# Patient Record
Sex: FEMALE | Race: WHITE | NOT HISPANIC OR LATINO | URBAN - METROPOLITAN AREA
[De-identification: names, ages, dates, MRNs, and addresses within clinical notes are randomized per-mention and may not be internally consistent; named-entity substitution may affect disease eponyms.]

---

## 2018-06-30 ENCOUNTER — OUTPATIENT (OUTPATIENT)
Dept: OUTPATIENT SERVICES | Facility: HOSPITAL | Age: 83
LOS: 1 days | End: 2018-06-30
Payer: COMMERCIAL

## 2018-06-30 ENCOUNTER — INPATIENT (INPATIENT)
Facility: HOSPITAL | Age: 83
LOS: 15 days | Discharge: OTHER INSTITUTION | DRG: 871 | End: 2018-07-16
Attending: HOSPITALIST | Admitting: HOSPITALIST
Payer: MEDICARE

## 2018-06-30 VITALS
TEMPERATURE: 97 F | RESPIRATION RATE: 17 BRPM | HEART RATE: 86 BPM | OXYGEN SATURATION: 95 % | WEIGHT: 113.1 LBS | DIASTOLIC BLOOD PRESSURE: 65 MMHG | SYSTOLIC BLOOD PRESSURE: 120 MMHG

## 2018-06-30 DIAGNOSIS — Z00.00 ENCOUNTER FOR GENERAL ADULT MEDICAL EXAMINATION WITHOUT ABNORMAL FINDINGS: ICD-10-CM

## 2018-06-30 DIAGNOSIS — E03.9 HYPOTHYROIDISM, UNSPECIFIED: ICD-10-CM

## 2018-06-30 DIAGNOSIS — N17.9 ACUTE KIDNEY FAILURE, UNSPECIFIED: ICD-10-CM

## 2018-06-30 DIAGNOSIS — E86.0 DEHYDRATION: ICD-10-CM

## 2018-06-30 DIAGNOSIS — I10 ESSENTIAL (PRIMARY) HYPERTENSION: ICD-10-CM

## 2018-06-30 DIAGNOSIS — M19.90 UNSPECIFIED OSTEOARTHRITIS, UNSPECIFIED SITE: ICD-10-CM

## 2018-06-30 DIAGNOSIS — Z29.9 ENCOUNTER FOR PROPHYLACTIC MEASURES, UNSPECIFIED: ICD-10-CM

## 2018-06-30 DIAGNOSIS — N39.0 URINARY TRACT INFECTION, SITE NOT SPECIFIED: ICD-10-CM

## 2018-06-30 DIAGNOSIS — E78.5 HYPERLIPIDEMIA, UNSPECIFIED: ICD-10-CM

## 2018-06-30 DIAGNOSIS — R53.1 WEAKNESS: ICD-10-CM

## 2018-06-30 LAB
ALBUMIN SERPL ELPH-MCNC: 2.2 G/DL — LOW (ref 3.3–5)
ALP SERPL-CCNC: 151 U/L — HIGH (ref 40–120)
ALT FLD-CCNC: 61 U/L — SIGNIFICANT CHANGE UP (ref 12–78)
ANION GAP SERPL CALC-SCNC: 12 MMOL/L — SIGNIFICANT CHANGE UP (ref 5–17)
APPEARANCE UR: CLEAR — SIGNIFICANT CHANGE UP
APTT BLD: 29.9 SEC — SIGNIFICANT CHANGE UP (ref 27.5–37.4)
AST SERPL-CCNC: 66 U/L — HIGH (ref 15–37)
BASOPHILS # BLD AUTO: 0 K/UL — SIGNIFICANT CHANGE UP (ref 0–0.2)
BASOPHILS NFR BLD AUTO: 0 % — SIGNIFICANT CHANGE UP (ref 0–2)
BILIRUB SERPL-MCNC: 0.3 MG/DL — SIGNIFICANT CHANGE UP (ref 0.2–1.2)
BILIRUB UR-MCNC: NEGATIVE — SIGNIFICANT CHANGE UP
BUN SERPL-MCNC: 45 MG/DL — HIGH (ref 7–23)
CALCIUM SERPL-MCNC: 9.2 MG/DL — SIGNIFICANT CHANGE UP (ref 8.5–10.1)
CHLORIDE SERPL-SCNC: 110 MMOL/L — HIGH (ref 96–108)
CO2 SERPL-SCNC: 18 MMOL/L — LOW (ref 22–31)
COLOR SPEC: YELLOW — SIGNIFICANT CHANGE UP
CREAT SERPL-MCNC: 1.6 MG/DL — HIGH (ref 0.5–1.3)
DIFF PNL FLD: ABNORMAL
EOSINOPHIL # BLD AUTO: 0.82 K/UL — HIGH (ref 0–0.5)
EOSINOPHIL NFR BLD AUTO: 7 % — HIGH (ref 0–6)
GLUCOSE SERPL-MCNC: 119 MG/DL — HIGH (ref 70–99)
GLUCOSE UR QL: NEGATIVE — SIGNIFICANT CHANGE UP
HCT VFR BLD CALC: 33.1 % — LOW (ref 34.5–45)
HGB BLD-MCNC: 11.4 G/DL — LOW (ref 11.5–15.5)
INR BLD: 1.04 RATIO — SIGNIFICANT CHANGE UP (ref 0.88–1.16)
KETONES UR-MCNC: NEGATIVE — SIGNIFICANT CHANGE UP
LACTATE SERPL-SCNC: 0.7 MMOL/L — SIGNIFICANT CHANGE UP (ref 0.7–2)
LEUKOCYTE ESTERASE UR-ACNC: ABNORMAL
LYMPHOCYTES # BLD AUTO: 1.53 K/UL — SIGNIFICANT CHANGE UP (ref 1–3.3)
LYMPHOCYTES # BLD AUTO: 13 % — SIGNIFICANT CHANGE UP (ref 13–44)
MCHC RBC-ENTMCNC: 31.1 PG — SIGNIFICANT CHANGE UP (ref 27–34)
MCHC RBC-ENTMCNC: 34.4 GM/DL — SIGNIFICANT CHANGE UP (ref 32–36)
MCV RBC AUTO: 90.4 FL — SIGNIFICANT CHANGE UP (ref 80–100)
MONOCYTES # BLD AUTO: 0.82 K/UL — SIGNIFICANT CHANGE UP (ref 0–0.9)
MONOCYTES NFR BLD AUTO: 7 % — SIGNIFICANT CHANGE UP (ref 2–14)
NEUTROPHILS # BLD AUTO: 8.24 K/UL — HIGH (ref 1.8–7.4)
NEUTROPHILS NFR BLD AUTO: 48 % — SIGNIFICANT CHANGE UP (ref 43–77)
NITRITE UR-MCNC: POSITIVE
PH UR: 5 — SIGNIFICANT CHANGE UP (ref 5–8)
PLATELET # BLD AUTO: 159 K/UL — SIGNIFICANT CHANGE UP (ref 150–400)
POTASSIUM SERPL-MCNC: 3.5 MMOL/L — SIGNIFICANT CHANGE UP (ref 3.5–5.3)
POTASSIUM SERPL-SCNC: 3.5 MMOL/L — SIGNIFICANT CHANGE UP (ref 3.5–5.3)
PROT SERPL-MCNC: 6.3 G/DL — SIGNIFICANT CHANGE UP (ref 6–8.3)
PROT UR-MCNC: 150 MG/DL
PROTHROM AB SERPL-ACNC: 11.4 SEC — SIGNIFICANT CHANGE UP (ref 9.8–12.7)
RBC # BLD: 3.66 M/UL — LOW (ref 3.8–5.2)
RBC # FLD: 13.9 % — SIGNIFICANT CHANGE UP (ref 10.3–14.5)
SODIUM SERPL-SCNC: 140 MMOL/L — SIGNIFICANT CHANGE UP (ref 135–145)
SP GR SPEC: 1.01 — SIGNIFICANT CHANGE UP (ref 1.01–1.02)
UROBILINOGEN FLD QL: NEGATIVE — SIGNIFICANT CHANGE UP
WBC # BLD: 11.77 K/UL — HIGH (ref 3.8–10.5)
WBC # FLD AUTO: 11.77 K/UL — HIGH (ref 3.8–10.5)

## 2018-06-30 PROCEDURE — 70450 CT HEAD/BRAIN W/O DYE: CPT

## 2018-06-30 PROCEDURE — 99285 EMERGENCY DEPT VISIT HI MDM: CPT

## 2018-06-30 PROCEDURE — 71045 X-RAY EXAM CHEST 1 VIEW: CPT | Mod: 26

## 2018-06-30 PROCEDURE — 99223 1ST HOSP IP/OBS HIGH 75: CPT | Mod: AI,GC

## 2018-06-30 PROCEDURE — 70450 CT HEAD/BRAIN W/O DYE: CPT | Mod: 26

## 2018-06-30 PROCEDURE — 93010 ELECTROCARDIOGRAM REPORT: CPT

## 2018-06-30 RX ORDER — LACTOBACILLUS ACIDOPHILUS 100MM CELL
1 CAPSULE ORAL DAILY
Qty: 0 | Refills: 0 | Status: DISCONTINUED | OUTPATIENT
Start: 2018-06-30 | End: 2018-07-16

## 2018-06-30 RX ORDER — FUROSEMIDE 40 MG
10 TABLET ORAL ONCE
Qty: 0 | Refills: 0 | Status: COMPLETED | OUTPATIENT
Start: 2018-06-30 | End: 2018-06-30

## 2018-06-30 RX ORDER — ASPIRIN/CALCIUM CARB/MAGNESIUM 324 MG
0 TABLET ORAL
Qty: 0 | Refills: 0 | COMMUNITY

## 2018-06-30 RX ORDER — METOPROLOL TARTRATE 50 MG
12.5 TABLET ORAL DAILY
Qty: 0 | Refills: 0 | Status: DISCONTINUED | OUTPATIENT
Start: 2018-06-30 | End: 2018-07-01

## 2018-06-30 RX ORDER — CHOLECALCIFEROL (VITAMIN D3) 125 MCG
1000 CAPSULE ORAL DAILY
Qty: 0 | Refills: 0 | Status: DISCONTINUED | OUTPATIENT
Start: 2018-06-30 | End: 2018-07-16

## 2018-06-30 RX ORDER — METOPROLOL TARTRATE 50 MG
0 TABLET ORAL
Qty: 0 | Refills: 0 | COMMUNITY

## 2018-06-30 RX ORDER — CEFTRIAXONE 500 MG/1
1 INJECTION, POWDER, FOR SOLUTION INTRAMUSCULAR; INTRAVENOUS EVERY 24 HOURS
Qty: 0 | Refills: 0 | Status: DISCONTINUED | OUTPATIENT
Start: 2018-07-01 | End: 2018-07-01

## 2018-06-30 RX ORDER — CEFTRIAXONE 500 MG/1
1 INJECTION, POWDER, FOR SOLUTION INTRAMUSCULAR; INTRAVENOUS ONCE
Qty: 0 | Refills: 0 | Status: COMPLETED | OUTPATIENT
Start: 2018-06-30 | End: 2018-06-30

## 2018-06-30 RX ORDER — ASPIRIN/CALCIUM CARB/MAGNESIUM 324 MG
81 TABLET ORAL DAILY
Qty: 0 | Refills: 0 | Status: DISCONTINUED | OUTPATIENT
Start: 2018-06-30 | End: 2018-07-16

## 2018-06-30 RX ORDER — LORATADINE 10 MG/1
10 TABLET ORAL DAILY
Qty: 0 | Refills: 0 | Status: DISCONTINUED | OUTPATIENT
Start: 2018-06-30 | End: 2018-07-16

## 2018-06-30 RX ORDER — LANOLIN ALCOHOL/MO/W.PET/CERES
0 CREAM (GRAM) TOPICAL
Qty: 0 | Refills: 0 | COMMUNITY

## 2018-06-30 RX ORDER — LEVOTHYROXINE SODIUM 125 MCG
0 TABLET ORAL
Qty: 0 | Refills: 0 | COMMUNITY

## 2018-06-30 RX ORDER — IBUPROFEN 200 MG
800 TABLET ORAL DAILY
Qty: 0 | Refills: 0 | Status: DISCONTINUED | OUTPATIENT
Start: 2018-06-30 | End: 2018-07-16

## 2018-06-30 RX ORDER — SODIUM CHLORIDE 9 MG/ML
1000 INJECTION INTRAMUSCULAR; INTRAVENOUS; SUBCUTANEOUS
Qty: 0 | Refills: 0 | Status: COMPLETED | OUTPATIENT
Start: 2018-06-30 | End: 2018-06-30

## 2018-06-30 RX ORDER — IPRATROPIUM/ALBUTEROL SULFATE 18-103MCG
3 AEROSOL WITH ADAPTER (GRAM) INHALATION ONCE
Qty: 0 | Refills: 0 | Status: COMPLETED | OUTPATIENT
Start: 2018-06-30 | End: 2018-06-30

## 2018-06-30 RX ORDER — SODIUM CHLORIDE 9 MG/ML
1000 INJECTION INTRAMUSCULAR; INTRAVENOUS; SUBCUTANEOUS ONCE
Qty: 0 | Refills: 0 | Status: COMPLETED | OUTPATIENT
Start: 2018-06-30 | End: 2018-06-30

## 2018-06-30 RX ORDER — LEVOTHYROXINE SODIUM 125 MCG
75 TABLET ORAL DAILY
Qty: 0 | Refills: 0 | Status: DISCONTINUED | OUTPATIENT
Start: 2018-06-30 | End: 2018-07-16

## 2018-06-30 RX ORDER — ENOXAPARIN SODIUM 100 MG/ML
30 INJECTION SUBCUTANEOUS EVERY 24 HOURS
Qty: 0 | Refills: 0 | Status: DISCONTINUED | OUTPATIENT
Start: 2018-06-30 | End: 2018-07-16

## 2018-06-30 RX ORDER — SIMVASTATIN 20 MG/1
10 TABLET, FILM COATED ORAL AT BEDTIME
Qty: 0 | Refills: 0 | Status: DISCONTINUED | OUTPATIENT
Start: 2018-06-30 | End: 2018-07-16

## 2018-06-30 RX ADMIN — Medication 3 MILLILITER(S): at 21:40

## 2018-06-30 RX ADMIN — Medication 10 MILLIGRAM(S): at 23:10

## 2018-06-30 RX ADMIN — CEFTRIAXONE 1 GRAM(S): 500 INJECTION, POWDER, FOR SOLUTION INTRAMUSCULAR; INTRAVENOUS at 20:46

## 2018-06-30 RX ADMIN — SODIUM CHLORIDE 1000 MILLILITER(S): 9 INJECTION INTRAMUSCULAR; INTRAVENOUS; SUBCUTANEOUS at 20:45

## 2018-06-30 RX ADMIN — CEFTRIAXONE 100 GRAM(S): 500 INJECTION, POWDER, FOR SOLUTION INTRAMUSCULAR; INTRAVENOUS at 20:06

## 2018-06-30 RX ADMIN — SODIUM CHLORIDE 1000 MILLILITER(S): 9 INJECTION INTRAMUSCULAR; INTRAVENOUS; SUBCUTANEOUS at 20:43

## 2018-06-30 RX ADMIN — SODIUM CHLORIDE 1000 MILLILITER(S): 9 INJECTION INTRAMUSCULAR; INTRAVENOUS; SUBCUTANEOUS at 19:18

## 2018-06-30 NOTE — ED PROVIDER NOTE - CARE PLAN
Principal Discharge DX:	Dehydration  Secondary Diagnosis:	Generalized weakness Principal Discharge DX:	Dehydration  Secondary Diagnosis:	Generalized weakness  Secondary Diagnosis:	UTI (urinary tract infection)

## 2018-06-30 NOTE — H&P ADULT - PROBLEM SELECTOR PLAN 9
IMPROVE VTE Individual Risk Assessment          RISK                                                          Points  [  ] Previous VTE                                                3  [  ] Thrombophilia                                            2  [  ] Lower limb paralysis                                  2        (unable to hold up >15 seconds)    [  ] Current Cancer                                            2         (within 6 months)  [  ] Immobilization > 24 hrs                             1  [  ] ICU/CCU stay > 24 hours                           1  [ X ] Age > 60                                                1    IMPROVE VTE Score:  1    DVT ppx: heparin

## 2018-06-30 NOTE — H&P ADULT - NSHPSOCIALHISTORY_GEN_ALL_CORE
Marital Status:  Lives with:   Occupation:     Tobacco Use:   Alcohol Use:  Substance Use:    Immunization Hx  [  ] Flu shot                          Date:   [  ] Pneumonia shot           Date:  [  ] Tetanus                          Date:    [  ] Advanced Directives: [   ] declined   [  ] health care proxy: Marital Status:   Lives with:   Occupation: Retired    Tobacco Use: Former smoker, quit>50yrs ago   Alcohol Use: Wine on occasion  Substance Use: Denies    [  ] Advanced Directives: [   ] declined   [  ] health care proxy:

## 2018-06-30 NOTE — H&P ADULT - PSH
Hip Replacement  left hip  Knee Replacement  b/l  S/P cataract surgery  bialteral  S/P hip replacement    S/P knee replacement, bilateral  Partial R) hip  S/P ovarian cystectomy Knee Replacement  b/l  S/P cataract surgery  bialteral  S/P hip replacement    S/P knee replacement, bilateral  Partial R) hip  S/P ovarian cystectomy

## 2018-06-30 NOTE — ED ADULT NURSE REASSESSMENT NOTE - NS ED NURSE REASSESS COMMENT FT1
2106 Pt. in no distress, interacting well with nurses and family. IV abx completed as ordered, no adverse rxn. Family at bedside. IVF as ordered. Pending admission, will monitor - olivia rn

## 2018-06-30 NOTE — ED ADULT NURSE NOTE - PSH
Hip Replacement  left hip  Knee Replacement  b/l  S/P cataract surgery  bialteral  S/P hip replacement    S/P knee replacement, bilateral  Partial R) hip  S/P ovarian cystectomy

## 2018-06-30 NOTE — H&P ADULT - PROBLEM SELECTOR PLAN 3
Likely due to UTI and dehydration  - PT consult Likely due to UTI and dehydration  - Baseline walks with cane and walker   - PT consult

## 2018-06-30 NOTE — H&P ADULT - PROBLEM SELECTOR PLAN 7
IMPROVE VTE Individual Risk Assessment          RISK                                                          Points  [  ] Previous VTE                                                3  [  ] Thrombophilia                                            2  [  ] Lower limb paralysis                                  2        (unable to hold up >15 seconds)    [  ] Current Cancer                                            2         (within 6 months)  [  ] Immobilization > 24 hrs                             1  [  ] ICU/CCU stay > 24 hours                           1  [ X ] Age > 60                                                1    IMPROVE VTE Score:  1    DVT ppx: heparin No hx of hypertension per patient but patient on metoprolol. Denies cardiac hx.   - Continue metoprolol for now

## 2018-06-30 NOTE — H&P ADULT - NSHPREVIEWOFSYSTEMS_GEN_ALL_CORE
CONSTITUTIONAL:  No fever, chills,  weight loss, weakness or fatigue.  HEENT:  Eyes:  No visual loss, blurred vision, diplopia or yellow sclerae.   Ears, Nose, Throat:  No hearing loss, congestion, runny nose or dysphagia.  CARDIOVASCULAR:  No chest pain, chest pressure or chest discomfort. No palpitations or edema.  RESPIRATORY:  No dyspnea, cough, congestion, wheeze.   GASTROINTESTINAL:  No abdominal pain, nausea, vomiting or diarrhea.   GENITOURINARY:  No dysuria, urinary frequency or hematuria.   NEUROLOGICAL:  No headache, dizziness, syncope, paralysis, ataxia, numbness or tingling in the extremities. No change in bowel or bladder control.  MUSCULOSKELETAL:  No muscle, back pain, joint pain or stiffness.  SKIN: No itch, rash, lesions.   HEMATOLOGIC:  No anemia, bleeding or bruising.  PSYCHIATRIC:  No history of depression or anxiety. CONSTITUTIONAL:  No fever, chills,  weight loss. Weakness and lightheadedness.  HEENT:  Eyes:  No visual loss, blurred vision, diplopia or yellow sclerae.   Ears, Nose, Throat:  No hearing loss, congestion, runny nose or dysphagia.  CARDIOVASCULAR:  No chest pain, chest pressure or chest discomfort. No palpitations or edema.  RESPIRATORY:  No dyspnea, cough, congestion, wheeze.   GASTROINTESTINAL:  No abdominal pain, nausea, vomiting or diarrhea.   GENITOURINARY:  No dysuria, urinary frequency or hematuria.   NEUROLOGICAL:  No headache, dizziness, syncope, paralysis, ataxia, numbness or tingling in the extremities.  MUSCULOSKELETAL:  No muscle, back pain, joint pain or stiffness.  SKIN: No itch, rash, lesions.   HEMATOLOGIC:  No anemia, bleeding or bruising.  PSYCHIATRIC:  No history of depression or anxiety.

## 2018-06-30 NOTE — ED ADULT NURSE REASSESSMENT NOTE - NS ED NURSE REASSESS COMMENT FT1
1900 Received report from outgoing RN, who states pt. at [facility] for head CT since our CT machine is down. Will assess upon arrival. As per previous RN, pt. alert, oriented, with some periods of confusion. Pending OLIVER - olivia rn

## 2018-06-30 NOTE — H&P ADULT - HISTORY OF PRESENT ILLNESS
87F with PMH of HLD and hypothyroidism presents with weakness and decreased PO intake x1 week. Brought in from home by daughter. Patient had a fever (101F) 5 days ago. Patient was lying in bed naked last night and was walking around not knowing what she was doing.    In ED, vitals stable. Labs significant for WBC 11.77, H/H 11.4/33.1, BUN/Cr 1.6/119, AST 66, alk phos 151. Troponin negative. CPK 3.9. UA positive. CXR pending. CT head negative. Given 2L NS bolus. Received dose of rocephin. 87F with PMH of hypothyroidism, HLD, and arthritis presents to ED with weakness for the past 5 days. This afternoon she felt more dizzy and weak. Family is at bedside and states that patient has also not been eating well this past week after receiving some bad news about her . Of note patient was lying in bed naked last night and was walking around not knowing what she was doing according to her daughter. Denies lightheadedness, CP, SOB, abd pain, dysuria.     In ED, vitals stable. Labs significant for WBC 11.77, H/H 11.4/33.1, BUN/Cr 1.6/119, AST 66, alk phos 151. Troponin negative. CPK 3.9. UA positive. CXR pending. CT head negative. Given 2L NS bolus. Received dose of rocephin.

## 2018-06-30 NOTE — ED PROVIDER NOTE - CONSTITUTIONAL, MLM
normal... thin, frail appearing, awake, alert, oriented to person, place, time/situation and in no apparent distress.

## 2018-06-30 NOTE — H&P ADULT - PROBLEM SELECTOR PLAN 4
- Continue levothyroxine Likely due to dehydration.   - Monitor Cr levels   - Monitor electrolytes and replete as needed

## 2018-06-30 NOTE — H&P ADULT - PROBLEM SELECTOR PLAN 2
- continue with IVF for 8 hrs Given 2L NS bolus. Hold off on IVF as pt seems to be fluid overloaded on exam. Given 2L NS bolus. Hold off on IVF as pt seems to be fluid overloaded on exam.  - Give small dose of lasix

## 2018-06-30 NOTE — H&P ADULT - PROBLEM SELECTOR PLAN 6
IMPROVE VTE Individual Risk Assessment          RISK                                                          Points  [  ] Previous VTE                                                3  [  ] Thrombophilia                                            2  [  ] Lower limb paralysis                                  2        (unable to hold up >15 seconds)    [  ] Current Cancer                                            2         (within 6 months)  [  ] Immobilization > 24 hrs                             1  [  ] ICU/CCU stay > 24 hours                           1  [ X ] Age > 60                                                1    IMPROVE VTE Score:  1    DVT ppx: heparin No hx of hypertension per patient but patient on metoprolol. Denies cardiac hx.   - Continue metoprolol for now - Continue statin

## 2018-06-30 NOTE — ED ADULT NURSE NOTE - OBJECTIVE STATEMENT
Pt to ED c/o lethargy and decrease in PO intake. Family reports pt being primary caregiver to  at home that is becoming increasingly ill. Family reports fever earlier in the week and observed as altered, pt is afebrile on arrival, pt is AxOx4, will continue to monitor

## 2018-06-30 NOTE — ED ADULT NURSE NOTE - PMH
Anxiety    Cataract  left eye  Clostridium Difficile Infection    Edema of Leg  b/l LE  Hypercholesteremia    Hyperlipidemia    Hypothyroid    Hypothyroidism    Migraine    Osteoporosis    Seasonal Allergies

## 2018-06-30 NOTE — H&P ADULT - PMH
Anxiety    Cataract  left eye  Edema of Leg  b/l LE  Hyperlipidemia    Hypothyroidism    Migraine    Osteoporosis Arthritis    Cataract  s/p cataract surgery  Edema of Leg  b/l LE  Hyperlipidemia    Hypothyroidism    Migraine    Osteoporosis

## 2018-06-30 NOTE — ED PROVIDER NOTE - OBJECTIVE STATEMENT
88 yo female hx of HLD, hypothyroid BIB daughter c/o generalized weakness decreased PO intake x 1 week.  +fever tmax 101 5 days ago with cough today.  No nausea/vomiting/diarrhea.  No abdominal pain.  PMD Dr. Eduardo Martin.  Also as per daughter, patient was lying in bed naked last night and was walking around not knowing what she was doing.

## 2018-06-30 NOTE — H&P ADULT - NSHPPHYSICALEXAM_GEN_ALL_CORE
Vital Signs Last 24 Hrs  T(C): 36.7 (30 Jun 2018 20:37), Max: 37.1 (30 Jun 2018 19:16)  T(F): 98.1 (30 Jun 2018 20:37), Max: 98.8 (30 Jun 2018 19:16)  HR: 75 (30 Jun 2018 20:37) (75 - 86)  BP: 152/83 (30 Jun 2018 20:37) (95/78 - 152/83)  RR: 16 (30 Jun 2018 20:37) (16 - 18)  SpO2: 98% (30 Jun 2018 20:37) (95% - 98%)

## 2018-06-30 NOTE — ED ADULT NURSE REASSESSMENT NOTE - NS ED NURSE REASSESS COMMENT FT1
2200 Pt. with improvement in slight wheezing after duoneb. Family and Resident doctors at bedside. Pending complete admission orders and bed placement - olivia page

## 2018-06-30 NOTE — H&P ADULT - PROBLEM SELECTOR PLAN 1
Admit to medicine.   - Continue with ceftriaxone  - f/u urine cx Admit to medicine.   - Continue with ceftriaxone  - f/u blood and urine culture

## 2018-07-01 DIAGNOSIS — R78.81 BACTEREMIA: ICD-10-CM

## 2018-07-01 DIAGNOSIS — N10 ACUTE PYELONEPHRITIS: ICD-10-CM

## 2018-07-01 DIAGNOSIS — R41.82 ALTERED MENTAL STATUS, UNSPECIFIED: ICD-10-CM

## 2018-07-01 LAB
ALBUMIN SERPL ELPH-MCNC: 2.1 G/DL — LOW (ref 3.3–5)
ALP SERPL-CCNC: 154 U/L — HIGH (ref 40–120)
ALT FLD-CCNC: 57 U/L — SIGNIFICANT CHANGE UP (ref 12–78)
ANION GAP SERPL CALC-SCNC: 11 MMOL/L — SIGNIFICANT CHANGE UP (ref 5–17)
AST SERPL-CCNC: 60 U/L — HIGH (ref 15–37)
BILIRUB SERPL-MCNC: 0.4 MG/DL — SIGNIFICANT CHANGE UP (ref 0.2–1.2)
BUN SERPL-MCNC: 32 MG/DL — HIGH (ref 7–23)
CALCIUM SERPL-MCNC: 8.4 MG/DL — LOW (ref 8.5–10.1)
CHLORIDE SERPL-SCNC: 110 MMOL/L — HIGH (ref 96–108)
CO2 SERPL-SCNC: 20 MMOL/L — LOW (ref 22–31)
CREAT SERPL-MCNC: 1.2 MG/DL — SIGNIFICANT CHANGE UP (ref 0.5–1.3)
E COLI DNA BLD POS QL NAA+NON-PROBE: SIGNIFICANT CHANGE UP
GLUCOSE SERPL-MCNC: 92 MG/DL — SIGNIFICANT CHANGE UP (ref 70–99)
GRAM STN FLD: SIGNIFICANT CHANGE UP
HCT VFR BLD CALC: 31.4 % — LOW (ref 34.5–45)
HGB BLD-MCNC: 10.5 G/DL — LOW (ref 11.5–15.5)
MCHC RBC-ENTMCNC: 30.5 PG — SIGNIFICANT CHANGE UP (ref 27–34)
MCHC RBC-ENTMCNC: 33.4 GM/DL — SIGNIFICANT CHANGE UP (ref 32–36)
MCV RBC AUTO: 91.3 FL — SIGNIFICANT CHANGE UP (ref 80–100)
METHOD TYPE: SIGNIFICANT CHANGE UP
NRBC # BLD: 0 /100 WBCS — SIGNIFICANT CHANGE UP (ref 0–0)
PLATELET # BLD AUTO: 154 K/UL — SIGNIFICANT CHANGE UP (ref 150–400)
POTASSIUM SERPL-MCNC: 3.1 MMOL/L — LOW (ref 3.5–5.3)
POTASSIUM SERPL-SCNC: 3.1 MMOL/L — LOW (ref 3.5–5.3)
PROT SERPL-MCNC: 5.9 G/DL — LOW (ref 6–8.3)
RBC # BLD: 3.44 M/UL — LOW (ref 3.8–5.2)
RBC # FLD: 14.2 % — SIGNIFICANT CHANGE UP (ref 10.3–14.5)
SODIUM SERPL-SCNC: 141 MMOL/L — SIGNIFICANT CHANGE UP (ref 135–145)
SPECIMEN SOURCE: SIGNIFICANT CHANGE UP
WBC # BLD: 15.01 K/UL — HIGH (ref 3.8–10.5)
WBC # FLD AUTO: 15.01 K/UL — HIGH (ref 3.8–10.5)

## 2018-07-01 PROCEDURE — 99233 SBSQ HOSP IP/OBS HIGH 50: CPT

## 2018-07-01 RX ORDER — PIPERACILLIN AND TAZOBACTAM 4; .5 G/20ML; G/20ML
3.38 INJECTION, POWDER, LYOPHILIZED, FOR SOLUTION INTRAVENOUS EVERY 8 HOURS
Qty: 0 | Refills: 0 | Status: DISCONTINUED | OUTPATIENT
Start: 2018-07-01 | End: 2018-07-05

## 2018-07-01 RX ORDER — VANCOMYCIN HCL 1 G
1000 VIAL (EA) INTRAVENOUS ONCE
Qty: 0 | Refills: 0 | Status: COMPLETED | OUTPATIENT
Start: 2018-07-01 | End: 2018-07-01

## 2018-07-01 RX ORDER — METOPROLOL TARTRATE 50 MG
12.5 TABLET ORAL DAILY
Qty: 0 | Refills: 0 | Status: DISCONTINUED | OUTPATIENT
Start: 2018-07-01 | End: 2018-07-16

## 2018-07-01 RX ORDER — PIPERACILLIN AND TAZOBACTAM 4; .5 G/20ML; G/20ML
3.38 INJECTION, POWDER, LYOPHILIZED, FOR SOLUTION INTRAVENOUS ONCE
Qty: 0 | Refills: 0 | Status: COMPLETED | OUTPATIENT
Start: 2018-07-01 | End: 2018-07-01

## 2018-07-01 RX ORDER — POTASSIUM CHLORIDE 20 MEQ
40 PACKET (EA) ORAL EVERY 4 HOURS
Qty: 0 | Refills: 0 | Status: COMPLETED | OUTPATIENT
Start: 2018-07-01 | End: 2018-07-01

## 2018-07-01 RX ADMIN — Medication 81 MILLIGRAM(S): at 12:07

## 2018-07-01 RX ADMIN — Medication 800 MILLIGRAM(S): at 12:07

## 2018-07-01 RX ADMIN — Medication 75 MICROGRAM(S): at 05:58

## 2018-07-01 RX ADMIN — Medication 40 MILLIEQUIVALENT(S): at 09:59

## 2018-07-01 RX ADMIN — ENOXAPARIN SODIUM 30 MILLIGRAM(S): 100 INJECTION SUBCUTANEOUS at 05:58

## 2018-07-01 RX ADMIN — Medication 1000 UNIT(S): at 12:07

## 2018-07-01 RX ADMIN — LORATADINE 10 MILLIGRAM(S): 10 TABLET ORAL at 12:07

## 2018-07-01 RX ADMIN — PIPERACILLIN AND TAZOBACTAM 25 GRAM(S): 4; .5 INJECTION, POWDER, LYOPHILIZED, FOR SOLUTION INTRAVENOUS at 21:44

## 2018-07-01 RX ADMIN — PIPERACILLIN AND TAZOBACTAM 200 GRAM(S): 4; .5 INJECTION, POWDER, LYOPHILIZED, FOR SOLUTION INTRAVENOUS at 13:35

## 2018-07-01 RX ADMIN — Medication 200 MILLIGRAM(S): at 00:08

## 2018-07-01 RX ADMIN — Medication 1 TABLET(S): at 12:07

## 2018-07-01 RX ADMIN — Medication 40 MILLIEQUIVALENT(S): at 13:36

## 2018-07-01 RX ADMIN — SIMVASTATIN 10 MILLIGRAM(S): 20 TABLET, FILM COATED ORAL at 21:44

## 2018-07-01 RX ADMIN — Medication 12.5 MILLIGRAM(S): at 05:57

## 2018-07-01 NOTE — PROGRESS NOTE ADULT - SUBJECTIVE AND OBJECTIVE BOX
INTERVAL HPI/OVERNIGHT EVENTS: Patient seen and examined at bedside. No acute events overnight. Complains of weakness this morning. Denies any other symptoms.    MEDICATIONS  (STANDING):  aspirin  chewable 81 milliGRAM(s) Oral daily  cholecalciferol 1000 Unit(s) Oral daily  enoxaparin Injectable 30 milliGRAM(s) SubCutaneous every 24 hours  ibuprofen  Tablet 800 milliGRAM(s) Oral daily  lactobacillus acidophilus 1 Tablet(s) Oral daily  levothyroxine 75 MICROGram(s) Oral daily  loratadine 10 milliGRAM(s) Oral daily  metoprolol succinate ER 12.5 milliGRAM(s) Oral daily  piperacillin/tazobactam IVPB. 3.375 Gram(s) IV Intermittent every 8 hours  simvastatin 10 milliGRAM(s) Oral at bedtime    MEDICATIONS  (PRN):  guaiFENesin    Syrup 200 milliGRAM(s) Oral every 6 hours PRN Cough      Allergies    erythromycin (Hives)    Intolerances        CONSTITUTIONAL: + weakness, no fevers or chills  RESPIRATORY: No cough, wheezing, hemoptysis; No shortness of breath  Cvs: No chest pain or palpitations  Gi: No abdominal pain. No nausea, vomiting, diarrhea or constipation. No melena or hematochezia.  Neuro: + weakness non focal    All other review of systems is negative unless indicated above.    Vital Signs Last 24 Hrs  T(C): 37.1 (2018 05:35), Max: 37.1 (2018 19:16)  T(F): 98.7 (2018 05:35), Max: 98.8 (2018 19:16)  HR: 90 (2018 05:35) (75 - 95)  BP: 138/82 (2018 05:35) (95/78 - 152/83)  BP(mean): --  RR: 16 (2018 05:35) (16 - 22)  SpO2: 97% (2018 05:35) (94% - 98%)    General: NAD  Neurology: alert and awake, nonfocal, motor strength 4/5 bl, unable to assess gait  Respiratory: CTA B/L  CV: RRR, S1S2  Abdominal: Soft, NT, ND +BS  Extremities: No edema, + peripheral pulses      LABS:                        10.5   15.01 )-----------( 154      ( 2018 05:56 )             31.4     07    141  |  110<H>  |  32<H>  ----------------------------<  92  3.1<L>   |  20<L>  |  1.20    Ca    8.4<L>      2018 05:56    TPro  5.9<L>  /  Alb  2.1<L>  /  TBili  0.4  /  DBili  x   /  AST  60<H>  /  ALT  57  /  AlkPhos  154<H>      PT/INR - ( 2018 16:54 )   PT: 11.4 sec;   INR: 1.04 ratio         PTT - ( 2018 16:54 )  PTT:29.9 sec  Urinalysis Basic - ( 2018 19:53 )    Color: Yellow / Appearance: Clear / S.010 / pH: x  Gluc: x / Ketone: Negative  / Bili: Negative / Urobili: Negative   Blood: x / Protein: 150 mg/dL / Nitrite: Positive   Leuk Esterase: Moderate / RBC: 6-10 /HPF / WBC 26-50   Sq Epi: x / Non Sq Epi: Moderate / Bacteria: Many        RADIOLOGY & ADDITIONAL TESTS:

## 2018-07-01 NOTE — PATIENT PROFILE ADULT. - FUNCTIONAL SCREEN CURRENT LEVEL: COMMUNICATION, MLM
Progress Notes by Gianni Fajardo, [facility] at 06/28/18 11:49 AM     Author:  Gianni Fajardo CMA Service:  (none) Author Type:  Certified Medical Assistant     Filed:  06/28/18 11:49 AM Encounter Date:  2018 Status:  Signed     :  Gianni Fajardo, [facility] (Certified Medical Assistant)              We have recommended to patient/parent/guardian that they should wait 15 minutes after receiving an injection. [JM1.1T] [address] [facility] [address], [address] [facility], [address][JM1.1M]    Electronically Signed by:    Gianni Fajardo CMA , 2018[JM1.1T]      Revision History        User Key Date/Time User Provider Type Action    > JM1.1 06/28/18 11:49 AM Cristina Bermudez, [facility] Certified Medical Assistant Sign    M - Manual, T - Template
(0) understands/communicates without difficulty

## 2018-07-01 NOTE — PROVIDER CONTACT NOTE (CRITICAL VALUE NOTIFICATION) - TEST AND RESULT REPORTED:
blood culture from 6/30/18 gram negative rods in aerobic bottle and gram positive cocci in clusters in the anaerobic bottle

## 2018-07-01 NOTE — PROGRESS NOTE ADULT - PROBLEM SELECTOR PLAN 1
- Blood Cx +  GNR   - ID Dr. Connell consulted  - Switched to zosyn for broader coverage given rising WBC count as well  - f/u repeat blood cultures and urine cx

## 2018-07-01 NOTE — CONSULT NOTE ADULT - SUBJECTIVE AND OBJECTIVE BOX
HPI:  87F with PMH of hypothyroidism, HLD, and arthritis presents to ED with weakness for the past 5 days PTA. She felt more dizzy and weak. Family was at bedside and states that patient has also not been eating well this past week after receiving some bad news about her . Of note patient was lying in bed naked last night and was walking around not knowing what she was doing according to her daughter. Denies lightheadedness, CP, SOB, abd pain, dysuria.     In ED, vitals stable. Labs significant for WBC 11.77, H/H 11.4/33.1, BUN/Cr 1.6/119, AST 66, alk phos 151. Troponin negative. CPK 3.9. UA positive. CXR pending. CT head negative. Given 2L NS bolus. Received dose of rocephin. (2018 20:55)    When I see the patient she is upset reporting that her  is here somewhere and no one will acknowledge this and she feel certain something suspicious is going on here.      PAST MEDICAL & SURGICAL HISTORY:  Arthritis  Hyperlipidemia  Osteoporosis  Migraine  Cataract: s/p cataract surgery  Edema of Leg: b/l LE  Hypothyroidism  S/P cataract surgery: bialteral  S/P ovarian cystectomy  S/P hip replacement  S/P knee replacement, bilateral: Partial R) hip  Knee Replacement: b/l      Antimicrobials  cefTRIAXone   IVPB 1 Gram(s) IV Intermittent every 24 hours      Immunological      Other  aspirin  chewable 81 milliGRAM(s) Oral daily  cholecalciferol 1000 Unit(s) Oral daily  enoxaparin Injectable 30 milliGRAM(s) SubCutaneous every 24 hours  guaiFENesin    Syrup 200 milliGRAM(s) Oral every 6 hours PRN  ibuprofen  Tablet 800 milliGRAM(s) Oral daily  lactobacillus acidophilus 1 Tablet(s) Oral daily  levothyroxine 75 MICROGram(s) Oral daily  loratadine 10 milliGRAM(s) Oral daily  metoprolol succinate ER 12.5 milliGRAM(s) Oral daily  potassium chloride    Tablet ER 40 milliEquivalent(s) Oral every 4 hours  simvastatin 10 milliGRAM(s) Oral at bedtime      Allergies    erythromycin (Hives)    Intolerances      SOCIAL HISTORY: no toxic habits reported    FAMILY HISTORY:  No pertinent family history in first degree relatives      ROS:    EYES:  Negative  blurry vision or double vision  GASTROINTESTINAL:  Negative for nausea, vomiting, diarrhea  -otherwise negative except for subjective    Vital Signs Last 24 Hrs  T(C): 37.1 (2018 05:35), Max: 37.1 (2018 19:16)  T(F): 98.7 (2018 05:35), Max: 98.8 (2018 19:16)  HR: 90 (2018 05:35) (75 - 95)  BP: 138/82 (2018 05:35) (95/78 - 152/83)  BP(mean): --  RR: 16 (2018 05:35) (16 - 22)  SpO2: 97% (2018 05:35) (94% - 98%)    PE:  WDWN in no distress  HEENT:  NC, PERRL, sclerae anicteric, conjunctivae clear, EOMI.  Sinuses nontender, no nasal exudate.  No buccal or pharyngeal lesions, erythema or exudate  Neck:  Supple, no adenopathy  Lungs:  No accessory muscle use, bilaterally clear to auscultation  Cor:  RRR, S1, S2, no murmur appreciated  Abd:  Symmetric, normoactive BS.  Soft, nontender, no masses, guarding or rebound.  Liver and spleen not enlarged  Extrem:  No cyanosis or edema  Skin:  No rashes.  Neuro: grossly intact, but not clearly well oriented  Musc: moving all limbs freely, no focal deficits    LABS:                        10.5   15.01 )-----------( 154      ( 2018 05:56 )             31.4       WBC Count: 15.01 K/uL (18 @ 05:56)  WBC Count: 11.77 K/uL (18 @ 16:59)          141  |  110<H>  |  32<H>  ----------------------------<  92  3.1<L>   |  20<L>  |  1.20    Ca    8.4<L>      2018 05:56    TPro  5.9<L>  /  Alb  2.1<L>  /  TBili  0.4  /  DBili  x   /  AST  60<H>  /  ALT  57  /  AlkPhos  154<H>        Creatinine, Serum: 1.20 mg/dL (18 @ 05:56)  Creatinine, Serum: 1.60 mg/dL (18 @ 16:36)      Urinalysis Basic - ( 2018 19:53 )    Color: Yellow / Appearance: Clear / S.010 / pH: x  Gluc: x / Ketone: Negative  / Bili: Negative / Urobili: Negative   Blood: x / Protein: 150 mg/dL / Nitrite: Positive   Leuk Esterase: Moderate / RBC: 6-10 /HPF / WBC 26-50   Sq Epi: x / Non Sq Epi: Moderate / Bacteria: Many        MICROBIOLOGY:  Culture Results:   Growth in aerobic bottle: Gram Negative Rods ( @ 18:56)  Culture Results:   Growth in aerobic bottle: Gram Negative Rods    Culture - Blood (18 @ 18:56)    -  Escherichia coli: Detec    Gram Stain:   Growth in aerobic bottle: Gram Negative Rods    Specimen Source: .Blood Blood    Organism: Blood Culture PCR    Culture Results:   Growth in aerobic bottle: Gram Negative Rods    RADIOLOGY & ADDITIONAL STUDIES:    --< from: Xray Chest 1 View AP/PA (18 @ 19:46) >    EXAM:  XR CHEST AP OR PA 1V                            PROCEDURE DATE:  2018          INTERPRETATION:  Dehydration    AP chest. Prior dated 2015.    Low lung volumes. No change heart mediastinum. Bilateral chronic   accentuated bronchovascular markings. No gross acute infiltrate or   pleural collection.    Impression: No gross acute infiltrate.

## 2018-07-01 NOTE — CONSULT NOTE ADULT - PROBLEM SELECTOR RECOMMENDATION 4
will re-evaluate clinical history as cognition improves.    Thank you for consulting us and involving us in the management of this most interesting and challenging case.     We will follow along in the care of this patient.

## 2018-07-01 NOTE — PROGRESS NOTE ADULT - ASSESSMENT
87F with PMH of hypothyroidism, HLD, and arthritis presents with weakness, admitted with UTI, GNR bacteremia.

## 2018-07-01 NOTE — PROGRESS NOTE ADULT - PROBLEM SELECTOR PLAN 7
No hx of hypertension per patient but patient on metoprolol. Denies cardiac hx.   - Continue metoprolol for now

## 2018-07-01 NOTE — CHART NOTE - NSCHARTNOTEFT_GEN_A_CORE
Notified by nurse of Growth in anaerobic bottle: Gram Positive Cocci in Clusters.  Will give one dose of Vancomycin.  Will defer to day team if they want to continue it.  Vital Signs Last 24 Hrs  T(C): 36.6 (01 Jul 2018 20:25), Max: 37.1 (01 Jul 2018 05:35)  T(F): 97.8 (01 Jul 2018 20:25), Max: 98.7 (01 Jul 2018 05:35)  HR: 89 (01 Jul 2018 20:25) (89 - 90)  BP: 131/79 (01 Jul 2018 20:25) (131/79 - 148/84)  BP(mean): --  RR: 17 (01 Jul 2018 20:25) (16 - 17)  SpO2: 95% (01 Jul 2018 20:25) (91% - 97%)

## 2018-07-01 NOTE — PATIENT PROFILE ADULT. - REASON FOR ADMISSION
They sent me to here because I have a UTI "They sent me to here because I have a UTI"  Brought in by EMS with AMS

## 2018-07-02 LAB
-  AMIKACIN: SIGNIFICANT CHANGE UP
-  AMOXICILLIN/CLAVULANIC ACID: SIGNIFICANT CHANGE UP
-  AMPICILLIN/SULBACTAM: SIGNIFICANT CHANGE UP
-  AMPICILLIN: SIGNIFICANT CHANGE UP
-  AZTREONAM: SIGNIFICANT CHANGE UP
-  CEFAZOLIN: SIGNIFICANT CHANGE UP
-  CEFEPIME: SIGNIFICANT CHANGE UP
-  CEFOXITIN: SIGNIFICANT CHANGE UP
-  CEFTRIAXONE: SIGNIFICANT CHANGE UP
-  CIPROFLOXACIN: SIGNIFICANT CHANGE UP
-  COAGULASE NEGATIVE STAPHYLOCOCCUS: SIGNIFICANT CHANGE UP
-  ERTAPENEM: SIGNIFICANT CHANGE UP
-  GENTAMICIN: SIGNIFICANT CHANGE UP
-  IMIPENEM: SIGNIFICANT CHANGE UP
-  LEVOFLOXACIN: SIGNIFICANT CHANGE UP
-  MEROPENEM: SIGNIFICANT CHANGE UP
-  NITROFURANTOIN: SIGNIFICANT CHANGE UP
-  PIPERACILLIN/TAZOBACTAM: SIGNIFICANT CHANGE UP
-  TIGECYCLINE: SIGNIFICANT CHANGE UP
-  TOBRAMYCIN: SIGNIFICANT CHANGE UP
-  TRIMETHOPRIM/SULFAMETHOXAZOLE: SIGNIFICANT CHANGE UP
ANION GAP SERPL CALC-SCNC: 11 MMOL/L — SIGNIFICANT CHANGE UP (ref 5–17)
BUN SERPL-MCNC: 29 MG/DL — HIGH (ref 7–23)
CALCIUM SERPL-MCNC: 8.8 MG/DL — SIGNIFICANT CHANGE UP (ref 8.5–10.1)
CHLORIDE SERPL-SCNC: 115 MMOL/L — HIGH (ref 96–108)
CO2 SERPL-SCNC: 19 MMOL/L — LOW (ref 22–31)
CREAT SERPL-MCNC: 1.2 MG/DL — SIGNIFICANT CHANGE UP (ref 0.5–1.3)
CULTURE RESULTS: SIGNIFICANT CHANGE UP
GLUCOSE SERPL-MCNC: 131 MG/DL — HIGH (ref 70–99)
HCT VFR BLD CALC: 33.3 % — LOW (ref 34.5–45)
HGB BLD-MCNC: 11.3 G/DL — LOW (ref 11.5–15.5)
MCHC RBC-ENTMCNC: 30.1 PG — SIGNIFICANT CHANGE UP (ref 27–34)
MCHC RBC-ENTMCNC: 33.9 GM/DL — SIGNIFICANT CHANGE UP (ref 32–36)
MCV RBC AUTO: 88.6 FL — SIGNIFICANT CHANGE UP (ref 80–100)
METHOD TYPE: SIGNIFICANT CHANGE UP
METHOD TYPE: SIGNIFICANT CHANGE UP
NRBC # BLD: 0 /100 WBCS — SIGNIFICANT CHANGE UP (ref 0–0)
ORGANISM # SPEC MICROSCOPIC CNT: SIGNIFICANT CHANGE UP
ORGANISM # SPEC MICROSCOPIC CNT: SIGNIFICANT CHANGE UP
PLATELET # BLD AUTO: 177 K/UL — SIGNIFICANT CHANGE UP (ref 150–400)
POTASSIUM SERPL-MCNC: 3.8 MMOL/L — SIGNIFICANT CHANGE UP (ref 3.5–5.3)
POTASSIUM SERPL-SCNC: 3.8 MMOL/L — SIGNIFICANT CHANGE UP (ref 3.5–5.3)
RBC # BLD: 3.76 M/UL — LOW (ref 3.8–5.2)
RBC # FLD: 14.6 % — HIGH (ref 10.3–14.5)
SODIUM SERPL-SCNC: 145 MMOL/L — SIGNIFICANT CHANGE UP (ref 135–145)
SPECIMEN SOURCE: SIGNIFICANT CHANGE UP
WBC # BLD: 20.58 K/UL — HIGH (ref 3.8–10.5)
WBC # FLD AUTO: 20.58 K/UL — HIGH (ref 3.8–10.5)

## 2018-07-02 PROCEDURE — 71045 X-RAY EXAM CHEST 1 VIEW: CPT | Mod: 26

## 2018-07-02 PROCEDURE — 99233 SBSQ HOSP IP/OBS HIGH 50: CPT

## 2018-07-02 RX ORDER — IPRATROPIUM/ALBUTEROL SULFATE 18-103MCG
3 AEROSOL WITH ADAPTER (GRAM) INHALATION ONCE
Qty: 0 | Refills: 0 | Status: COMPLETED | OUTPATIENT
Start: 2018-07-02 | End: 2018-07-02

## 2018-07-02 RX ADMIN — Medication 1 TABLET(S): at 11:24

## 2018-07-02 RX ADMIN — SIMVASTATIN 10 MILLIGRAM(S): 20 TABLET, FILM COATED ORAL at 23:12

## 2018-07-02 RX ADMIN — Medication 75 MICROGRAM(S): at 05:28

## 2018-07-02 RX ADMIN — Medication 200 MILLIGRAM(S): at 23:10

## 2018-07-02 RX ADMIN — Medication 200 MILLIGRAM(S): at 01:27

## 2018-07-02 RX ADMIN — PIPERACILLIN AND TAZOBACTAM 25 GRAM(S): 4; .5 INJECTION, POWDER, LYOPHILIZED, FOR SOLUTION INTRAVENOUS at 13:27

## 2018-07-02 RX ADMIN — Medication 12.5 MILLIGRAM(S): at 05:28

## 2018-07-02 RX ADMIN — Medication 800 MILLIGRAM(S): at 11:24

## 2018-07-02 RX ADMIN — Medication 81 MILLIGRAM(S): at 11:24

## 2018-07-02 RX ADMIN — PIPERACILLIN AND TAZOBACTAM 25 GRAM(S): 4; .5 INJECTION, POWDER, LYOPHILIZED, FOR SOLUTION INTRAVENOUS at 05:28

## 2018-07-02 RX ADMIN — Medication 3 MILLILITER(S): at 05:52

## 2018-07-02 RX ADMIN — Medication 800 MILLIGRAM(S): at 12:25

## 2018-07-02 RX ADMIN — Medication 250 MILLIGRAM(S): at 01:19

## 2018-07-02 RX ADMIN — Medication 1000 UNIT(S): at 11:25

## 2018-07-02 RX ADMIN — LORATADINE 10 MILLIGRAM(S): 10 TABLET ORAL at 11:24

## 2018-07-02 RX ADMIN — ENOXAPARIN SODIUM 30 MILLIGRAM(S): 100 INJECTION SUBCUTANEOUS at 05:28

## 2018-07-02 NOTE — PROGRESS NOTE ADULT - SUBJECTIVE AND OBJECTIVE BOX
Patient is a 87y old  Female who presents with a chief complaint of "They sent me to here because I have a UTI"  Brought in by EMS with AMS (2018 00:09)      INTERVAL HPI/OVERNIGHT EVENTS: Complains of cough, though otherwise feeling well. Denies CP, abdominal pain, fevers, nausea, vomiting.     MEDICATIONS  (STANDING):  aspirin  chewable 81 milliGRAM(s) Oral daily  cholecalciferol 1000 Unit(s) Oral daily  enoxaparin Injectable 30 milliGRAM(s) SubCutaneous every 24 hours  ibuprofen  Tablet 800 milliGRAM(s) Oral daily  lactobacillus acidophilus 1 Tablet(s) Oral daily  levothyroxine 75 MICROGram(s) Oral daily  loratadine 10 milliGRAM(s) Oral daily  metoprolol succinate ER 12.5 milliGRAM(s) Oral daily  piperacillin/tazobactam IVPB. 3.375 Gram(s) IV Intermittent every 8 hours  simvastatin 10 milliGRAM(s) Oral at bedtime    MEDICATIONS  (PRN):  guaiFENesin    Syrup 200 milliGRAM(s) Oral every 6 hours PRN Cough      Allergies    erythromycin (Hives)    Intolerances        REVIEW OF SYSTEMS:  CONSTITUTIONAL: No fever or chills  HEENT:  No headache, no sore throat  RESPIRATORY: No cough, wheezing, or shortness of breath  CARDIOVASCULAR: No chest pain, palpitations, or leg swelling  GASTROINTESTINAL: No abd pain, nausea, vomiting, or diarrhea  GENITOURINARY: No dysuria, frequency, or hematuria  NEUROLOGICAL: no focal weakness or dizziness  MUSCULOSKELETAL: no myalgias     Vital Signs Last 24 Hrs  T(C): 37.1 (2018 13:39), Max: 37.1 (2018 13:39)  T(F): 98.7 (2018 13:39), Max: 98.7 (2018 13:39)  HR: 85 (2018 13:39) (85 - 91)  BP: 121/71 (2018 13:39) (121/71 - 156/86)  BP(mean): --  RR: 17 (2018 13:39) (17 - 17)  SpO2: 95% (2018 13:39) (92% - 95%)    PHYSICAL EXAM:  GENERAL: NAD, frail, resting comfortably sitting up in a chair.   HEENT:  EOMI, moist mucous membranes  CHEST/LUNG:  Right middle and lower lobe crackles, no wheezes, no rhonchi  HEART:  RRR, S1, S2  ABDOMEN:  BS+, soft, nontender, nondistended  EXTREMITIES: no edema, cyanosis, or calf tenderness  NERVOUS SYSTEM: AA&Ox3    LABS:                        11.3   20.58 )-----------( 177      ( 2018 06:57 )             33.3     CBC Full  -  ( 2018 06:57 )  WBC Count : 20.58 K/uL  Hemoglobin : 11.3 g/dL  Hematocrit : 33.3 %  Platelet Count - Automated : 177 K/uL  Mean Cell Volume : 88.6 fl  Mean Cell Hemoglobin : 30.1 pg  Mean Cell Hemoglobin Concentration : 33.9 gm/dL  Auto Neutrophil # : x  Auto Lymphocyte # : x  Auto Monocyte # : x  Auto Eosinophil # : x  Auto Basophil # : x  Auto Neutrophil % : x  Auto Lymphocyte % : x  Auto Monocyte % : x  Auto Eosinophil % : x  Auto Basophil % : x    2018 06:57    145    |  115    |  29     ----------------------------<  131    3.8     |  19     |  1.20     Ca    8.8        2018 06:57      PT/INR - ( 2018 16:54 )   PT: 11.4 sec;   INR: 1.04 ratio         PTT - ( 2018 16:54 )  PTT:29.9 sec  Urinalysis Basic - ( 2018 19:53 )    Color: Yellow / Appearance: Clear / S.010 / pH: x  Gluc: x / Ketone: Negative  / Bili: Negative / Urobili: Negative   Blood: x / Protein: 150 mg/dL / Nitrite: Positive   Leuk Esterase: Moderate / RBC: 6-10 /HPF / WBC 26-50   Sq Epi: x / Non Sq Epi: Moderate / Bacteria: Many      CAPILLARY BLOOD GLUCOSE            Culture - Urine (collected 18 @ 22:50)  Source: .Urine Clean Catch (Midstream)  Preliminary Report (18 @ 17:40):    >100,000 CFU/ml Escherichia coli    Culture - Blood (collected 18 @ 18:56)  Source: .Blood Blood  Gram Stain (18 @ 13:31):    Growth in aerobic bottle: Gram Negative Rods    Growth in anaerobic bottle: Gram Negative Rods  Preliminary Report (18 @ 06:58):    Growth in aerobic and anaerobic bottles: Escherichia coli    "Due to technical problems, Proteus sp. will Not be reported as part of    the BCID panel until further notice"    ***Blood Panel PCR results on this specimen are available    approximately 3 hours after the Gram stain result.***    Gram stain, PCR, and/or culture results may not always    correspond due to difference in methodologies.    ************************************************************    This PCR assay was performed using [employer].    The following targets are tested for: Enterococcus,    vancomycin resistant enterococci, Listeria monocytogenes,    coagulase negative staphylococci, S. aureus,    methicillin resistant S. aureus, Streptococcus agalactiae    (Group B), S. pneumoniae, S.pyogenes (Group A),    Acinetobacter baumannii, Enterobacter cloacae, E. coli,    Klebsiella oxytoca, K. pneumoniae, Proteus sp.,    Serratia marcescens, Haemophilus influenzae,    Neisseria meningitidis, Pseudomonas aeruginosa, Candida    albicans, C. glabrata, C krusei, C parapsilosis,    C. tropicalis and the KPC resistance gene.  Organism: Blood Culture PCR (18 @ 09:56)  Organism: Blood Culture PCR (18 @ 09:56)      -  Escherichia coli: Detec      Method Type: PCR    Culture - Blood (collected 18 @ 18:56)  Source: .Blood Blood  Gram Stain (18 @ 22:49):    Growth in aerobic bottle: Gram Negative Rods    Growth in anaerobic bottle: Gram Positive Cocci in Clusters  Preliminary Report (18 @ 13:08):    Growth in aerobic bottle: Escherichia coli    SEE SPECIMEN# [id number]    Growth in anaerobic bottle: Coag Negative Staphylococcus    Single set isolate, possible contaminant. Contact    Microbiology if susceptibility testing clinically    indicated.    "Due to technical problems, Proteus sp. will Not be reported as part of    the BCID panel until further notice"    ***Blood Panel PCR results on this specimen are available    approximately 3 hours after the Gram stain result.***    Gram stain, PCR, and/or culture results may not always    correspond due to difference in methodologies.    ************************************************************    This PCR assay was performed using [employer].    The following targets are tested for: Enterococcus,    vancomycin resistant enterococci, Listeria monocytogenes,    coagulase negative staphylococci, S. aureus,    methicillin resistant S. aureus, Streptococcus agalactiae    (Group B), S. pneumoniae, S. pyogenes (Group A),    Acinetobacter baumannii, Enterobacter cloacae, E. coli,    Klebsiella oxytoca, K. pneumoniae, Proteus sp.,    Serratia marcescens, Haemophilus influenzae,    Neisseria meningitidis, Pseudomonas aeruginosa, Candida    albicans, C. glabrata, C krusei, C parapsilosis,    C. tropicalis and the KPC resistance gene.  Organism: Blood Culture PCR (18 @ 01:03)  Organism: Blood Culture PCR (18 @ 01:03)      -  Coagulase negative Staphylococcus: Detec      Method Type: PCR        RADIOLOGY & ADDITIONAL TESTS:    Personally reviewed.     Consultant(s) Notes Reviewed:  [x] YES  [ ] NO

## 2018-07-02 NOTE — PROGRESS NOTE ADULT - ATTENDING COMMENTS
Agree with exam and plan as above with following: Pt with EColi bacteremia suspected sepsis. Continue Zosyn for now. CXR concerning for possible infiltrate. Will consider  short interval CT scan. Rest as above.

## 2018-07-02 NOTE — PHYSICAL THERAPY INITIAL EVALUATION ADULT - ADDITIONAL COMMENTS
24 hour home care aide, 5 steps to enter with one handrail. Stair glide to 2nd floor. Pt states she owns a walker and a cane. ,

## 2018-07-02 NOTE — PROGRESS NOTE ADULT - SUBJECTIVE AND OBJECTIVE BOX
[facility], Division of Infectious Diseases  MARCIN Muniz A. Lee  [phone]    Name: ROSENDO ROSARIO  Age: 87y  Gender: Female  MRN: [id number]    Interval History--  Notes reviewed  pt feels better today. still coughing, no abd pain, no diarrhea, no dysuria    Past Medical History--  Arthritis  Hypothyroid  Hyperlipidemia  Seasonal Allergies  Osteoporosis  Migraine  Cataract  Edema of Leg  Chronic Edema  Seasonal Rhinitis  Hypercholesteremia  Hypothyroidism  Anxiety  Clostridium Difficile Infection  S/P cataract surgery  S/P ovarian cystectomy  S/P hip replacement  S/P knee replacement, bilateral  Hip Replacement  Knee Replacement      For details regarding the patient's social history, family history, and other miscellaneous elements, please refer the initial infectious diseases consultation and/or the admitting history and physical examination for this admission.    Allergies    erythromycin (Hives)    Intolerances        Medications--  Antibiotics:  piperacillin/tazobactam IVPB. 3.375 Gram(s) IV Intermittent every 8 hours    Immunologic:    Other:  aspirin  chewable  cholecalciferol  enoxaparin Injectable  guaiFENesin    Syrup PRN  ibuprofen  Tablet  lactobacillus acidophilus  levothyroxine  loratadine  metoprolol succinate ER  simvastatin      Review of Systems--  A 10-point review of systems was obtained.     Pertinent positives and negatives--  Constitutional: No fevers. No Chills. No Rigors.   Cardiovascular: No chest pain. No palpitations.  Respiratory: No shortness of breath. No cough.  Gastrointestinal: No nausea or vomiting. No diarrhea or constipation.   Psychiatric: no anxiety    Review of systems otherwise negative except as previously noted.    Physical Examination--  Vital Signs: T(F): 98.1 (07-02-18 @ 05:09), Max: 98.3 (07-01-18 @ 14:19)  HR: 91 (07-02-18 @ 05:09)  BP: 156/86 (07-02-18 @ 05:09)  RR: 17 (07-02-18 @ 05:09)  SpO2: 92% (07-02-18 @ 05:09)  Wt(kg): --  General: Nontoxic-appearing Female in no acute distress.  HEENT: AT/NC. Anicteric. Conjunctiva pink and moist. Oropharynx clear. Dentition fair.  Neck: Not rigid. No sense of mass.  Nodes: None palpable.  Lungs: Clear bilaterally without rales, wheezing or rhonchi  Heart: Regular rate and rhythm. + systolic murmur  Abdomen: Bowel sounds present and normoactive. Soft. Nondistended. Nontender.  Back: No spinal tenderness. No costovertebral angle tenderness.   Extremities: No cyanosis or clubbing. No edema.   Skin: Warm. Dry. Good turgor. No rash. No vasculitic stigmata.          Laboratory Studies--  CBC                        11.3   20.58 )-----------( 177      ( 02 Jul 2018 06:57 )             33.3       Chemistries  07-02    145  |  115<H>  |  29<H>  ----------------------------<  131<H>  3.8   |  19<L>  |  1.20    Ca    8.8      02 Jul 2018 06:57    TPro  5.9<L>  /  Alb  2.1<L>  /  TBili  0.4  /  DBili  x   /  AST  60<H>  /  ALT  57  /  AlkPhos  154<H>  07-01      Culture Data    Culture - Urine (collected 30 Jun 2018 22:50)  Source: .Urine Clean Catch (Midstream)  Preliminary Report (01 Jul 2018 17:40):    >100,000 CFU/ml Escherichia coli    Culture - Blood (collected 30 Jun 2018 18:56)  Source: .Blood Blood  Gram Stain (01 Jul 2018 13:31):    Growth in aerobic bottle: Gram Negative Rods    Growth in anaerobic bottle: Gram Negative Rods  Preliminary Report (02 Jul 2018 06:58):    Growth in aerobic and anaerobic bottles: Escherichia coli    "Due to technical problems, Proteus sp. will Not be reported as part of    the BCID panel until further notice"    ***Blood Panel PCR results on this specimen are available    approximately 3 hours after the Gram stain result.***    Gram stain, PCR, and/or culture results may not always    correspond due to difference in methodologies.    ************************************************************    This PCR assay was performed using [employer].    The following targets are tested for: Enterococcus,    vancomycin resistant enterococci, Listeria monocytogenes,    coagulase negative staphylococci, S. aureus,    methicillin resistant S. aureus, Streptococcus agalactiae    (Group B), S. pneumoniae, S.pyogenes (Group A),    Acinetobacter baumannii, Enterobacter cloacae, E. coli,    Klebsiella oxytoca, K. pneumoniae, Proteus sp.,    Serratia marcescens, Haemophilus influenzae,    Neisseria meningitidis, Pseudomonas aeruginosa, Candida    albicans, C. glabrata, C krusei, C parapsilosis,    C. tropicalis and the KPC resistance gene.  Organism: Blood Culture PCR (01 Jul 2018 09:56)  Organism: Blood Culture PCR (01 Jul 2018 09:56)    Culture - Blood (collected 30 Jun 2018 18:56)  Source: .Blood Blood  Gram Stain (01 Jul 2018 22:49):    Growth in aerobic bottle: Gram Negative Rods    Growth in anaerobic bottle: Gram Positive Cocci in Clusters  Preliminary Report (02 Jul 2018 13:08):    Growth in aerobic bottle: Escherichia coli    SEE SPECIMEN# [id number]    Growth in anaerobic bottle: Coag Negative Staphylococcus    Single set isolate, possible contaminant. Contact    Microbiology if susceptibility testing clinically    indicated.    "Due to technical problems, Proteus sp. will Not be reported as part of    the BCID panel until further notice"    ***Blood Panel PCR results on this specimen are available    approximately 3 hours after the Gram stain result.***    Gram stain, PCR, and/or culture results may not always    correspond due to difference in methodologies.    ************************************************************    This PCR assay was performed using [employer].    The following targets are tested for: Enterococcus,    vancomycin resistant enterococci, Listeria monocytogenes,    coagulase negative staphylococci, S. aureus,    methicillin resistant S. aureus, Streptococcus agalactiae    (Group B), S. pneumoniae, S. pyogenes (Group A),    Acinetobacter baumannii, Enterobacter cloacae, E. coli,    Klebsiella oxytoca, K. pneumoniae, Proteus sp.,    Serratia marcescens, Haemophilus influenzae,    Neisseria meningitidis, Pseudomonas aeruginosa, Candida    albicans, C. glabrata, C krusei, C parapsilosis,    C. tropicalis and the KPC resistance gene.  Organism: Blood Culture PCR (02 Jul 2018 01:03)  Organism: Blood Culture PCR (02 Jul 2018 01:03)      < from: Xray Chest 1 View AP/PA (06.30.18 @ 19:46) >    EXAM:  XR CHEST AP OR PA 1V                            PROCEDURE DATE:  06/30/2018          INTERPRETATION:  Dehydration    AP chest. Prior dated 2/4/2015.    Low lung volumes. No change heart mediastinum. Bilateral chronic   accentuated bronchovascular markings. No gross acute infiltrate or   pleural collection.    Impression: No gross acute infiltrate.    < end of copied text >

## 2018-07-02 NOTE — PROGRESS NOTE ADULT - PROBLEM SELECTOR PLAN 1
pending sensitivities  gu source?  although no symptoms urine cx with ecoli as well  follow sensitivities of both to see if concordant.  cont zosyn for now.  follow wbc and temp  repeat blood cx

## 2018-07-02 NOTE — PROGRESS NOTE ADULT - PROBLEM SELECTOR PLAN 1
- Bacteriemia:  Blood Cx +  for GNR  E.Coli  - Switched to Zosyn for broader coverage given rising WBC count  - CXR for cough , prelim read as RUL infiltrate , awaiting final read , likely pneumonia  - following recs from ID : Dr. Isabela Whiting   - f/u repeat blood cultures and urine cx

## 2018-07-02 NOTE — CHART NOTE - NSCHARTNOTEFT_GEN_A_CORE
called by RN, patient found to have increased coughing and expiratory wheezes per nurse. Patient seen and examined at bedside. Denies any SOB or chest pain, reports she feels well.     Physical Exam:  General: Well developed, well nourished, NAD  HEENT: NCAT, PERRLA, EOMI bl, moist mucous membranes   Neck: Supple, nontender, no mass  Neurology: A&Ox3, nonfocal, CN II-XII grossly intact, sensation intact  Respiratory: diffuse expiratory wheezing in all lung fields.  CV: RRR, +S1/S2, no murmurs, rubs or gallops  Abdominal: Soft, NT, ND +BSx4  Extremities: No C/C/E, + peripheral pulses  MSK: Normal ROM, no joint erythema or warmth, no joint swelling   Skin: warm, dry, normal color, no rash or abnormal lesions    Assessment and plan  87F with PMH of hypothyroidism, HLD, and arthritis presents with weakness, admitted with UTI, GNR bacteremia. Patient found to have expiratory wheezing and increased cough r/o pneumonia vs fluid overload.   -repeat CXR  -one time duoneb treatment  -will continue to follow

## 2018-07-02 NOTE — PROGRESS NOTE ADULT - ASSESSMENT
87F with PMH of hypothyroidism, HLD, and arthritis presents with weakness, admitted with UTI, GNR bacteremia. 87F with PMH of hypothyroidism, HLD, and arthritis presents with weakness, admitted with UTI, GNR bacteremia. Sepsis suspected due to UTI.

## 2018-07-03 DIAGNOSIS — R93.8 ABNORMAL FINDINGS ON DIAGNOSTIC IMAGING OF OTHER SPECIFIED BODY STRUCTURES: ICD-10-CM

## 2018-07-03 DIAGNOSIS — J18.9 PNEUMONIA, UNSPECIFIED ORGANISM: ICD-10-CM

## 2018-07-03 LAB
-  AMIKACIN: SIGNIFICANT CHANGE UP
-  AMPICILLIN/SULBACTAM: SIGNIFICANT CHANGE UP
-  AMPICILLIN: SIGNIFICANT CHANGE UP
-  AZTREONAM: SIGNIFICANT CHANGE UP
-  CEFAZOLIN: SIGNIFICANT CHANGE UP
-  CEFEPIME: SIGNIFICANT CHANGE UP
-  CEFOXITIN: SIGNIFICANT CHANGE UP
-  CEFTRIAXONE: SIGNIFICANT CHANGE UP
-  CIPROFLOXACIN: SIGNIFICANT CHANGE UP
-  ERTAPENEM: SIGNIFICANT CHANGE UP
-  GENTAMICIN: SIGNIFICANT CHANGE UP
-  IMIPENEM: SIGNIFICANT CHANGE UP
-  LEVOFLOXACIN: SIGNIFICANT CHANGE UP
-  MEROPENEM: SIGNIFICANT CHANGE UP
-  PIPERACILLIN/TAZOBACTAM: SIGNIFICANT CHANGE UP
-  TOBRAMYCIN: SIGNIFICANT CHANGE UP
-  TRIMETHOPRIM/SULFAMETHOXAZOLE: SIGNIFICANT CHANGE UP
ANION GAP SERPL CALC-SCNC: 11 MMOL/L — SIGNIFICANT CHANGE UP (ref 5–17)
BASOPHILS # BLD AUTO: 0 K/UL — SIGNIFICANT CHANGE UP (ref 0–0.2)
BASOPHILS NFR BLD AUTO: 0 % — SIGNIFICANT CHANGE UP (ref 0–2)
BUN SERPL-MCNC: 26 MG/DL — HIGH (ref 7–23)
CALCIUM SERPL-MCNC: 8.8 MG/DL — SIGNIFICANT CHANGE UP (ref 8.5–10.1)
CHLORIDE SERPL-SCNC: 111 MMOL/L — HIGH (ref 96–108)
CO2 SERPL-SCNC: 21 MMOL/L — LOW (ref 22–31)
CREAT SERPL-MCNC: 1 MG/DL — SIGNIFICANT CHANGE UP (ref 0.5–1.3)
CULTURE RESULTS: SIGNIFICANT CHANGE UP
CULTURE RESULTS: SIGNIFICANT CHANGE UP
EOSINOPHIL # BLD AUTO: 0.85 K/UL — HIGH (ref 0–0.5)
EOSINOPHIL NFR BLD AUTO: 4 % — SIGNIFICANT CHANGE UP (ref 0–6)
GLUCOSE SERPL-MCNC: 119 MG/DL — HIGH (ref 70–99)
HCT VFR BLD CALC: 31.6 % — LOW (ref 34.5–45)
HGB BLD-MCNC: 10.7 G/DL — LOW (ref 11.5–15.5)
LYMPHOCYTES # BLD AUTO: 1.7 K/UL — SIGNIFICANT CHANGE UP (ref 1–3.3)
LYMPHOCYTES # BLD AUTO: 8 % — LOW (ref 13–44)
MCHC RBC-ENTMCNC: 30.3 PG — SIGNIFICANT CHANGE UP (ref 27–34)
MCHC RBC-ENTMCNC: 33.9 GM/DL — SIGNIFICANT CHANGE UP (ref 32–36)
MCV RBC AUTO: 89.5 FL — SIGNIFICANT CHANGE UP (ref 80–100)
METHOD TYPE: SIGNIFICANT CHANGE UP
MONOCYTES # BLD AUTO: 2.77 K/UL — HIGH (ref 0–0.9)
MONOCYTES NFR BLD AUTO: 13 % — SIGNIFICANT CHANGE UP (ref 2–14)
NEUTROPHILS # BLD AUTO: 15.53 K/UL — HIGH (ref 1.8–7.4)
NEUTROPHILS NFR BLD AUTO: 70 % — SIGNIFICANT CHANGE UP (ref 43–77)
NT-PROBNP SERPL-SCNC: 8001 PG/ML — HIGH (ref 0–450)
ORGANISM # SPEC MICROSCOPIC CNT: SIGNIFICANT CHANGE UP
PLATELET # BLD AUTO: 174 K/UL — SIGNIFICANT CHANGE UP (ref 150–400)
POTASSIUM SERPL-MCNC: 3.3 MMOL/L — LOW (ref 3.5–5.3)
POTASSIUM SERPL-SCNC: 3.3 MMOL/L — LOW (ref 3.5–5.3)
RBC # BLD: 3.53 M/UL — LOW (ref 3.8–5.2)
RBC # FLD: 15.2 % — HIGH (ref 10.3–14.5)
SODIUM SERPL-SCNC: 143 MMOL/L — SIGNIFICANT CHANGE UP (ref 135–145)
SPECIMEN SOURCE: SIGNIFICANT CHANGE UP
SPECIMEN SOURCE: SIGNIFICANT CHANGE UP
TSH SERPL-MCNC: 2.43 UIU/ML — SIGNIFICANT CHANGE UP (ref 0.36–3.74)
WBC # BLD: 21.28 K/UL — HIGH (ref 3.8–10.5)
WBC # FLD AUTO: 21.28 K/UL — HIGH (ref 3.8–10.5)

## 2018-07-03 PROCEDURE — 76775 US EXAM ABDO BACK WALL LIM: CPT | Mod: 26

## 2018-07-03 PROCEDURE — 71250 CT THORAX DX C-: CPT | Mod: 26

## 2018-07-03 PROCEDURE — 99233 SBSQ HOSP IP/OBS HIGH 50: CPT

## 2018-07-03 RX ORDER — ALBUTEROL 90 UG/1
2.5 AEROSOL, METERED ORAL EVERY 8 HOURS
Qty: 0 | Refills: 0 | Status: DISCONTINUED | OUTPATIENT
Start: 2018-07-03 | End: 2018-07-16

## 2018-07-03 RX ORDER — POTASSIUM CHLORIDE 20 MEQ
20 PACKET (EA) ORAL
Qty: 0 | Refills: 0 | Status: COMPLETED | OUTPATIENT
Start: 2018-07-03 | End: 2018-07-03

## 2018-07-03 RX ADMIN — Medication 20 MILLIEQUIVALENT(S): at 12:09

## 2018-07-03 RX ADMIN — Medication 800 MILLIGRAM(S): at 13:11

## 2018-07-03 RX ADMIN — PIPERACILLIN AND TAZOBACTAM 25 GRAM(S): 4; .5 INJECTION, POWDER, LYOPHILIZED, FOR SOLUTION INTRAVENOUS at 17:21

## 2018-07-03 RX ADMIN — PIPERACILLIN AND TAZOBACTAM 25 GRAM(S): 4; .5 INJECTION, POWDER, LYOPHILIZED, FOR SOLUTION INTRAVENOUS at 08:47

## 2018-07-03 RX ADMIN — Medication 200 MILLIGRAM(S): at 07:01

## 2018-07-03 RX ADMIN — Medication 12.5 MILLIGRAM(S): at 07:01

## 2018-07-03 RX ADMIN — Medication 1 TABLET(S): at 12:12

## 2018-07-03 RX ADMIN — Medication 1000 UNIT(S): at 12:11

## 2018-07-03 RX ADMIN — Medication 20 MILLIEQUIVALENT(S): at 10:43

## 2018-07-03 RX ADMIN — Medication 800 MILLIGRAM(S): at 12:11

## 2018-07-03 RX ADMIN — PIPERACILLIN AND TAZOBACTAM 25 GRAM(S): 4; .5 INJECTION, POWDER, LYOPHILIZED, FOR SOLUTION INTRAVENOUS at 01:01

## 2018-07-03 RX ADMIN — LORATADINE 10 MILLIGRAM(S): 10 TABLET ORAL at 12:12

## 2018-07-03 RX ADMIN — Medication 81 MILLIGRAM(S): at 12:11

## 2018-07-03 RX ADMIN — SIMVASTATIN 10 MILLIGRAM(S): 20 TABLET, FILM COATED ORAL at 22:33

## 2018-07-03 RX ADMIN — PIPERACILLIN AND TAZOBACTAM 25 GRAM(S): 4; .5 INJECTION, POWDER, LYOPHILIZED, FOR SOLUTION INTRAVENOUS at 22:33

## 2018-07-03 RX ADMIN — Medication 75 MICROGRAM(S): at 07:01

## 2018-07-03 RX ADMIN — ENOXAPARIN SODIUM 30 MILLIGRAM(S): 100 INJECTION SUBCUTANEOUS at 06:56

## 2018-07-03 RX ADMIN — Medication 20 MILLIEQUIVALENT(S): at 13:38

## 2018-07-03 NOTE — PROGRESS NOTE ADULT - PROBLEM SELECTOR PLAN 2
Cough x 2 days  CXR   Non contrast CT Chest showed multifocal ground glass pneumonia, with RUL consolidation  Continue on Zosyn  - follow ID recs  - Consulted Pulmonary Dr. Mack Caldera  - Will contact primary care doctor :  Eduardo Martin Cough x 2 days  CXR showed RUL infiltrate  Non contrast CT Chest showed multifocal ground glass pneumonia, with RUL consolidation  Continue on Zosyn  - follow ID recs  - Consulted Pulmonary Dr. Mack Caldera  - Will contact primary care doctor :  Eduardo Martin Cough x 2 days  CXR showed RUL infiltrate  Non contrast CT Chest showed multifocal ground glass pneumonia, with RUL consolidation  Continue on Zosyn  - follow ID recs,   - Consulted Pulmonary Dr. Mack Caldera   - Will contact primary care doctor :  Eduardo Martin, [phone].  Attempted to reach today for past records of abnormal chest CT. Office closed.

## 2018-07-03 NOTE — PROGRESS NOTE ADULT - SUBJECTIVE AND OBJECTIVE BOX
[facility], Division of Infectious Diseases  MARCIN Muniz A. Lee  [phone]    Name: ROSENDO ROSARIO  Age: 87y  Gender: Female  MRN: [id number]    Interval History--  Notes reviewed. Feeling ok. No CP. No SOB. No cough. No N/V. Denies any new/worsening back pain. No fevers, chills, or rigors. Denies urinary symptoms presently    Past Medical History--  Arthritis  Hypothyroid  Hyperlipidemia  Seasonal Allergies  Osteoporosis  Migraine  Cataract  Edema of Leg  Chronic Edema  Seasonal Rhinitis  Hypercholesteremia  Hypothyroidism  Anxiety  Clostridium Difficile Infection  S/P cataract surgery  S/P ovarian cystectomy  S/P hip replacement  S/P knee replacement, bilateral  Hip Replacement  Knee Replacement      For details regarding the patient's social history, family history, and other miscellaneous elements, please refer the initial infectious diseases consultation and/or the admitting history and physical examination for this admission.    Allergies    erythromycin (Hives)    Intolerances        Medications--  Antibiotics:  piperacillin/tazobactam IVPB. 3.375 Gram(s) IV Intermittent every 8 hours    Immunologic:    Other:  aspirin  chewable  cholecalciferol  enoxaparin Injectable  guaiFENesin    Syrup PRN  ibuprofen  Tablet  lactobacillus acidophilus  levothyroxine  loratadine  metoprolol succinate ER  potassium chloride    Tablet ER  simvastatin      Review of Systems--  A 10-point review of systems was obtained.     Pertinent positives and negatives--  Constitutional: No fevers. No Chills. No Rigors.   Cardiovascular: No chest pain. No palpitations.  Respiratory: No shortness of breath. No cough.  Gastrointestinal: No nausea or vomiting. No diarrhea or constipation.   Psychiatric: Pleasant. Appropriate affect.    Review of systems otherwise negative except as previously noted.    Physical Examination--  Vital Signs: T(F): 98.1 (07-03-18 @ 06:10), Max: 99.1 (07-02-18 @ 20:24)  HR: 86 (07-03-18 @ 06:10)  BP: 158/82 (07-03-18 @ 06:10)  RR: 18 (07-03-18 @ 06:10)  SpO2: 93% (07-03-18 @ 06:10)  Wt(kg): --  General: Nontoxic-appearing Female in no acute distress.  HEENT: Mild bitemporal wasting. Anicteric. Conjunctiva pink and moist. Oropharynx clear.  Neck: Not rigid. No sense of mass.  Nodes: None palpable.  Lungs: Clear bilaterally without rales, wheezing or rhonchi  Heart: Regular rate and rhythm. No Murmur. No rub. No gallop. No palpable thrill.  Abdomen: Bowel sounds present and normoactive. Soft. Nondistended. Nontender. No sense of mass. No organomegaly.  Back: No spinal tenderness. No costovertebral angle tenderness.   Extremities: No cyanosis or clubbing. No edema.   Skin: Warm. Dry. Good turgor. No rash. No vasculitic stigmata.  Psychiatric: Appropriate affect and mood for situation.         Laboratory Studies--  CBC                        10.7   21.28 )-----------( 174      ( 03 Jul 2018 07:48 )             31.6       Chemistries  07-03    143  |  111<H>  |  26<H>  ----------------------------<  119<H>  3.3<L>   |  21<L>  |  1.00    Ca    8.8      03 Jul 2018 07:48    < from: CT Chest No Cont (07.03.18 @ 11:30) >  EXAM:  CT CHEST                        PROCEDURE DATE:  07/03/2018    INTERPRETATION:  .  CLINICAL INFORMATION: Normal chest xray evaluate for endobronchial   lesion. UTI sepsis.    TECHNIQUE: Helical axial images of the chest were obtained from the   thoracic inlet to the adrenal glands without the administration of   intravenous contrast. Sagittal and coronal reformations were obtained   from the source data.     COMPARISON: Prior chest x-ray examination from 7/2/2018. No prior chest   CT examinations available for comparison.    FINDINGS: There is no axillary adenopathy. Subcentimeter sized lymph   nodes are notable within the mediastinum.    The heart size is at the upper limits of normal. The pericardium appears   unremarkable. There are atherosclerotic calcifications of the imaged   coronary arteries, aorta, and branch vessels. The imaged portions of the   aorta are normal in caliber.    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 Layering secretions are notable within the   right side of the trachea. There is patchy consolidation within the right   upper lobe. A few patchy groundglass opacities are also notable within   the left upper lobe and lingula. A few patchy groundglass opacities are   notable within the right middle lobe. Scattered areas of linear   atelectasis are notable throughout both lungs. No pleural effusions are   noted.    There is an exophytic left-sided renal cyst. There is nonspecific   left-sided perinephric stranding. The other visualized upper abdominal   organs appear unremarkable.    There is generalized osteopenia. Multilevel degenerative changes are   noted within the imaged potions of the spine. There are age indeterminate   moderate compression deformities of the T8, T9, and T11 vertebral bodies   without retropulsion. There is a severe L1 compression deformity which is   age indeterminate. There is mild retropulsion of the posterosuperior   aspect into the vertebral canal.  There is a partially imaged superior   endplate deformity of L3.     IMPRESSION: Findings compatible with multifocal pneumonia, most severe   within the right upper lobe.    Multiple age indeterminate compression fractures, as discussed. Mild   retropulsion at the L1 level.    IRMA HAYWOOD M.D., ATTENDING RADIOLOGIST  This document has been electronically signed. Jul  3 2018 11:13AM  < end of copied text >      Culture Data    Culture - Blood (collected 02 Jul 2018 08:25)  Source: .Blood Blood-Peripheral  Preliminary Report (03 Jul 2018 09:01):    No growth to date.    Culture - Blood (collected 01 Jul 2018 16:50)  Source: .Blood Blood-Venous  Preliminary Report (02 Jul 2018 17:01):    No growth to date.    Culture - Blood (collected 01 Jul 2018 16:50)  Source: .Blood Blood-Peripheral  Preliminary Report (02 Jul 2018 17:01):    No growth to date.    Culture - Urine (collected 30 Jun 2018 22:50)  Source: .Urine Clean Catch (Midstream)  Final Report (02 Jul 2018 18:49):    >100,000 CFU/ml Escherichia coli  Organism: Escherichia coli (02 Jul 2018 18:49)  Organism: Escherichia coli (02 Jul 2018 18:49)    Culture - Blood (collected 30 Jun 2018 18:56)  Source: .Blood Blood  Gram Stain (01 Jul 2018 13:31):    Growth in aerobic bottle: Gram Negative Rods    Growth in anaerobic bottle: Gram Negative Rods  Final Report (03 Jul 2018 10:47):    Growth in aerobic and anaerobic bottles: Escherichia coli    "Due to technical problems, Proteus sp. will Not be reported as part of    the BCID panel until further notice"    ***Blood Panel PCR results on this specimen are available    approximately 3 hours after the Gram stain result.***    Gram stain, PCR, and/or culture results may not always    correspond due to difference in methodologies.    ************************************************************    This PCR assay was performed using [employer].    The following targets are tested for: Enterococcus,    vancomycin resistant enterococci, Listeria monocytogenes,    coagulase negative staphylococci, S. aureus,    methicillin resistant S. aureus, Streptococcus agalactiae    (Group B), S. pneumoniae, S.pyogenes (Group A),    Acinetobacter baumannii, Enterobacter cloacae, E. coli,    Klebsiella oxytoca, K. pneumoniae, Proteus sp.,    Serratia marcescens, Haemophilus influenzae,    Neisseria meningitidis, Pseudomonas aeruginosa, Candida    albicans, C. glabrata, C krusei, C parapsilosis,    C. tropicalis and the KPC resistance gene.  Organism: Blood Culture PCR  Escherichia coli (03 Jul 2018 10:47)  Organism: Escherichia coli (03 Jul 2018 10:47)  Organism: Blood Culture PCR (03 Jul 2018 10:47)    Culture - Blood (collected 30 Jun 2018 18:56)  Source: .Blood Blood  Gram Stain (01 Jul 2018 22:49):    Growth in aerobic bottle: Gram Negative Rods    Growth in anaerobic bottle: Gram Positive Cocci in Clusters  Final Report (03 Jul 2018 10:46):    Growth in aerobic bottle: Escherichia coli    See previous culture [id number]    Growth in anaerobic bottle: Coag Negative Staphylococcus    Single set isolate, possible contaminant. Contact    Microbiology if susceptibility testing clinically    indicated.    "Due to technical problems, Proteus sp. will Not be reported as part of    the BCID panel until further notice"    ***Blood Panel PCR results on this specimen are available    approximately 3 hours after the Gram stain result.***    Gram stain, PCR, and/or culture results may not always    correspond due to difference in methodologies.    ************************************************************    This PCR assay was performed using [employer].    The following targets are tested for: Enterococcus,    vancomycin resistant enterococci, Listeria monocytogenes,    coagulase negative staphylococci, S. aureus,    methicillin resistant S. aureus, Streptococcus agalactiae    (Group B), S. pneumoniae, S. pyogenes (Group A),    Acinetobacter baumannii, Enterobacter cloacae, E. coli,    Klebsiella oxytoca, K. pneumoniae, Proteus sp.,    Serratia marcescens, Haemophilus influenzae,    Neisseria meningitidis, Pseudomonas aeruginosa, Candida    albicans, C. glabrata, C krusei, C parapsilosis,    C. tropicalis and the KPC resistance gene.  Organism: Blood Culture PCR (03 Jul 2018 10:46)  Organism: Blood Culture PCR (03 Jul 2018 10:46)

## 2018-07-03 NOTE — PROGRESS NOTE ADULT - ASSESSMENT
86 yo female admitted with confusion and E coli bacteremia likely from a urinary source  L perinephric stranding on chest CT  WBC going up despite what in vitro should be adequate antibiotics  Patient with changes on CT, but unimpressive exam and paucity of respiratory symptoms to suggest pneumonia  Patient states with abnormal CT in past going back 4-5 years, but not clear on what the abnormalities actually were/are.  If this woman had bacteremic pneumonia with E. coli, I would exepect that she would be quite ill.

## 2018-07-03 NOTE — PROGRESS NOTE ADULT - SUBJECTIVE AND OBJECTIVE BOX
Patient is a 87y old  Female who presents with a chief complaint of "They sent me to here because I have a UTI"  Brought in by EMS with AMS (01 Jul 2018 00:09)      INTERVAL HPI/OVERNIGHT EVENTS: None. Denies all complaints      MEDICATIONS  (STANDING):  aspirin  chewable 81 milliGRAM(s) Oral daily  cholecalciferol 1000 Unit(s) Oral daily  enoxaparin Injectable 30 milliGRAM(s) SubCutaneous every 24 hours  ibuprofen  Tablet 800 milliGRAM(s) Oral daily  lactobacillus acidophilus 1 Tablet(s) Oral daily  levothyroxine 75 MICROGram(s) Oral daily  loratadine 10 milliGRAM(s) Oral daily  metoprolol succinate ER 12.5 milliGRAM(s) Oral daily  piperacillin/tazobactam IVPB. 3.375 Gram(s) IV Intermittent every 8 hours  potassium chloride    Tablet ER 20 milliEquivalent(s) Oral every 2 hours  simvastatin 10 milliGRAM(s) Oral at bedtime    MEDICATIONS  (PRN):  guaiFENesin    Syrup 200 milliGRAM(s) Oral every 6 hours PRN Cough      Allergies    erythromycin (Hives)    Intolerances        REVIEW OF SYSTEMS:  CONSTITUTIONAL: No fever or chills  HEENT:  No headache, no sore throat  RESPIRATORY: No cough, wheezing, or shortness of breath  CARDIOVASCULAR: + , palpitations, no leg swelling  GASTROINTESTINAL: No abd pain, nausea, vomiting, or diarrhea  GENITOURINARY: No dysuria, frequency, or hematuria  NEUROLOGICAL: no focal weakness or dizziness  MUSCULOSKELETAL: no myalgias     Vital Signs Last 24 Hrs  T(C): 36.7 (03 Jul 2018 06:10), Max: 37.3 (02 Jul 2018 20:24)  T(F): 98.1 (03 Jul 2018 06:10), Max: 99.1 (02 Jul 2018 20:24)  HR: 86 (03 Jul 2018 06:10) (85 - 86)  BP: 158/82 (03 Jul 2018 06:10) (121/71 - 158/82)  BP(mean): --  RR: 18 (03 Jul 2018 06:10) (17 - 18)  SpO2: 93% (03 Jul 2018 06:10) (92% - 95%)    PHYSICAL EXAM:  GENERAL: anxious, frail woman  HEENT:  EOMI, moist mucous membranes  CHEST/LUNG:  CTA b/l, no rales, wheezes, or rhonchi  HEART:  RRR, S1, S2  ABDOMEN:  BS+, soft, nontender, nondistended  EXTREMITIES: no edema, cyanosis, or calf tenderness  NERVOUS SYSTEM: AA&Ox3    LABS:                        10.7   21.28 )-----------( 174      ( 03 Jul 2018 07:48 )             31.6     CBC Full  -  ( 03 Jul 2018 07:48 )  WBC Count : 21.28 K/uL  Hemoglobin : 10.7 g/dL  Hematocrit : 31.6 %  Platelet Count - Automated : 174 K/uL  Mean Cell Volume : 89.5 fl  Mean Cell Hemoglobin : 30.3 pg  Mean Cell Hemoglobin Concentration : 33.9 gm/dL  Auto Neutrophil # : x  Auto Lymphocyte # : x  Auto Monocyte # : x  Auto Eosinophil # : x  Auto Basophil # : x  Auto Neutrophil % : x  Auto Lymphocyte % : x  Auto Monocyte % : x  Auto Eosinophil % : x  Auto Basophil % : x    03 Jul 2018 07:48    143    |  111    |  26     ----------------------------<  119    3.3     |  21     |  1.00     Ca    8.8        03 Jul 2018 07:48          CAPILLARY BLOOD GLUCOSE            Culture - Blood (collected 07-02-18 @ 08:25)  Source: .Blood Blood-Peripheral  Preliminary Report (07-03-18 @ 09:01):    No growth to date.    Culture - Blood (collected 07-01-18 @ 16:50)  Source: .Blood Blood-Venous  Preliminary Report (07-02-18 @ 17:01):    No growth to date.    Culture - Blood (collected 07-01-18 @ 16:50)  Source: .Blood Blood-Peripheral  Preliminary Report (07-02-18 @ 17:01):    No growth to date.    Culture - Urine (collected 06-30-18 @ 22:50)  Source: .Urine Clean Catch (Midstream)  Final Report (07-02-18 @ 18:49):    >100,000 CFU/ml Escherichia coli  Organism: Escherichia coli (07-02-18 @ 18:49)  Organism: Escherichia coli (07-02-18 @ 18:49)      -  Amikacin: S <=8      -  Amoxicillin/Clavulanic Acid: S <=8/4      -  Ampicillin: R >16 These ampicillin results predict results for amoxicillin      -  Ampicillin/Sulbactam: I 16/8      -  Aztreonam: S <=4      -  Cefazolin: S <=2 This predicts results for oral agents cefaclor, cefdinir, cefpodoxime, cefprozil, cefuroxime axetil, cephalexin and locarbef for uncomplicated UTI. Note that some isolates may be susceptible to these agents while testing resistant to cefazolin.      -  Cefepime: S <=2      -  Cefoxitin: S <=4      -  Ceftriaxone: S <=1 Enterobacter, Citrobacter, and Serratia may develop resistance during prolonged therapy      -  Ciprofloxacin: S <=0.5      -  Ertapenem: S <=0.5      -  Gentamicin: S <=1      -  Imipenem: S <=1      -  Levofloxacin: S 2      -  Meropenem: S <=1      -  Nitrofurantoin: S <=32 Should not be used to treat pyelonephritis      -  Piperacillin/Tazobactam: S <=8      -  Tigecycline: S <=1      -  Tobramycin: S <=2      -  Trimethoprim/Sulfamethoxazole: R >2/38      Method Type: CECY    Culture - Blood (collected 06-30-18 @ 18:56)  Source: .Blood Blood  Gram Stain (07-01-18 @ 13:31):    Growth in aerobic bottle: Gram Negative Rods    Growth in anaerobic bottle: Gram Negative Rods  Preliminary Report (07-02-18 @ 06:58):    Growth in aerobic and anaerobic bottles: Escherichia coli    "Due to technical problems, Proteus sp. will Not be reported as part of    the BCID panel until further notice"    ***Blood Panel PCR results on this specimen are available    approximately 3 hours after the Gram stain result.***    Gram stain, PCR, and/or culture results may not always    correspond due to difference in methodologies.    ************************************************************    This PCR assay was performed using [email or web address].    The following targets are tested for: Enterococcus,    vancomycin resistant enterococci, Listeria monocytogenes,    coagulase negative staphylococci, S. aureus,    methicillin resistant S. aureus, Streptococcus agalactiae    (Group B), S. pneumoniae, S.pyogenes (Group A),    Acinetobacter baumannii, Enterobacter cloacae, E. coli,    Klebsiella oxytoca, K. pneumoniae, Proteus sp.,    Serratia marcescens, Haemophilus influenzae,    Neisseria meningitidis, Pseudomonas aeruginosa, Candida    albicans, C. glabrata, C krusei, C parapsilosis,    C. tropicalis and the KPC resistance gene.  Organism: Blood Culture PCR (07-01-18 @ 09:56)  Organism: Blood Culture PCR (07-01-18 @ 09:56)      -  Escherichia coli: Detec      Method Type: PCR    Culture - Blood (collected 06-30-18 @ 18:56)  Source: .Blood Blood  Gram Stain (07-01-18 @ 22:49):    Growth in aerobic bottle: Gram Negative Rods    Growth in anaerobic bottle: Gram Positive Cocci in Clusters  Preliminary Report (07-02-18 @ 13:08):    Growth in aerobic bottle: Escherichia coli    SEE SPECIMEN# [id number]    Growth in anaerobic bottle: Coag Negative Staphylococcus    Single set isolate, possible contaminant. Contact    Microbiology if susceptibility testing clinically    indicated.    "Due to technical problems, Proteus sp. will Not be reported as part of    the BCID panel until further notice"    ***Blood Panel PCR results on this specimen are available    approximately 3 hours after the Gram stain result.***    Gram stain, PCR, and/or culture results may not always    correspond due to difference in methodologies.    ************************************************************    This PCR assay was performed using [email or web address].    The following targets are tested for: Enterococcus,    vancomycin resistant enterococci, Listeria monocytogenes,    coagulase negative staphylococci, S. aureus,    methicillin resistant S. aureus, Streptococcus agalactiae    (Group B), S. pneumoniae, S. pyogenes (Group A),    Acinetobacter baumannii, Enterobacter cloacae, E. coli,    Klebsiella oxytoca, K. pneumoniae, Proteus sp.,    Serratia marcescens, Haemophilus influenzae,    Neisseria meningitidis, Pseudomonas aeruginosa, Candida    albicans, C. glabrata, C krusei, C parapsilosis,    C. tropicalis and the KPC resistance gene.  Organism: Blood Culture PCR (07-02-18 @ 01:03)  Organism: Blood Culture PCR (07-02-18 @ 01:03)      -  Coagulase negative Staphylococcus: Detec      Method Type: PCR        RADIOLOGY & ADDITIONAL TESTS:    Personally reviewed.     Consultant(s) Notes Reviewed:  [x] YES  [ ] NO Patient is a 87y old  Female who presents with a chief complaint of "They sent me to here because I have a UTI"  Brought in by EMS with AMS (01 Jul 2018 00:09)      INTERVAL HPI/OVERNIGHT EVENTS: No overnight events.  Denies all complaints. Still has cough this morning.   Pt required reorienting as she was concerned that "people asking her  for things, money".       MEDICATIONS  (STANDING):  aspirin  chewable 81 milliGRAM(s) Oral daily  cholecalciferol 1000 Unit(s) Oral daily  enoxaparin Injectable 30 milliGRAM(s) SubCutaneous every 24 hours  ibuprofen  Tablet 800 milliGRAM(s) Oral daily  lactobacillus acidophilus 1 Tablet(s) Oral daily  levothyroxine 75 MICROGram(s) Oral daily  loratadine 10 milliGRAM(s) Oral daily  metoprolol succinate ER 12.5 milliGRAM(s) Oral daily  piperacillin/tazobactam IVPB. 3.375 Gram(s) IV Intermittent every 8 hours  potassium chloride    Tablet ER 20 milliEquivalent(s) Oral every 2 hours  simvastatin 10 milliGRAM(s) Oral at bedtime    MEDICATIONS  (PRN):  guaiFENesin    Syrup 200 milliGRAM(s) Oral every 6 hours PRN Cough      Allergies    erythromycin (Hives)    Intolerances        REVIEW OF SYSTEMS:  CONSTITUTIONAL: No fever or chills  HEENT:  No headache, no sore throat  RESPIRATORY: No cough, wheezing, or shortness of breath  CARDIOVASCULAR: + , palpitations, no leg swelling  GASTROINTESTINAL: No abd pain, nausea, vomiting, or diarrhea  GENITOURINARY: No dysuria, frequency, or hematuria  NEUROLOGICAL: no focal weakness or dizziness  MUSCULOSKELETAL: no myalgias     Vital Signs Last 24 Hrs  T(C): 36.7 (03 Jul 2018 06:10), Max: 37.3 (02 Jul 2018 20:24)  T(F): 98.1 (03 Jul 2018 06:10), Max: 99.1 (02 Jul 2018 20:24)  HR: 86 (03 Jul 2018 06:10) (85 - 86)  BP: 158/82 (03 Jul 2018 06:10) (121/71 - 158/82)  BP(mean): --  RR: 18 (03 Jul 2018 06:10) (17 - 18)  SpO2: 93% (03 Jul 2018 06:10) (92% - 95%)    PHYSICAL EXAM:  GENERAL: NAD, frail woman  HEENT:  EOMI, moist mucous membranes  CHEST/LUNG:  crackles throughout all lung fields bilaterally ,no wheezes, or rhonchi  HEART:  RRR, S1, S2  ABDOMEN:  BS+, soft, nontender, nondistended  EXTREMITIES: no edema, cyanosis, no calf tenderness  NERVOUS SYSTEM: AAxO 3/4, not oriented to situation     LABS:                        10.7   21.28 )-----------( 174      ( 03 Jul 2018 07:48 )             31.6     CBC Full  -  ( 03 Jul 2018 07:48 )  WBC Count : 21.28 K/uL  Hemoglobin : 10.7 g/dL  Hematocrit : 31.6 %  Platelet Count - Automated : 174 K/uL  Mean Cell Volume : 89.5 fl  Mean Cell Hemoglobin : 30.3 pg  Mean Cell Hemoglobin Concentration : 33.9 gm/dL  Auto Neutrophil # : x  Auto Lymphocyte # : x  Auto Monocyte # : x  Auto Eosinophil # : x  Auto Basophil # : x  Auto Neutrophil % : x  Auto Lymphocyte % : x  Auto Monocyte % : x  Auto Eosinophil % : x  Auto Basophil % : x    03 Jul 2018 07:48    143    |  111    |  26     ----------------------------<  119    3.3     |  21     |  1.00     Ca    8.8        03 Jul 2018 07:48          CAPILLARY BLOOD GLUCOSE            Culture - Blood (collected 07-02-18 @ 08:25)  Source: .Blood Blood-Peripheral  Preliminary Report (07-03-18 @ 09:01):    No growth to date.    Culture - Blood (collected 07-01-18 @ 16:50)  Source: .Blood Blood-Venous  Preliminary Report (07-02-18 @ 17:01):    No growth to date.    Culture - Blood (collected 07-01-18 @ 16:50)  Source: .Blood Blood-Peripheral  Preliminary Report (07-02-18 @ 17:01):    No growth to date.    Culture - Urine (collected 06-30-18 @ 22:50)  Source: .Urine Clean Catch (Midstream)  Final Report (07-02-18 @ 18:49):    >100,000 CFU/ml Escherichia coli  Organism: Escherichia coli (07-02-18 @ 18:49)  Organism: Escherichia coli (07-02-18 @ 18:49)      -  Amikacin: S <=8      -  Amoxicillin/Clavulanic Acid: S <=8/4      -  Ampicillin: R >16 These ampicillin results predict results for amoxicillin      -  Ampicillin/Sulbactam: I 16/8      -  Aztreonam: S <=4      -  Cefazolin: S <=2 This predicts results for oral agents cefaclor, cefdinir, cefpodoxime, cefprozil, cefuroxime axetil, cephalexin and locarbef for uncomplicated UTI. Note that some isolates may be susceptible to these agents while testing resistant to cefazolin.      -  Cefepime: S <=2      -  Cefoxitin: S <=4      -  Ceftriaxone: S <=1 Enterobacter, Citrobacter, and Serratia may develop resistance during prolonged therapy      -  Ciprofloxacin: S <=0.5      -  Ertapenem: S <=0.5      -  Gentamicin: S <=1      -  Imipenem: S <=1      -  Levofloxacin: S 2      -  Meropenem: S <=1      -  Nitrofurantoin: S <=32 Should not be used to treat pyelonephritis      -  Piperacillin/Tazobactam: S <=8      -  Tigecycline: S <=1      -  Tobramycin: S <=2      -  Trimethoprim/Sulfamethoxazole: R >2/38      Method Type: CECY    Culture - Blood (collected 06-30-18 @ 18:56)  Source: .Blood Blood  Gram Stain (07-01-18 @ 13:31):    Growth in aerobic bottle: Gram Negative Rods    Growth in anaerobic bottle: Gram Negative Rods  Preliminary Report (07-02-18 @ 06:58):    Growth in aerobic and anaerobic bottles: Escherichia coli    "Due to technical problems, Proteus sp. will Not be reported as part of    the BCID panel until further notice"    ***Blood Panel PCR results on this specimen are available    approximately 3 hours after the Gram stain result.***    Gram stain, PCR, and/or culture results may not always    correspond due to difference in methodologies.    ************************************************************    This PCR assay was performed using [employer].    The following targets are tested for: Enterococcus,    vancomycin resistant enterococci, Listeria monocytogenes,    coagulase negative staphylococci, S. aureus,    methicillin resistant S. aureus, Streptococcus agalactiae    (Group B), S. pneumoniae, S.pyogenes (Group A),    Acinetobacter baumannii, Enterobacter cloacae, E. coli,    Klebsiella oxytoca, K. pneumoniae, Proteus sp.,    Serratia marcescens, Haemophilus influenzae,    Neisseria meningitidis, Pseudomonas aeruginosa, Candida    albicans, C. glabrata, C krusei, C parapsilosis,    C. tropicalis and the KPC resistance gene.  Organism: Blood Culture PCR (07-01-18 @ 09:56)  Organism: Blood Culture PCR (07-01-18 @ 09:56)      -  Escherichia coli: Detec      Method Type: PCR    Culture - Blood (collected 06-30-18 @ 18:56)  Source: .Blood Blood  Gram Stain (07-01-18 @ 22:49):    Growth in aerobic bottle: Gram Negative Rods    Growth in anaerobic bottle: Gram Positive Cocci in Clusters  Preliminary Report (07-02-18 @ 13:08):    Growth in aerobic bottle: Escherichia coli    SEE SPECIMEN# [id number]    Growth in anaerobic bottle: Coag Negative Staphylococcus    Single set isolate, possible contaminant. Contact    Microbiology if susceptibility testing clinically    indicated.    "Due to technical problems, Proteus sp. will Not be reported as part of    the BCID panel until further notice"    ***Blood Panel PCR results on this specimen are available    approximately 3 hours after the Gram stain result.***    Gram stain, PCR, and/or culture results may not always    correspond due to difference in methodologies.    ************************************************************    This PCR assay was performed using [employer].    The following targets are tested for: Enterococcus,    vancomycin resistant enterococci, Listeria monocytogenes,    coagulase negative staphylococci, S. aureus,    methicillin resistant S. aureus, Streptococcus agalactiae    (Group B), S. pneumoniae, S. pyogenes (Group A),    Acinetobacter baumannii, Enterobacter cloacae, E. coli,    Klebsiella oxytoca, K. pneumoniae, Proteus sp.,    Serratia marcescens, Haemophilus influenzae,    Neisseria meningitidis, Pseudomonas aeruginosa, Candida    albicans, C. glabrata, C krusei, C parapsilosis,    C. tropicalis and the KPC resistance gene.  Organism: Blood Culture PCR (07-02-18 @ 01:03)  Organism: Blood Culture PCR (07-02-18 @ 01:03)      -  Coagulase negative Staphylococcus: Detec      Method Type: PCR        RADIOLOGY  < from: CT Chest No Cont (07.03.18 @ 11:30) >  EXAM:  CT CHEST                        PROCEDURE DATE:  07/03/2018    INTERPRETATION:  .    CLINICAL INFORMATION: Normal chest xray evaluate for endobronchial   lesion. UTI sepsis.    TECHNIQUE: Helical axial images of the chest were obtained from the   thoracic inlet to the adrenal glands without the administration of   intravenous contrast. Sagittal and coronal reformations were obtained   from the source data.     COMPARISON: Prior chest x-ray examination from 7/2/2018. No prior chest   CT examinations available for comparison.    FINDINGS: There is no axillary adenopathy. Subcentimeter sized lymph   nodes are notable within the mediastinum.    The heart size is at the upper limits of normal. The pericardium appears   unremarkable. There are atherosclerotic calcifications of the imaged   coronary arteries, aorta, and branch vessels. The imaged portions of the   aorta are normal in caliber.    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 Layering secretions are notable within the   right side of the trachea. There is patchy consolidation within the right   upper lobe. A few patchy groundglass opacities are also notable within   the left upper lobe and lingula. A few patchy groundglass opacities are   notable within the right middle lobe. Scattered areas of linear   atelectasis are notable throughout both lungs. No pleural effusions are   noted.    There is an exophytic left-sided renal cyst. There is nonspecific   left-sided perinephric stranding. The other visualized upper abdominal   organs appear unremarkable.    There is generalized osteopenia. Multilevel degenerative changes are   noted within the imaged potions of the spine. There are age indeterminate   moderate compression deformities of the T8, T9, and T11 vertebral bodies   without retropulsion. There is a severe L1 compression deformity which is   age indeterminate. There is mild retropulsion of the posterosuperior   aspect into the vertebral canal.  There is a partially imaged superior   endplate deformity of L3.     IMPRESSION: Findings compatible with multifocal pneumonia, most severe   within the right upper lobe.    Multiple age indeterminate compression fractures, as discussed. Mild   retropulsion at the L1 level.                IRMA HAYWOOD M.D., ATTENDING RADIOLOGIST  This document has been electronically signed. Jul  3 2018 11:13AM    < end of copied text >    Personally reviewed.     Consultant(s) Notes Reviewed:  [x] YES  [ ] NO Patient is a 87y old  Female who presents with a chief complaint of "They sent me to here because I have a UTI"  Brought in by EMS with AMS (01 Jul 2018 00:09)      INTERVAL HPI/OVERNIGHT EVENTS: No overnight events.  Denies all complaints. Still has cough this morning.   Pt required reorienting as she was concerned that "people asking her  for things, money".       MEDICATIONS  (STANDING):  aspirin  chewable 81 milliGRAM(s) Oral daily  cholecalciferol 1000 Unit(s) Oral daily  enoxaparin Injectable 30 milliGRAM(s) SubCutaneous every 24 hours  ibuprofen  Tablet 800 milliGRAM(s) Oral daily  lactobacillus acidophilus 1 Tablet(s) Oral daily  levothyroxine 75 MICROGram(s) Oral daily  loratadine 10 milliGRAM(s) Oral daily  metoprolol succinate ER 12.5 milliGRAM(s) Oral daily  piperacillin/tazobactam IVPB. 3.375 Gram(s) IV Intermittent every 8 hours  potassium chloride    Tablet ER 20 milliEquivalent(s) Oral every 2 hours  simvastatin 10 milliGRAM(s) Oral at bedtime    MEDICATIONS  (PRN):  guaiFENesin    Syrup 200 milliGRAM(s) Oral every 6 hours PRN Cough      Allergies    erythromycin (Hives)    Intolerances        REVIEW OF SYSTEMS:  CONSTITUTIONAL: No fever or chills  HEENT:  No headache, no sore throat  RESPIRATORY: No cough, wheezing, or shortness of breath  CARDIOVASCULAR: + , palpitations, no leg swelling  GASTROINTESTINAL: No abd pain, nausea, vomiting, or diarrhea  GENITOURINARY: No dysuria, frequency, or hematuria  NEUROLOGICAL: no focal weakness or dizziness  MUSCULOSKELETAL: no myalgias     Vital Signs Last 24 Hrs  T(C): 36.7 (03 Jul 2018 06:10), Max: 37.3 (02 Jul 2018 20:24)  T(F): 98.1 (03 Jul 2018 06:10), Max: 99.1 (02 Jul 2018 20:24)  HR: 86 (03 Jul 2018 06:10) (85 - 86)  BP: 158/82 (03 Jul 2018 06:10) (121/71 - 158/82)  BP(mean): --  RR: 18 (03 Jul 2018 06:10) (17 - 18)  SpO2: 93% (03 Jul 2018 06:10) (92% - 95%)    PHYSICAL EXAM:  GENERAL: NAD, frail woman  HEENT:  EOMI, moist mucous membranes  CHEST/LUNG:  crackles throughout all lung fields bilaterally ,no wheezes, or rhonchi  HEART:  RRR, S1, S2  ABDOMEN:  BS+, soft, nontender, nondistended  EXTREMITIES: no edema, cyanosis, no calf tenderness  NERVOUS SYSTEM: AAxO 3/4, not oriented to situation     LABS:                        10.7   21.28 )-----------( 174      ( 03 Jul 2018 07:48 )             31.6     CBC Full  -  ( 03 Jul 2018 07:48 )  WBC Count : 21.28 K/uL  Hemoglobin : 10.7 g/dL  Hematocrit : 31.6 %  Platelet Count - Automated : 174 K/uL  Mean Cell Volume : 89.5 fl  Mean Cell Hemoglobin : 30.3 pg  Mean Cell Hemoglobin Concentration : 33.9 gm/dL  Auto Neutrophil # : x  Auto Lymphocyte # : x  Auto Monocyte # : x  Auto Eosinophil # : x  Auto Basophil # : x  Auto Neutrophil % : x  Auto Lymphocyte % : x  Auto Monocyte % : x  Auto Eosinophil % : x  Auto Basophil % : x    03 Jul 2018 07:48    143    |  111    |  26     ----------------------------<  119    3.3     |  21     |  1.00     Ca    8.8        03 Jul 2018 07:48          CAPILLARY BLOOD GLUCOSE            Culture - Blood (collected 07-02-18 @ 08:25)  Source: .Blood Blood-Peripheral  Preliminary Report (07-03-18 @ 09:01):    No growth to date.    Culture - Blood (collected 07-01-18 @ 16:50)  Source: .Blood Blood-Venous  Preliminary Report (07-02-18 @ 17:01):    No growth to date.    Culture - Blood (collected 07-01-18 @ 16:50)  Source: .Blood Blood-Peripheral  Preliminary Report (07-02-18 @ 17:01):    No growth to date.    Culture - Urine (collected 06-30-18 @ 22:50)  Source: .Urine Clean Catch (Midstream)  Final Report (07-02-18 @ 18:49):    >100,000 CFU/ml Escherichia coli  Organism: Escherichia coli (07-02-18 @ 18:49)  Organism: Escherichia coli (07-02-18 @ 18:49)      -  Amikacin: S <=8      -  Amoxicillin/Clavulanic Acid: S <=8/4      -  Ampicillin: R >16 These ampicillin results predict results for amoxicillin      -  Ampicillin/Sulbactam: I 16/8      -  Aztreonam: S <=4      -  Cefazolin: S <=2 This predicts results for oral agents cefaclor, cefdinir, cefpodoxime, cefprozil, cefuroxime axetil, cephalexin and locarbef for uncomplicated UTI. Note that some isolates may be susceptible to these agents while testing resistant to cefazolin.      -  Cefepime: S <=2      -  Cefoxitin: S <=4      -  Ceftriaxone: S <=1 Enterobacter, Citrobacter, and Serratia may develop resistance during prolonged therapy      -  Ciprofloxacin: S <=0.5      -  Ertapenem: S <=0.5      -  Gentamicin: S <=1      -  Imipenem: S <=1      -  Levofloxacin: S 2      -  Meropenem: S <=1      -  Nitrofurantoin: S <=32 Should not be used to treat pyelonephritis      -  Piperacillin/Tazobactam: S <=8      -  Tigecycline: S <=1      -  Tobramycin: S <=2      -  Trimethoprim/Sulfamethoxazole: R >2/38      Method Type: CECY    Culture - Blood (collected 06-30-18 @ 18:56)  Source: .Blood Blood  Gram Stain (07-01-18 @ 13:31):    Growth in aerobic bottle: Gram Negative Rods    Growth in anaerobic bottle: Gram Negative Rods  Preliminary Report (07-02-18 @ 06:58):    Growth in aerobic and anaerobic bottles: Escherichia coli    "Due to technical problems, Proteus sp. will Not be reported as part of    the BCID panel until further notice"    ***Blood Panel PCR results on this specimen are available    approximately 3 hours after the Gram stain result.***    Gram stain, PCR, and/or culture results may not always    correspond due to difference in methodologies.    ************************************************************    This PCR assay was performed using [employer].    The following targets are tested for: Enterococcus,    vancomycin resistant enterococci, Listeria monocytogenes,    coagulase negative staphylococci, S. aureus,    methicillin resistant S. aureus, Streptococcus agalactiae    (Group B), S. pneumoniae, S.pyogenes (Group A),    Acinetobacter baumannii, Enterobacter cloacae, E. coli,    Klebsiella oxytoca, K. pneumoniae, Proteus sp.,    Serratia marcescens, Haemophilus influenzae,    Neisseria meningitidis, Pseudomonas aeruginosa, Candida    albicans, C. glabrata, C krusei, C parapsilosis,    C. tropicalis and the KPC resistance gene.  Organism: Blood Culture PCR (07-01-18 @ 09:56)  Organism: Blood Culture PCR (07-01-18 @ 09:56)      -  Escherichia coli: Detec      Method Type: PCR    Culture - Blood (collected 06-30-18 @ 18:56)  Source: .Blood Blood  Gram Stain (07-01-18 @ 22:49):    Growth in aerobic bottle: Gram Negative Rods    Growth in anaerobic bottle: Gram Positive Cocci in Clusters  Preliminary Report (07-02-18 @ 13:08):    Growth in aerobic bottle: Escherichia coli    SEE SPECIMEN# [id number]    Growth in anaerobic bottle: Coag Negative Staphylococcus    Single set isolate, possible contaminant. Contact    Microbiology if susceptibility testing clinically    indicated.    "Due to technical problems, Proteus sp. will Not be reported as part of    the BCID panel until further notice"    ***Blood Panel PCR results on this specimen are available    approximately 3 hours after the Gram stain result.***    Gram stain, PCR, and/or culture results may not always    correspond due to difference in methodologies.    ************************************************************    This PCR assay was performed using [employer].    The following targets are tested for: Enterococcus,    vancomycin resistant enterococci, Listeria monocytogenes,    coagulase negative staphylococci, S. aureus,    methicillin resistant S. aureus, Streptococcus agalactiae    (Group B), S. pneumoniae, S. pyogenes (Group A),    Acinetobacter baumannii, Enterobacter cloacae, E. coli,    Klebsiella oxytoca, K. pneumoniae, Proteus sp.,    Serratia marcescens, Haemophilus influenzae,    Neisseria meningitidis, Pseudomonas aeruginosa, Candida    albicans, C. glabrata, C krusei, C parapsilosis,    C. tropicalis and the KPC resistance gene.  Organism: Blood Culture PCR (07-02-18 @ 01:03)  Organism: Blood Culture PCR (07-02-18 @ 01:03)      -  Coagulase negative Staphylococcus: Detec      Method Type: PCR        RADIOLOGY  < from: Xray Chest 1 View- PORTABLE-Urgent (07.02.18 @ 06:12) >  EXAM:  XR CHEST PORTABLE URGENT 1V                        PROCEDURE DATE:  07/02/2018    INTERPRETATION:  cough    A frontal chest film demonstrates opacification of the right upper lung   field suggesting a right upper lobe infiltrate. There is suggestion of a   right lung volume loss as well in the upper lobe region.    Generalized emphysematous and fibrotic changes are noted in both lungs      There is worsening as compared to 6/30/2018 .     The osseous structures appear intact intact.     IMPRESSION:  Right upper lobe infiltrate with volume loss. Interval reassessment and   follow-up recommended. CT imaging of the chest may be considered to rule   out the possibility of an endobronchial obstructive lesion or mass.    STELLA BEAVERS M.D., ATTENDING RADIOLOGIST  This document has been electronically signed. Jul 2 2018 11:08AM      < from: CT Chest No Cont (07.03.18 @ 11:30) >  EXAM:  CT CHEST                        PROCEDURE DATE:  07/03/2018    INTERPRETATION:  .    CLINICAL INFORMATION: Normal chest xray evaluate for endobronchial   lesion. UTI sepsis.    TECHNIQUE: Helical axial images of the chest were obtained from the   thoracic inlet to the adrenal glands without the administration of   intravenous contrast. Sagittal and coronal reformations were obtained   from the source data.     COMPARISON: Prior chest x-ray examination from 7/2/2018. No prior chest   CT examinations available for comparison.    FINDINGS: There is no axillary adenopathy. Subcentimeter sized lymph   nodes are notable within the mediastinum.    The heart size is at the upper limits of normal. The pericardium appears   unremarkable. There are atherosclerotic calcifications of the imaged   coronary arteries, aorta, and branch vessels. The imaged portions of the   aorta are normal in caliber.    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 Layering secretions are notable within the   right side of the trachea. There is patchy consolidation within the right   upper lobe. A few patchy groundglass opacities are also notable within   the left upper lobe and lingula. A few patchy groundglass opacities are   notable within the right middle lobe. Scattered areas of linear   atelectasis are notable throughout both lungs. No pleural effusions are   noted.    There is an exophytic left-sided renal cyst. There is nonspecific   left-sided perinephric stranding. The other visualized upper abdominal   organs appear unremarkable.    There is generalized osteopenia. Multilevel degenerative changes are   noted within the imaged potions of the spine. There are age indeterminate   moderate compression deformities of the T8, T9, and T11 vertebral bodies   without retropulsion. There is a severe L1 compression deformity which is   age indeterminate. There is mild retropulsion of the posterosuperior   aspect into the vertebral canal.  There is a partially imaged superior   endplate deformity of L3.     IMPRESSION: Findings compatible with multifocal pneumonia, most severe   within the right upper lobe.    Multiple age indeterminate compression fractures, as discussed. Mild   retropulsion at the L1 level.    IRMA HAYWOOD M.D., ATTENDING RADIOLOGIST  This document has been electronically signed. Jul  3 2018 11:13AM      < from: US Renal (07.03.18 @ 14:43) >    EXAM:  US KIDNEY(S)                        PROCEDURE DATE:  07/03/2018    INTERPRETATION:  History: Escherichia coli septicemia. r/o pyelonephrosis  Bilateral renal ultrasound.    Right kidney 9.5 the left 12.4 cm long dimension. No hydronephrosis   shadowing calculus or solid renal mass bilaterally. There is a 1.7 x 1.3   cm cortical cyst in the right kidney, 3.2 x 2.9 cm cortical cyst   interpolar left kidney with internal septa, and  3.4 x 2.3 cm simple   cortical cyst left lower pole.    Impression: No hydronephrosis. Bilateral renal cortical cysts    KETURAH CAM M.D., ATTENDING RADIOLOGIST  This document has been electronically signed. Jul  3 2018  3:12PM    Personally reviewed.     Consultant(s) Notes Reviewed:  [x] YES  [ ] NO

## 2018-07-03 NOTE — PROGRESS NOTE ADULT - ASSESSMENT
87F with PMH of hypothyroidism, HLD, and arthritis presents with weakness, admitted with UTI, GNR bacteremia. Sepsis suspected due to UTI.

## 2018-07-03 NOTE — PROGRESS NOTE ADULT - PROBLEM SELECTOR PLAN 1
- Bacteriemia:  Blood Cx +  for GNR  E.Coli  - Switched to Zosyn for broader coverage given rising WBC count  - pneumonia  - following recs from ID : Dr. Isabela Whiting   - f/u repeat blood cultures and urine cx - Bacteriemia:  Blood Cx +  for GNR  E.Coli  b/l Renal US  neg for hydronephrosis, does not reveal reason for increasing WBC   - Continue on Zosyn for broad coverage   - following recs from ID : Dr. Jones  - f/u repeat blood cultures and urine cx - Bacteriemia:  Blood Cx +  for GNR  E.Coli  b/l Renal US  neg for hydronephrosis, does not reveal reason for increasing WBC   - Continue on Zosyn for broad coverage   - following recs from ID : Dr. Macario  - f/u repeat blood cultures and urine cx

## 2018-07-03 NOTE — CONSULT NOTE ADULT - SUBJECTIVE AND OBJECTIVE BOX
Date/Time Patient Seen:  		  Referring MD:   Data Reviewed	       Patient is a 87y old  Female who presents with a chief complaint of "They sent me to here because I have a UTI"  Brought in by EMS with AMS (01 Jul 2018 00:09)      Subjective/HPI    in bed  seen and examined  vs and meds reviewed    H and P reviewed  pt is a poor historian    ER provider note reviewed    LABS and Imaging reviewed    History and Physical:   Source of Information	Patient  Outpatient Providers	PCP: Dr. Eduardo Martin     Language:  · Patient/Family of Limited English Proficiency	No       History of Present Illness:  Reason for Admission: Weakness  History of Present Illness:   87F with PMH of hypothyroidism, HLD, and arthritis presents to ED with weakness for the past 5 days. This afternoon she felt more dizzy and weak. Family is at bedside and states that patient has also not been eating well this past week after receiving some bad news about her . Of note patient was lying in bed naked last night and was walking around not knowing what she was doing according to her daughter. Denies lightheadedness, CP, SOB, abd pain, dysuria.     In ED, vitals stable. Labs significant for WBC 11.77, H/H 11.4/33.1, BUN/Cr 1.6/119, AST 66, alk phos 151. Troponin negative. CPK 3.9. UA positive. CXR pending. CT head negative. Given 2L NS bolus. Received dose of rocephin.        PAST MEDICAL & SURGICAL HISTORY:  Arthritis  Hypothyroid  Hyperlipidemia  Seasonal Allergies  Osteoporosis  Migraine  Cataract: s/p cataract surgery  Edema of Leg: b/l LE  Chronic Edema  Seasonal Rhinitis  Hypercholesteremia  Hypothyroidism  Anxiety  Clostridium Difficile Infection  S/P cataract surgery: bialteral  S/P ovarian cystectomy  S/P hip replacement  S/P knee replacement, bilateral: Partial R) hip  Hip Replacement: left hip  Knee Replacement: b/l        Medication list         MEDICATIONS  (STANDING):  aspirin  chewable 81 milliGRAM(s) Oral daily  cholecalciferol 1000 Unit(s) Oral daily  enoxaparin Injectable 30 milliGRAM(s) SubCutaneous every 24 hours  ibuprofen  Tablet 800 milliGRAM(s) Oral daily  lactobacillus acidophilus 1 Tablet(s) Oral daily  levothyroxine 75 MICROGram(s) Oral daily  loratadine 10 milliGRAM(s) Oral daily  metoprolol succinate ER 12.5 milliGRAM(s) Oral daily  piperacillin/tazobactam IVPB. 3.375 Gram(s) IV Intermittent every 8 hours  simvastatin 10 milliGRAM(s) Oral at bedtime    MEDICATIONS  (PRN):  guaiFENesin    Syrup 200 milliGRAM(s) Oral every 6 hours PRN Cough         Vitals log        ICU Vital Signs Last 24 Hrs  T(C): 36.7 (03 Jul 2018 06:10), Max: 37.3 (02 Jul 2018 20:24)  T(F): 98.1 (03 Jul 2018 06:10), Max: 99.1 (02 Jul 2018 20:24)  HR: 86 (03 Jul 2018 06:10) (86 - 86)  BP: 158/82 (03 Jul 2018 06:10) (147/77 - 158/82)  BP(mean): --  ABP: --  ABP(mean): --  RR: 18 (03 Jul 2018 06:10) (18 - 18)  SpO2: 93% (03 Jul 2018 06:10) (92% - 93%)        Past Surgical History:  Knee Replacement  b/l  S/P cataract surgery  bialteral  S/P hip replacement    S/P knee replacement, bilateral  Partial R) hip  S/P ovarian cystectomy.     Family History:  No pertinent family history in first degree relatives.     Social History:  Social History (marital status, living situation, occupation, tobacco use, alcohol and drug use, and sexual history): Marital Status:   	Lives with:   	Occupation: Retired    	Tobacco Use: Former smoker, quit>50yrs ago   	Alcohol Use: Wine on occasion  	Substance Use: Denies    	[  ] Advanced Directives: [   ] declined   [  ] health care proxy:      Input and Output:  I&O's Detail      Lab Data                        10.7   21.28 )-----------( 174      ( 03 Jul 2018 07:48 )             31.6     07-03    143  |  111<H>  |  26<H>  ----------------------------<  119<H>  3.3<L>   |  21<L>  |  1.00    Ca    8.8      03 Jul 2018 07:48              Review of Systems	      Objective     Physical Examination    frail  weak  lung dec BS  abd soft      Pertinent Lab findings & Imaging      Diaz:  NO   Adequate UO     I&O's Detail           Discussed with:     Cultures:	        Radiology

## 2018-07-03 NOTE — CONSULT NOTE ADULT - PROBLEM SELECTOR RECOMMENDATION 9
frail and weak elderly  ct chest reviewed  on ABX for poss LUT infection, covers poss PNA  HOB elev  suspect poss aspiration on chronic basis  will order NEBS to help mobilize secretions  chest pt as needed  keep sat > 88 pct  oral hygiene  speech and swallow eval  pall care eval  check ProBNP eval for HF  check Thyroid panel  ID follow up and monitoring  GGO on CT chest noted and appreciated - differential - infectious, malignant and volume overloaded,   will follow  work up under way  prognosis guarded  advanced age, frail and weak, pall care eval for MOLST discussion
Suspicion for this being from a urinary source with this clinical story and the grossly abn UA but will need to follow as patient's cognitive status improves for any other potential localization such as abdomen.  With rising wbc and sensitivities still pending on E coli will escalate to Zosyn with further recs to follow based ons sensitivities and clinical course

## 2018-07-03 NOTE — PROGRESS NOTE ADULT - ATTENDING COMMENTS
Agree with exam and plan as above with following: Pt with EColi bacteremia suspected sepsis. WBC count not improved ordered renal us no sign of hydronephrosis, collection/abcess. Pt has abnormal CXR which she claims is chronic for many years, CT non contrast with GGO concerning or multifocal PNA. Dr Caldera consulted, requested PMD to fax over old CT scan findings. Spoke to daughter over the phone aware of current hospital course. Rest agree as above. Agree with exam and plan as above with following: Pt with EColi bacteremia suspected sepsis. WBC count not improved ordered renal us no sign of hydronephrosis, evidence of obstruction, collection/abcess. Pt has abnormal CXR which she claims is chronic for many years, CT non contrast with GGO concerning or multifocal PNA. Dr Caldera consulted, requested PMD to fax over old CT scan findings. Spoke to daughter over the phone aware of current hospital course. Rest agree as above.

## 2018-07-04 DIAGNOSIS — D72.829 ELEVATED WHITE BLOOD CELL COUNT, UNSPECIFIED: ICD-10-CM

## 2018-07-04 LAB
ANION GAP SERPL CALC-SCNC: 9 MMOL/L — SIGNIFICANT CHANGE UP (ref 5–17)
BUN SERPL-MCNC: 20 MG/DL — SIGNIFICANT CHANGE UP (ref 7–23)
CALCIUM SERPL-MCNC: 8.5 MG/DL — SIGNIFICANT CHANGE UP (ref 8.5–10.1)
CHLORIDE SERPL-SCNC: 110 MMOL/L — HIGH (ref 96–108)
CO2 SERPL-SCNC: 23 MMOL/L — SIGNIFICANT CHANGE UP (ref 22–31)
CREAT SERPL-MCNC: 0.84 MG/DL — SIGNIFICANT CHANGE UP (ref 0.5–1.3)
GLUCOSE SERPL-MCNC: 120 MG/DL — HIGH (ref 70–99)
HCT VFR BLD CALC: 31.8 % — LOW (ref 34.5–45)
HGB BLD-MCNC: 11 G/DL — LOW (ref 11.5–15.5)
MCHC RBC-ENTMCNC: 29.9 PG — SIGNIFICANT CHANGE UP (ref 27–34)
MCHC RBC-ENTMCNC: 34.6 GM/DL — SIGNIFICANT CHANGE UP (ref 32–36)
MCV RBC AUTO: 86.4 FL — SIGNIFICANT CHANGE UP (ref 80–100)
NRBC # BLD: 0 /100 WBCS — SIGNIFICANT CHANGE UP (ref 0–0)
PLATELET # BLD AUTO: 159 K/UL — SIGNIFICANT CHANGE UP (ref 150–400)
POTASSIUM SERPL-MCNC: 3.4 MMOL/L — LOW (ref 3.5–5.3)
POTASSIUM SERPL-SCNC: 3.4 MMOL/L — LOW (ref 3.5–5.3)
RBC # BLD: 3.68 M/UL — LOW (ref 3.8–5.2)
RBC # FLD: 14.8 % — HIGH (ref 10.3–14.5)
SODIUM SERPL-SCNC: 142 MMOL/L — SIGNIFICANT CHANGE UP (ref 135–145)
T3 SERPL-MCNC: 50 NG/DL — LOW (ref 80–200)
T4 AB SER-ACNC: 8.9 UG/DL — SIGNIFICANT CHANGE UP (ref 4.6–12)
WBC # BLD: 22.61 K/UL — HIGH (ref 3.8–10.5)
WBC # FLD AUTO: 22.61 K/UL — HIGH (ref 3.8–10.5)

## 2018-07-04 PROCEDURE — 99233 SBSQ HOSP IP/OBS HIGH 50: CPT

## 2018-07-04 RX ORDER — POTASSIUM CHLORIDE 20 MEQ
40 PACKET (EA) ORAL ONCE
Qty: 0 | Refills: 0 | Status: COMPLETED | OUTPATIENT
Start: 2018-07-04 | End: 2018-07-04

## 2018-07-04 RX ORDER — FUROSEMIDE 40 MG
40 TABLET ORAL ONCE
Qty: 0 | Refills: 0 | Status: COMPLETED | OUTPATIENT
Start: 2018-07-04 | End: 2018-07-04

## 2018-07-04 RX ADMIN — Medication 200 MILLIGRAM(S): at 21:44

## 2018-07-04 RX ADMIN — ALBUTEROL 2.5 MILLIGRAM(S): 90 AEROSOL, METERED ORAL at 19:15

## 2018-07-04 RX ADMIN — ALBUTEROL 2.5 MILLIGRAM(S): 90 AEROSOL, METERED ORAL at 13:36

## 2018-07-04 RX ADMIN — Medication 75 MICROGRAM(S): at 05:43

## 2018-07-04 RX ADMIN — Medication 800 MILLIGRAM(S): at 12:14

## 2018-07-04 RX ADMIN — Medication 1 TABLET(S): at 12:14

## 2018-07-04 RX ADMIN — Medication 40 MILLIEQUIVALENT(S): at 12:14

## 2018-07-04 RX ADMIN — Medication 40 MILLIGRAM(S): at 06:18

## 2018-07-04 RX ADMIN — Medication 1000 UNIT(S): at 12:15

## 2018-07-04 RX ADMIN — Medication 81 MILLIGRAM(S): at 12:15

## 2018-07-04 RX ADMIN — Medication 12.5 MILLIGRAM(S): at 05:44

## 2018-07-04 RX ADMIN — PIPERACILLIN AND TAZOBACTAM 25 GRAM(S): 4; .5 INJECTION, POWDER, LYOPHILIZED, FOR SOLUTION INTRAVENOUS at 17:01

## 2018-07-04 RX ADMIN — Medication 800 MILLIGRAM(S): at 13:14

## 2018-07-04 RX ADMIN — ALBUTEROL 2.5 MILLIGRAM(S): 90 AEROSOL, METERED ORAL at 07:21

## 2018-07-04 RX ADMIN — LORATADINE 10 MILLIGRAM(S): 10 TABLET ORAL at 12:14

## 2018-07-04 RX ADMIN — PIPERACILLIN AND TAZOBACTAM 25 GRAM(S): 4; .5 INJECTION, POWDER, LYOPHILIZED, FOR SOLUTION INTRAVENOUS at 09:30

## 2018-07-04 RX ADMIN — ENOXAPARIN SODIUM 30 MILLIGRAM(S): 100 INJECTION SUBCUTANEOUS at 05:43

## 2018-07-04 RX ADMIN — SIMVASTATIN 10 MILLIGRAM(S): 20 TABLET, FILM COATED ORAL at 21:44

## 2018-07-04 NOTE — SWALLOW BEDSIDE ASSESSMENT ADULT - ASR SWALLOW ASPIRATION MONITOR
change of breathing pattern/cough/fever/throat clearing/upper respiratory infection/gurgly voice/pneumonia

## 2018-07-04 NOTE — PROGRESS NOTE ADULT - PROBLEM SELECTOR PLAN 1
frail and weak  CT chest noted, GGO noted, differential - infection, malignancy and or aspiration and or pulm edema  proBNP elevated, will give LASIX x 1 dose now  cont NEBS TID  speech and swallow eval  HOB elev  oral hygiene  on emp ABX, ID following  supportive measures  pall care eval for MOLST discussion  will follow

## 2018-07-04 NOTE — PROGRESS NOTE ADULT - SUBJECTIVE AND OBJECTIVE BOX
Patient is a 87y old  Female who presents with a chief complaint of "They sent me to here because I have a UTI"  Brought in by EMS with AMS (01 Jul 2018 00:09)      INTERVAL HPI/OVERNIGHT EVENTS: Patient seen and examined at bedside. No acute events overnight. Seen by Dr. Caldera Pulmonary. Coughing improved. Abnormal CT scan called PMD Dr Martin awaiting report from prior CT scans. BNP elevated, getting ECHO.      MEDICATIONS  (STANDING):  ALBUTerol    0.083% 2.5 milliGRAM(s) Nebulizer every 8 hours  aspirin  chewable 81 milliGRAM(s) Oral daily  cholecalciferol 1000 Unit(s) Oral daily  enoxaparin Injectable 30 milliGRAM(s) SubCutaneous every 24 hours  ibuprofen  Tablet 800 milliGRAM(s) Oral daily  lactobacillus acidophilus 1 Tablet(s) Oral daily  levothyroxine 75 MICROGram(s) Oral daily  loratadine 10 milliGRAM(s) Oral daily  metoprolol succinate ER 12.5 milliGRAM(s) Oral daily  piperacillin/tazobactam IVPB. 3.375 Gram(s) IV Intermittent every 8 hours  simvastatin 10 milliGRAM(s) Oral at bedtime    MEDICATIONS  (PRN):  guaiFENesin    Syrup 200 milliGRAM(s) Oral every 6 hours PRN Cough        Allergies    erythromycin (Hives)    Intolerances        REVIEW OF SYSTEMS:  CONSTITUTIONAL: No fever or chills  HEENT:  No headache, no sore throat  RESPIRATORY: No cough, wheezing, or shortness of breath  CARDIOVASCULAR: No palpitations, no leg swelling  GASTROINTESTINAL: No abd pain, nausea, vomiting, or diarrhea  GENITOURINARY: No dysuria, frequency, or hematuria  NEUROLOGICAL: no focal weakness or dizziness  MUSCULOSKELETAL: no myalgias     Vital Signs Last 24 Hrs  T(C): 36.7 (04 Jul 2018 13:51), Max: 36.7 (03 Jul 2018 20:32)  T(F): 98 (04 Jul 2018 13:51), Max: 98.1 (04 Jul 2018 04:47)  HR: 91 (04 Jul 2018 13:51) (70 - 91)  BP: 92/61 (04 Jul 2018 13:51) (92/61 - 170/84)  BP(mean): --  RR: 17 (04 Jul 2018 13:51) (17 - 18)  SpO2: 98% (04 Jul 2018 13:51) (94% - 98%)    PHYSICAL EXAM:  GENERAL: NAD, frail woman  HEENT:  EOMI, moist mucous membranes  CHEST/LUNG:  expiratory wheezes bases  HEART:  RRR, S1, S2  ABDOMEN:  BS+, soft, nontender, nondistended  EXTREMITIES: no edema, cyanosis, no calf tenderness  NERVOUS SYSTEM: AAxO 3/4, not oriented to situation     LABS:                        11.0   22.61 )-----------( 159      ( 04 Jul 2018 06:32 )             31.8     07-04    142  |  110<H>  |  20  ----------------------------<  120<H>  3.4<L>   |  23  |  0.84    Ca    8.5      04 Jul 2018 06:32            Culture - Blood (collected 07-02-18 @ 08:25)  Source: .Blood Blood-Peripheral  Preliminary Report (07-03-18 @ 09:01):    No growth to date.    Culture - Blood (collected 07-01-18 @ 16:50)  Source: .Blood Blood-Venous  Preliminary Report (07-02-18 @ 17:01):    No growth to date.    Culture - Blood (collected 07-01-18 @ 16:50)  Source: .Blood Blood-Peripheral  Preliminary Report (07-02-18 @ 17:01):    No growth to date.    Culture - Urine (collected 06-30-18 @ 22:50)  Source: .Urine Clean Catch (Midstream)  Final Report (07-02-18 @ 18:49):    >100,000 CFU/ml Escherichia coli  Organism: Escherichia coli (07-02-18 @ 18:49)  Organism: Escherichia coli (07-02-18 @ 18:49)      -  Amikacin: S <=8      -  Amoxicillin/Clavulanic Acid: S <=8/4      -  Ampicillin: R >16 These ampicillin results predict results for amoxicillin      -  Ampicillin/Sulbactam: I 16/8      -  Aztreonam: S <=4      -  Cefazolin: S <=2 This predicts results for oral agents cefaclor, cefdinir, cefpodoxime, cefprozil, cefuroxime axetil, cephalexin and locarbef for uncomplicated UTI. Note that some isolates may be susceptible to these agents while testing resistant to cefazolin.      -  Cefepime: S <=2      -  Cefoxitin: S <=4      -  Ceftriaxone: S <=1 Enterobacter, Citrobacter, and Serratia may develop resistance during prolonged therapy      -  Ciprofloxacin: S <=0.5      -  Ertapenem: S <=0.5      -  Gentamicin: S <=1      -  Imipenem: S <=1      -  Levofloxacin: S 2      -  Meropenem: S <=1      -  Nitrofurantoin: S <=32 Should not be used to treat pyelonephritis      -  Piperacillin/Tazobactam: S <=8      -  Tigecycline: S <=1      -  Tobramycin: S <=2      -  Trimethoprim/Sulfamethoxazole: R >2/38      Method Type: CECY    Culture - Blood (collected 06-30-18 @ 18:56)  Source: .Blood Blood  Gram Stain (07-01-18 @ 13:31):    Growth in aerobic bottle: Gram Negative Rods    Growth in anaerobic bottle: Gram Negative Rods  Preliminary Report (07-02-18 @ 06:58):    Growth in aerobic and anaerobic bottles: Escherichia coli    "Due to technical problems, Proteus sp. will Not be reported as part of    the BCID panel until further notice"    ***Blood Panel PCR results on this specimen are available    approximately 3 hours after the Gram stain result.***    Gram stain, PCR, and/or culture results may not always    correspond due to difference in methodologies.    ************************************************************    This PCR assay was performed using [employer].    The following targets are tested for: Enterococcus,    vancomycin resistant enterococci, Listeria monocytogenes,    coagulase negative staphylococci, S. aureus,    methicillin resistant S. aureus, Streptococcus agalactiae    (Group B), S. pneumoniae, S.pyogenes (Group A),    Acinetobacter baumannii, Enterobacter cloacae, E. coli,    Klebsiella oxytoca, K. pneumoniae, Proteus sp.,    Serratia marcescens, Haemophilus influenzae,    Neisseria meningitidis, Pseudomonas aeruginosa, Candida    albicans, C. glabrata, C krusei, C parapsilosis,    C. tropicalis and the KPC resistance gene.  Organism: Blood Culture PCR (07-01-18 @ 09:56)  Organism: Blood Culture PCR (07-01-18 @ 09:56)      -  Escherichia coli: Detec      Method Type: PCR    Culture - Blood (collected 06-30-18 @ 18:56)  Source: .Blood Blood  Gram Stain (07-01-18 @ 22:49):    Growth in aerobic bottle: Gram Negative Rods    Growth in anaerobic bottle: Gram Positive Cocci in Clusters  Preliminary Report (07-02-18 @ 13:08):    Growth in aerobic bottle: Escherichia coli    SEE SPECIMEN# [id number]    Growth in anaerobic bottle: Coag Negative Staphylococcus    Single set isolate, possible contaminant. Contact    Microbiology if susceptibility testing clinically    indicated.    "Due to technical problems, Proteus sp. will Not be reported as part of    the BCID panel until further notice"    ***Blood Panel PCR results on this specimen are available    approximately 3 hours after the Gram stain result.***    Gram stain, PCR, and/or culture results may not always    correspond due to difference in methodologies.    ************************************************************    This PCR assay was performed using [employer].    The following targets are tested for: Enterococcus,    vancomycin resistant enterococci, Listeria monocytogenes,    coagulase negative staphylococci, S. aureus,    methicillin resistant S. aureus, Streptococcus agalactiae    (Group B), S. pneumoniae, S. pyogenes (Group A),    Acinetobacter baumannii, Enterobacter cloacae, E. coli,    Klebsiella oxytoca, K. pneumoniae, Proteus sp.,    Serratia marcescens, Haemophilus influenzae,    Neisseria meningitidis, Pseudomonas aeruginosa, Candida    albicans, C. glabrata, C krusei, C parapsilosis,    C. tropicalis and the KPC resistance gene.  Organism: Blood Culture PCR (07-02-18 @ 01:03)  Organism: Blood Culture PCR (07-02-18 @ 01:03)      -  Coagulase negative Staphylococcus: Detec      Method Type: PCR        RADIOLOGY  < from: Xray Chest 1 View- PORTABLE-Urgent (07.02.18 @ 06:12) >  EXAM:  XR CHEST PORTABLE URGENT 1V                        PROCEDURE DATE:  07/02/2018    INTERPRETATION:  cough    A frontal chest film demonstrates opacification of the right upper lung   field suggesting a right upper lobe infiltrate. There is suggestion of a   right lung volume loss as well in the upper lobe region.    Generalized emphysematous and fibrotic changes are noted in both lungs      There is worsening as compared to 6/30/2018 .     The osseous structures appear intact intact.     IMPRESSION:  Right upper lobe infiltrate with volume loss. Interval reassessment and   follow-up recommended. CT imaging of the chest may be considered to rule   out the possibility of an endobronchial obstructive lesion or mass.    STELLA BEAVERS M.D., ATTENDING RADIOLOGIST  This document has been electronically signed. Jul 2 2018 11:08AM      < from: CT Chest No Cont (07.03.18 @ 11:30) >  EXAM:  CT CHEST                        PROCEDURE DATE:  07/03/2018    INTERPRETATION:  .    CLINICAL INFORMATION: Normal chest xray evaluate for endobronchial   lesion. UTI sepsis.    TECHNIQUE: Helical axial images of the chest were obtained from the   thoracic inlet to the adrenal glands without the administration of   intravenous contrast. Sagittal and coronal reformations were obtained   from the source data.     COMPARISON: Prior chest x-ray examination from 7/2/2018. No prior chest   CT examinations available for comparison.    FINDINGS: There is no axillary adenopathy. Subcentimeter sized lymph   nodes are notable within the mediastinum.    The heart size is at the upper limits of normal. The pericardium appears   unremarkable. There are atherosclerotic calcifications of the imaged   coronary arteries, aorta, and branch vessels. The imaged portions of the   aorta are normal in caliber.    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 Layering secretions are notable within the   right side of the trachea. There is patchy consolidation within the right   upper lobe. A few patchy groundglass opacities are also notable within   the left upper lobe and lingula. A few patchy groundglass opacities are   notable within the right middle lobe. Scattered areas of linear   atelectasis are notable throughout both lungs. No pleural effusions are   noted.    There is an exophytic left-sided renal cyst. There is nonspecific   left-sided perinephric stranding. The other visualized upper abdominal   organs appear unremarkable.    There is generalized osteopenia. Multilevel degenerative changes are   noted within the imaged potions of the spine. There are age indeterminate   moderate compression deformities of the T8, T9, and T11 vertebral bodies   without retropulsion. There is a severe L1 compression deformity which is   age indeterminate. There is mild retropulsion of the posterosuperior   aspect into the vertebral canal.  There is a partially imaged superior   endplate deformity of L3.     IMPRESSION: Findings compatible with multifocal pneumonia, most severe   within the right upper lobe.    Multiple age indeterminate compression fractures, as discussed. Mild   retropulsion at the L1 level.    IRMA HAYWOOD M.D., ATTENDING RADIOLOGIST  This document has been electronically signed. Jul  3 2018 11:13AM      < from: US Renal (07.03.18 @ 14:43) >    EXAM:  US KIDNEY(S)                        PROCEDURE DATE:  07/03/2018    INTERPRETATION:  History: Escherichia coli septicemia. r/o pyelonephrosis  Bilateral renal ultrasound.    Right kidney 9.5 the left 12.4 cm long dimension. No hydronephrosis   shadowing calculus or solid renal mass bilaterally. There is a 1.7 x 1.3   cm cortical cyst in the right kidney, 3.2 x 2.9 cm cortical cyst   interpolar left kidney with internal septa, and  3.4 x 2.3 cm simple   cortical cyst left lower pole.    Impression: No hydronephrosis. Bilateral renal cortical cysts    KETURAH CAM M.D., ATTENDING RADIOLOGIST  This document has been electronically signed. Jul  3 2018  3:12PM    Personally reviewed.     Consultant(s) Notes Reviewed:  [x] YES  [ ] NO

## 2018-07-04 NOTE — PROGRESS NOTE ADULT - PROBLEM SELECTOR PLAN 1
- Bacteriemia:  Blood Cx +  for GNR  E.Coli  - Renal US  neg for hydronephrosis/abc ess collection/ obstruction   - Continue on Zosyn for broad coverage   - following recs from ID : Dr. Macario  - f/u repeat blood cultures and urine cx

## 2018-07-04 NOTE — SWALLOW BEDSIDE ASSESSMENT ADULT - COMMENTS
Pt A+Ox4 cooperative. Pt admitted with UTI, AMS, multi-focal pna. Pt presents with oral dysphagia characterized by adequate oral prep, prolonged but complete mastication, latent AP transport, timely swallow. No overt s/s of aspiration noted h/e pt at increased risk of aspiration with hard solids secondary to oral phase difficulties. Recommend dysphagia 3 soft solids with thin liquids. Discussed with RN.

## 2018-07-04 NOTE — PROVIDER CONTACT NOTE (OTHER) - ACTION/TREATMENT ORDERED:
Lasix given as ordered; doctor to order cough medicine; receiving med-surg ANAID shook
maintain monitor and recheck 1 hr

## 2018-07-04 NOTE — PROGRESS NOTE ADULT - ASSESSMENT
88 yo female admitted with confusion and E coli bacteremia likely from a urinary source  L perinephric stranding on chest CT  WBC going up despite what in vitro should be adequate antibiotics-- and clinically looks well.  renal ultrasound without obstruction or abscess

## 2018-07-04 NOTE — PROGRESS NOTE ADULT - SUBJECTIVE AND OBJECTIVE BOX
[facility], Division of Infectious Diseases  MARCIN Muniz A. Lee  [phone]  Name: ROSENDO ROSARIO  Age: 87y  Gender: Female  MRN: [id number]    Interval History--  Notes reviewed  would like to go home.  has been sitting in chair all morning    Past Medical History--  Arthritis  Hypothyroid  Hyperlipidemia  Seasonal Allergies  Osteoporosis  Migraine  Cataract  Edema of Leg  Chronic Edema  Seasonal Rhinitis  Hypercholesteremia  Hypothyroidism  Anxiety  Clostridium Difficile Infection  S/P cataract surgery  S/P ovarian cystectomy  S/P hip replacement  S/P knee replacement, bilateral  Hip Replacement  Knee Replacement      For details regarding the patient's social history, family history, and other miscellaneous elements, please refer the initial infectious diseases consultation and/or the admitting history and physical examination for this admission.    Allergies    erythromycin (Hives)    Intolerances        Medications--  Antibiotics:  piperacillin/tazobactam IVPB. 3.375 Gram(s) IV Intermittent every 8 hours    Immunologic:    Other:  ALBUTerol    0.083%  aspirin  chewable  cholecalciferol  enoxaparin Injectable  guaiFENesin    Syrup PRN  ibuprofen  Tablet  lactobacillus acidophilus  levothyroxine  loratadine  metoprolol succinate ER  potassium chloride    Tablet ER  simvastatin      Review of Systems--  A 10-point review of systems was obtained.     Pertinent positives and negatives--  Constitutional: No fevers. No Chills. No Rigors.   Cardiovascular: No chest pain. No palpitations.  Respiratory: No shortness of breath. No cough.  Gastrointestinal: No nausea or vomiting. No diarrhea or constipation.   Psychiatric: +anxiety    Review of systems otherwise negative except as previously noted.    Physical Examination--  Vital Signs: T(F): 98.1 (07-04-18 @ 04:47), Max: 98.1 (07-04-18 @ 04:47)  HR: 72 (07-04-18 @ 07:21)  BP: 170/84 (07-04-18 @ 04:47)  RR: 18 (07-04-18 @ 04:47)  SpO2: 97% (07-04-18 @ 07:21)  Wt(kg): --  General: Nontoxic-appearing Female in no acute distress.  HEENT: AT/NC.  Neck: Not rigid. No sense of mass.  Nodes: None palpable.  Lungs: Clear bilaterally without rales, wheezing or rhonchi  Heart: Regular rate and rhythm. + murmur  Abdomen: Bowel sounds present and normoactive. Soft. Nondistended. N  Back: No spinal tenderness. No costovertebral angle tenderness.   Extremities: No cyanosis or clubbing. No edema.   Skin: Warm. Dry. Good turgor. No rash. No vasculitic stigmata.  Psychiatric: Appropriate affect and mood for situation.         Laboratory Studies--  CBC                        11.0   22.61 )-----------( 159      ( 04 Jul 2018 06:32 )             31.8       Chemistries  07-04    142  |  110<H>  |  20  ----------------------------<  120<H>  3.4<L>   |  23  |  0.84    Ca    8.5      04 Jul 2018 06:32        Culture Data    Culture - Blood (collected 02 Jul 2018 08:25)  Source: .Blood Blood-Peripheral  Preliminary Report (03 Jul 2018 09:01):    No growth to date.    Culture - Blood (collected 01 Jul 2018 16:50)  Source: .Blood Blood-Venous  Preliminary Report (02 Jul 2018 17:01):    No growth to date.    Culture - Blood (collected 01 Jul 2018 16:50)  Source: .Blood Blood-Peripheral  Preliminary Report (02 Jul 2018 17:01):    No growth to date.    Culture - Urine (collected 30 Jun 2018 22:50)  Source: .Urine Clean Catch (Midstream)  Final Report (02 Jul 2018 18:49):    >100,000 CFU/ml Escherichia coli  Organism: Escherichia coli (02 Jul 2018 18:49)  Organism: Escherichia coli (02 Jul 2018 18:49)    Culture - Blood (collected 30 Jun 2018 18:56)  Source: .Blood Blood  Gram Stain (01 Jul 2018 13:31):    Growth in aerobic bottle: Gram Negative Rods    Growth in anaerobic bottle: Gram Negative Rods  Final Report (03 Jul 2018 10:47):    Growth in aerobic and anaerobic bottles: Escherichia coli    "Due to technical problems, Proteus sp. will Not be reported as part of    the BCID panel until further notice"    ***Blood Panel PCR results on this specimen are available    approximately 3 hours after the Gram stain result.***    Gram stain, PCR, and/or culture results may not always    correspond due to difference in methodologies.    ************************************************************    This PCR assay was performed using [employer].    The following targets are tested for: Enterococcus,    vancomycin resistant enterococci, Listeria monocytogenes,    coagulase negative staphylococci, S. aureus,    methicillin resistant S. aureus, Streptococcus agalactiae    (Group B), S. pneumoniae, S.pyogenes (Group A),    Acinetobacter baumannii, Enterobacter cloacae, E. coli,    Klebsiella oxytoca, K. pneumoniae, Proteus sp.,    Serratia marcescens, Haemophilus influenzae,    Neisseria meningitidis, Pseudomonas aeruginosa, Candida    albicans, C. glabrata, C krusei, C parapsilosis,    C. tropicalis and the KPC resistance gene.  Organism: Blood Culture PCR  Escherichia coli (03 Jul 2018 10:47)  Organism: Escherichia coli (03 Jul 2018 10:47)  Organism: Blood Culture PCR (03 Jul 2018 10:47)    Culture - Blood (collected 30 Jun 2018 18:56)  Source: .Blood Blood  Gram Stain (01 Jul 2018 22:49):    Growth in aerobic bottle: Gram Negative Rods    Growth in anaerobic bottle: Gram Positive Cocci in Clusters  Final Report (03 Jul 2018 10:46):    Growth in aerobic bottle: Escherichia coli    See previous culture [id number]    Growth in anaerobic bottle: Coag Negative Staphylococcus    Single set isolate, possible contaminant. Contact    Microbiology if susceptibility testing clinically    indicated.    "Due to technical problems, Proteus sp. will Not be reported as part of    the BCID panel until further notice"    ***Blood Panel PCR results on this specimen are available    approximately 3 hours after the Gram stain result.***    Gram stain, PCR, and/or culture results may not always    correspond due to difference in methodologies.    ************************************************************    This PCR assay was performed using [employer].    The following targets are tested for: Enterococcus,    vancomycin resistant enterococci, Listeria monocytogenes,    coagulase negative staphylococci, S. aureus,    methicillin resistant S. aureus, Streptococcus agalactiae    (Group B), S. pneumoniae, S. pyogenes (Group A),    Acinetobacter baumannii, Enterobacter cloacae, E. coli,    Klebsiella oxytoca, K. pneumoniae, Proteus sp.,    Serratia marcescens, Haemophilus influenzae,    Neisseria meningitidis, Pseudomonas aeruginosa, Candida    albicans, C. glabrata, C krusei, C parapsilosis,    C. tropicalis and the KPC resistance gene.  Organism: Blood Culture PCR (03 Jul 2018 10:46)  Organism: Blood Culture PCR (03 Jul 2018 10:46)          < from: US Renal (07.03.18 @ 14:43) >    EXAM:  US KIDNEY(S)                            PROCEDURE DATE:  07/03/2018          INTERPRETATION:  History: Escherichia coli septicemia.    Bilateral renal ultrasound.    Right kidney 9.5 the left 12.4 cm long dimension. No hydronephrosis   shadowing calculus or solid renal mass bilaterally. There is a 1.7 x 1.3   cm cortical cyst in the right kidney, 3.2 x 2.9 cm cortical cyst   interpolar left kidney with internal septa, and  3.4 x 2.3 cm simple   cortical cyst left lower pole.    Impression: No hydronephrosis. Bilateral renal cortical cysts    < end of copied text >

## 2018-07-04 NOTE — PROGRESS NOTE ADULT - SUBJECTIVE AND OBJECTIVE BOX
Date/Time Patient Seen:  		  Referring MD:   Data Reviewed	       Patient is a 87y old  Female who presents with a chief complaint of "They sent me to here because I have a UTI"  Brought in by EMS with AMS (01 Jul 2018 00:09)  in bed  seen and examined  vs and meds reviewed        Subjective/HPI     PAST MEDICAL & SURGICAL HISTORY:  Arthritis  Hypothyroid  Hyperlipidemia  Seasonal Allergies  Osteoporosis  Migraine  Cataract: s/p cataract surgery  Edema of Leg: b/l LE  Chronic Edema  Seasonal Rhinitis  Hypercholesteremia  Hypothyroidism  Anxiety  Clostridium Difficile Infection  S/P cataract surgery: bialteral  S/P ovarian cystectomy  S/P hip replacement  S/P knee replacement, bilateral: Partial R) hip  Hip Replacement: left hip  Knee Replacement: b/l        Medication list         MEDICATIONS  (STANDING):  ALBUTerol    0.083% 2.5 milliGRAM(s) Nebulizer every 8 hours  aspirin  chewable 81 milliGRAM(s) Oral daily  cholecalciferol 1000 Unit(s) Oral daily  enoxaparin Injectable 30 milliGRAM(s) SubCutaneous every 24 hours  furosemide   Injectable 40 milliGRAM(s) IV Push once  ibuprofen  Tablet 800 milliGRAM(s) Oral daily  lactobacillus acidophilus 1 Tablet(s) Oral daily  levothyroxine 75 MICROGram(s) Oral daily  loratadine 10 milliGRAM(s) Oral daily  metoprolol succinate ER 12.5 milliGRAM(s) Oral daily  piperacillin/tazobactam IVPB. 3.375 Gram(s) IV Intermittent every 8 hours  simvastatin 10 milliGRAM(s) Oral at bedtime    MEDICATIONS  (PRN):  guaiFENesin    Syrup 200 milliGRAM(s) Oral every 6 hours PRN Cough         Vitals log        ICU Vital Signs Last 24 Hrs  T(C): 36.7 (04 Jul 2018 04:47), Max: 36.7 (03 Jul 2018 06:10)  T(F): 98.1 (04 Jul 2018 04:47), Max: 98.1 (03 Jul 2018 06:10)  HR: 81 (04 Jul 2018 04:47) (81 - 86)  BP: 170/84 (04 Jul 2018 04:47) (158/82 - 170/84)  BP(mean): --  ABP: --  ABP(mean): --  RR: 18 (04 Jul 2018 04:47) (18 - 18)  SpO2: 96% (04 Jul 2018 04:47) (93% - 96%)           Input and Output:  I&O's Detail      Lab Data                        10.7   21.28 )-----------( 174      ( 03 Jul 2018 07:48 )             31.6     07-03    143  |  111<H>  |  26<H>  ----------------------------<  119<H>  3.3<L>   |  21<L>  |  1.00    Ca    8.8      03 Jul 2018 07:48              Review of Systems	      Objective     Physical Examination    head at  heart s1s2  lung dec BS  abd soft      Pertinent Lab findings & Imaging      Emily:  NO   Adequate UO     I&O's Detail           Discussed with:     Cultures:	        Radiology

## 2018-07-04 NOTE — PROGRESS NOTE ADULT - PROBLEM SELECTOR PLAN 2
Cough x 2 days  CXR showed RUL infiltrate  Non contrast CT Chest showed multifocal ground glass pneumonia, with RUL consolidation-awaiting records to compare if this is chronic   Continue on Zosyn  - follow ID recs,   - Consulted Pulmonary Dr. Mack Caldera   - Will contact primary care doctor :  Eduardo Martin, [phone].  Attempted to reach today for past records of abnormal chest CT. Spoke to daughter who states shes had abnormal Lung CT scans in the past.

## 2018-07-04 NOTE — PROGRESS NOTE ADULT - ASSESSMENT
87F with PMH of hypothyroidism, HLD, and arthritis presents with weakness, admitted with UTI, GNR bacteremia. Sepsis suspected due to UTI. Abnormal CT scan of lung.

## 2018-07-05 ENCOUNTER — TRANSCRIPTION ENCOUNTER (OUTPATIENT)
Age: 83
End: 2018-07-05

## 2018-07-05 LAB
ANION GAP SERPL CALC-SCNC: 9 MMOL/L — SIGNIFICANT CHANGE UP (ref 5–17)
BASOPHILS # BLD AUTO: 0.23 K/UL — HIGH (ref 0–0.2)
BASOPHILS NFR BLD AUTO: 1 % — SIGNIFICANT CHANGE UP (ref 0–2)
BUN SERPL-MCNC: 25 MG/DL — HIGH (ref 7–23)
CALCIUM SERPL-MCNC: 8.2 MG/DL — LOW (ref 8.5–10.1)
CHLORIDE SERPL-SCNC: 111 MMOL/L — HIGH (ref 96–108)
CO2 SERPL-SCNC: 22 MMOL/L — SIGNIFICANT CHANGE UP (ref 22–31)
CREAT SERPL-MCNC: 1.1 MG/DL — SIGNIFICANT CHANGE UP (ref 0.5–1.3)
EOSINOPHIL # BLD AUTO: 0.23 K/UL — SIGNIFICANT CHANGE UP (ref 0–0.5)
EOSINOPHIL NFR BLD AUTO: 1 % — SIGNIFICANT CHANGE UP (ref 0–6)
GLUCOSE SERPL-MCNC: 123 MG/DL — HIGH (ref 70–99)
HCT VFR BLD CALC: 28.2 % — LOW (ref 34.5–45)
HGB BLD-MCNC: 9.8 G/DL — LOW (ref 11.5–15.5)
LYMPHOCYTES # BLD AUTO: 11 % — LOW (ref 13–44)
LYMPHOCYTES # BLD AUTO: 2.57 K/UL — SIGNIFICANT CHANGE UP (ref 1–3.3)
MCHC RBC-ENTMCNC: 30.2 PG — SIGNIFICANT CHANGE UP (ref 27–34)
MCHC RBC-ENTMCNC: 34.8 GM/DL — SIGNIFICANT CHANGE UP (ref 32–36)
MCV RBC AUTO: 87 FL — SIGNIFICANT CHANGE UP (ref 80–100)
MONOCYTES # BLD AUTO: 0.47 K/UL — SIGNIFICANT CHANGE UP (ref 0–0.9)
MONOCYTES NFR BLD AUTO: 2 % — SIGNIFICANT CHANGE UP (ref 2–14)
NEUTROPHILS # BLD AUTO: 19.4 K/UL — HIGH (ref 1.8–7.4)
NEUTROPHILS NFR BLD AUTO: 75 % — SIGNIFICANT CHANGE UP (ref 43–77)
PLATELET # BLD AUTO: 176 K/UL — SIGNIFICANT CHANGE UP (ref 150–400)
POTASSIUM SERPL-MCNC: 3.3 MMOL/L — LOW (ref 3.5–5.3)
POTASSIUM SERPL-SCNC: 3.3 MMOL/L — LOW (ref 3.5–5.3)
RBC # BLD: 3.24 M/UL — LOW (ref 3.8–5.2)
RBC # FLD: 14.7 % — HIGH (ref 10.3–14.5)
SODIUM SERPL-SCNC: 142 MMOL/L — SIGNIFICANT CHANGE UP (ref 135–145)
WBC # BLD: 23.37 K/UL — HIGH (ref 3.8–10.5)
WBC # FLD AUTO: 23.37 K/UL — HIGH (ref 3.8–10.5)

## 2018-07-05 PROCEDURE — 99233 SBSQ HOSP IP/OBS HIGH 50: CPT

## 2018-07-05 PROCEDURE — 93306 TTE W/DOPPLER COMPLETE: CPT | Mod: 26

## 2018-07-05 RX ORDER — POTASSIUM CHLORIDE 20 MEQ
20 PACKET (EA) ORAL
Qty: 0 | Refills: 0 | Status: COMPLETED | OUTPATIENT
Start: 2018-07-05 | End: 2018-07-05

## 2018-07-05 RX ORDER — ACETAMINOPHEN 500 MG
650 TABLET ORAL ONCE
Qty: 0 | Refills: 0 | Status: COMPLETED | OUTPATIENT
Start: 2018-07-05 | End: 2018-07-05

## 2018-07-05 RX ORDER — ALBUTEROL 90 UG/1
2.5 AEROSOL, METERED ORAL ONCE
Qty: 0 | Refills: 0 | Status: COMPLETED | OUTPATIENT
Start: 2018-07-05 | End: 2018-07-05

## 2018-07-05 RX ORDER — ERTAPENEM SODIUM 1 G/1
1000 INJECTION, POWDER, LYOPHILIZED, FOR SOLUTION INTRAMUSCULAR; INTRAVENOUS EVERY 24 HOURS
Qty: 0 | Refills: 0 | Status: COMPLETED | OUTPATIENT
Start: 2018-07-05 | End: 2018-07-14

## 2018-07-05 RX ADMIN — Medication 1000 UNIT(S): at 13:06

## 2018-07-05 RX ADMIN — SIMVASTATIN 10 MILLIGRAM(S): 20 TABLET, FILM COATED ORAL at 22:37

## 2018-07-05 RX ADMIN — Medication 650 MILLIGRAM(S): at 04:06

## 2018-07-05 RX ADMIN — Medication 20 MILLIEQUIVALENT(S): at 13:06

## 2018-07-05 RX ADMIN — LORATADINE 10 MILLIGRAM(S): 10 TABLET ORAL at 13:04

## 2018-07-05 RX ADMIN — Medication 12.5 MILLIGRAM(S): at 05:17

## 2018-07-05 RX ADMIN — Medication 81 MILLIGRAM(S): at 13:05

## 2018-07-05 RX ADMIN — Medication 1 TABLET(S): at 13:04

## 2018-07-05 RX ADMIN — ALBUTEROL 2.5 MILLIGRAM(S): 90 AEROSOL, METERED ORAL at 07:39

## 2018-07-05 RX ADMIN — ERTAPENEM SODIUM 120 MILLIGRAM(S): 1 INJECTION, POWDER, LYOPHILIZED, FOR SOLUTION INTRAMUSCULAR; INTRAVENOUS at 19:31

## 2018-07-05 RX ADMIN — ALBUTEROL 2.5 MILLIGRAM(S): 90 AEROSOL, METERED ORAL at 15:33

## 2018-07-05 RX ADMIN — PIPERACILLIN AND TAZOBACTAM 25 GRAM(S): 4; .5 INJECTION, POWDER, LYOPHILIZED, FOR SOLUTION INTRAVENOUS at 01:07

## 2018-07-05 RX ADMIN — Medication 800 MILLIGRAM(S): at 13:04

## 2018-07-05 RX ADMIN — ALBUTEROL 2.5 MILLIGRAM(S): 90 AEROSOL, METERED ORAL at 23:16

## 2018-07-05 RX ADMIN — ENOXAPARIN SODIUM 30 MILLIGRAM(S): 100 INJECTION SUBCUTANEOUS at 05:16

## 2018-07-05 RX ADMIN — Medication 200 MILLIGRAM(S): at 03:47

## 2018-07-05 RX ADMIN — PIPERACILLIN AND TAZOBACTAM 25 GRAM(S): 4; .5 INJECTION, POWDER, LYOPHILIZED, FOR SOLUTION INTRAVENOUS at 09:30

## 2018-07-05 RX ADMIN — ALBUTEROL 2.5 MILLIGRAM(S): 90 AEROSOL, METERED ORAL at 03:47

## 2018-07-05 RX ADMIN — Medication 20 MILLIEQUIVALENT(S): at 18:22

## 2018-07-05 RX ADMIN — Medication 20 MILLIEQUIVALENT(S): at 10:36

## 2018-07-05 RX ADMIN — Medication 75 MICROGRAM(S): at 05:16

## 2018-07-05 RX ADMIN — Medication 200 MILLIGRAM(S): at 22:37

## 2018-07-05 RX ADMIN — Medication 650 MILLIGRAM(S): at 03:36

## 2018-07-05 RX ADMIN — Medication 800 MILLIGRAM(S): at 14:00

## 2018-07-05 NOTE — DISCHARGE NOTE ADULT - MEDICATION SUMMARY - MEDICATIONS TO STOP TAKING
I will STOP taking the medications listed below when I get home from the hospital:    metoprolol tartrate 25 mg oral tablet  -- 0.5 tab(s) by mouth once a day    lysine 500 mg oral tablet  -- 1 tab(s) by mouth once a day    ibuprofen 800 mg oral tablet  -- 1 tab(s) by mouth once a day

## 2018-07-05 NOTE — PROGRESS NOTE ADULT - ASSESSMENT
88 yo female admitted with confusion and E coli bacteremia likely from a urinary source  L perinephric stranding on chest CT  WBC going up despite what in vitro should be adequate antibiotics  Patient with changes on CT, but unimpressive exam and paucity of respiratory symptoms to suggest pneumonia

## 2018-07-05 NOTE — DISCHARGE NOTE ADULT - CARE PLAN
Principal Discharge DX:	Bacteremia due to Escherichia coli  Secondary Diagnosis:	Anemia  Secondary Diagnosis:	Arthritis  Secondary Diagnosis:	Hyperlipidemia  Secondary Diagnosis:	Hypothyroidism  Secondary Diagnosis:	Hypertension  Secondary Diagnosis:	Vertigo Principal Discharge DX:	Bacteremia due to Escherichia coli  Goal:	Resolution  Assessment and plan of treatment:	Completed course of antibiotics  F/U with PCP  Secondary Diagnosis:	Anemia  Assessment and plan of treatment:	F/U with PCP  Secondary Diagnosis:	Arthritis  Assessment and plan of treatment:	Stable  F/u with PCP  Secondary Diagnosis:	Hyperlipidemia  Assessment and plan of treatment:	Continue Meds  F/u with PCP  Secondary Diagnosis:	Hypothyroidism  Assessment and plan of treatment:	Continue Synthroid  F/u with PCP  Secondary Diagnosis:	Hypertension  Assessment and plan of treatment:	Continue meds  F/u with PCP  Secondary Diagnosis:	Vertigo  Assessment and plan of treatment:	Asymptomatic  F/u with PCP.

## 2018-07-05 NOTE — CHART NOTE - NSCHARTNOTEFT_GEN_A_CORE
called by RN, patient requesting tylenol for leg pain and nurse requesting albuterol treatment as patient is coughing. Patient seen and examined at bedside. Patient states that the leg pain is sciatic and she has had it for years. Denies chest pain or SOB. Legs are warm to touch, no swelling noted.     Vital Signs Last 24 Hrs  T(C): 36.8 (05 Jul 2018 03:21), Max: 37.7 (04 Jul 2018 20:28)  T(F): 98.2 (05 Jul 2018 03:21), Max: 99.9 (04 Jul 2018 20:28)  HR: 89 (05 Jul 2018 03:21) (70 - 97)  BP: 155/80 (05 Jul 2018 03:21) (90/60 - 170/84)  BP(mean): --  RR: 17 (05 Jul 2018 03:21) (17 - 18)  SpO2: 92% (05 Jul 2018 03:21) (92% - 98%)    Physical Exam:  General: frail, NAD  HEENT: NCAT, PERRLA, EOMI bl, moist mucous membranes   Neck: Supple, nontender, no mass  Neurology: A&Ox3, nonfocal, CN II-XII grossly intact, sensation intact, no gait abnormalities   Respiratory: CTA B/L, No W/R/R  CV: RRR, +S1/S2, no murmurs, rubs or gallops  Abdominal: Soft, NT, ND +BSx4  Extremities: No C/C/E, + peripheral pulses  MSK: Normal ROM, no joint erythema or warmth, no joint swelling   Skin: warm, dry, normal color, no rash or abnormal lesions    Assessment and Plan    87F with PMH of hypothyroidism, HLD, and arthritis presents with weakness, admitted with UTI, GNR bacteremia. Sepsis suspected due to UTI. Abnormal CT scan of lung. Complaining of sciatic leg pain and cough.     - tylenol 650mg PO x1 given      - albuterol treatment to be administered.

## 2018-07-05 NOTE — DISCHARGE NOTE ADULT - PLAN OF CARE
Resolution Completed course of antibiotics  F/U with PCP F/U with PCP Stable  F/u with PCP Continue Meds  F/u with PCP Continue Synthroid  F/u with PCP Continue meds  F/u with PCP Asymptomatic  F/u with PCP.

## 2018-07-05 NOTE — PROGRESS NOTE ADULT - SUBJECTIVE AND OBJECTIVE BOX
Date/Time Patient Seen:  		  Referring MD:   Data Reviewed	       Patient is a 87y old  Female who presents with a chief complaint of "They sent me to here because I have a UTI"  Brought in by EMS with AMS (01 Jul 2018 00:09)    in bed  seen and examined  vs and meds reviewed    speech and swallow eval noted      Subjective/HPI     PAST MEDICAL & SURGICAL HISTORY:  Arthritis  Hypothyroid  Hyperlipidemia  Seasonal Allergies  Osteoporosis  Migraine  Cataract: s/p cataract surgery  Edema of Leg: b/l LE  Chronic Edema  Seasonal Rhinitis  Hypercholesteremia  Hypothyroidism  Anxiety  Clostridium Difficile Infection  S/P cataract surgery: bialteral  S/P ovarian cystectomy  S/P hip replacement  S/P knee replacement, bilateral: Partial R) hip  Hip Replacement: left hip  Knee Replacement: b/l        Medication list         MEDICATIONS  (STANDING):  ALBUTerol    0.083% 2.5 milliGRAM(s) Nebulizer every 8 hours  aspirin  chewable 81 milliGRAM(s) Oral daily  cholecalciferol 1000 Unit(s) Oral daily  enoxaparin Injectable 30 milliGRAM(s) SubCutaneous every 24 hours  ibuprofen  Tablet 800 milliGRAM(s) Oral daily  lactobacillus acidophilus 1 Tablet(s) Oral daily  levothyroxine 75 MICROGram(s) Oral daily  loratadine 10 milliGRAM(s) Oral daily  metoprolol succinate ER 12.5 milliGRAM(s) Oral daily  piperacillin/tazobactam IVPB. 3.375 Gram(s) IV Intermittent every 8 hours  simvastatin 10 milliGRAM(s) Oral at bedtime    MEDICATIONS  (PRN):  guaiFENesin    Syrup 200 milliGRAM(s) Oral every 6 hours PRN Cough         Vitals log        ICU Vital Signs Last 24 Hrs  T(C): 36.7 (05 Jul 2018 04:54), Max: 37.7 (04 Jul 2018 20:28)  T(F): 98 (05 Jul 2018 04:54), Max: 99.9 (04 Jul 2018 20:28)  HR: 87 (05 Jul 2018 04:54) (70 - 97)  BP: 125/71 (05 Jul 2018 04:54) (90/60 - 155/80)  BP(mean): --  ABP: --  ABP(mean): --  RR: 17 (05 Jul 2018 04:54) (17 - 18)  SpO2: 95% (05 Jul 2018 04:54) (92% - 98%)           Input and Output:  I&O's Detail    04 Jul 2018 07:01  -  05 Jul 2018 06:09  --------------------------------------------------------  IN:    Solution: 100 mL  Total IN: 100 mL    OUT:  Total OUT: 0 mL    Total NET: 100 mL          Lab Data                        11.0   22.61 )-----------( 159      ( 04 Jul 2018 06:32 )             31.8     07-04    142  |  110<H>  |  20  ----------------------------<  120<H>  3.4<L>   |  23  |  0.84    Ca    8.5      04 Jul 2018 06:32              Review of Systems	      Objective     Physical Examination  head at  heart s1s2  lung dec BS  abd soft  cn grossly int        Pertinent Lab findings & Imaging      Emily:  NO   Adequate UO     I&O's Detail    04 Jul 2018 07:01  -  05 Jul 2018 06:09  --------------------------------------------------------  IN:    Solution: 100 mL  Total IN: 100 mL    OUT:  Total OUT: 0 mL    Total NET: 100 mL               Discussed with:     Cultures:	        Radiology

## 2018-07-05 NOTE — DISCHARGE NOTE ADULT - MEDICATION SUMMARY - MEDICATIONS TO TAKE
I will START or STAY ON the medications listed below when I get home from the hospital:    aspirin 81 mg oral tablet  -- 1 tab(s) by mouth once a day  -- Indication: For Home Med    enoxaparin  -- 30 milligram(s) subcutaneous once a day  -- Indication: For DVT PPX    Xyzal 5 mg oral tablet  -- 1 tab(s) by mouth once a day (in the evening)  -- Indication: For Home Med    pravastatin 20 mg oral tablet  -- 1 tab(s) by mouth once a day  -- Indication: For HLD    metoprolol  -- 12.5 milligram(s) by mouth once a day  -- Indication: For HTN    petrolatum topical ointment  -- 1 application on skin 3 times a day  -- Indication: For Skin care    guaiFENesin 100 mg/5 mL oral liquid  -- 10 milliliter(s) by mouth every 6 hours, As needed, Cough  -- Indication: For As needed for cough    Acidophilus oral tablet  -- 1 tab(s) by mouth 2 times a day  -- Indication: For GI PPX    levothyroxine 75 mcg (0.075 mg) oral tablet  -- 1 tab(s) by mouth once a day  -- Indication: For Hypothyroidism    Vitamin D3 1000 intl units oral tablet  -- 1 tab(s) by mouth once a day  -- Indication: For Supplements

## 2018-07-05 NOTE — PROGRESS NOTE ADULT - PROBLEM SELECTOR PLAN 1
with persistent leukocytosis  change to ertapenem  repeat blood cx today  abd exam benign   follow cbc

## 2018-07-05 NOTE — GOALS OF CARE CONVERSATION - PERSONAL ADVANCE DIRECTIVE - CONVERSATION DETAILS
Pt clear in her understanding confused with some things .Spoke with dtr Corry GONZALES to confirm her wishes.

## 2018-07-05 NOTE — PROGRESS NOTE ADULT - PROBLEM SELECTOR PLAN 2
Cough x 2 days  CXR showed RUL infiltrate  Non contrast CT Chest showed multifocal ground glass pneumonia, with RUL consolidation-awaiting records to compare if this is chronic   - Continue invanz  - ID Dr. Whiting   - Pulmonary Dr. Mack Caldera

## 2018-07-05 NOTE — PROGRESS NOTE ADULT - PROBLEM SELECTOR PLAN 1
- Bacteriemia:  Blood Cx +  for GNR  E.Coli  - Renal US  neg for hydronephrosis/abc ess collection/ obstruction   - WBC worsening. Zosyn changed to invanz as per ID, Dr. Whiting  - f/u repeat blood cultures and urine cx

## 2018-07-05 NOTE — PROGRESS NOTE ADULT - SUBJECTIVE AND OBJECTIVE BOX
Patient is a 87y old  Female who presents with a chief complaint of "They sent me to here because I have a UTI"  Brought in by EMS with AMS (05 Jul 2018 13:57)      INTERVAL HPI/OVERNIGHT EVENTS: No events overnight. patient seen and examined at bedside this am. Patient states she is "feeling fine." Denies any chest pain, SOB, or palpitations. Tolerating PO diet. Denies any abdominal pain, nausea, vomiting, or diarrhea. No other complaints at this time.     MEDICATIONS  (STANDING):  ALBUTerol    0.083% 2.5 milliGRAM(s) Nebulizer every 8 hours  aspirin  chewable 81 milliGRAM(s) Oral daily  cholecalciferol 1000 Unit(s) Oral daily  enoxaparin Injectable 30 milliGRAM(s) SubCutaneous every 24 hours  ertapenem  IVPB 1000 milliGRAM(s) IV Intermittent every 24 hours  ibuprofen  Tablet 800 milliGRAM(s) Oral daily  lactobacillus acidophilus 1 Tablet(s) Oral daily  levothyroxine 75 MICROGram(s) Oral daily  loratadine 10 milliGRAM(s) Oral daily  metoprolol succinate ER 12.5 milliGRAM(s) Oral daily  potassium chloride    Tablet ER 20 milliEquivalent(s) Oral every 2 hours  simvastatin 10 milliGRAM(s) Oral at bedtime    MEDICATIONS  (PRN):  guaiFENesin    Syrup 200 milliGRAM(s) Oral every 6 hours PRN Cough      Allergies    erythromycin (Hives)    Intolerances  REVIEW OF SYSTEMS:  CONSTITUTIONAL: No fever or chills  HEENT:  No headache, no sore throat  RESPIRATORY: No cough, wheezing, or shortness of breath  CARDIOVASCULAR: No chest pain, palpitations, or leg swelling  GASTROINTESTINAL: No abd pain, nausea, vomiting, or diarrhea  GENITOURINARY: No dysuria, frequency, or hematuria  NEUROLOGICAL: no focal weakness or dizziness  MUSCULOSKELETAL: no myalgias     Vital Signs Last 24 Hrs  T(C): 36.7 (05 Jul 2018 14:41), Max: 37.7 (04 Jul 2018 20:28)  T(F): 98.1 (05 Jul 2018 14:41), Max: 99.9 (04 Jul 2018 20:28)  HR: 81 (05 Jul 2018 15:34) (81 - 97)  BP: 144/75 (05 Jul 2018 14:41) (118/66 - 155/80)  BP(mean): --  RR: 17 (05 Jul 2018 14:41) (17 - 18)  SpO2: 96% (05 Jul 2018 15:34) (92% - 97%)    PHYSICAL EXAM:  GENERAL: NAD, frail woman  HEENT:  EOMI, moist mucous membranes  CHEST/LUNG:  crackles heard throughout  HEART:  RRR, S1, S2  ABDOMEN:  BS+, soft, nontender, nondistended  EXTREMITIES: no edema, cyanosis, no calf tenderness  NERVOUS SYSTEM: AAO x 3    LABS:                        9.8    23.37 )-----------( 176      ( 05 Jul 2018 07:11 )             28.2     CBC Full  -  ( 05 Jul 2018 07:11 )  WBC Count : 23.37 K/uL  Hemoglobin : 9.8 g/dL  Hematocrit : 28.2 %  Platelet Count - Automated : 176 K/uL  Mean Cell Volume : 87.0 fl  Mean Cell Hemoglobin : 30.2 pg  Mean Cell Hemoglobin Concentration : 34.8 gm/dL  Auto Neutrophil # : 19.40 K/uL  Auto Lymphocyte # : 2.57 K/uL  Auto Monocyte # : 0.47 K/uL  Auto Eosinophil # : 0.23 K/uL  Auto Basophil # : 0.23 K/uL  Auto Neutrophil % : 75.0 %  Auto Lymphocyte % : 11.0 %  Auto Monocyte % : 2.0 %  Auto Eosinophil % : 1.0 %  Auto Basophil % : 1.0 %    05 Jul 2018 07:11    142    |  111    |  25     ----------------------------<  123    3.3     |  22     |  1.10     Ca    8.2        05 Jul 2018 07:11          CAPILLARY BLOOD GLUCOSE            Culture - Blood (collected 07-02-18 @ 08:25)  Source: .Blood Blood-Peripheral  Preliminary Report (07-03-18 @ 09:01):    No growth to date.    Culture - Blood (collected 07-01-18 @ 16:50)  Source: .Blood Blood-Venous  Preliminary Report (07-02-18 @ 17:01):    No growth to date.    Culture - Blood (collected 07-01-18 @ 16:50)  Source: .Blood Blood-Peripheral  Preliminary Report (07-02-18 @ 17:01):    No growth to date.    Culture - Urine (collected 06-30-18 @ 22:50)  Source: .Urine Clean Catch (Midstream)  Final Report (07-02-18 @ 18:49):    >100,000 CFU/ml Escherichia coli  Organism: Escherichia coli (07-02-18 @ 18:49)  Organism: Escherichia coli (07-02-18 @ 18:49)      -  Amikacin: S <=8      -  Amoxicillin/Clavulanic Acid: S <=8/4      -  Ampicillin: R >16 These ampicillin results predict results for amoxicillin      -  Ampicillin/Sulbactam: I 16/8      -  Aztreonam: S <=4      -  Cefazolin: S <=2 This predicts results for oral agents cefaclor, cefdinir, cefpodoxime, cefprozil, cefuroxime axetil, cephalexin and locarbef for uncomplicated UTI. Note that some isolates may be susceptible to these agents while testing resistant to cefazolin.      -  Cefepime: S <=2      -  Cefoxitin: S <=4      -  Ceftriaxone: S <=1 Enterobacter, Citrobacter, and Serratia may develop resistance during prolonged therapy      -  Ciprofloxacin: S <=0.5      -  Ertapenem: S <=0.5      -  Gentamicin: S <=1      -  Imipenem: S <=1      -  Levofloxacin: S 2      -  Meropenem: S <=1      -  Nitrofurantoin: S <=32 Should not be used to treat pyelonephritis      -  Piperacillin/Tazobactam: S <=8      -  Tigecycline: S <=1      -  Tobramycin: S <=2      -  Trimethoprim/Sulfamethoxazole: R >2/38      Method Type: CECY    Culture - Blood (collected 06-30-18 @ 18:56)  Source: .Blood Blood  Gram Stain (07-01-18 @ 13:31):    Growth in aerobic bottle: Gram Negative Rods    Growth in anaerobic bottle: Gram Negative Rods  Final Report (07-03-18 @ 10:47):    Growth in aerobic and anaerobic bottles: Escherichia coli    "Due to technical problems, Proteus sp. will Not be reported as part of    the BCID panel until further notice"    ***Blood Panel PCR results on this specimen are available    approximately 3 hours after the Gram stain result.***    Gram stain, PCR, and/or culture results may not always    correspond due to difference in methodologies.    ************************************************************    This PCR assay was performed using [employer].    The following targets are tested for: Enterococcus,    vancomycin resistant enterococci, Listeria monocytogenes,    coagulase negative staphylococci, S. aureus,    methicillin resistant S. aureus, Streptococcus agalactiae    (Group B), S. pneumoniae, S.pyogenes (Group A),    Acinetobacter baumannii, Enterobacter cloacae, E. coli,    Klebsiella oxytoca, K. pneumoniae, Proteus sp.,    Serratia marcescens, Haemophilus influenzae,    Neisseria meningitidis, Pseudomonas aeruginosa, Candida    albicans, C. glabrata, C krusei, C parapsilosis,    C. tropicalis and the KPC resistance gene.  Organism: Blood Culture PCR  Escherichia coli (07-03-18 @ 10:47)  Organism: Escherichia coli (07-03-18 @ 10:47)      -  Amikacin: S <=8      -  Ampicillin: R >16 These ampicillin results predict results for amoxicillin      -  Ampicillin/Sulbactam: I 16/8      -  Aztreonam: S <=4      -  Cefazolin: S <=2      -  Cefepime: S <=2      -  Cefoxitin: S <=4      -  Ceftriaxone: S <=1 Enterobacter, Citrobacter, and Serratia may develop resistance during prolonged therapy      -  Ciprofloxacin: S <=0.5      -  Ertapenem: S <=0.5      -  Gentamicin: S <=1      -  Imipenem: S <=1      -  Levofloxacin: S <=1      -  Meropenem: S <=1      -  Piperacillin/Tazobactam: S <=8      -  Tobramycin: S <=2      -  Trimethoprim/Sulfamethoxazole: R >2/38      Method Type: CECY  Organism: Blood Culture PCR (07-03-18 @ 10:47)      -  Escherichia coli: Detec      Method Type: PCR    Culture - Blood (collected 06-30-18 @ 18:56)  Source: .Blood Blood  Gram Stain (07-01-18 @ 22:49):    Growth in aerobic bottle: Gram Negative Rods    Growth in anaerobic bottle: Gram Positive Cocci in Clusters  Final Report (07-03-18 @ 10:46):    Growth in aerobic bottle: Escherichia coli    See previous culture [id number]    Growth in anaerobic bottle: Coag Negative Staphylococcus    Single set isolate, possible contaminant. Contact    Microbiology if susceptibility testing clinically    indicated.    "Due to technical problems, Proteus sp. will Not be reported as part of    the BCID panel until further notice"    ***Blood Panel PCR results on this specimen are available    approximately 3 hours after the Gram stain result.***    Gram stain, PCR, and/or culture results may not always    correspond due to difference in methodologies.    ************************************************************    This PCR assay was performed using [employer].    The following targets are tested for: Enterococcus,    vancomycin resistant enterococci, Listeria monocytogenes,    coagulase negative staphylococci, S. aureus,    methicillin resistant S. aureus, Streptococcus agalactiae    (Group B), S. pneumoniae, S. pyogenes (Group A),    Acinetobacter baumannii, Enterobacter cloacae, E. coli,    Klebsiella oxytoca, K. pneumoniae, Proteus sp.,    Serratia marcescens, Haemophilus influenzae,    Neisseria meningitidis, Pseudomonas aeruginosa, Candida    albicans, C. glabrata, C krusei, C parapsilosis,    C. tropicalis and the KPC resistance gene.  Organism: Blood Culture PCR (07-03-18 @ 10:46)  Organism: Blood Culture PCR (07-03-18 @ 10:46)      -  Coagulase negative Staphylococcus: Detec      Method Type: PCR        RADIOLOGY & ADDITIONAL TESTS:    Personally reviewed.     Consultant(s) Notes Reviewed:  [x] YES  [ ] NO

## 2018-07-05 NOTE — PROGRESS NOTE ADULT - SUBJECTIVE AND OBJECTIVE BOX
[facility], Division of Infectious Diseases  MARCIN Muniz A. Lee  [phone]  Name: ROSENDO ROSARIO  Age: 87y  Gender: Female  MRN: [id number]    Interval History--  Notes reviewed  pt without any events     Past Medical History--  Arthritis  Hypothyroid  Hyperlipidemia  Seasonal Allergies  Osteoporosis  Migraine  Cataract  Edema of Leg  Chronic Edema  Seasonal Rhinitis  Hypercholesteremia  Hypothyroidism  Anxiety  Clostridium Difficile Infection  S/P cataract surgery  S/P ovarian cystectomy  S/P hip replacement  S/P knee replacement, bilateral  Hip Replacement  Knee Replacement      For details regarding the patient's social history, family history, and other miscellaneous elements, please refer the initial infectious diseases consultation and/or the admitting history and physical examination for this admission.    Allergies    erythromycin (Hives)    Intolerances        Medications--  Antibiotics:  ertapenem  IVPB 1000 milliGRAM(s) IV Intermittent every 24 hours    Immunologic:    Other:  ALBUTerol    0.083%  aspirin  chewable  cholecalciferol  enoxaparin Injectable  guaiFENesin    Syrup PRN  ibuprofen  Tablet  lactobacillus acidophilus  levothyroxine  loratadine  metoprolol succinate ER  potassium chloride    Tablet ER  simvastatin      Review of Systems--  A 10-point review of systems was obtained.     Pertinent positives and negatives--  Constitutional: No fevers. No Chills. No Rigors.   Cardiovascular: No chest pain. No palpitations.  Respiratory: No shortness of breath. No cough.  Gastrointestinal: No nausea or vomiting. No diarrhea or constipation.   Psychiatric: + anxiety    Review of systems otherwise negative except as previously noted.    Physical Examination--  Vital Signs: T(F): 98.1 (07-05-18 @ 14:41), Max: 99.9 (07-04-18 @ 20:28)  HR: 81 (07-05-18 @ 15:34)  BP: 144/75 (07-05-18 @ 14:41)  RR: 17 (07-05-18 @ 14:41)  SpO2: 96% (07-05-18 @ 15:34)  Wt(kg): --  General: Nontoxic-appearing Female in no acute distress.  HEENT: AT/NC.  Anicteric. Conjunctiva pink and moist. Oropharynx clear. Dentition fair.  Neck: Not rigid. No sense of mass.  Nodes: None palpable.  Lungs: Clear bilaterally without rales, wheezing or rhonchi  Heart: Regular rate and rhythm. No Murmur. No rub. No gallop. No palpable thrill.  Abdomen: Bowel sounds present and normoactive. Soft. Nondistended.  Extremities: No cyanosis or clubbing. No edema.   Skin: Warm. Dry. Good turgor. No rash. No vasculitic stigmata.  Psychiatric: Appropriate affect and mood for situation.         Laboratory Studies--  CBC                        9.8    23.37 )-----------( 176      ( 05 Jul 2018 07:11 )             28.2       Chemistries  07-05    142  |  111<H>  |  25<H>  ----------------------------<  123<H>  3.3<L>   |  22  |  1.10    Ca    8.2<L>      05 Jul 2018 07:11        Culture Data    Culture - Blood (collected 02 Jul 2018 08:25)  Source: .Blood Blood-Peripheral  Preliminary Report (03 Jul 2018 09:01):    No growth to date.    Culture - Blood (collected 01 Jul 2018 16:50)  Source: .Blood Blood-Venous  Preliminary Report (02 Jul 2018 17:01):    No growth to date.    Culture - Blood (collected 01 Jul 2018 16:50)  Source: .Blood Blood-Peripheral  Preliminary Report (02 Jul 2018 17:01):    No growth to date.    Culture - Urine (collected 30 Jun 2018 22:50)  Source: .Urine Clean Catch (Midstream)  Final Report (02 Jul 2018 18:49):    >100,000 CFU/ml Escherichia coli  Organism: Escherichia coli (02 Jul 2018 18:49)  Organism: Escherichia coli (02 Jul 2018 18:49)    Culture - Blood (collected 30 Jun 2018 18:56)  Source: .Blood Blood  Gram Stain (01 Jul 2018 13:31):    Growth in aerobic bottle: Gram Negative Rods    Growth in anaerobic bottle: Gram Negative Rods  Final Report (03 Jul 2018 10:47):    Growth in aerobic and anaerobic bottles: Escherichia coli    "Due to technical problems, Proteus sp. will Not be reported as part of    the BCID panel until further notice"    ***Blood Panel PCR results on this specimen are available    approximately 3 hours after the Gram stain result.***    Gram stain, PCR, and/or culture results may not always    correspond due to difference in methodologies.    ************************************************************    This PCR assay was performed using [employer].    The following targets are tested for: Enterococcus,    vancomycin resistant enterococci, Listeria monocytogenes,    coagulase negative staphylococci, S. aureus,    methicillin resistant S. aureus, Streptococcus agalactiae    (Group B), S. pneumoniae, S.pyogenes (Group A),    Acinetobacter baumannii, Enterobacter cloacae, E. coli,    Klebsiella oxytoca, K. pneumoniae, Proteus sp.,    Serratia marcescens, Haemophilus influenzae,    Neisseria meningitidis, Pseudomonas aeruginosa, Candida    albicans, C. glabrata, C krusei, C parapsilosis,    C. tropicalis and the KPC resistance gene.  Organism: Blood Culture PCR  Escherichia coli (03 Jul 2018 10:47)  Organism: Escherichia coli (03 Jul 2018 10:47)  Organism: Blood Culture PCR (03 Jul 2018 10:47)    Culture - Blood (collected 30 Jun 2018 18:56)  Source: .Blood Blood  Gram Stain (01 Jul 2018 22:49):    Growth in aerobic bottle: Gram Negative Rods    Growth in anaerobic bottle: Gram Positive Cocci in Clusters  Final Report (03 Jul 2018 10:46):    Growth in aerobic bottle: Escherichia coli    See previous culture [id number]    Growth in anaerobic bottle: Coag Negative Staphylococcus    Single set isolate, possible contaminant. Contact    Microbiology if susceptibility testing clinically    indicated.    "Due to technical problems, Proteus sp. will Not be reported as part of    the BCID panel until further notice"    ***Blood Panel PCR results on this specimen are available    approximately 3 hours after the Gram stain result.***    Gram stain, PCR, and/or culture results may not always    correspond due to difference in methodologies.    ************************************************************    This PCR assay was performed using [employer].    The following targets are tested for: Enterococcus,    vancomycin resistant enterococci, Listeria monocytogenes,    coagulase negative staphylococci, S. aureus,    methicillin resistant S. aureus, Streptococcus agalactiae    (Group B), S. pneumoniae, S. pyogenes (Group A),    Acinetobacter baumannii, Enterobacter cloacae, E. coli,    Klebsiella oxytoca, K. pneumoniae, Proteus sp.,    Serratia marcescens, Haemophilus influenzae,    Neisseria meningitidis, Pseudomonas aeruginosa, Candida    albicans, C. glabrata, C krusei, C parapsilosis,    C. tropicalis and the KPC resistance gene.  Organism: Blood Culture PCR (03 Jul 2018 10:46)  Organism: Blood Culture PCR (03 Jul 2018 10:46)            < from: US Renal (07.03.18 @ 14:43) >    EXAM:  US KIDNEY(S)                            PROCEDURE DATE:  07/03/2018          INTERPRETATION:  History: Escherichia coli septicemia.    Bilateral renal ultrasound.    Right kidney 9.5 the left 12.4 cm long dimension. No hydronephrosis   shadowing calculus or solid renal mass bilaterally. There is a 1.7 x 1.3   cm cortical cyst in the right kidney, 3.2 x 2.9 cm cortical cyst   interpolar left kidney with internal septa, and  3.4 x 2.3 cm simple   cortical cyst left lower pole.    Impression: No hydronephrosis. Bilateral renal cortical cysts    < end of copied text >

## 2018-07-05 NOTE — PROGRESS NOTE ADULT - PROBLEM SELECTOR PLAN 1
aspiration in the differential  pulm edema in the differential  Speech and swallow eval noted - recs - Dysphagia diet 3  HOB elev  oral hygiene  NEBS bid  s/p empiric dose of LASIX  on emp ABX, ID following  Mucinex PRN  supportive measures and assist with ADL  keep sat > 88 pct  overall prognosis guarded, advanced age and multiple medical issues, GOC discussion under way

## 2018-07-05 NOTE — PROGRESS NOTE ADULT - ATTENDING COMMENTS
Agree with exam and plan as above with following: Pt with EColi bacteremia suspected sepsis. WBC count not improved ordered renal us no sign of hydronephrosis, evidence of obstruction, collection/abcess. ID changed antibiotics to Ivanz and awaiting repeat cultures.  Pt has abnormal CXR which she claims is chronic for many years, CT non contrast with GGO concerning or multifocal PNA. Dr Caldera consulted, requested PMD to fax over old CT scan findings. Spoke to daughter over the phone aware of current hospital course. Patient has been told she has abnormal CT findings in the past. Will attempt to get copy of CT chest and discuss with PMD Dr. Martin. Rest agree as above.

## 2018-07-05 NOTE — DISCHARGE NOTE ADULT - ADDITIONAL INSTRUCTIONS
Follow-up with your PCP upon discharge. Your CT scan was abnormal, the pulmonologist recommends a repeat CT scan of the chest in 12 weeks to monitor.

## 2018-07-05 NOTE — DISCHARGE NOTE ADULT - NSTOBACCOHOTLINE_GEN_A_CS
[facility] Smokers Quitline ([phone]) [facility] Smokers Quitline ([phone])

## 2018-07-05 NOTE — DISCHARGE NOTE ADULT - HOSPITAL COURSE
87F with PMH of hypothyroidism, HLD, and arthritis presents to ED with weakness for the past 5 days. This afternoon she felt more dizzy and weak. Family is at bedside and states that patient has also not been eating well this past week after receiving some bad news about her . Of note patient was lying in bed naked last night and was walking around not knowing what she was doing according to her daughter. Denies lightheadedness, CP, SOB, abd pain, dysuria.     In ED, vitals stable. Labs significant for WBC 11.77, H/H 11.4/33.1, BUN/Cr 1.6/119, AST 66, alk phos 151. Troponin negative. CPK 3.9. UA positive. CXR pending. CT head negative. Given 2L NS bolus. Received dose of rocephin. 87F with PMH of hypothyroidism, HLD, and arthritis presents to ED with weakness for the past 5 days. This afternoon she felt more dizzy and weak. Family is at bedside and states that patient has also not been eating well this past week after receiving some bad news about her . Of note patient was lying in bed naked last night and was walking around not knowing what she was doing according to her daughter. Denies lightheadedness, CP, SOB, abd pain, dysuria.     In ED, vitals stable. Labs significant for WBC 11.77, H/H 11.4/33.1, BUN/Cr 1.6/119, AST 66, alk phos 151. Troponin negative. CPK 3.9. UA positive. CXR pending. CT head negative. Given 2L NS bolus. Received dose of rocephin. Blood cultures came back positive for E coli, vivianaley secondary to UTI. Completed course of antibiotics. Repeat blood cultures are negative. Patient medically stable for discharge to [facility]. 87F with PMHx hypothyroidism, HLD, and arthritis presented to ED with weakness, delerium, admitted for UTI with poor PO intake.  Noted to have PNA, Blood Cx grew E coli, completed course of IV antibiotics. Physical therapy evaluated patient, recommend [facility].     7/11: bloodwork to be performed tomorrow. patient in agreement. petroleum jelly for dry skin. meclizine 12.5 mg TID prn for dizziness 87F with PMHx hypothyroidism, HLD, and arthritis presented to ED with weakness, delerium, admitted for UTI, pyelonephritis with poor PO intake.  Noted to have PNA, Blood Cx grew E coli, ID consulted (Dr. Isabela Whiting) completed course of IV antibiotics. Physical therapy evaluated patient, recommend [facility].   CT Chest significant for patchy consolidation in RUL, few patchy groundglass opatcities within THOMAS and lingula and RML, subcentimeter lymph nodes within mediastinum, and multiple age indeterminate compression fractures.  Pulmonology (Dr. Caldera) recommended follow-up CT in 12 weeks. 87F with PMHx hypothyroidism, HLD, and arthritis presented to ED with weakness, delerium, admitted for UTI, pyelonephritis with poor PO intake.  Noted to have PNA, Blood Cx grew E coli, ID consulted (Dr. Isabela Whiitng) completed course of IV antibiotics. Physical therapy evaluated patient, recommend [facility].   CT Chest significant for patchy consolidation in RUL, few patchy groundglass opatcities within THOMAS and lingula and RML, subcentimeter lymph nodes within mediastinum, and multiple age indeterminate compression fractures.  Pulmonology (Dr. Caldera) recommended follow-up CT in 12 weeks.   Patient symptomatically improved and stable for discharge to [facility].     Exam on day of discharge:    Vital Signs Last 24 Hrs  T(F): 98.1 (16 Jul 2018 04:57), Max: 98.4 (15 Jul 2018 20:44)  HR: 79 (16 Jul 2018 08:05) (75 - 87)  BP: 112/65 (16 Jul 2018 04:57) (110/67 - 114/67)  RR: 17 (16 Jul 2018 04:57) (17 - 17)  SpO2: 97% (16 Jul 2018 08:05) (97% - 98%)    Physical Exam:  GENERAL: NAD, resting comfortably in bed   HEENT:  EOMI, moist mucous membranes  CHEST/LUNG:  CTA b/l, no rales, wheezes, or rhonchi  HEART:  RRR, S1, S2, no murmurs   ABDOMEN:  BS+, soft, nontender, nondistended  EXTREMITIES: no edema, cyanosis, or calf tenderness  NERVOUS SYSTEM: AA&Ox3  SKIN: no rashes or open wounds. diffuse ecchymosis on b/l UE forearms from previous blood draws, improving

## 2018-07-05 NOTE — DISCHARGE NOTE ADULT - PATIENT PORTAL LINK FT
You can access the [facility] Patient Portal, offered by [facility], by registering with the following website: [email or web address]

## 2018-07-06 DIAGNOSIS — R05 COUGH: ICD-10-CM

## 2018-07-06 LAB
ANION GAP SERPL CALC-SCNC: 10 MMOL/L — SIGNIFICANT CHANGE UP (ref 5–17)
BASOPHILS # BLD AUTO: 0 K/UL — SIGNIFICANT CHANGE UP (ref 0–0.2)
BASOPHILS NFR BLD AUTO: 0 % — SIGNIFICANT CHANGE UP (ref 0–2)
BUN SERPL-MCNC: 18 MG/DL — SIGNIFICANT CHANGE UP (ref 7–23)
CALCIUM SERPL-MCNC: 8.2 MG/DL — LOW (ref 8.5–10.1)
CHLORIDE SERPL-SCNC: 109 MMOL/L — HIGH (ref 96–108)
CK MB BLD-MCNC: 5.2 % — HIGH (ref 0–3.5)
CK MB CFR SERPL CALC: 1.5 NG/ML — SIGNIFICANT CHANGE UP (ref 0–3.6)
CK SERPL-CCNC: 29 U/L — SIGNIFICANT CHANGE UP (ref 26–192)
CO2 SERPL-SCNC: 23 MMOL/L — SIGNIFICANT CHANGE UP (ref 22–31)
CREAT SERPL-MCNC: 0.85 MG/DL — SIGNIFICANT CHANGE UP (ref 0.5–1.3)
CULTURE RESULTS: SIGNIFICANT CHANGE UP
CULTURE RESULTS: SIGNIFICANT CHANGE UP
EOSINOPHIL # BLD AUTO: 0.49 K/UL — SIGNIFICANT CHANGE UP (ref 0–0.5)
EOSINOPHIL NFR BLD AUTO: 2 % — SIGNIFICANT CHANGE UP (ref 0–6)
GLUCOSE SERPL-MCNC: 94 MG/DL — SIGNIFICANT CHANGE UP (ref 70–99)
HCT VFR BLD CALC: 29.7 % — LOW (ref 34.5–45)
HGB BLD-MCNC: 10.4 G/DL — LOW (ref 11.5–15.5)
LYMPHOCYTES # BLD AUTO: 15 % — SIGNIFICANT CHANGE UP (ref 13–44)
LYMPHOCYTES # BLD AUTO: 3.71 K/UL — HIGH (ref 1–3.3)
MAGNESIUM SERPL-MCNC: 1.9 MG/DL — SIGNIFICANT CHANGE UP (ref 1.6–2.6)
MCHC RBC-ENTMCNC: 31 PG — SIGNIFICANT CHANGE UP (ref 27–34)
MCHC RBC-ENTMCNC: 35 GM/DL — SIGNIFICANT CHANGE UP (ref 32–36)
MCV RBC AUTO: 88.7 FL — SIGNIFICANT CHANGE UP (ref 80–100)
MONOCYTES # BLD AUTO: 2.72 K/UL — HIGH (ref 0–0.9)
MONOCYTES NFR BLD AUTO: 11 % — SIGNIFICANT CHANGE UP (ref 2–14)
NEUTROPHILS # BLD AUTO: 17.55 K/UL — HIGH (ref 1.8–7.4)
NEUTROPHILS NFR BLD AUTO: 67 % — SIGNIFICANT CHANGE UP (ref 43–77)
PHOSPHATE SERPL-MCNC: 2.5 MG/DL — SIGNIFICANT CHANGE UP (ref 2.5–4.5)
PLATELET # BLD AUTO: 177 K/UL — SIGNIFICANT CHANGE UP (ref 150–400)
POTASSIUM SERPL-MCNC: 4 MMOL/L — SIGNIFICANT CHANGE UP (ref 3.5–5.3)
POTASSIUM SERPL-SCNC: 4 MMOL/L — SIGNIFICANT CHANGE UP (ref 3.5–5.3)
RBC # BLD: 3.35 M/UL — LOW (ref 3.8–5.2)
RBC # FLD: 15 % — HIGH (ref 10.3–14.5)
SODIUM SERPL-SCNC: 142 MMOL/L — SIGNIFICANT CHANGE UP (ref 135–145)
SPECIMEN SOURCE: SIGNIFICANT CHANGE UP
SPECIMEN SOURCE: SIGNIFICANT CHANGE UP
TROPONIN I SERPL-MCNC: <.015 NG/ML — SIGNIFICANT CHANGE UP (ref 0.01–0.04)
WBC # BLD: 24.72 K/UL — HIGH (ref 3.8–10.5)
WBC # FLD AUTO: 24.72 K/UL — HIGH (ref 3.8–10.5)

## 2018-07-06 PROCEDURE — 71045 X-RAY EXAM CHEST 1 VIEW: CPT | Mod: 26

## 2018-07-06 PROCEDURE — 99233 SBSQ HOSP IP/OBS HIGH 50: CPT

## 2018-07-06 PROCEDURE — 93010 ELECTROCARDIOGRAM REPORT: CPT | Mod: 76

## 2018-07-06 RX ORDER — ACETAMINOPHEN 500 MG
650 TABLET ORAL ONCE
Qty: 0 | Refills: 0 | Status: COMPLETED | OUTPATIENT
Start: 2018-07-06 | End: 2018-07-06

## 2018-07-06 RX ADMIN — Medication 1 TABLET(S): at 11:57

## 2018-07-06 RX ADMIN — Medication 12.5 MILLIGRAM(S): at 06:25

## 2018-07-06 RX ADMIN — ENOXAPARIN SODIUM 30 MILLIGRAM(S): 100 INJECTION SUBCUTANEOUS at 06:25

## 2018-07-06 RX ADMIN — Medication 1000 UNIT(S): at 11:57

## 2018-07-06 RX ADMIN — LORATADINE 10 MILLIGRAM(S): 10 TABLET ORAL at 11:57

## 2018-07-06 RX ADMIN — SIMVASTATIN 10 MILLIGRAM(S): 20 TABLET, FILM COATED ORAL at 22:02

## 2018-07-06 RX ADMIN — Medication 75 MICROGRAM(S): at 06:25

## 2018-07-06 RX ADMIN — Medication 650 MILLIGRAM(S): at 03:25

## 2018-07-06 RX ADMIN — Medication 100 MILLIGRAM(S): at 02:55

## 2018-07-06 RX ADMIN — Medication 800 MILLIGRAM(S): at 11:57

## 2018-07-06 RX ADMIN — Medication 650 MILLIGRAM(S): at 02:55

## 2018-07-06 RX ADMIN — Medication 800 MILLIGRAM(S): at 13:10

## 2018-07-06 RX ADMIN — ALBUTEROL 2.5 MILLIGRAM(S): 90 AEROSOL, METERED ORAL at 07:54

## 2018-07-06 RX ADMIN — Medication 81 MILLIGRAM(S): at 11:57

## 2018-07-06 RX ADMIN — Medication 100 MILLIGRAM(S): at 22:02

## 2018-07-06 RX ADMIN — ERTAPENEM SODIUM 120 MILLIGRAM(S): 1 INJECTION, POWDER, LYOPHILIZED, FOR SOLUTION INTRAMUSCULAR; INTRAVENOUS at 17:31

## 2018-07-06 RX ADMIN — Medication 100 MILLIGRAM(S): at 13:11

## 2018-07-06 RX ADMIN — ALBUTEROL 2.5 MILLIGRAM(S): 90 AEROSOL, METERED ORAL at 15:44

## 2018-07-06 NOTE — CHART NOTE - NSCHARTNOTEFT_GEN_A_CORE
called by RN, patient complaining of chest pressure and heaviness. Seen and examined at bedside. Patient admits to mid chest pressure and shortness of breath. Pain reproducible and patient has been coughing for the past few days.     Vital Signs Last 24 Hrs  T(C): 37.1 (06 Jul 2018 02:24), Max: 37.1 (05 Jul 2018 20:40)  T(F): 98.7 (06 Jul 2018 02:24), Max: 98.8 (05 Jul 2018 20:40)  HR: 95 (06 Jul 2018 02:24) (81 - 95)  BP: 147/70 (06 Jul 2018 02:24) (125/71 - 155/80)  BP(mean): --  RR: 18 (06 Jul 2018 02:24) (17 - 18)  SpO2: 94% (06 Jul 2018 02:24) (92% - 96%)    Physical Exam:  General: cachetic, anxious  HEENT: NCAT, PERRLA, EOMI bl, moist mucous membranes   Neck: Supple, nontender, no mass  Neurology: baseline dementia, nonfocal, CN II-XII grossly intact, sensation intact, no gait abnormalities   Respiratory: coarse breath sounds throughout   CV: RRR, +S1/S2, no murmurs, rubs or gallops  Abdominal: Soft, NT, ND +BSx4  Extremities: No C/C/E, + peripheral pulses  MSK: Normal ROM, no joint erythema or warmth, no joint swelling   Skin: warm, dry, normal color, no rash or abnormal lesions    Assessment and plan     87F with PMH of hypothyroidism, HLD, and arthritis presents with weakness, admitted with UTI, GNR bacteremia. Sepsis suspected due to UTI. Abnormal CT scan of lung. Patient complaining of chest pressure and heaviness as well as cough. Chest pain reproducible on exam.       -ekg ordered- similar to prior, no ST changes      - Cardiac enzymes ordered      -CXR ordered    - tylenol 650 POx1 for pain called by RN, patient complaining of chest pressure and heaviness. Seen and examined at bedside. Patient admits to mid chest pressure and shortness of breath. Pain reproducible and patient has been coughing for the past few days.     Vital Signs Last 24 Hrs  T(C): 37.1 (06 Jul 2018 02:24), Max: 37.1 (05 Jul 2018 20:40)  T(F): 98.7 (06 Jul 2018 02:24), Max: 98.8 (05 Jul 2018 20:40)  HR: 95 (06 Jul 2018 02:24) (81 - 95)  BP: 147/70 (06 Jul 2018 02:24) (125/71 - 155/80)  BP(mean): --  RR: 18 (06 Jul 2018 02:24) (17 - 18)  SpO2: 94% (06 Jul 2018 02:24) (92% - 96%)    Physical Exam:  General: cachetic, anxious  HEENT: NCAT, PERRLA, EOMI bl, moist mucous membranes   Neck: Supple, nontender, no mass  Neurology: baseline dementia, nonfocal, CN II-XII grossly intact, sensation intact, no gait abnormalities   Respiratory: coarse breath sounds throughout   CV: RRR, +S1/S2, no murmurs, rubs or gallops  Abdominal: Soft, NT, ND +BSx4  Extremities: No C/C/E, + peripheral pulses  MSK: Normal ROM, no joint erythema or warmth, no joint swelling   Skin: warm, dry, normal color, no rash or abnormal lesions    Assessment and plan     87F with PMH of hypothyroidism, HLD, and arthritis presents with weakness, admitted with UTI, GNR bacteremia. Sepsis suspected due to UTI. Abnormal CT scan of lung. Patient complaining of chest pressure and heaviness as well as cough. Chest pain reproducible on exam. Likely 2/2 cough.       -ekg ordered- similar to prior, no ST changes      - Cardiac enzymes ordered      -CXR ordered      - tessalon perle for cough     - tylenol 650 POx1 for pain      - will continue to monitor, RN to call for any changes.

## 2018-07-06 NOTE — PROGRESS NOTE ADULT - ATTENDING COMMENTS
I personally conducted a physical examination of the patient. I personally gathered the patient's history. I edited the above listed findings which were prepared by the listed resident physician. I personally discussed the plan of care with the patient. The questions and concerns were addressed to the best of my ability. The patient is in agreement with the listed treatment plan.     A/P: multifocal complex pna. continue invanz for now. monitor symptoms, doing well.

## 2018-07-06 NOTE — PROGRESS NOTE ADULT - SUBJECTIVE AND OBJECTIVE BOX
Patient is a 87y old  Female who presents with a chief complaint of "They sent me to here because I have a UTI"  Brought in by EMS with [facility] (05 Jul 2018 13:57)      INTERVAL HPI/OVERNIGHT EVENTS:  Patient seen at bedside, overnight patient reported having midsternal chest pressure. Patient stated she felt like she was "dying." Chest pressure was reproducible on physical exam. EKG no acute changes, Troponin negative x1, Chest x-ray negative.  Patient was given teslon pearls for her cough. Now feeling well, other than fatigue. Denies chest pain, SOB, palpitations, abdominal pain, dysuria, constipation, diarrhea, headache, weakness.     MEDICATIONS  (STANDING):  ALBUTerol    0.083% 2.5 milliGRAM(s) Nebulizer every 8 hours  aspirin  chewable 81 milliGRAM(s) Oral daily  benzonatate 100 milliGRAM(s) Oral three times a day  cholecalciferol 1000 Unit(s) Oral daily  enoxaparin Injectable 30 milliGRAM(s) SubCutaneous every 24 hours  ertapenem  IVPB 1000 milliGRAM(s) IV Intermittent every 24 hours  ibuprofen  Tablet 800 milliGRAM(s) Oral daily  lactobacillus acidophilus 1 Tablet(s) Oral daily  levothyroxine 75 MICROGram(s) Oral daily  loratadine 10 milliGRAM(s) Oral daily  metoprolol succinate ER 12.5 milliGRAM(s) Oral daily  simvastatin 10 milliGRAM(s) Oral at bedtime    MEDICATIONS  (PRN):  guaiFENesin    Syrup 200 milliGRAM(s) Oral every 6 hours PRN Cough      Allergies    erythromycin (Hives)        REVIEW OF SYSTEMS:  CONSTITUTIONAL: +Fatigue, No fever or chills, No dizziness, No tinnitus, confusion   HEENT:  No headache, no sore throat  RESPIRATORY: No cough, wheezing, or shortness of breath  CARDIOVASCULAR: No chest pain, palpitations, or leg swelling  GASTROINTESTINAL: No abd pain, nausea, vomiting, or diarrhea  GENITOURINARY: No dysuria, frequency, or hematuria  NEUROLOGICAL: no focal weakness or dizziness  MUSCULOSKELETAL: +generalized arthralgia, no myalgias     Vital Signs Last 24 Hrs  T(C): 36.5 (06 Jul 2018 14:23), Max: 37.1 (05 Jul 2018 20:40)  T(F): 97.7 (06 Jul 2018 14:23), Max: 98.8 (05 Jul 2018 20:40)  HR: 84 (06 Jul 2018 14:23) (80 - 95)  BP: 128/76 (06 Jul 2018 14:23) (121/69 - 147/70)  BP(mean): --  RR: 17 (06 Jul 2018 14:23) (17 - 18)  SpO2: 96% (06 Jul 2018 14:23) (92% - 97%)    PHYSICAL EXAM:  GENERAL: NAD, frail elderly woman  HEENT:  EOMI, moist mucous membranes  CHEST/LUNG:  Decreased breath sounds on Right lung field,  no rales, wheezes, or rhonchi  HEART:  RRR, S1, S2 no murmurs, rubs, gallops, or clicks  ABDOMEN:  BS+, soft, nontender, nondistended  EXTREMITIES: no edema, cyanosis, or calf tenderness  NERVOUS SYSTEM: AA&Ox3  SKIN: Skin survey negative for lesions or wounds    LABS:                        10.4   24.72 )-----------( 177      ( 06 Jul 2018 06:50 )             29.7     CBC Full  -  ( 06 Jul 2018 06:50 )  WBC Count : 24.72 K/uL  Hemoglobin : 10.4 g/dL  Hematocrit : 29.7 %  Platelet Count - Automated : 177 K/uL  Mean Cell Volume : 88.7 fl  Mean Cell Hemoglobin : 31.0 pg  Mean Cell Hemoglobin Concentration : 35.0 gm/dL  Auto Neutrophil # : 17.55 K/uL  Auto Lymphocyte # : 3.71 K/uL  Auto Monocyte # : 2.72 K/uL  Auto Eosinophil # : 0.49 K/uL  Auto Basophil # : 0.00 K/uL  Auto Neutrophil % : 67.0 %  Auto Lymphocyte % : 15.0 %  Auto Monocyte % : 11.0 %  Auto Eosinophil % : 2.0 %  Auto Basophil % : 0.0 %    06 Jul 2018 06:50    142    |  109    |  18     ----------------------------<  94     4.0     |  23     |  0.85     Ca    8.2        06 Jul 2018 06:50  Phos  2.5       06 Jul 2018 06:50  Mg     1.9       06 Jul 2018 06:50          CAPILLARY BLOOD GLUCOSE            Culture - Blood (collected 07-02-18 @ 08:25)  Source: .Blood Blood-Peripheral  Preliminary Report (07-03-18 @ 09:01):    No growth to date.    Culture - Blood (collected 07-01-18 @ 16:50)  Source: .Blood Blood-Venous  Preliminary Report (07-02-18 @ 17:01):    No growth to date.    Culture - Blood (collected 07-01-18 @ 16:50)  Source: .Blood Blood-Peripheral  Preliminary Report (07-02-18 @ 17:01):    No growth to date.    Culture - Urine (collected 06-30-18 @ 22:50)  Source: .Urine Clean Catch (Midstream)  Final Report (07-02-18 @ 18:49):    >100,000 CFU/ml Escherichia coli  Organism: Escherichia coli (07-02-18 @ 18:49)  Organism: Escherichia coli (07-02-18 @ 18:49)      -  Amikacin: S <=8      -  Amoxicillin/Clavulanic Acid: S <=8/4      -  Ampicillin: R >16 These ampicillin results predict results for amoxicillin      -  Ampicillin/Sulbactam: I 16/8      -  Aztreonam: S <=4      -  Cefazolin: S <=2 This predicts results for oral agents cefaclor, cefdinir, cefpodoxime, cefprozil, cefuroxime axetil, cephalexin and locarbef for uncomplicated UTI. Note that some isolates may be susceptible to these agents while testing resistant to cefazolin.      -  Cefepime: S <=2      -  Cefoxitin: S <=4      -  Ceftriaxone: S <=1 Enterobacter, Citrobacter, and Serratia may develop resistance during prolonged therapy      -  Ciprofloxacin: S <=0.5      -  Ertapenem: S <=0.5      -  Gentamicin: S <=1      -  Imipenem: S <=1      -  Levofloxacin: S 2      -  Meropenem: S <=1      -  Nitrofurantoin: S <=32 Should not be used to treat pyelonephritis      -  Piperacillin/Tazobactam: S <=8      -  Tigecycline: S <=1      -  Tobramycin: S <=2      -  Trimethoprim/Sulfamethoxazole: R >2/38      Method Type: CECY    Culture - Blood (collected 06-30-18 @ 18:56)  Source: .Blood Blood  Gram Stain (07-01-18 @ 13:31):    Growth in aerobic bottle: Gram Negative Rods    Growth in anaerobic bottle: Gram Negative Rods  Final Report (07-03-18 @ 10:47):    Growth in aerobic and anaerobic bottles: Escherichia coli    "Due to technical problems, Proteus sp. will Not be reported as part of    the BCID panel until further notice"    ***Blood Panel PCR results on this specimen are available    approximately 3 hours after the Gram stain result.***    Gram stain, PCR, and/or culture results may not always    correspond due to difference in methodologies.    ************************************************************    This PCR assay was performed using [employer].    The following targets are tested for: Enterococcus,    vancomycin resistant enterococci, Listeria monocytogenes,    coagulase negative staphylococci, S. aureus,    methicillin resistant S. aureus, Streptococcus agalactiae    (Group B), S. pneumoniae, S.pyogenes (Group A),    Acinetobacter baumannii, Enterobacter cloacae, E. coli,    Klebsiella oxytoca, K. pneumoniae, Proteus sp.,    Serratia marcescens, Haemophilus influenzae,    Neisseria meningitidis, Pseudomonas aeruginosa, Candida    albicans, C. glabrata, C krusei, C parapsilosis,    C. tropicalis and the KPC resistance gene.  Organism: Blood Culture PCR  Escherichia coli (07-03-18 @ 10:47)  Organism: Escherichia coli (07-03-18 @ 10:47)      -  Amikacin: S <=8      -  Ampicillin: R >16 These ampicillin results predict results for amoxicillin      -  Ampicillin/Sulbactam: I 16/8      -  Aztreonam: S <=4      -  Cefazolin: S <=2      -  Cefepime: S <=2      -  Cefoxitin: S <=4      -  Ceftriaxone: S <=1 Enterobacter, Citrobacter, and Serratia may develop resistance during prolonged therapy      -  Ciprofloxacin: S <=0.5      -  Ertapenem: S <=0.5      -  Gentamicin: S <=1      -  Imipenem: S <=1      -  Levofloxacin: S <=1      -  Meropenem: S <=1      -  Piperacillin/Tazobactam: S <=8      -  Tobramycin: S <=2      -  Trimethoprim/Sulfamethoxazole: R >2/38      Method Type: CECY  Organism: Blood Culture PCR (07-03-18 @ 10:47)      -  Escherichia coli: Detec      Method Type: PCR    Culture - Blood (collected 06-30-18 @ 18:56)  Source: .Blood Blood  Gram Stain (07-01-18 @ 22:49):    Growth in aerobic bottle: Gram Negative Rods    Growth in anaerobic bottle: Gram Positive Cocci in Clusters  Final Report (07-03-18 @ 10:46):    Growth in aerobic bottle: Escherichia coli    See previous culture [id number]    Growth in anaerobic bottle: Coag Negative Staphylococcus    Single set isolate, possible contaminant. Contact    Microbiology if susceptibility testing clinically    indicated.    "Due to technical problems, Proteus sp. will Not be reported as part of    the BCID panel until further notice"    ***Blood Panel PCR results on this specimen are available    approximately 3 hours after the Gram stain result.***    Gram stain, PCR, and/or culture results may not always    correspond due to difference in methodologies.    ************************************************************    This PCR assay was performed using [employer].    The following targets are tested for: Enterococcus,    vancomycin resistant enterococci, Listeria monocytogenes,    coagulase negative staphylococci, S. aureus,    methicillin resistant S. aureus, Streptococcus agalactiae    (Group B), S. pneumoniae, S. pyogenes (Group A),    Acinetobacter baumannii, Enterobacter cloacae, E. coli,    Klebsiella oxytoca, K. pneumoniae, Proteus sp.,    Serratia marcescens, Haemophilus influenzae,    Neisseria meningitidis, Pseudomonas aeruginosa, Candida    albicans, C. glabrata, C krusei, C parapsilosis,    C. tropicalis and the KPC resistance gene.  Organism: Blood Culture PCR (07-03-18 @ 10:46)  Organism: Blood Culture PCR (07-03-18 @ 10:46)      -  Coagulase negative Staphylococcus: Detec      Method Type: PCR        RADIOLOGY & ADDITIONAL TESTS:  < from: Xray Chest 1 View AP/PA (07.06.18 @ 03:01) >  FINDINGS:  Mild interval decrease right upper lobe airspace consolidation   superimposed on chronic interstitial disease. The left lung remains   grossly clear. No pleural effusion or pneumothorax.  The cardiac silhouette is magnified on this projection.   The osseous structures are unremarkable.    IMPRESSION:  Mild improvement right upper lobe infiltrate superimposed on chronic   interstitial changes.    < end of copied text >    < from: TTE Echo Doppler w/o contrast (07.06.18 @ 12:36) >  TTE:   CONCLUSIONS:  1. Technically difficult and limited study.  2. Endocardium is not well-visualized. Grossly normal left ventricular   size and systolic function.  3. Thickened and calcified mitral valve leaflet tips with normal   diastolic opening. Mild mitral regurgitation  4. Grossly normal right ventricular size and systolic function. Mild   tricuspid regurgitation.  5. Grade 1 diastolic dysfunction  6. No pericardial effusion    No prior exam for comparison  Personally reviewed.     < from: CT Chest No Cont (07.03.18 @ 11:30) >  INTERPRETATION:  .    CLINICAL INFORMATION: Normal chest xray evaluate for endobronchial   lesion. UTI sepsis.    TECHNIQUE: Helical axial images of the chest were obtained from the   thoracic inlet to the adrenal glands without the administration of   intravenous contrast. Sagittal and coronal reformations were obtained   from the source data.     COMPARISON: Prior chest x-ray examination from 7/2/2018. No prior chest   CT examinations available for comparison.    FINDINGS: There is no axillary adenopathy. Subcentimeter sized lymph   nodes are notable within the mediastinum.    The heart size is at the upper limits of normal. The pericardium appears   unremarkable. There are atherosclerotic calcifications of the imaged   coronary arteries, aorta, and branch vessels. The imaged portions of the   aorta are normal in caliber.    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 Layering secretions are notable within the   right side of the trachea. There is patchy consolidation within the right   upper lobe. A few patchy groundglass opacities are also notable within   the left upper lobe and lingula. A few patchy groundglass opacities are   notable within the right middle lobe. Scattered areas of linear   atelectasis are notable throughout both lungs. No pleural effusions are   noted.    There is an exophytic left-sided renal cyst. There is nonspecific   left-sided perinephric stranding. The other visualized upper abdominal   organs appear unremarkable.    There is generalized osteopenia. Multilevel degenerative changes are   noted within the imaged potions of the spine. There are age indeterminate   moderate compression deformities of the T8, T9, and T11 vertebral bodies   without retropulsion. There is a severe L1 compression deformity which is   age indeterminate. There is mild retropulsion of the posterosuperior   aspect into the vertebral canal.  There is a partially imaged superior   endplate deformity of L3.     IMPRESSION: Findings compatible with multifocal pneumonia, most severe   within the right upper lobe.    Multiple age indeterminate compression fractures, as discussed. Mild   retropulsion at the L1 level.    < end of copied text >    Consultant(s) Notes Reviewed:  [x] YES  [ ] NO Patient is a 87y old  Female who presents with a chief complaint of "They sent me to here because I have a UTI"  Brought in by EMS with [facility] (05 Jul 2018 13:57)      INTERVAL HPI/OVERNIGHT EVENTS:  Patient seen at bedside, overnight patient reported having midsternal chest pressure. Patient stated she felt like she was "dying." Chest pressure was reproducible on physical exam. EKG no acute changes, Troponin negative x1, Chest x-ray negative.  Patient was given teslon pearls for her cough. Now feeling well, other than fatigue. Denies chest pain, SOB, palpitations, abdominal pain, dysuria, constipation, diarrhea, headache, weakness.     MEDICATIONS  (STANDING):  ALBUTerol    0.083% 2.5 milliGRAM(s) Nebulizer every 8 hours  aspirin  chewable 81 milliGRAM(s) Oral daily  benzonatate 100 milliGRAM(s) Oral three times a day  cholecalciferol 1000 Unit(s) Oral daily  enoxaparin Injectable 30 milliGRAM(s) SubCutaneous every 24 hours  ertapenem  IVPB 1000 milliGRAM(s) IV Intermittent every 24 hours  ibuprofen  Tablet 800 milliGRAM(s) Oral daily  lactobacillus acidophilus 1 Tablet(s) Oral daily  levothyroxine 75 MICROGram(s) Oral daily  loratadine 10 milliGRAM(s) Oral daily  metoprolol succinate ER 12.5 milliGRAM(s) Oral daily  simvastatin 10 milliGRAM(s) Oral at bedtime    MEDICATIONS  (PRN):  guaiFENesin    Syrup 200 milliGRAM(s) Oral every 6 hours PRN Cough      Allergies    erythromycin (Hives)        REVIEW OF SYSTEMS:  CONSTITUTIONAL: +Fatigue, No fever or chills, No dizziness, No tinnitus, confusion   HEENT:  No headache, no sore throat  RESPIRATORY: No cough, wheezing, or shortness of breath  CARDIOVASCULAR: No chest pain, palpitations, or leg swelling  GASTROINTESTINAL: No abd pain, nausea, vomiting, or diarrhea  GENITOURINARY: No dysuria, frequency, or hematuria  NEUROLOGICAL: no focal weakness or dizziness  MUSCULOSKELETAL: +generalized arthralgia, no myalgias   10 systesms reviewed and negative unless otherwise noted    Vital Signs Last 24 Hrs  T(C): 36.5 (06 Jul 2018 14:23), Max: 37.1 (05 Jul 2018 20:40)  T(F): 97.7 (06 Jul 2018 14:23), Max: 98.8 (05 Jul 2018 20:40)  HR: 84 (06 Jul 2018 14:23) (80 - 95)  BP: 128/76 (06 Jul 2018 14:23) (121/69 - 147/70)  BP(mean): --  RR: 17 (06 Jul 2018 14:23) (17 - 18)  SpO2: 96% (06 Jul 2018 14:23) (92% - 97%)    PHYSICAL EXAM:  GENERAL: NAD, frail elderly woman  HEENT:  EOMI, moist mucous membranes  CHEST/LUNG:  Decreased breath sounds on Right lung field,  no rales, wheezes, or rhonchi  HEART:  RRR, S1, S2 no murmurs, rubs, gallops, or clicks  ABDOMEN:  BS+, soft, nontender, nondistended  EXTREMITIES: no edema, cyanosis, or calf tenderness  NERVOUS SYSTEM: AA&Ox3  SKIN: Skin survey negative for lesions or wounds    LABS:                        10.4   24.72 )-----------( 177      ( 06 Jul 2018 06:50 )             29.7     CBC Full  -  ( 06 Jul 2018 06:50 )  WBC Count : 24.72 K/uL  Hemoglobin : 10.4 g/dL  Hematocrit : 29.7 %  Platelet Count - Automated : 177 K/uL  Mean Cell Volume : 88.7 fl  Mean Cell Hemoglobin : 31.0 pg  Mean Cell Hemoglobin Concentration : 35.0 gm/dL  Auto Neutrophil # : 17.55 K/uL  Auto Lymphocyte # : 3.71 K/uL  Auto Monocyte # : 2.72 K/uL  Auto Eosinophil # : 0.49 K/uL  Auto Basophil # : 0.00 K/uL  Auto Neutrophil % : 67.0 %  Auto Lymphocyte % : 15.0 %  Auto Monocyte % : 11.0 %  Auto Eosinophil % : 2.0 %  Auto Basophil % : 0.0 %    06 Jul 2018 06:50    142    |  109    |  18     ----------------------------<  94     4.0     |  23     |  0.85     Ca    8.2        06 Jul 2018 06:50  Phos  2.5       06 Jul 2018 06:50  Mg     1.9       06 Jul 2018 06:50          CAPILLARY BLOOD GLUCOSE            Culture - Blood (collected 07-02-18 @ 08:25)  Source: .Blood Blood-Peripheral  Preliminary Report (07-03-18 @ 09:01):    No growth to date.    Culture - Blood (collected 07-01-18 @ 16:50)  Source: .Blood Blood-Venous  Preliminary Report (07-02-18 @ 17:01):    No growth to date.    Culture - Blood (collected 07-01-18 @ 16:50)  Source: .Blood Blood-Peripheral  Preliminary Report (07-02-18 @ 17:01):    No growth to date.    Culture - Urine (collected 06-30-18 @ 22:50)  Source: .Urine Clean Catch (Midstream)  Final Report (07-02-18 @ 18:49):    >100,000 CFU/ml Escherichia coli  Organism: Escherichia coli (07-02-18 @ 18:49)  Organism: Escherichia coli (07-02-18 @ 18:49)      -  Amikacin: S <=8      -  Amoxicillin/Clavulanic Acid: S <=8/4      -  Ampicillin: R >16 These ampicillin results predict results for amoxicillin      -  Ampicillin/Sulbactam: I 16/8      -  Aztreonam: S <=4      -  Cefazolin: S <=2 This predicts results for oral agents cefaclor, cefdinir, cefpodoxime, cefprozil, cefuroxime axetil, cephalexin and locarbef for uncomplicated UTI. Note that some isolates may be susceptible to these agents while testing resistant to cefazolin.      -  Cefepime: S <=2      -  Cefoxitin: S <=4      -  Ceftriaxone: S <=1 Enterobacter, Citrobacter, and Serratia may develop resistance during prolonged therapy      -  Ciprofloxacin: S <=0.5      -  Ertapenem: S <=0.5      -  Gentamicin: S <=1      -  Imipenem: S <=1      -  Levofloxacin: S 2      -  Meropenem: S <=1      -  Nitrofurantoin: S <=32 Should not be used to treat pyelonephritis      -  Piperacillin/Tazobactam: S <=8      -  Tigecycline: S <=1      -  Tobramycin: S <=2      -  Trimethoprim/Sulfamethoxazole: R >2/38      Method Type: CECY    Culture - Blood (collected 06-30-18 @ 18:56)  Source: .Blood Blood  Gram Stain (07-01-18 @ 13:31):    Growth in aerobic bottle: Gram Negative Rods    Growth in anaerobic bottle: Gram Negative Rods  Final Report (07-03-18 @ 10:47):    Growth in aerobic and anaerobic bottles: Escherichia coli    "Due to technical problems, Proteus sp. will Not be reported as part of    the BCID panel until further notice"    ***Blood Panel PCR results on this specimen are available    approximately 3 hours after the Gram stain result.***    Gram stain, PCR, and/or culture results may not always    correspond due to difference in methodologies.    ************************************************************    This PCR assay was performed using [employer].    The following targets are tested for: Enterococcus,    vancomycin resistant enterococci, Listeria monocytogenes,    coagulase negative staphylococci, S. aureus,    methicillin resistant S. aureus, Streptococcus agalactiae    (Group B), S. pneumoniae, S.pyogenes (Group A),    Acinetobacter baumannii, Enterobacter cloacae, E. coli,    Klebsiella oxytoca, K. pneumoniae, Proteus sp.,    Serratia marcescens, Haemophilus influenzae,    Neisseria meningitidis, Pseudomonas aeruginosa, Candida    albicans, C. glabrata, C krusei, C parapsilosis,    C. tropicalis and the KPC resistance gene.  Organism: Blood Culture PCR  Escherichia coli (07-03-18 @ 10:47)  Organism: Escherichia coli (07-03-18 @ 10:47)      -  Amikacin: S <=8      -  Ampicillin: R >16 These ampicillin results predict results for amoxicillin      -  Ampicillin/Sulbactam: I 16/8      -  Aztreonam: S <=4      -  Cefazolin: S <=2      -  Cefepime: S <=2      -  Cefoxitin: S <=4      -  Ceftriaxone: S <=1 Enterobacter, Citrobacter, and Serratia may develop resistance during prolonged therapy      -  Ciprofloxacin: S <=0.5      -  Ertapenem: S <=0.5      -  Gentamicin: S <=1      -  Imipenem: S <=1      -  Levofloxacin: S <=1      -  Meropenem: S <=1      -  Piperacillin/Tazobactam: S <=8      -  Tobramycin: S <=2      -  Trimethoprim/Sulfamethoxazole: R >2/38      Method Type: CECY  Organism: Blood Culture PCR (07-03-18 @ 10:47)      -  Escherichia coli: Detec      Method Type: PCR    Culture - Blood (collected 06-30-18 @ 18:56)  Source: .Blood Blood  Gram Stain (07-01-18 @ 22:49):    Growth in aerobic bottle: Gram Negative Rods    Growth in anaerobic bottle: Gram Positive Cocci in Clusters  Final Report (07-03-18 @ 10:46):    Growth in aerobic bottle: Escherichia coli    See previous culture [id number]    Growth in anaerobic bottle: Coag Negative Staphylococcus    Single set isolate, possible contaminant. Contact    Microbiology if susceptibility testing clinically    indicated.    "Due to technical problems, Proteus sp. will Not be reported as part of    the BCID panel until further notice"    ***Blood Panel PCR results on this specimen are available    approximately 3 hours after the Gram stain result.***    Gram stain, PCR, and/or culture results may not always    correspond due to difference in methodologies.    ************************************************************    This PCR assay was performed using [employer].    The following targets are tested for: Enterococcus,    vancomycin resistant enterococci, Listeria monocytogenes,    coagulase negative staphylococci, S. aureus,    methicillin resistant S. aureus, Streptococcus agalactiae    (Group B), S. pneumoniae, S. pyogenes (Group A),    Acinetobacter baumannii, Enterobacter cloacae, E. coli,    Klebsiella oxytoca, K. pneumoniae, Proteus sp.,    Serratia marcescens, Haemophilus influenzae,    Neisseria meningitidis, Pseudomonas aeruginosa, Candida    albicans, C. glabrata, C krusei, C parapsilosis,    C. tropicalis and the KPC resistance gene.  Organism: Blood Culture PCR (07-03-18 @ 10:46)  Organism: Blood Culture PCR (07-03-18 @ 10:46)      -  Coagulase negative Staphylococcus: Detec      Method Type: PCR        RADIOLOGY & ADDITIONAL TESTS:  < from: Xray Chest 1 View AP/PA (07.06.18 @ 03:01) >  FINDINGS:  Mild interval decrease right upper lobe airspace consolidation   superimposed on chronic interstitial disease. The left lung remains   grossly clear. No pleural effusion or pneumothorax.  The cardiac silhouette is magnified on this projection.   The osseous structures are unremarkable.    IMPRESSION:  Mild improvement right upper lobe infiltrate superimposed on chronic   interstitial changes.    < end of copied text >    < from: TTE Echo Doppler w/o contrast (07.06.18 @ 12:36) >  TTE:   CONCLUSIONS:  1. Technically difficult and limited study.  2. Endocardium is not well-visualized. Grossly normal left ventricular   size and systolic function.  3. Thickened and calcified mitral valve leaflet tips with normal   diastolic opening. Mild mitral regurgitation  4. Grossly normal right ventricular size and systolic function. Mild   tricuspid regurgitation.  5. Grade 1 diastolic dysfunction  6. No pericardial effusion    No prior exam for comparison  Personally reviewed.     < from: CT Chest No Cont (07.03.18 @ 11:30) >  INTERPRETATION:  .    CLINICAL INFORMATION: Normal chest xray evaluate for endobronchial   lesion. UTI sepsis.    TECHNIQUE: Helical axial images of the chest were obtained from the   thoracic inlet to the adrenal glands without the administration of   intravenous contrast. Sagittal and coronal reformations were obtained   from the source data.     COMPARISON: Prior chest x-ray examination from 7/2/2018. No prior chest   CT examinations available for comparison.    FINDINGS: There is no axillary adenopathy. Subcentimeter sized lymph   nodes are notable within the mediastinum.    The heart size is at the upper limits of normal. The pericardium appears   unremarkable. There are atherosclerotic calcifications of the imaged   coronary arteries, aorta, and branch vessels. The imaged portions of the   aorta are normal in caliber.    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 Layering secretions are notable within the   right side of the trachea. There is patchy consolidation within the right   upper lobe. A few patchy groundglass opacities are also notable within   the left upper lobe and lingula. A few patchy groundglass opacities are   notable within the right middle lobe. Scattered areas of linear   atelectasis are notable throughout both lungs. No pleural effusions are   noted.    There is an exophytic left-sided renal cyst. There is nonspecific   left-sided perinephric stranding. The other visualized upper abdominal   organs appear unremarkable.    There is generalized osteopenia. Multilevel degenerative changes are   noted within the imaged potions of the spine. There are age indeterminate   moderate compression deformities of the T8, T9, and T11 vertebral bodies   without retropulsion. There is a severe L1 compression deformity which is   age indeterminate. There is mild retropulsion of the posterosuperior   aspect into the vertebral canal.  There is a partially imaged superior   endplate deformity of L3.     IMPRESSION: Findings compatible with multifocal pneumonia, most severe   within the right upper lobe.    Multiple age indeterminate compression fractures, as discussed. Mild   retropulsion at the L1 level.    < end of copied text >    Consultant(s) Notes Reviewed:  [x] YES  [ ] NO

## 2018-07-06 NOTE — PROGRESS NOTE ADULT - PROBLEM SELECTOR PLAN 1
- Bacteriemia:  Blood Cx +  for GNR  E.Coli  - Renal US  neg for hydronephrosis/abc ess collection/ obstruction   - WBC worsening. Zosyn changed to invanz as per ID, Dr. Whiting  - blood cultures negative NGTD  - urine cx positive for E. coli - Bacteriemia:  Blood Cx +  for GNR  E.Coli  - Renal US  neg for hydronephrosis/abc ess collection/ obstruction   - WBC worsening. Zosyn changed to invanz as per ID, Dr. Whiting  - blood cultures negative NGTD  - urine cx positive for E. coli  - pt not eating, not wanting to eat, denies urinary symptoms, monitor clinically, no symptoms ot UTI at this time

## 2018-07-06 NOTE — PROGRESS NOTE ADULT - PROBLEM SELECTOR PLAN 1
ct chest reviewed  diff dx - aspiration, pna, pulm edema  HOB elev  asp prec  dysphagia diet  ABX as per ID

## 2018-07-06 NOTE — PROGRESS NOTE ADULT - ASSESSMENT
87F with PMH of hypothyroidism, HLD, and arthritis presents with weakness, admitted with UTI, GNR bacteremia. Abnormal CT scan of lung.

## 2018-07-06 NOTE — PROGRESS NOTE ADULT - PROBLEM SELECTOR PLAN 3
Cough  CP likely due to cough, overnight events noted  dysphagia diet  asp prec  may need diuresis as standing order  CXR done overnight, prelim review done, official report pending  cont NEBS and Perles PRN for cough, monitor Sat, keep sat > 88 pct  overall prognosis guarded, pt is frail and weak elderly, will follow

## 2018-07-06 NOTE — PROGRESS NOTE ADULT - PROBLEM SELECTOR PLAN 2
elev WBC  am LABS pending  oral and skin care  ID follow up  aspiration PNA is in the differential  monitor vs and HD and Sat

## 2018-07-06 NOTE — PROGRESS NOTE ADULT - SUBJECTIVE AND OBJECTIVE BOX
PULMONARY CONSULT NOTE      ROSENDO ROSARIO  MRN-[id number]    Patient is a 87y old  Female who presents with a chief complaint of "They sent me to here because I have a UTI"  Brought in by EMS with AMS (05 Jul 2018 13:57)  in bed  seen and examined  overnight events noted        HISTORY OF PRESENT ILLNESS:    MEDICATIONS  (STANDING):  ALBUTerol    0.083% 2.5 milliGRAM(s) Nebulizer every 8 hours  aspirin  chewable 81 milliGRAM(s) Oral daily  cholecalciferol 1000 Unit(s) Oral daily  enoxaparin Injectable 30 milliGRAM(s) SubCutaneous every 24 hours  ertapenem  IVPB 1000 milliGRAM(s) IV Intermittent every 24 hours  ibuprofen  Tablet 800 milliGRAM(s) Oral daily  lactobacillus acidophilus 1 Tablet(s) Oral daily  levothyroxine 75 MICROGram(s) Oral daily  loratadine 10 milliGRAM(s) Oral daily  metoprolol succinate ER 12.5 milliGRAM(s) Oral daily  simvastatin 10 milliGRAM(s) Oral at bedtime      MEDICATIONS  (PRN):  guaiFENesin    Syrup 200 milliGRAM(s) Oral every 6 hours PRN Cough      Allergies    erythromycin (Hives)    Intolerances        PAST MEDICAL & SURGICAL HISTORY:  Arthritis  Hyperlipidemia  Osteoporosis  Migraine  Cataract: s/p cataract surgery  Edema of Leg: b/l LE  Hypothyroidism  S/P cataract surgery: bialteral  S/P ovarian cystectomy  S/P hip replacement  S/P knee replacement, bilateral: Partial R) hip  Knee Replacement: b/l      FAMILY HISTORY:  No pertinent family history in first degree relatives      SOCIAL HISTORY  Smoking History:     REVIEW OF SYSTEMS:    REVIEW OF SYSTEMS      General:	    Skin/Breast:  	  Ophthalmologic:  	  ENMT:	    Respiratory and Thorax:  	  Cardiovascular:	    Gastrointestinal:	    Genitourinary:	    Musculoskeletal:	    Neurological:	    Psychiatric:	    Hematology/Lymphatics:	    Endocrine:	    Allergic/Immunologic:	    Vital Signs Last 24 Hrs  T(C): 36.4 (06 Jul 2018 04:23), Max: 37.1 (05 Jul 2018 20:40)  T(F): 97.6 (06 Jul 2018 04:23), Max: 98.8 (05 Jul 2018 20:40)  HR: 93 (06 Jul 2018 04:23) (81 - 95)  BP: 121/69 (06 Jul 2018 04:23) (121/69 - 147/70)  BP(mean): --  RR: 18 (06 Jul 2018 04:23) (17 - 18)  SpO2: 94% (06 Jul 2018 04:23) (92% - 96%)    PHYSICAL EXAMINATION:  PHYSICAL EXAM:      Constitutional:    Eyes:    ENMT:    Neck:    Breasts:    Back:    Respiratory:    Cardiovascular:    Gastrointestinal:    Genitourinary:    Rectal:    Extremities:    Vascular:    Neurological:    Skin:    Lymph Nodes:    Musculoskeletal:    Psychiatric:          LABS:                        9.8    23.37 )-----------( 176      ( 05 Jul 2018 07:11 )             28.2     07-05    142  |  111<H>  |  25<H>  ----------------------------<  123<H>  3.3<L>   |  22  |  1.10    Ca    8.2<L>      05 Jul 2018 07:11            CARDIAC MARKERS ( 06 Jul 2018 02:44 )  <.015 ng/mL / x     / 29 U/L / x     / 1.5 ng/mL        Serum Pro-Brain Natriuretic Peptide: 8001 pg/mL (07-03-18 @ 23:12)          MICROBIOLOGY:    RADIOLOGY & ADDITIONAL STUDIES:

## 2018-07-06 NOTE — PROGRESS NOTE ADULT - PROBLEM SELECTOR PLAN 2
Cough x 2 days, dry  CXR showed RUL infiltrate, likely secondary to aspiration  Non contrast CT Chest showed multifocal ground glass pneumonia, with RUL consolidation-awaiting records to compare if this is chronic, repeat CXR with similar results  - Continue invanz  - ID Dr. Whiting   - Pulmonary Dr. Mack Caldera  - TTE negative for valvular pathology  - Repeat EKG, no acute changes, normal sinus 86, old first degree AV block, old RBBB

## 2018-07-07 LAB
ANION GAP SERPL CALC-SCNC: 8 MMOL/L — SIGNIFICANT CHANGE UP (ref 5–17)
BASOPHILS # BLD AUTO: 0 K/UL — SIGNIFICANT CHANGE UP (ref 0–0.2)
BASOPHILS NFR BLD AUTO: 0 % — SIGNIFICANT CHANGE UP (ref 0–2)
BUN SERPL-MCNC: 16 MG/DL — SIGNIFICANT CHANGE UP (ref 7–23)
CALCIUM SERPL-MCNC: 8.3 MG/DL — LOW (ref 8.5–10.1)
CHLORIDE SERPL-SCNC: 109 MMOL/L — HIGH (ref 96–108)
CO2 SERPL-SCNC: 24 MMOL/L — SIGNIFICANT CHANGE UP (ref 22–31)
CREAT SERPL-MCNC: 0.7 MG/DL — SIGNIFICANT CHANGE UP (ref 0.5–1.3)
CULTURE RESULTS: SIGNIFICANT CHANGE UP
EOSINOPHIL # BLD AUTO: 1.33 K/UL — HIGH (ref 0–0.5)
EOSINOPHIL NFR BLD AUTO: 6 % — SIGNIFICANT CHANGE UP (ref 0–6)
GLUCOSE SERPL-MCNC: 94 MG/DL — SIGNIFICANT CHANGE UP (ref 70–99)
GRAM STN FLD: SIGNIFICANT CHANGE UP
HCT VFR BLD CALC: 28.3 % — LOW (ref 34.5–45)
HGB BLD-MCNC: 9.5 G/DL — LOW (ref 11.5–15.5)
LYMPHOCYTES # BLD AUTO: 18 % — SIGNIFICANT CHANGE UP (ref 13–44)
LYMPHOCYTES # BLD AUTO: 3.98 K/UL — HIGH (ref 1–3.3)
MAGNESIUM SERPL-MCNC: 1.9 MG/DL — SIGNIFICANT CHANGE UP (ref 1.6–2.6)
MCHC RBC-ENTMCNC: 30.3 PG — SIGNIFICANT CHANGE UP (ref 27–34)
MCHC RBC-ENTMCNC: 33.6 GM/DL — SIGNIFICANT CHANGE UP (ref 32–36)
MCV RBC AUTO: 90.1 FL — SIGNIFICANT CHANGE UP (ref 80–100)
MONOCYTES # BLD AUTO: 1.55 K/UL — HIGH (ref 0–0.9)
MONOCYTES NFR BLD AUTO: 7 % — SIGNIFICANT CHANGE UP (ref 2–14)
NEUTROPHILS # BLD AUTO: 15.25 K/UL — HIGH (ref 1.8–7.4)
NEUTROPHILS NFR BLD AUTO: 66 % — SIGNIFICANT CHANGE UP (ref 43–77)
PHOSPHATE SERPL-MCNC: 2.6 MG/DL — SIGNIFICANT CHANGE UP (ref 2.5–4.5)
PLATELET # BLD AUTO: 182 K/UL — SIGNIFICANT CHANGE UP (ref 150–400)
POTASSIUM SERPL-MCNC: 3.8 MMOL/L — SIGNIFICANT CHANGE UP (ref 3.5–5.3)
POTASSIUM SERPL-SCNC: 3.8 MMOL/L — SIGNIFICANT CHANGE UP (ref 3.5–5.3)
RBC # BLD: 3.14 M/UL — LOW (ref 3.8–5.2)
RBC # FLD: 15.2 % — HIGH (ref 10.3–14.5)
SODIUM SERPL-SCNC: 141 MMOL/L — SIGNIFICANT CHANGE UP (ref 135–145)
SPECIMEN SOURCE: SIGNIFICANT CHANGE UP
WBC # BLD: 22.1 K/UL — HIGH (ref 3.8–10.5)
WBC # FLD AUTO: 22.1 K/UL — HIGH (ref 3.8–10.5)

## 2018-07-07 PROCEDURE — 99233 SBSQ HOSP IP/OBS HIGH 50: CPT

## 2018-07-07 RX ADMIN — Medication 75 MICROGRAM(S): at 06:04

## 2018-07-07 RX ADMIN — Medication 100 MILLIGRAM(S): at 13:02

## 2018-07-07 RX ADMIN — Medication 1000 UNIT(S): at 12:56

## 2018-07-07 RX ADMIN — Medication 800 MILLIGRAM(S): at 12:56

## 2018-07-07 RX ADMIN — Medication 1 TABLET(S): at 12:56

## 2018-07-07 RX ADMIN — ALBUTEROL 2.5 MILLIGRAM(S): 90 AEROSOL, METERED ORAL at 07:52

## 2018-07-07 RX ADMIN — ERTAPENEM SODIUM 120 MILLIGRAM(S): 1 INJECTION, POWDER, LYOPHILIZED, FOR SOLUTION INTRAMUSCULAR; INTRAVENOUS at 19:28

## 2018-07-07 RX ADMIN — LORATADINE 10 MILLIGRAM(S): 10 TABLET ORAL at 12:56

## 2018-07-07 RX ADMIN — Medication 81 MILLIGRAM(S): at 12:56

## 2018-07-07 RX ADMIN — Medication 800 MILLIGRAM(S): at 13:03

## 2018-07-07 RX ADMIN — Medication 12.5 MILLIGRAM(S): at 06:03

## 2018-07-07 RX ADMIN — SIMVASTATIN 10 MILLIGRAM(S): 20 TABLET, FILM COATED ORAL at 21:31

## 2018-07-07 RX ADMIN — Medication 100 MILLIGRAM(S): at 21:31

## 2018-07-07 RX ADMIN — ALBUTEROL 2.5 MILLIGRAM(S): 90 AEROSOL, METERED ORAL at 22:45

## 2018-07-07 RX ADMIN — Medication 100 MILLIGRAM(S): at 06:03

## 2018-07-07 RX ADMIN — Medication 200 MILLIGRAM(S): at 20:06

## 2018-07-07 RX ADMIN — ENOXAPARIN SODIUM 30 MILLIGRAM(S): 100 INJECTION SUBCUTANEOUS at 06:02

## 2018-07-07 RX ADMIN — ALBUTEROL 2.5 MILLIGRAM(S): 90 AEROSOL, METERED ORAL at 14:19

## 2018-07-07 RX ADMIN — ALBUTEROL 2.5 MILLIGRAM(S): 90 AEROSOL, METERED ORAL at 00:01

## 2018-07-07 NOTE — PROGRESS NOTE ADULT - SUBJECTIVE AND OBJECTIVE BOX
Date/Time Patient Seen:  		  Referring MD:   Data Reviewed	       Patient is a 87y old  Female who presents with a chief complaint of "They sent me to here because I have a UTI"  Brought in by EMS with AMS (05 Jul 2018 13:57)    in bed  seen and examined  vs and meds reviewed  on room air      Subjective/HPI     PAST MEDICAL & SURGICAL HISTORY:  Arthritis  Hypothyroid  Hyperlipidemia  Seasonal Allergies  Osteoporosis  Migraine  Cataract: s/p cataract surgery  Edema of Leg: b/l LE  Chronic Edema  Seasonal Rhinitis  Hypercholesteremia  Hypothyroidism  Anxiety  Clostridium Difficile Infection  S/P cataract surgery: bialteral  S/P ovarian cystectomy  S/P hip replacement  S/P knee replacement, bilateral: Partial R) hip  Hip Replacement: left hip  Knee Replacement: b/l        Medication list         MEDICATIONS  (STANDING):  ALBUTerol    0.083% 2.5 milliGRAM(s) Nebulizer every 8 hours  aspirin  chewable 81 milliGRAM(s) Oral daily  benzonatate 100 milliGRAM(s) Oral three times a day  cholecalciferol 1000 Unit(s) Oral daily  enoxaparin Injectable 30 milliGRAM(s) SubCutaneous every 24 hours  ertapenem  IVPB 1000 milliGRAM(s) IV Intermittent every 24 hours  ibuprofen  Tablet 800 milliGRAM(s) Oral daily  lactobacillus acidophilus 1 Tablet(s) Oral daily  levothyroxine 75 MICROGram(s) Oral daily  loratadine 10 milliGRAM(s) Oral daily  metoprolol succinate ER 12.5 milliGRAM(s) Oral daily  simvastatin 10 milliGRAM(s) Oral at bedtime    MEDICATIONS  (PRN):  guaiFENesin    Syrup 200 milliGRAM(s) Oral every 6 hours PRN Cough         Vitals log        ICU Vital Signs Last 24 Hrs  T(C): 36.9 (07 Jul 2018 04:52), Max: 36.9 (07 Jul 2018 04:52)  T(F): 98.5 (07 Jul 2018 04:52), Max: 98.5 (07 Jul 2018 04:52)  HR: 92 (07 Jul 2018 04:52) (78 - 92)  BP: 151/79 (07 Jul 2018 04:52) (113/72 - 151/79)  BP(mean): --  ABP: --  ABP(mean): --  RR: 17 (07 Jul 2018 04:52) (17 - 17)  SpO2: 95% (07 Jul 2018 04:52) (95% - 98%)           Input and Output:  I&O's Detail    06 Jul 2018 07:01  -  07 Jul 2018 05:58  --------------------------------------------------------  IN:    Oral Fluid: 720 mL    Solution: 50 mL  Total IN: 770 mL    OUT:  Total OUT: 0 mL    Total NET: 770 mL          Lab Data                        10.4   24.72 )-----------( 177      ( 06 Jul 2018 06:50 )             29.7     07-06    142  |  109<H>  |  18  ----------------------------<  94  4.0   |  23  |  0.85    Ca    8.2<L>      06 Jul 2018 06:50  Phos  2.5     07-06  Mg     1.9     07-06        CARDIAC MARKERS ( 06 Jul 2018 02:44 )  <.015 ng/mL / x     / 29 U/L / x     / 1.5 ng/mL        Review of Systems	      Objective     Physical Examination    heart s1s2  lung dec BS  abd soft  cn grossly int    Pertinent Lab findings & Imaging      Emily:  NO   Adequate UO     I&O's Detail    06 Jul 2018 07:01  -  07 Jul 2018 05:58  --------------------------------------------------------  IN:    Oral Fluid: 720 mL    Solution: 50 mL  Total IN: 770 mL    OUT:  Total OUT: 0 mL    Total NET: 770 mL               Discussed with:     Cultures:	        Radiology

## 2018-07-07 NOTE — DIETITIAN INITIAL EVALUATION ADULT. - PROBLEM SELECTOR PLAN 2
Given 2L NS bolus. Hold off on IVF as pt seems to be fluid overloaded on exam.  - Give small dose of lasix

## 2018-07-07 NOTE — PROGRESS NOTE ADULT - PROBLEM SELECTOR PLAN 1
- Bacteremia:  Blood Cx +  for GNR E.Coli 6/30/18, then negative on 7/1/18  - now repeat blood cultures from 7/5/18 are growing gram negative rods - invanz was started on 7/5/18 - ID ordered repeat blood cultures today so if repeat blood cultures from 48hrs from now demonstrate no growth, can consider this more appropriate response to therapy. clinically, pt reports that her energy level has improved for the first time since hospitalization, this may demonstrate response to adjusted abx regimen  - Renal US neg for hydronephrosis/abscess collection/obstruction   - no urinary symptoms currently  - still w/ 3% bands but slight improvement in WBC count today

## 2018-07-07 NOTE — DIETITIAN INITIAL EVALUATION ADULT. - OTHER INFO
Pt reports she lost ~ 5# in the last month due to stress, ate good at breakfast >75% of meal but only ate ~ 35% of lunch as the two meals were " so close together in time", has nka to food, follows a low sugar low salt diet for general good health, denies constipation or diarrhea , avoids shellfish as she vomits if consumed. Pts skin is intact. She has pmhx as below and being treated for a UTI. pt encouraged to take healthy snacks from hospitality cart to eat if she doesn't eat well at a meal due to spacing of meals "close together" . It was explained that we don't want her to lose any more wt.

## 2018-07-07 NOTE — PROGRESS NOTE ADULT - SUBJECTIVE AND OBJECTIVE BOX
Patient is a 87y old  Female who presents with a chief complaint of "They sent me to here because I have a UTI"  Brought in by EMS with AMS (05 Jul 2018 13:57)      HPI:  87F with PMH of hypothyroidism, HLD, and arthritis presents to ED with weakness for the past 5 days. This afternoon she felt more dizzy and weak. Family is at bedside and states that patient has also not been eating well this past week after receiving some bad news about her . Of note patient was lying in bed naked last night and was walking around not knowing what she was doing according to her daughter. Denies lightheadedness, CP, SOB, abd pain, dysuria.     In ED, vitals stable. Labs significant for WBC 11.77, H/H 11.4/33.1, BUN/Cr 1.6/119, AST 66, alk phos 151. Troponin negative. CPK 3.9. UA positive. CXR pending. CT head negative. Given 2L NS bolus. Received dose of rocephin. (30 Jun 2018 20:55)      INTERVAL HPI/OVERNIGHT EVENTS: Pt seen and evaluated at the bedside. No acute overnight events occurred. Pt is feeling more energized today and "I'm eating everything they put in front of me" feels overall better today. No pain. No other focal complaints, eager for d/c. Pt asks multiple pertinent questions regarding lab results and her tx plan.       T(C): 36.9 (07-07-18 @ 04:52), Max: 36.9 (07-07-18 @ 04:52)  HR: 85 (07-07-18 @ 07:52) (78 - 92)  BP: 151/79 (07-07-18 @ 04:52) (113/72 - 151/79)  RR: 17 (07-07-18 @ 04:52) (17 - 17)  SpO2: 97% (07-07-18 @ 07:52) (95% - 98%)  Wt(kg): --  I&O's Summary    06 Jul 2018 07:01  -  07 Jul 2018 07:00  --------------------------------------------------------  IN: 1330 mL / OUT: 0 mL / NET: 1330 mL        REVIEW OF SYSTEMS:  CONSTITUTIONAL: denies fever, chills, fatigue, weakness  HEENT: denies blurred vision, sore throat  SKIN: denies new lesions, rash  CARDIOVASCULAR: denies chest pain, chest pressure, palpitations  RESPIRATORY: admits mild coughing which is dry (and chronic), no chest congestion  GASTROINTESTINAL: denies nausea, vomiting, diarrhea, abdominal pain  GENITOURINARY: denies dysuria, discharge  NEUROLOGICAL: denies numbness, headache, focal weakness  MUSCULOSKELETAL: denies new joint pain, muscle aches  HEMATOLOGIC: denies gross bleeding, bruising  LYMPHATICS: denies enlarged lymph nodes, mild lower extremity swelling which is symmetric and nontender  PSYCHIATRIC: denies recent changes in anxiety, depression  ENDOCRINOLOGIC: denies sweating, cold or heat intolerance    PHYSICAL EXAM:  GENERAL: patient appears thin, frail, tired, elderly but appropriate and conversational  EYES: sclera clear, no exudates  ENMT: oropharynx clear without erythema, no exudates, moist mucous membranes  NECK: supple, soft, no thyromegaly noted  LUNGS: good air entry bilaterally, clear to auscultation, symmetric breath sounds, no wheezing or rhonchi appreciated  HEART: soft S1/S2, regular rate and rhythm, no murmurs noted, no appreciable b/l LE edema  GASTROINTESTINAL: abdomen is soft, nontender, nondistended, normoactive bowel sounds, no palpable masses  INTEGUMENT: good skin turgor, no lesions noted  MUSCULOSKELETAL: no clubbing or cyanosis, no obvious deformity  NEUROLOGIC: awake, alert, oriented x3, good muscle tone in 4 extremities, no obvious sensory deficits  PSYCHIATRIC: mood is depressed but clearly improved from yesterday's assessment, affect is congruent, linear and logical thought process  HEME/LYMPH: no palpable supraclavicular nodules, no obvious ecchymosis or petechiae     MEDICATIONS  (STANDING):  ALBUTerol    0.083% 2.5 milliGRAM(s) Nebulizer every 8 hours  aspirin  chewable 81 milliGRAM(s) Oral daily  benzonatate 100 milliGRAM(s) Oral three times a day  cholecalciferol 1000 Unit(s) Oral daily  enoxaparin Injectable 30 milliGRAM(s) SubCutaneous every 24 hours  ertapenem  IVPB 1000 milliGRAM(s) IV Intermittent every 24 hours  ibuprofen  Tablet 800 milliGRAM(s) Oral daily  lactobacillus acidophilus 1 Tablet(s) Oral daily  levothyroxine 75 MICROGram(s) Oral daily  loratadine 10 milliGRAM(s) Oral daily  metoprolol succinate ER 12.5 milliGRAM(s) Oral daily  simvastatin 10 milliGRAM(s) Oral at bedtime    MEDICATIONS  (PRN):  guaiFENesin    Syrup 200 milliGRAM(s) Oral every 6 hours PRN Cough      LABS:                        9.5    22.10 )-----------( 182      ( 07 Jul 2018 06:59 )             28.3     07-07    141  |  109<H>  |  16  ----------------------------<  94  3.8   |  24  |  0.70    Ca    8.3<L>      07 Jul 2018 06:59  Phos  2.6     07-07  Mg     1.9     07-07          CAPILLARY BLOOD GLUCOSE          07-05 @ 22:38   No growth to date.  --  --          RADIOLOGY & ADDITIONAL TESTS:    Imaging Personally Reviewed:

## 2018-07-07 NOTE — PROGRESS NOTE ADULT - PROBLEM SELECTOR PLAN 1
cough  better overall  dysphagia diet  HOB elev  oral hygiene  tessalon perles  NEBS  enc clearing of secretions  out of bed as tolerated

## 2018-07-07 NOTE — PROGRESS NOTE ADULT - PROBLEM SELECTOR PLAN 3
ct chest reviewed  aspiration and or pulm edema and resp infection is in the differential  cont supportive regimen  emp ABX  NEBS  chest pt  HFpEF - TTE noted, may need PRN diuresis  will follow  overall appears better

## 2018-07-07 NOTE — PROGRESS NOTE ADULT - PROBLEM SELECTOR PLAN 2
mild cough is dry but improved form previous few days  CXR showed RUL infiltrate, likely secondary to aspiration  Non contrast CT Chest showed multifocal ground glass pneumonia, with RUL consolidation-awaiting records to compare if this is chronic, repeat CXR with similar results  - Continue invanz  - ID following  - Pulmonary Dr. Mack Caldera  - TTE negative for valvular pathology  - Repeat EKG, no acute changes, normal sinus 86, old first degree AV block, old RBBB

## 2018-07-08 LAB
ANION GAP SERPL CALC-SCNC: 9 MMOL/L — SIGNIFICANT CHANGE UP (ref 5–17)
BUN SERPL-MCNC: 17 MG/DL — SIGNIFICANT CHANGE UP (ref 7–23)
CALCIUM SERPL-MCNC: 8.8 MG/DL — SIGNIFICANT CHANGE UP (ref 8.5–10.1)
CHLORIDE SERPL-SCNC: 109 MMOL/L — HIGH (ref 96–108)
CO2 SERPL-SCNC: 24 MMOL/L — SIGNIFICANT CHANGE UP (ref 22–31)
CREAT SERPL-MCNC: 0.93 MG/DL — SIGNIFICANT CHANGE UP (ref 0.5–1.3)
GLUCOSE SERPL-MCNC: 100 MG/DL — HIGH (ref 70–99)
HCT VFR BLD CALC: 30.3 % — LOW (ref 34.5–45)
HGB BLD-MCNC: 10.2 G/DL — LOW (ref 11.5–15.5)
MAGNESIUM SERPL-MCNC: 2.2 MG/DL — SIGNIFICANT CHANGE UP (ref 1.6–2.6)
MCHC RBC-ENTMCNC: 31 PG — SIGNIFICANT CHANGE UP (ref 27–34)
MCHC RBC-ENTMCNC: 33.7 GM/DL — SIGNIFICANT CHANGE UP (ref 32–36)
MCV RBC AUTO: 92.1 FL — SIGNIFICANT CHANGE UP (ref 80–100)
NRBC # BLD: 0 /100 WBCS — SIGNIFICANT CHANGE UP (ref 0–0)
PLATELET # BLD AUTO: 247 K/UL — SIGNIFICANT CHANGE UP (ref 150–400)
POTASSIUM SERPL-MCNC: 4.2 MMOL/L — SIGNIFICANT CHANGE UP (ref 3.5–5.3)
POTASSIUM SERPL-SCNC: 4.2 MMOL/L — SIGNIFICANT CHANGE UP (ref 3.5–5.3)
RBC # BLD: 3.29 M/UL — LOW (ref 3.8–5.2)
RBC # FLD: 15.6 % — HIGH (ref 10.3–14.5)
SODIUM SERPL-SCNC: 142 MMOL/L — SIGNIFICANT CHANGE UP (ref 135–145)
WBC # BLD: 19.34 K/UL — HIGH (ref 3.8–10.5)
WBC # FLD AUTO: 19.34 K/UL — HIGH (ref 3.8–10.5)

## 2018-07-08 PROCEDURE — 99233 SBSQ HOSP IP/OBS HIGH 50: CPT

## 2018-07-08 RX ORDER — ACETAMINOPHEN 500 MG
650 TABLET ORAL ONCE
Qty: 0 | Refills: 0 | Status: COMPLETED | OUTPATIENT
Start: 2018-07-08 | End: 2018-07-08

## 2018-07-08 RX ORDER — FLUCONAZOLE 150 MG/1
150 TABLET ORAL ONCE
Qty: 0 | Refills: 0 | Status: COMPLETED | OUTPATIENT
Start: 2018-07-08 | End: 2018-07-08

## 2018-07-08 RX ADMIN — Medication 12.5 MILLIGRAM(S): at 05:26

## 2018-07-08 RX ADMIN — Medication 75 MICROGRAM(S): at 05:26

## 2018-07-08 RX ADMIN — ENOXAPARIN SODIUM 30 MILLIGRAM(S): 100 INJECTION SUBCUTANEOUS at 05:26

## 2018-07-08 RX ADMIN — Medication 800 MILLIGRAM(S): at 12:25

## 2018-07-08 RX ADMIN — Medication 100 MILLIGRAM(S): at 13:41

## 2018-07-08 RX ADMIN — Medication 81 MILLIGRAM(S): at 12:25

## 2018-07-08 RX ADMIN — ALBUTEROL 2.5 MILLIGRAM(S): 90 AEROSOL, METERED ORAL at 15:00

## 2018-07-08 RX ADMIN — Medication 100 MILLIGRAM(S): at 21:59

## 2018-07-08 RX ADMIN — ALBUTEROL 2.5 MILLIGRAM(S): 90 AEROSOL, METERED ORAL at 07:50

## 2018-07-08 RX ADMIN — SIMVASTATIN 10 MILLIGRAM(S): 20 TABLET, FILM COATED ORAL at 21:59

## 2018-07-08 RX ADMIN — FLUCONAZOLE 150 MILLIGRAM(S): 150 TABLET ORAL at 13:42

## 2018-07-08 RX ADMIN — ALBUTEROL 2.5 MILLIGRAM(S): 90 AEROSOL, METERED ORAL at 20:18

## 2018-07-08 RX ADMIN — Medication 800 MILLIGRAM(S): at 12:35

## 2018-07-08 RX ADMIN — Medication 650 MILLIGRAM(S): at 21:59

## 2018-07-08 RX ADMIN — Medication 1 TABLET(S): at 12:25

## 2018-07-08 RX ADMIN — Medication 100 MILLIGRAM(S): at 05:26

## 2018-07-08 RX ADMIN — Medication 1000 UNIT(S): at 12:25

## 2018-07-08 RX ADMIN — Medication 650 MILLIGRAM(S): at 21:45

## 2018-07-08 RX ADMIN — ERTAPENEM SODIUM 120 MILLIGRAM(S): 1 INJECTION, POWDER, LYOPHILIZED, FOR SOLUTION INTRAMUSCULAR; INTRAVENOUS at 18:09

## 2018-07-08 NOTE — PROGRESS NOTE ADULT - SUBJECTIVE AND OBJECTIVE BOX
Patient is a 87y old  Female who presents with a chief complaint of "They sent me to here because I have a UTI"  Brought in by EMS with AMS (05 Jul 2018 13:57)      HPI:  87F with PMH of hypothyroidism, HLD, and arthritis presents to ED with weakness for the past 5 days. This afternoon she felt more dizzy and weak. Family is at bedside and states that patient has also not been eating well this past week after receiving some bad news about her . Of note patient was lying in bed naked last night and was walking around not knowing what she was doing according to her daughter. Denies lightheadedness, CP, SOB, abd pain, dysuria.     In ED, vitals stable. Labs significant for WBC 11.77, H/H 11.4/33.1, BUN/Cr 1.6/119, AST 66, alk phos 151. Troponin negative. CPK 3.9. UA positive. CXR pending. CT head negative. Given 2L NS bolus. Received dose of rocephin. (30 Jun 2018 20:55)      INTERVAL HPI/OVERNIGHT EVENTS: Pt seen and evaluated at the bedside. No acute overnight events occurred. No events. Pt continues to feel clinically improved on a daily basis. No acute concerns at this time. Feeling "better". We discussed culture findings.       T(C): 36.8 (07-08-18 @ 13:24), Max: 36.9 (07-08-18 @ 05:08)  HR: 83 (07-08-18 @ 15:00) (83 - 93)  BP: 116/72 (07-08-18 @ 13:24) (116/72 - 130/74)  RR: 16 (07-08-18 @ 13:24) (16 - 16)  SpO2: 98% (07-08-18 @ 15:00) (93% - 98%)  Wt(kg): --  I&O's Summary    07 Jul 2018 07:01  -  08 Jul 2018 07:00  --------------------------------------------------------  IN: 50 mL / OUT: 0 mL / NET: 50 mL      REVIEW OF SYSTEMS:  CONSTITUTIONAL: denies fever, chills, fatigue, weakness  HEENT: denies blurred vision, sore throat  SKIN: denies new lesions, rash  CARDIOVASCULAR: denies chest pain, chest pressure, palpitations  RESPIRATORY: admits mild coughing which is dry (and chronic), no chest congestion  GASTROINTESTINAL: denies nausea, vomiting, diarrhea, abdominal pain  GENITOURINARY: denies dysuria, discharge  NEUROLOGICAL: denies numbness, headache, focal weakness  MUSCULOSKELETAL: denies new joint pain, muscle aches  HEMATOLOGIC: denies gross bleeding, bruising  LYMPHATICS: denies enlarged lymph nodes, mild lower extremity swelling which is symmetric and nontender  PSYCHIATRIC: denies recent changes in anxiety, depression  ENDOCRINOLOGIC: denies sweating, cold or heat intolerance    PHYSICAL EXAM:  GENERAL: patient appears thin, frail, tired, elderly but appropriate and conversational  EYES: sclera clear, no exudates  ENMT: oropharynx clear without erythema, no exudates, moist mucous membranes  NECK: supple, soft, no thyromegaly noted  LUNGS: good air entry bilaterally, clear to auscultation, symmetric breath sounds, no wheezing or rhonchi appreciated  HEART: soft S1/S2, regular rate and rhythm, no murmurs noted, no appreciable b/l LE edema  GASTROINTESTINAL: abdomen is soft, nontender, nondistended, normoactive bowel sounds, no palpable masses  INTEGUMENT: good skin turgor, no lesions noted  MUSCULOSKELETAL: no clubbing or cyanosis, no obvious deformity  NEUROLOGIC: awake, alert, oriented x3, good muscle tone in 4 extremities, no obvious sensory deficits  PSYCHIATRIC: mood is depressed but clearly improved from yesterday's assessment, affect is congruent, linear and logical thought process  HEME/LYMPH: no palpable supraclavicular nodules, no obvious ecchymosis or petechiae         MEDICATIONS  (STANDING):  ALBUTerol    0.083% 2.5 milliGRAM(s) Nebulizer every 8 hours  aspirin  chewable 81 milliGRAM(s) Oral daily  benzonatate 100 milliGRAM(s) Oral three times a day  cholecalciferol 1000 Unit(s) Oral daily  enoxaparin Injectable 30 milliGRAM(s) SubCutaneous every 24 hours  ertapenem  IVPB 1000 milliGRAM(s) IV Intermittent every 24 hours  ibuprofen  Tablet 800 milliGRAM(s) Oral daily  lactobacillus acidophilus 1 Tablet(s) Oral daily  levothyroxine 75 MICROGram(s) Oral daily  loratadine 10 milliGRAM(s) Oral daily  metoprolol succinate ER 12.5 milliGRAM(s) Oral daily  simvastatin 10 milliGRAM(s) Oral at bedtime    MEDICATIONS  (PRN):  guaiFENesin    Syrup 200 milliGRAM(s) Oral every 6 hours PRN Cough      LABS:                        10.2   19.34 )-----------( 247      ( 08 Jul 2018 06:46 )             30.3     07-08    142  |  109<H>  |  17  ----------------------------<  100<H>  4.2   |  24  |  0.93    Ca    8.8      08 Jul 2018 06:46  Phos  2.6     07-07  Mg     2.2     07-08          CAPILLARY BLOOD GLUCOSE          07-05 @ 22:38   No growth to date.  --  --          RADIOLOGY & ADDITIONAL TESTS:    Imaging Personally Reviewed:

## 2018-07-08 NOTE — PROGRESS NOTE ADULT - SUBJECTIVE AND OBJECTIVE BOX
Date/Time Patient Seen:  		  Referring MD:   Data Reviewed	       Patient is a 87y old  Female who presents with a chief complaint of "They sent me to here because I have a UTI"  Brought in by EMS with AMS (05 Jul 2018 13:57)  in bed  seen and examined  vs and meds reviewed        Subjective/HPI     PAST MEDICAL & SURGICAL HISTORY:  Arthritis  Hypothyroid  Hyperlipidemia  Seasonal Allergies  Osteoporosis  Migraine  Cataract: s/p cataract surgery  Edema of Leg: b/l LE  Chronic Edema  Seasonal Rhinitis  Hypercholesteremia  Hypothyroidism  Anxiety  Clostridium Difficile Infection  S/P cataract surgery: bialteral  S/P ovarian cystectomy  S/P hip replacement  S/P knee replacement, bilateral: Partial R) hip  Hip Replacement: left hip  Knee Replacement: b/l        Medication list         MEDICATIONS  (STANDING):  ALBUTerol    0.083% 2.5 milliGRAM(s) Nebulizer every 8 hours  aspirin  chewable 81 milliGRAM(s) Oral daily  benzonatate 100 milliGRAM(s) Oral three times a day  cholecalciferol 1000 Unit(s) Oral daily  enoxaparin Injectable 30 milliGRAM(s) SubCutaneous every 24 hours  ertapenem  IVPB 1000 milliGRAM(s) IV Intermittent every 24 hours  ibuprofen  Tablet 800 milliGRAM(s) Oral daily  lactobacillus acidophilus 1 Tablet(s) Oral daily  levothyroxine 75 MICROGram(s) Oral daily  loratadine 10 milliGRAM(s) Oral daily  metoprolol succinate ER 12.5 milliGRAM(s) Oral daily  simvastatin 10 milliGRAM(s) Oral at bedtime    MEDICATIONS  (PRN):  guaiFENesin    Syrup 200 milliGRAM(s) Oral every 6 hours PRN Cough         Vitals log        ICU Vital Signs Last 24 Hrs  T(C): 36.9 (08 Jul 2018 05:08), Max: 36.9 (08 Jul 2018 05:08)  T(F): 98.4 (08 Jul 2018 05:08), Max: 98.4 (08 Jul 2018 05:08)  HR: 89 (08 Jul 2018 05:08) (79 - 93)  BP: 130/74 (08 Jul 2018 05:08) (121/77 - 130/74)  BP(mean): --  ABP: --  ABP(mean): --  RR: 16 (08 Jul 2018 05:08) (16 - 17)  SpO2: 96% (08 Jul 2018 05:08) (93% - 97%)           Input and Output:  I&O's Detail    06 Jul 2018 07:01  -  07 Jul 2018 07:00  --------------------------------------------------------  IN:    Oral Fluid: 1280 mL    Solution: 50 mL  Total IN: 1330 mL    OUT:  Total OUT: 0 mL    Total NET: 1330 mL      07 Jul 2018 07:01  -  08 Jul 2018 06:35  --------------------------------------------------------  IN:    Solution: 50 mL  Total IN: 50 mL    OUT:  Total OUT: 0 mL    Total NET: 50 mL          Lab Data                        9.5    22.10 )-----------( 182      ( 07 Jul 2018 06:59 )             28.3     07-07    141  |  109<H>  |  16  ----------------------------<  94  3.8   |  24  |  0.70    Ca    8.3<L>      07 Jul 2018 06:59  Phos  2.6     07-07  Mg     1.9     07-07              Review of Systems	      Objective     Physical Examination    head at  heart s1s2  lung dec BS  abd soft      Pertinent Lab findings & Imaging      Emily:  NO   Adequate UO     I&O's Detail    06 Jul 2018 07:01  -  07 Jul 2018 07:00  --------------------------------------------------------  IN:    Oral Fluid: 1280 mL    Solution: 50 mL  Total IN: 1330 mL    OUT:  Total OUT: 0 mL    Total NET: 1330 mL      07 Jul 2018 07:01  -  08 Jul 2018 06:35  --------------------------------------------------------  IN:    Solution: 50 mL  Total IN: 50 mL    OUT:  Total OUT: 0 mL    Total NET: 50 mL               Discussed with:     Cultures:	        Radiology

## 2018-07-08 NOTE — PROGRESS NOTE ADULT - PROBLEM SELECTOR PLAN 1
on emp ABX  ID following  am WBC pending  cough reported, asp pna possible  CT chest reviewed  GGO on ct chest - differential pulm edema, infection, malignancy  cont tessalon perles, cont robitussin and nebs  will follow  keep sat > 88 pct  keep HOB elev  dysphagia diet

## 2018-07-08 NOTE — PROGRESS NOTE ADULT - PROBLEM SELECTOR PLAN 1
- Bacteremia:  Blood Cx +  for GNR E.Coli 6/30/18, then negative on 7/1/18  - now repeat blood cultures from 7/5/18 are growing gram negative rods - invanz was started on 7/5/18 - ID ordered repeat blood cultures 7/8/18, so if repeat blood cultures from 48hrs from now demonstrate no growth, can consider this more appropriate response to therapy. clinically, pt reports that her energy level has improved for the first time since hospitalization, this may demonstrate response to adjusted abx regimen  - Renal US neg for hydronephrosis/abscess collection/obstruction   - no urinary symptoms currently  - wbc downtrending

## 2018-07-09 DIAGNOSIS — D64.9 ANEMIA, UNSPECIFIED: ICD-10-CM

## 2018-07-09 DIAGNOSIS — N76.0 ACUTE VAGINITIS: ICD-10-CM

## 2018-07-09 LAB
-  AMIKACIN: SIGNIFICANT CHANGE UP
-  AMPICILLIN/SULBACTAM: SIGNIFICANT CHANGE UP
-  AMPICILLIN: SIGNIFICANT CHANGE UP
-  AZTREONAM: SIGNIFICANT CHANGE UP
-  CEFAZOLIN: SIGNIFICANT CHANGE UP
-  CEFEPIME: SIGNIFICANT CHANGE UP
-  CEFOXITIN: SIGNIFICANT CHANGE UP
-  CEFTRIAXONE: SIGNIFICANT CHANGE UP
-  CIPROFLOXACIN: SIGNIFICANT CHANGE UP
-  ERTAPENEM: SIGNIFICANT CHANGE UP
-  GENTAMICIN: SIGNIFICANT CHANGE UP
-  IMIPENEM: SIGNIFICANT CHANGE UP
-  LEVOFLOXACIN: SIGNIFICANT CHANGE UP
-  MEROPENEM: SIGNIFICANT CHANGE UP
-  PIPERACILLIN/TAZOBACTAM: SIGNIFICANT CHANGE UP
-  TOBRAMYCIN: SIGNIFICANT CHANGE UP
-  TRIMETHOPRIM/SULFAMETHOXAZOLE: SIGNIFICANT CHANGE UP
ANION GAP SERPL CALC-SCNC: 7 MMOL/L — SIGNIFICANT CHANGE UP (ref 5–17)
APPEARANCE UR: ABNORMAL
BASOPHILS # BLD AUTO: 0.3 K/UL — HIGH (ref 0–0.2)
BASOPHILS NFR BLD AUTO: 2 % — SIGNIFICANT CHANGE UP (ref 0–2)
BILIRUB UR-MCNC: NEGATIVE — SIGNIFICANT CHANGE UP
BUN SERPL-MCNC: 18 MG/DL — SIGNIFICANT CHANGE UP (ref 7–23)
CALCIUM SERPL-MCNC: 8.2 MG/DL — LOW (ref 8.5–10.1)
CHLORIDE SERPL-SCNC: 111 MMOL/L — HIGH (ref 96–108)
CO2 SERPL-SCNC: 25 MMOL/L — SIGNIFICANT CHANGE UP (ref 22–31)
COLOR SPEC: YELLOW — SIGNIFICANT CHANGE UP
CREAT SERPL-MCNC: 0.71 MG/DL — SIGNIFICANT CHANGE UP (ref 0.5–1.3)
CULTURE RESULTS: SIGNIFICANT CHANGE UP
DIFF PNL FLD: ABNORMAL
EOSINOPHIL # BLD AUTO: 0.15 K/UL — SIGNIFICANT CHANGE UP (ref 0–0.5)
EOSINOPHIL NFR BLD AUTO: 1 % — SIGNIFICANT CHANGE UP (ref 0–6)
GLUCOSE SERPL-MCNC: 87 MG/DL — SIGNIFICANT CHANGE UP (ref 70–99)
GLUCOSE UR QL: NEGATIVE — SIGNIFICANT CHANGE UP
HCT VFR BLD CALC: 27.3 % — LOW (ref 34.5–45)
HGB BLD-MCNC: 8.9 G/DL — LOW (ref 11.5–15.5)
KETONES UR-MCNC: NEGATIVE — SIGNIFICANT CHANGE UP
LEUKOCYTE ESTERASE UR-ACNC: ABNORMAL
LYMPHOCYTES # BLD AUTO: 14 % — SIGNIFICANT CHANGE UP (ref 13–44)
LYMPHOCYTES # BLD AUTO: 2.1 K/UL — SIGNIFICANT CHANGE UP (ref 1–3.3)
MCHC RBC-ENTMCNC: 30.5 PG — SIGNIFICANT CHANGE UP (ref 27–34)
MCHC RBC-ENTMCNC: 32.6 GM/DL — SIGNIFICANT CHANGE UP (ref 32–36)
MCV RBC AUTO: 93.5 FL — SIGNIFICANT CHANGE UP (ref 80–100)
METHOD TYPE: SIGNIFICANT CHANGE UP
MONOCYTES # BLD AUTO: 0.75 K/UL — SIGNIFICANT CHANGE UP (ref 0–0.9)
MONOCYTES NFR BLD AUTO: 5 % — SIGNIFICANT CHANGE UP (ref 2–14)
NEUTROPHILS # BLD AUTO: 11.27 K/UL — HIGH (ref 1.8–7.4)
NEUTROPHILS NFR BLD AUTO: 72 % — SIGNIFICANT CHANGE UP (ref 43–77)
NITRITE UR-MCNC: NEGATIVE — SIGNIFICANT CHANGE UP
ORGANISM # SPEC MICROSCOPIC CNT: SIGNIFICANT CHANGE UP
ORGANISM # SPEC MICROSCOPIC CNT: SIGNIFICANT CHANGE UP
PH UR: 6.5 — SIGNIFICANT CHANGE UP (ref 5–8)
PLATELET # BLD AUTO: 275 K/UL — SIGNIFICANT CHANGE UP (ref 150–400)
POTASSIUM SERPL-MCNC: 3.9 MMOL/L — SIGNIFICANT CHANGE UP (ref 3.5–5.3)
POTASSIUM SERPL-SCNC: 3.9 MMOL/L — SIGNIFICANT CHANGE UP (ref 3.5–5.3)
PROT UR-MCNC: NEGATIVE — SIGNIFICANT CHANGE UP
RBC # BLD: 2.92 M/UL — LOW (ref 3.8–5.2)
RBC # FLD: 15.5 % — HIGH (ref 10.3–14.5)
SODIUM SERPL-SCNC: 143 MMOL/L — SIGNIFICANT CHANGE UP (ref 135–145)
SP GR SPEC: 1.01 — SIGNIFICANT CHANGE UP (ref 1.01–1.02)
SPECIMEN SOURCE: SIGNIFICANT CHANGE UP
UROBILINOGEN FLD QL: NEGATIVE — SIGNIFICANT CHANGE UP
WBC # BLD: 15.02 K/UL — HIGH (ref 3.8–10.5)
WBC # FLD AUTO: 15.02 K/UL — HIGH (ref 3.8–10.5)

## 2018-07-09 PROCEDURE — 99233 SBSQ HOSP IP/OBS HIGH 50: CPT | Mod: GC

## 2018-07-09 RX ORDER — LIDOCAINE AND PRILOCAINE CREAM 25; 25 MG/G; MG/G
1 CREAM TOPICAL DAILY
Qty: 0 | Refills: 0 | Status: DISCONTINUED | OUTPATIENT
Start: 2018-07-09 | End: 2018-07-16

## 2018-07-09 RX ORDER — IOHEXOL 300 MG/ML
30 INJECTION, SOLUTION INTRAVENOUS ONCE
Qty: 0 | Refills: 0 | Status: DISCONTINUED | OUTPATIENT
Start: 2018-07-09 | End: 2018-07-16

## 2018-07-09 RX ADMIN — Medication 800 MILLIGRAM(S): at 13:00

## 2018-07-09 RX ADMIN — SIMVASTATIN 10 MILLIGRAM(S): 20 TABLET, FILM COATED ORAL at 22:12

## 2018-07-09 RX ADMIN — Medication 1 TABLET(S): at 12:18

## 2018-07-09 RX ADMIN — ERTAPENEM SODIUM 120 MILLIGRAM(S): 1 INJECTION, POWDER, LYOPHILIZED, FOR SOLUTION INTRAMUSCULAR; INTRAVENOUS at 22:12

## 2018-07-09 RX ADMIN — Medication 75 MICROGRAM(S): at 05:58

## 2018-07-09 RX ADMIN — ENOXAPARIN SODIUM 30 MILLIGRAM(S): 100 INJECTION SUBCUTANEOUS at 05:58

## 2018-07-09 RX ADMIN — ALBUTEROL 2.5 MILLIGRAM(S): 90 AEROSOL, METERED ORAL at 20:40

## 2018-07-09 RX ADMIN — LIDOCAINE AND PRILOCAINE CREAM 1 APPLICATION(S): 25; 25 CREAM TOPICAL at 18:07

## 2018-07-09 RX ADMIN — Medication 81 MILLIGRAM(S): at 12:17

## 2018-07-09 RX ADMIN — Medication 1000 UNIT(S): at 12:18

## 2018-07-09 RX ADMIN — ALBUTEROL 2.5 MILLIGRAM(S): 90 AEROSOL, METERED ORAL at 09:32

## 2018-07-09 RX ADMIN — Medication 100 MILLIGRAM(S): at 05:57

## 2018-07-09 RX ADMIN — Medication 800 MILLIGRAM(S): at 12:17

## 2018-07-09 RX ADMIN — ALBUTEROL 2.5 MILLIGRAM(S): 90 AEROSOL, METERED ORAL at 16:12

## 2018-07-09 RX ADMIN — LORATADINE 10 MILLIGRAM(S): 10 TABLET ORAL at 12:18

## 2018-07-09 RX ADMIN — Medication 12.5 MILLIGRAM(S): at 05:57

## 2018-07-09 NOTE — PROGRESS NOTE ADULT - PROBLEM SELECTOR PLAN 1
- Bacteremia:  Blood Cx +  for GNR E.Coli 6/30/18, then negative on 7/1/18  - Repeat blood cultures from 7/5/18 are growing gram negative rods - invanz was started on 7/5/18 - ID ordered repeat blood cultures 7/8/18, so if repeat blood cultures from 48hrs from now demonstrate no growth, can consider this more appropriate response to therapy. clinically, pt reports that her energy level has improved for the first time since hospitalization, this may demonstrate response to adjusted abx regimen  - Renal US neg for hydronephrosis/abscess collection/obstruction   - no urinary symptoms currently  - wbc downtrending - Bacteremia:  Blood Cx +  for GNR E.Coli 6/30/18, then negative on 7/1/18  - Repeat blood cultures on 7/5/18 are growing gram negative rods - invanz was started on 7/5/18 - ID ordered repeat blood cultures 7/8/18, so if repeat blood cultures from 48hrs from now demonstrate no growth, can consider this more appropriate response to therapy. clinically, pt reports that her energy level has improved for the first time since hospitalization, this may demonstrate response to adjusted abx regimen  - Renal US neg for hydronephrosis/abscess collection/obstruction   - no urinary symptoms currently  - wbc downtrending - Bacteremia:  Blood Cx +  for GNR E.Coli 6/30/18, then negative on 7/1/18  - Repeat blood cultures on 7/5/18 are growing gram negative rods - invanz was started on 7/5/18 - ID ordered repeat blood cultures 7/8/18, results show no growth to this point  - Renal US neg for hydronephrosis/abscess collection/obstruction   - no urinary symptoms currently  - wbc downtrending

## 2018-07-09 NOTE — PROGRESS NOTE ADULT - PROBLEM SELECTOR PLAN 3
Chronic anemia, acutely drop from 10.2 to 8.9  Not on fluids.   offer FOBT Chronic anemia, acutely drop from 10.2 to 8.9  Patient wants conservative treatment only, refused offer of FOBT and Colonoscopy

## 2018-07-09 NOTE — PROGRESS NOTE ADULT - ATTENDING COMMENTS
I personally conducted a physical examination of the patient. I personally gathered the patient's history. I edited the above listed findings which were prepared by the listed resident physician. I personally discussed the plan of care with the patient. The questions and concerns were addressed to the best of my ability. The patient is in agreement with the listed treatment plan.     A/P: continue abx regimen. spoke w/ ID, offered CT scan of a/p w/ IV contrast but pt declines further imaging as she's feeling dramatically better over the past 48hrs. continue current regimen. cultures negative to this point. will discuss duration of IV abx w/ ID tomorrow and inform SW/CM/family to coordinate dispo planning

## 2018-07-09 NOTE — PROGRESS NOTE ADULT - SUBJECTIVE AND OBJECTIVE BOX
[facility], Division of Infectious Diseases  MARCIN Muniz A. Lee  [phone]  Name: ROSENDO ROSARIO  Age: 87y  Gender: Female  MRN: [id number]    Interval History--  Notes reviewed  Pt feels better today, sitting in chair    Past Medical History--  Arthritis  Hypothyroid  Hyperlipidemia  Seasonal Allergies  Osteoporosis  Migraine  Cataract  Edema of Leg  Chronic Edema  Seasonal Rhinitis  Hypercholesteremia  Hypothyroidism  Anxiety  Clostridium Difficile Infection  S/P cataract surgery  S/P ovarian cystectomy  S/P hip replacement  S/P knee replacement, bilateral        For details regarding the patient's social history, family history, and other miscellaneous elements, please refer the initial infectious diseases consultation and/or the admitting history and physical examination for this admission.    Allergies    erythromycin (Hives)    Intolerances        Medications--  Antibiotics:  ertapenem  IVPB 1000 milliGRAM(s) IV Intermittent every 24 hours    Immunologic:    Other:  ALBUTerol    0.083%  aspirin  chewable  cholecalciferol  enoxaparin Injectable  guaiFENesin    Syrup PRN  ibuprofen  Tablet  lactobacillus acidophilus  levothyroxine  loratadine  metoprolol succinate ER  simvastatin      Review of Systems--  A 10-point review of systems was obtained.     Pertinent positives and negatives--  Constitutional: No fevers. No Chills. No Rigors.   Cardiovascular: No chest pain. No palpitations.  Respiratory: No shortness of breath. No cough.  Gastrointestinal: No nausea or vomiting. No diarrhea or constipation.   Psychiatric: Pleasant. Appropriate affect.    Review of systems otherwise negative except as previously noted.    Physical Examination--  Vital Signs: T(F): 98.1 (07-09-18 @ 04:52), Max: 98.2 (07-08-18 @ 13:24)  HR: 76 (07-09-18 @ 09:25)  BP: 137/78 (07-09-18 @ 04:52)  RR: 16 (07-09-18 @ 04:52)  SpO2: 98% (07-09-18 @ 04:52)  Wt(kg): --  General: Nontoxic-appearing Female in no acute distress.  HEENT: AT/NC.   Neck: Not rigid. No sense of mass.  Nodes: None palpable.  Lungs: Clear bilaterally without rales, wheezing or rhonchi  Heart: Regular rate and rhythm. No Murmur. No rub. No gallop. No palpable thrill.  Abdomen: Bowel sounds present and normoactive. Soft. Nondistended.  Back: No spinal tenderness. No costovertebral angle tenderness.   Extremities: No cyanosis or clubbing. No edema.   Skin: Warm. Dry. Good turgor. No rash. No vasculitic stigmata.  Psychiatric: Appropriate affect and mood for situation.         Laboratory Studies--  CBC                        8.9    15.02 )-----------( 275      ( 09 Jul 2018 06:41 )             27.3       Chemistries  07-09    143  |  111<H>  |  18  ----------------------------<  87  3.9   |  25  |  0.71    Ca    8.2<L>      09 Jul 2018 06:41  Mg     2.2     07-08        Culture Data    Culture - Blood (collected 05 Jul 2018 22:38)  Source: .Blood Blood-Peripheral  Gram Stain (07 Jul 2018 04:34):    Growth in anaerobic bottle: Gram Negative Rods  Final Report (09 Jul 2018 11:25):    Growth in anaerobic bottle: Escherichia coli  Organism: Escherichia coli (09 Jul 2018 11:25)  Organism: Escherichia coli (09 Jul 2018 11:25)    Culture - Blood (collected 05 Jul 2018 22:38)  Source: .Blood Blood  Preliminary Report (06 Jul 2018 23:01):    No growth to date.        < from: Xray Chest 1 View AP/PA (07.06.18 @ 03:01) >  EXAM:  XR CHEST AP OR PA 1V                            PROCEDURE DATE:  07/06/2018          INTERPRETATION:  CLINICAL INFORMATION: Cough.    EXAM: AP view of chest performed on 7/6/2018 at 2:53 AM    COMPARISON: AP view the chest from 7/2/2018 at 5:47 AM.    FINDINGS:  Mild interval decrease right upper lobe airspace consolidation   superimposed on chronic interstitial disease. The left lung remains   grossly clear. No pleural effusion or pneumothorax.  The cardiac silhouette is magnified on this projection.   The osseous structures are unremarkable.    IMPRESSION:  Mild improvement right upper lobe infiltrate superimposed on chronic   interstitial changes.    < end of copied text >

## 2018-07-09 NOTE — PROGRESS NOTE ADULT - PROBLEM SELECTOR PLAN 1
WBC is decreasing  changed to ertapenem  repeat blood cx today , July 5 blood cx still positive   renal ultrasound without any abscess or stone as nidus for infection  CT abd / pelvis with oral IV contrast WBC is decreasing  changed to ertapenem  repeat blood cx today , July 5 blood cx still positive   renal ultrasound without any abscess or stone as nidus for infection  CT abd / pelvis with oral IV contrast  pt currently refusing ct scan   given clinical improvement and wbc decreased will hold off for now.

## 2018-07-09 NOTE — PROGRESS NOTE ADULT - PROBLEM SELECTOR PLAN 4
Complaint of vaginal itching, likely secondary to antibiotic treatment  Continue Diflucan  Follow up UA and UCx Complaint of vaginal itching, likely secondary to antibiotic treatment  improved w/ diflucan 150mg x1 dose  Follow up UA and UCx

## 2018-07-09 NOTE — PROGRESS NOTE ADULT - SUBJECTIVE AND OBJECTIVE BOX
Date/Time Patient Seen:  		  Referring MD:   Data Reviewed	       Patient is a 87y old  Female who presents with a chief complaint of "They sent me to here because I have a UTI"  Brought in by EMS with AMS (05 Jul 2018 13:57)  in bed  seen and examined  vs and meds reviewed        Subjective/HPI     PAST MEDICAL & SURGICAL HISTORY:  Arthritis  Hypothyroid  Hyperlipidemia  Seasonal Allergies  Osteoporosis  Migraine  Cataract: s/p cataract surgery  Edema of Leg: b/l LE  Chronic Edema  Seasonal Rhinitis  Hypercholesteremia  Hypothyroidism  Anxiety  Clostridium Difficile Infection  S/P cataract surgery: bialteral  S/P ovarian cystectomy  S/P hip replacement  S/P knee replacement, bilateral: Partial R) hip  Hip Replacement: left hip  Knee Replacement: b/l        Medication list         MEDICATIONS  (STANDING):  ALBUTerol    0.083% 2.5 milliGRAM(s) Nebulizer every 8 hours  aspirin  chewable 81 milliGRAM(s) Oral daily  cholecalciferol 1000 Unit(s) Oral daily  enoxaparin Injectable 30 milliGRAM(s) SubCutaneous every 24 hours  ertapenem  IVPB 1000 milliGRAM(s) IV Intermittent every 24 hours  ibuprofen  Tablet 800 milliGRAM(s) Oral daily  lactobacillus acidophilus 1 Tablet(s) Oral daily  levothyroxine 75 MICROGram(s) Oral daily  loratadine 10 milliGRAM(s) Oral daily  metoprolol succinate ER 12.5 milliGRAM(s) Oral daily  simvastatin 10 milliGRAM(s) Oral at bedtime    MEDICATIONS  (PRN):  guaiFENesin    Syrup 200 milliGRAM(s) Oral every 6 hours PRN Cough         Vitals log        ICU Vital Signs Last 24 Hrs  T(C): 36.7 (09 Jul 2018 04:52), Max: 36.8 (08 Jul 2018 13:24)  T(F): 98.1 (09 Jul 2018 04:52), Max: 98.2 (08 Jul 2018 13:24)  HR: 76 (09 Jul 2018 04:52) (76 - 93)  BP: 137/78 (09 Jul 2018 04:52) (116/72 - 137/78)  BP(mean): --  ABP: --  ABP(mean): --  RR: 16 (09 Jul 2018 04:52) (16 - 16)  SpO2: 98% (09 Jul 2018 04:52) (94% - 98%)           Input and Output:  I&O's Detail    07 Jul 2018 07:01  -  08 Jul 2018 07:00  --------------------------------------------------------  IN:    Solution: 50 mL  Total IN: 50 mL    OUT:  Total OUT: 0 mL    Total NET: 50 mL          Lab Data                        10.2   19.34 )-----------( 247      ( 08 Jul 2018 06:46 )             30.3     07-08    142  |  109<H>  |  17  ----------------------------<  100<H>  4.2   |  24  |  0.93    Ca    8.8      08 Jul 2018 06:46  Phos  2.6     07-07  Mg     2.2     07-08              Review of Systems	      Objective     Physical Examination    heart s1s2  lung dec BS  abd soft      Pertinent Lab findings & Imaging      Emily:  NO   Adequate UO     I&O's Detail    07 Jul 2018 07:01  -  08 Jul 2018 07:00  --------------------------------------------------------  IN:    Solution: 50 mL  Total IN: 50 mL    OUT:  Total OUT: 0 mL    Total NET: 50 mL               Discussed with:     Cultures:	        Radiology

## 2018-07-09 NOTE — PROGRESS NOTE ADULT - SUBJECTIVE AND OBJECTIVE BOX
Patient is a 87y old  Female who presents with a chief complaint of "They sent me to here because I have a UTI"  Brought in by EMS with AMS (05 Jul 2018 13:57)      INTERVAL HPI/OVERNIGHT EVENTS: No events overnight. Patient reports vaginal itch improved with Diflucan. Feeling well  and cough softer and less frequent.     MEDICATIONS  (STANDING):  ALBUTerol    0.083% 2.5 milliGRAM(s) Nebulizer every 8 hours  aspirin  chewable 81 milliGRAM(s) Oral daily  cholecalciferol 1000 Unit(s) Oral daily  enoxaparin Injectable 30 milliGRAM(s) SubCutaneous every 24 hours  ertapenem  IVPB 1000 milliGRAM(s) IV Intermittent every 24 hours  ibuprofen  Tablet 800 milliGRAM(s) Oral daily  iohexol 300 mG (iodine)/mL Oral Solution 30 milliLiter(s) Oral once  lactobacillus acidophilus 1 Tablet(s) Oral daily  levothyroxine 75 MICROGram(s) Oral daily  loratadine 10 milliGRAM(s) Oral daily  metoprolol succinate ER 12.5 milliGRAM(s) Oral daily  simvastatin 10 milliGRAM(s) Oral at bedtime    MEDICATIONS  (PRN):  guaiFENesin    Syrup 200 milliGRAM(s) Oral every 6 hours PRN Cough      Allergies    erythromycin (Hives)    Intolerances    REVIEW OF SYSTEMS:  CONSTITUTIONAL: No fever or chills  HEENT:  No headache, no sore throat  RESPIRATORY: + Cough No wheezing, or shortness of breath  CARDIOVASCULAR: No chest pain, palpitations, or leg swelling  GASTROINTESTINAL: No abd pain, nausea, vomiting, or diarrhea  GENITOURINARY: No dysuria, frequency, or hematuria  NEUROLOGICAL: no focal weakness or dizziness  MUSCULOSKELETAL: no myalgias     Vital Signs Last 24 Hrs  T(C): 36.7 (09 Jul 2018 04:52), Max: 36.8 (08 Jul 2018 21:02)  T(F): 98.1 (09 Jul 2018 04:52), Max: 98.2 (08 Jul 2018 21:02)  HR: 76 (09 Jul 2018 09:25) (76 - 93)  BP: 137/78 (09 Jul 2018 04:52) (127/73 - 137/78)  BP(mean): --  RR: 16 (09 Jul 2018 04:52) (16 - 16)  SpO2: 98% (09 Jul 2018 04:52) (97% - 98%)    PHYSICAL EXAM:  GENERAL: NAD, frail elderly woman resting comfortably in bed  HEENT:  EOMI, moist mucous membranes  CHEST/LUNG:  + rales in lower lung bases, No wheezes, or rhonchi  HEART:  RRR, S1, S2, no murmurs appreciated   ABDOMEN:  BS+, soft, nontender, nondistended  EXTREMITIES: no edema, cyanosis, or calf tenderness  NERVOUS SYSTEM: AA&Ox3    LABS:                        8.9    15.02 )-----------( 275      ( 09 Jul 2018 06:41 )             27.3     CBC Full  -  ( 09 Jul 2018 06:41 )  WBC Count : 15.02 K/uL  Hemoglobin : 8.9 g/dL  Hematocrit : 27.3 %  Platelet Count - Automated : 275 K/uL  Mean Cell Volume : 93.5 fl  Mean Cell Hemoglobin : 30.5 pg  Mean Cell Hemoglobin Concentration : 32.6 gm/dL  Auto Neutrophil # : 11.27 K/uL  Auto Lymphocyte # : 2.10 K/uL  Auto Monocyte # : 0.75 K/uL  Auto Eosinophil # : 0.15 K/uL  Auto Basophil # : 0.30 K/uL  Auto Neutrophil % : 72.0 %  Auto Lymphocyte % : 14.0 %  Auto Monocyte % : 5.0 %  Auto Eosinophil % : 1.0 %  Auto Basophil % : 2.0 %    09 Jul 2018 06:41    143    |  111    |  18     ----------------------------<  87     3.9     |  25     |  0.71     Ca    8.2        09 Jul 2018 06:41        Culture - Blood (collected 07-08-18 @ 11:20)  Source: .Blood Blood-Peripheral  Preliminary Report (07-09-18 @ 12:01):    No growth to date.    Culture - Blood (collected 07-08-18 @ 11:19)  Source: .Blood Blood-Venous  Preliminary Report (07-09-18 @ 12:01):    No growth to date.    Culture - Blood (collected 07-05-18 @ 22:38)  Source: .Blood Blood-Peripheral  Gram Stain (07-07-18 @ 04:34):    Growth in anaerobic bottle: Gram Negative Rods  Final Report (07-09-18 @ 11:25):    Growth in anaerobic bottle: Escherichia coli  Organism: Escherichia coli (07-09-18 @ 11:25)  Organism: Escherichia coli (07-09-18 @ 11:25)      -  Amikacin: S <=8      -  Ampicillin: R >16 These ampicillin results predict results for amoxicillin      -  Ampicillin/Sulbactam: I 16/8      -  Aztreonam: S <=4      -  Cefazolin: S <=2      -  Cefepime: S <=2      -  Cefoxitin: S <=4      -  Ceftriaxone: S <=1 Enterobacter, Citrobacter, and Serratia may develop resistance during prolonged therapy      -  Ciprofloxacin: S <=0.5      -  Ertapenem: S <=0.5      -  Gentamicin: S <=1      -  Imipenem: S <=1      -  Levofloxacin: S <=1      -  Meropenem: S <=1      -  Piperacillin/Tazobactam: S <=8      -  Tobramycin: S <=2      -  Trimethoprim/Sulfamethoxazole: R >2/38      Method Type: CECY    Culture - Blood (collected 07-05-18 @ 22:38)  Source: .Blood Blood  Preliminary Report (07-06-18 @ 23:01):    No growth to date.        RADIOLOGY & ADDITIONAL TESTS:    Personally reviewed.     Consultant(s) Notes Reviewed:  [x] YES  [ ] NO Patient is a 87y old  Female who presents with a chief complaint of "They sent me to here because I have a UTI"  Brought in by EMS with [facility] (05 Jul 2018 13:57)      INTERVAL HPI/OVERNIGHT EVENTS: No events overnight. Patient reports vaginal itch improved with Diflucan. Feeling well  and cough softer and less frequent.     MEDICATIONS  (STANDING):  ALBUTerol    0.083% 2.5 milliGRAM(s) Nebulizer every 8 hours  aspirin  chewable 81 milliGRAM(s) Oral daily  cholecalciferol 1000 Unit(s) Oral daily  enoxaparin Injectable 30 milliGRAM(s) SubCutaneous every 24 hours  ertapenem  IVPB 1000 milliGRAM(s) IV Intermittent every 24 hours  ibuprofen  Tablet 800 milliGRAM(s) Oral daily  iohexol 300 mG (iodine)/mL Oral Solution 30 milliLiter(s) Oral once  lactobacillus acidophilus 1 Tablet(s) Oral daily  levothyroxine 75 MICROGram(s) Oral daily  loratadine 10 milliGRAM(s) Oral daily  metoprolol succinate ER 12.5 milliGRAM(s) Oral daily  simvastatin 10 milliGRAM(s) Oral at bedtime    MEDICATIONS  (PRN):  guaiFENesin    Syrup 200 milliGRAM(s) Oral every 6 hours PRN Cough      Allergies    erythromycin (Hives)    Intolerances    REVIEW OF SYSTEMS:  CONSTITUTIONAL: No fever or chills  HEENT:  No headache, no sore throat  RESPIRATORY: + Cough No wheezing, or shortness of breath  CARDIOVASCULAR: No chest pain, palpitations, or leg swelling  GASTROINTESTINAL: No abd pain, nausea, vomiting, or diarrhea  GENITOURINARY: No dysuria, frequency, or hematuria  NEUROLOGICAL: no focal weakness or dizziness  MUSCULOSKELETAL: no myalgias   10 systems reviewed and negative unless otherwise noted    Vital Signs Last 24 Hrs  T(C): 36.7 (09 Jul 2018 04:52), Max: 36.8 (08 Jul 2018 21:02)  T(F): 98.1 (09 Jul 2018 04:52), Max: 98.2 (08 Jul 2018 21:02)  HR: 76 (09 Jul 2018 09:25) (76 - 93)  BP: 137/78 (09 Jul 2018 04:52) (127/73 - 137/78)  BP(mean): --  RR: 16 (09 Jul 2018 04:52) (16 - 16)  SpO2: 98% (09 Jul 2018 04:52) (97% - 98%)    PHYSICAL EXAM:  GENERAL: NAD, frail elderly woman resting comfortably in bed  NECK: soft, thin, no JVD  HEENT:  EOMI, moist mucous membranes  CHEST/LUNG:  + trace rales in lower lung bases, No wheezes, or rhonchi  HEART:  RRR, S1, S2, no murmurs appreciated   ABDOMEN:  BS+, soft, nontender, nondistended  EXTREMITIES: no edema, cyanosis, or calf tenderness  NERVOUS SYSTEM: AA&Ox3    LABS:                        8.9    15.02 )-----------( 275      ( 09 Jul 2018 06:41 )             27.3     CBC Full  -  ( 09 Jul 2018 06:41 )  WBC Count : 15.02 K/uL  Hemoglobin : 8.9 g/dL  Hematocrit : 27.3 %  Platelet Count - Automated : 275 K/uL  Mean Cell Volume : 93.5 fl  Mean Cell Hemoglobin : 30.5 pg  Mean Cell Hemoglobin Concentration : 32.6 gm/dL  Auto Neutrophil # : 11.27 K/uL  Auto Lymphocyte # : 2.10 K/uL  Auto Monocyte # : 0.75 K/uL  Auto Eosinophil # : 0.15 K/uL  Auto Basophil # : 0.30 K/uL  Auto Neutrophil % : 72.0 %  Auto Lymphocyte % : 14.0 %  Auto Monocyte % : 5.0 %  Auto Eosinophil % : 1.0 %  Auto Basophil % : 2.0 %    09 Jul 2018 06:41    143    |  111    |  18     ----------------------------<  87     3.9     |  25     |  0.71     Ca    8.2        09 Jul 2018 06:41        Culture - Blood (collected 07-08-18 @ 11:20)  Source: .Blood Blood-Peripheral  Preliminary Report (07-09-18 @ 12:01):    No growth to date.    Culture - Blood (collected 07-08-18 @ 11:19)  Source: .Blood Blood-Venous  Preliminary Report (07-09-18 @ 12:01):    No growth to date.    Culture - Blood (collected 07-05-18 @ 22:38)  Source: .Blood Blood-Peripheral  Gram Stain (07-07-18 @ 04:34):    Growth in anaerobic bottle: Gram Negative Rods  Final Report (07-09-18 @ 11:25):    Growth in anaerobic bottle: Escherichia coli  Organism: Escherichia coli (07-09-18 @ 11:25)  Organism: Escherichia coli (07-09-18 @ 11:25)      -  Amikacin: S <=8      -  Ampicillin: R >16 These ampicillin results predict results for amoxicillin      -  Ampicillin/Sulbactam: I 16/8      -  Aztreonam: S <=4      -  Cefazolin: S <=2      -  Cefepime: S <=2      -  Cefoxitin: S <=4      -  Ceftriaxone: S <=1 Enterobacter, Citrobacter, and Serratia may develop resistance during prolonged therapy      -  Ciprofloxacin: S <=0.5      -  Ertapenem: S <=0.5      -  Gentamicin: S <=1      -  Imipenem: S <=1      -  Levofloxacin: S <=1      -  Meropenem: S <=1      -  Piperacillin/Tazobactam: S <=8      -  Tobramycin: S <=2      -  Trimethoprim/Sulfamethoxazole: R >2/38      Method Type: CECY    Culture - Blood (collected 07-05-18 @ 22:38)  Source: .Blood Blood  Preliminary Report (07-06-18 @ 23:01):    No growth to date.        RADIOLOGY & ADDITIONAL TESTS:    Personally reviewed.     Consultant(s) Notes Reviewed:  [x] YES  [ ] NO

## 2018-07-09 NOTE — PROGRESS NOTE ADULT - ASSESSMENT
86 yo female admitted with confusion and E coli bacteremia likely from a urinary source  L perinephric stranding on chest CT

## 2018-07-10 LAB
CULTURE RESULTS: NO GROWTH — SIGNIFICANT CHANGE UP
CULTURE RESULTS: SIGNIFICANT CHANGE UP
SPECIMEN SOURCE: SIGNIFICANT CHANGE UP
SPECIMEN SOURCE: SIGNIFICANT CHANGE UP

## 2018-07-10 PROCEDURE — 99233 SBSQ HOSP IP/OBS HIGH 50: CPT | Mod: GC

## 2018-07-10 RX ORDER — LANOLIN ALCOHOL/MO/W.PET/CERES
3 CREAM (GRAM) TOPICAL ONCE
Qty: 0 | Refills: 0 | Status: COMPLETED | OUTPATIENT
Start: 2018-07-10 | End: 2018-07-10

## 2018-07-10 RX ADMIN — Medication 1000 UNIT(S): at 12:22

## 2018-07-10 RX ADMIN — Medication 800 MILLIGRAM(S): at 13:31

## 2018-07-10 RX ADMIN — Medication 81 MILLIGRAM(S): at 12:24

## 2018-07-10 RX ADMIN — Medication 3 MILLIGRAM(S): at 22:33

## 2018-07-10 RX ADMIN — ERTAPENEM SODIUM 120 MILLIGRAM(S): 1 INJECTION, POWDER, LYOPHILIZED, FOR SOLUTION INTRAMUSCULAR; INTRAVENOUS at 22:55

## 2018-07-10 RX ADMIN — ALBUTEROL 2.5 MILLIGRAM(S): 90 AEROSOL, METERED ORAL at 08:29

## 2018-07-10 RX ADMIN — ALBUTEROL 2.5 MILLIGRAM(S): 90 AEROSOL, METERED ORAL at 19:40

## 2018-07-10 RX ADMIN — SIMVASTATIN 10 MILLIGRAM(S): 20 TABLET, FILM COATED ORAL at 21:57

## 2018-07-10 RX ADMIN — Medication 75 MICROGRAM(S): at 05:51

## 2018-07-10 RX ADMIN — ENOXAPARIN SODIUM 30 MILLIGRAM(S): 100 INJECTION SUBCUTANEOUS at 12:27

## 2018-07-10 RX ADMIN — ALBUTEROL 2.5 MILLIGRAM(S): 90 AEROSOL, METERED ORAL at 15:41

## 2018-07-10 RX ADMIN — LORATADINE 10 MILLIGRAM(S): 10 TABLET ORAL at 12:22

## 2018-07-10 RX ADMIN — Medication 12.5 MILLIGRAM(S): at 05:51

## 2018-07-10 RX ADMIN — Medication 800 MILLIGRAM(S): at 12:20

## 2018-07-10 RX ADMIN — LIDOCAINE AND PRILOCAINE CREAM 1 APPLICATION(S): 25; 25 CREAM TOPICAL at 12:23

## 2018-07-10 RX ADMIN — Medication 1 TABLET(S): at 12:22

## 2018-07-10 NOTE — PROGRESS NOTE ADULT - SUBJECTIVE AND OBJECTIVE BOX
Patient is a 87y old  Female who presents with a chief complaint of "They sent me to here because I have a UTI"  Brought in by EMS with [facility] (2018 13:57)      INTERVAL HPI/OVERNIGHT EVENTS:  No events overnight. Patient offers no complaints.     MEDICATIONS  (STANDING):  ALBUTerol    0.083% 2.5 milliGRAM(s) Nebulizer every 8 hours  aspirin  chewable 81 milliGRAM(s) Oral daily  cholecalciferol 1000 Unit(s) Oral daily  enoxaparin Injectable 30 milliGRAM(s) SubCutaneous every 24 hours  ertapenem  IVPB 1000 milliGRAM(s) IV Intermittent every 24 hours  ibuprofen  Tablet 800 milliGRAM(s) Oral daily  iohexol 300 mG (iodine)/mL Oral Solution 30 milliLiter(s) Oral once  lactobacillus acidophilus 1 Tablet(s) Oral daily  levothyroxine 75 MICROGram(s) Oral daily  lidocaine/prilocaine Cream 1 Application(s) Topical daily  loratadine 10 milliGRAM(s) Oral daily  metoprolol succinate ER 12.5 milliGRAM(s) Oral daily  simvastatin 10 milliGRAM(s) Oral at bedtime    MEDICATIONS  (PRN):  guaiFENesin    Syrup 200 milliGRAM(s) Oral every 6 hours PRN Cough      Allergies  erythromycin (Hives)  Intolerances        REVIEW OF SYSTEMS:  CONSTITUTIONAL: No fever or chills  HEENT:  No headache, no sore throat  RESPIRATORY: No cough, wheezing, or shortness of breath  CARDIOVASCULAR: No chest pain, palpitations, or leg swelling  GASTROINTESTINAL: No abd pain, nausea, vomiting, or diarrhea  GENITOURINARY: No dysuria, frequency, or hematuria  NEUROLOGICAL: no focal weakness or dizziness  MUSCULOSKELETAL: no myalgias, no  new joint pains    Vital Signs Last 24 Hrs  T(C): 36.7 (10 Jul 2018 13:41), Max: 36.9 (10 Jul 2018 04:38)  T(F): 98 (10 Jul 2018 13:41), Max: 98.4 (10 Jul 2018 04:38)  HR: 84 (10 Jul 2018 13:41) (80 - 97)  BP: 116/71 (10 Jul 2018 13:41) (116/71 - 135/69)  BP(mean): --  RR: 17 (10 Jul 2018 13:41) (17 - 17)  SpO2: 97% (10 Jul 2018 13:41) (95% - 98%)    PHYSICAL EXAM:  GENERAL: NAD, resting comfortably in bed   HEENT:  EOMI, moist mucous membranes  CHEST/LUNG:  CTA b/l, no rales, wheezes, or rhonchi  HEART:  RRR, S1, S2, no murmurs   ABDOMEN:  BS+, soft, nontender, nondistended  EXTREMITIES: no edema, cyanosis, or calf tenderness  NERVOUS SYSTEM: AA&Ox3  skin: no rashes or open wounds    LABS:    CBC Full  -  ( 2018 06:41 )  WBC Count : 15.02 K/uL  Hemoglobin : 8.9 g/dL  Hematocrit : 27.3 %  Platelet Count - Automated : 275 K/uL  Mean Cell Volume : 93.5 fl  Mean Cell Hemoglobin : 30.5 pg  Mean Cell Hemoglobin Concentration : 32.6 gm/dL  Auto Neutrophil # : 11.27 K/uL  Auto Lymphocyte # : 2.10 K/uL  Auto Monocyte # : 0.75 K/uL  Auto Eosinophil # : 0.15 K/uL  Auto Basophil # : 0.30 K/uL  Auto Neutrophil % : 72.0 %  Auto Lymphocyte % : 14.0 %  Auto Monocyte % : 5.0 %  Auto Eosinophil % : 1.0 %  Auto Basophil % : 2.0 %      Ca    8.2        2018 06:41        Urinalysis Basic - ( 2018 17:19 )    Color: Yellow / Appearance: Slightly Turbid / S.010 / pH: x  Gluc: x / Ketone: Negative  / Bili: Negative / Urobili: Negative   Blood: x / Protein: Negative / Nitrite: Negative   Leuk Esterase: Moderate / RBC: 25-50 /HPF / WBC 0-2   Sq Epi: x / Non Sq Epi: Occasional / Bacteria: Occasional        Culture - Blood (collected 18 @ 11:20)  Source: .Blood Blood-Peripheral  Preliminary Report (18 @ 12:01):    No growth to date.    Culture - Blood (collected 18 @ 11:19)  Source: .Blood Blood-Venous  Preliminary Report (18 @ 12:01):    No growth to date.    Culture - Blood (collected 18 @ 22:38)  Source: .Blood Blood-Peripheral  Gram Stain (18 @ 04:34):    Growth in anaerobic bottle: Gram Negative Rods  Final Report (18 @ 11:25):    Growth in anaerobic bottle: Escherichia coli  Organism: Escherichia coli (18 @ 11:25)  Organism: Escherichia coli (18 @ 11:25)      -  Amikacin: S <=8      -  Ampicillin: R >16 These ampicillin results predict results for amoxicillin      -  Ampicillin/Sulbactam: I 16/8      -  Aztreonam: S <=4      -  Cefazolin: S <=2      -  Cefepime: S <=2      -  Cefoxitin: S <=4      -  Ceftriaxone: S <=1 Enterobacter, Citrobacter, and Serratia may develop resistance during prolonged therapy      -  Ciprofloxacin: S <=0.5      -  Ertapenem: S <=0.5      -  Gentamicin: S <=1      -  Imipenem: S <=1      -  Levofloxacin: S <=1      -  Meropenem: S <=1      -  Piperacillin/Tazobactam: S <=8      -  Tobramycin: S <=2      -  Trimethoprim/Sulfamethoxazole: R >      Method Type: CECY    Culture - Blood (collected 18 @ 22:38)  Source: .Blood Blood  Preliminary Report (18 @ 23:01):    No growth to date.        RADIOLOGY & ADDITIONAL TESTS:    Personally reviewed.     Consultant(s) Notes Reviewed:  [x] YES  [ ] NO Patient is a 87y old  Female who presents with a chief complaint of "They sent me to here because I have a UTI"  Brought in by EMS with [facility] (2018 13:57)      INTERVAL HPI/OVERNIGHT EVENTS:  No events overnight. Patient offers no complaints. Pt agitated at times but redirectable. No other issues.     MEDICATIONS  (STANDING):  ALBUTerol    0.083% 2.5 milliGRAM(s) Nebulizer every 8 hours  aspirin  chewable 81 milliGRAM(s) Oral daily  cholecalciferol 1000 Unit(s) Oral daily  enoxaparin Injectable 30 milliGRAM(s) SubCutaneous every 24 hours  ertapenem  IVPB 1000 milliGRAM(s) IV Intermittent every 24 hours  ibuprofen  Tablet 800 milliGRAM(s) Oral daily  iohexol 300 mG (iodine)/mL Oral Solution 30 milliLiter(s) Oral once  lactobacillus acidophilus 1 Tablet(s) Oral daily  levothyroxine 75 MICROGram(s) Oral daily  lidocaine/prilocaine Cream 1 Application(s) Topical daily  loratadine 10 milliGRAM(s) Oral daily  metoprolol succinate ER 12.5 milliGRAM(s) Oral daily  simvastatin 10 milliGRAM(s) Oral at bedtime    MEDICATIONS  (PRN):  guaiFENesin    Syrup 200 milliGRAM(s) Oral every 6 hours PRN Cough      Allergies  erythromycin (Hives)  Intolerances        REVIEW OF SYSTEMS:  CONSTITUTIONAL: No fever or chills  HEENT:  No headache, no sore throat  RESPIRATORY: No cough, wheezing, or shortness of breath  CARDIOVASCULAR: No chest pain, palpitations, or leg swelling  GASTROINTESTINAL: No abd pain, nausea, vomiting, or diarrhea  GENITOURINARY: No dysuria, frequency, or hematuria  NEUROLOGICAL: no focal weakness or dizziness  MUSCULOSKELETAL: no myalgias, no  new joint pains  10 systems reviewed and negative unless otherwise noted    Vital Signs Last 24 Hrs  T(C): 36.7 (10 Jul 2018 13:41), Max: 36.9 (10 Jul 2018 04:38)  T(F): 98 (10 Jul 2018 13:41), Max: 98.4 (10 Jul 2018 04:38)  HR: 84 (10 Jul 2018 13:41) (80 - 97)  BP: 116/71 (10 Jul 2018 13:41) (116/71 - 135/69)  BP(mean): --  RR: 17 (10 Jul 2018 13:41) (17 - 17)  SpO2: 97% (10 Jul 2018 13:41) (95% - 98%)    PHYSICAL EXAM:  GENERAL: NAD, resting comfortably in bed   HEENT:  EOMI, moist mucous membranes  CHEST/LUNG:  CTA b/l, no rales, wheezes, or rhonchi  HEART:  RRR, S1, S2, no murmurs   ABDOMEN:  BS+, soft, nontender, nondistended  EXTREMITIES: no edema, cyanosis, or calf tenderness  NERVOUS SYSTEM: AA&Ox3  SKIN: no rashes or open wounds. diffuse ecchymosis on b/l UE forearms from previous blood draws    LABS:    CBC Full  -  ( 2018 06:41 )  WBC Count : 15.02 K/uL  Hemoglobin : 8.9 g/dL  Hematocrit : 27.3 %  Platelet Count - Automated : 275 K/uL  Mean Cell Volume : 93.5 fl  Mean Cell Hemoglobin : 30.5 pg  Mean Cell Hemoglobin Concentration : 32.6 gm/dL  Auto Neutrophil # : 11.27 K/uL  Auto Lymphocyte # : 2.10 K/uL  Auto Monocyte # : 0.75 K/uL  Auto Eosinophil # : 0.15 K/uL  Auto Basophil # : 0.30 K/uL  Auto Neutrophil % : 72.0 %  Auto Lymphocyte % : 14.0 %  Auto Monocyte % : 5.0 %  Auto Eosinophil % : 1.0 %  Auto Basophil % : 2.0 %      Ca    8.2        2018 06:41        Urinalysis Basic - ( 2018 17:19 )    Color: Yellow / Appearance: Slightly Turbid / S.010 / pH: x  Gluc: x / Ketone: Negative  / Bili: Negative / Urobili: Negative   Blood: x / Protein: Negative / Nitrite: Negative   Leuk Esterase: Moderate / RBC: 25-50 /HPF / WBC 0-2   Sq Epi: x / Non Sq Epi: Occasional / Bacteria: Occasional        Culture - Blood (collected 18 @ 11:20)  Source: .Blood Blood-Peripheral  Preliminary Report (18 @ 12:01):    No growth to date.    Culture - Blood (collected 18 @ 11:19)  Source: .Blood Blood-Venous  Preliminary Report (18 @ 12:01):    No growth to date.    Culture - Blood (collected 07-05-18 @ 22:38)  Source: .Blood Blood-Peripheral  Gram Stain (18 @ 04:34):    Growth in anaerobic bottle: Gram Negative Rods  Final Report (18 @ 11:25):    Growth in anaerobic bottle: Escherichia coli  Organism: Escherichia coli (18 @ 11:25)  Organism: Escherichia coli (18 @ 11:25)      -  Amikacin: S <=8      -  Ampicillin: R >16 These ampicillin results predict results for amoxicillin      -  Ampicillin/Sulbactam: I 16/8      -  Aztreonam: S <=4      -  Cefazolin: S <=2      -  Cefepime: S <=2      -  Cefoxitin: S <=4      -  Ceftriaxone: S <=1 Enterobacter, Citrobacter, and Serratia may develop resistance during prolonged therapy      -  Ciprofloxacin: S <=0.5      -  Ertapenem: S <=0.5      -  Gentamicin: S <=1      -  Imipenem: S <=1      -  Levofloxacin: S <=1      -  Meropenem: S <=1      -  Piperacillin/Tazobactam: S <=8      -  Tobramycin: S <=2      -  Trimethoprim/Sulfamethoxazole: R >38      Method Type: CECY    Culture - Blood (collected 18 @ 22:38)  Source: .Blood Blood  Preliminary Report (18 @ 23:01):    No growth to date.        RADIOLOGY & ADDITIONAL TESTS:    Personally reviewed.     Consultant(s) Notes Reviewed:  [x] YES  [ ] NO

## 2018-07-10 NOTE — PROGRESS NOTE ADULT - PROBLEM SELECTOR PLAN 7
- Continue statin No hx of hypertension per patient but patient on metoprolol. Denies cardiac hx.   - Continue metoprolol for now

## 2018-07-10 NOTE — PROGRESS NOTE ADULT - PROBLEM SELECTOR PLAN 3
cough  aspiration  dysphagia  frailty  NEBS and cough rx regimen  HOB elev  supportive measures  will follow  tolerating room air

## 2018-07-10 NOTE — PROGRESS NOTE ADULT - SUBJECTIVE AND OBJECTIVE BOX
Date/Time Patient Seen:  		  Referring MD:   Data Reviewed	       Patient is a 87y old  Female who presents with a chief complaint of "They sent me to here because I have a UTI"  Brought in by EMS with [facility] (05 Jul 2018 13:57)    in bed  seen and examined  vs and meds reviewed      Subjective/HPI     PAST MEDICAL & SURGICAL HISTORY:  Arthritis  Hypothyroid  Hyperlipidemia  Seasonal Allergies  Osteoporosis  Migraine  Cataract: s/p cataract surgery  Edema of Leg: b/l LE  Chronic Edema  Seasonal Rhinitis  Hypercholesteremia  Hypothyroidism  Anxiety  Clostridium Difficile Infection  S/P cataract surgery: bialteral  S/P ovarian cystectomy  S/P hip replacement  S/P knee replacement, bilateral: Partial R) hip  Hip Replacement: left hip  Knee Replacement: b/l        Medication list         MEDICATIONS  (STANDING):  ALBUTerol    0.083% 2.5 milliGRAM(s) Nebulizer every 8 hours  aspirin  chewable 81 milliGRAM(s) Oral daily  cholecalciferol 1000 Unit(s) Oral daily  enoxaparin Injectable 30 milliGRAM(s) SubCutaneous every 24 hours  ertapenem  IVPB 1000 milliGRAM(s) IV Intermittent every 24 hours  ibuprofen  Tablet 800 milliGRAM(s) Oral daily  iohexol 300 mG (iodine)/mL Oral Solution 30 milliLiter(s) Oral once  lactobacillus acidophilus 1 Tablet(s) Oral daily  levothyroxine 75 MICROGram(s) Oral daily  lidocaine/prilocaine Cream 1 Application(s) Topical daily  loratadine 10 milliGRAM(s) Oral daily  metoprolol succinate ER 12.5 milliGRAM(s) Oral daily  simvastatin 10 milliGRAM(s) Oral at bedtime    MEDICATIONS  (PRN):  guaiFENesin    Syrup 200 milliGRAM(s) Oral every 6 hours PRN Cough         Vitals log        ICU Vital Signs Last 24 Hrs  T(C): 36.9 (10 Jul 2018 04:38), Max: 36.9 (10 Jul 2018 04:38)  T(F): 98.4 (10 Jul 2018 04:38), Max: 98.4 (10 Jul 2018 04:38)  HR: 87 (10 Jul 2018 04:38) (76 - 97)  BP: 126/75 (10 Jul 2018 04:38) (126/75 - 135/71)  BP(mean): --  ABP: --  ABP(mean): --  RR: 17 (10 Jul 2018 04:38) (17 - 17)  SpO2: 95% (10 Jul 2018 04:38) (95% - 98%)           Input and Output:  I&O's Detail    08 Jul 2018 07:01  -  09 Jul 2018 07:00  --------------------------------------------------------  IN:    Oral Fluid: 660 mL  Total IN: 660 mL    OUT:  Total OUT: 0 mL    Total NET: 660 mL          Lab Data                        8.9    15.02 )-----------( 275      ( 09 Jul 2018 06:41 )             27.3     07-09    143  |  111<H>  |  18  ----------------------------<  87  3.9   |  25  |  0.71    Ca    8.2<L>      09 Jul 2018 06:41  Mg     2.2     07-08              Review of Systems	      Objective     Physical Examination    heart s1s2  lung dec BS  abd soft      Pertinent Lab findings & Imaging      Emily:  NO   Adequate UO     I&O's Detail    08 Jul 2018 07:01  -  09 Jul 2018 07:00  --------------------------------------------------------  IN:    Oral Fluid: 660 mL  Total IN: 660 mL    OUT:  Total OUT: 0 mL    Total NET: 660 mL               Discussed with:     Cultures:	        Radiology

## 2018-07-10 NOTE — PROGRESS NOTE ADULT - PROBLEM SELECTOR PLAN 2
WBC trending down  ID follow up noted  oral and skin care  no imaging at this time, pt refusing  on emp ABX

## 2018-07-10 NOTE — PROGRESS NOTE ADULT - PROBLEM SELECTOR PLAN 1
WBC is decreasing follow wbc  changed to ertapenem  July 5 blood cx ++  7/8 blood cx neg to date   renal ultrasound without any abscess or stone as nidus for infection  CT abd / pelvis with oral IV contrast  pt currently refusing ct scan   given clinical improvement and wbc decreased will hold off for now.

## 2018-07-10 NOTE — PROGRESS NOTE ADULT - SUBJECTIVE AND OBJECTIVE BOX
[facility], Division of Infectious Diseases  MARCIN Muniz A. Lee  [phone]  Name: ROSENDO ROSARIO  Age: 87y  Gender: Female  MRN: [id number]    Interval History--  Notes reviewed  very upset and frustrated.  she feels she cannot take care of herself  crying    Past Medical History--  Arthritis  Hypothyroid  Hyperlipidemia  Seasonal Allergies  Osteoporosis  Migraine  Cataract  Edema of Leg  Chronic Edema  Seasonal Rhinitis  Hypercholesteremia  Hypothyroidism  Anxiety  Clostridium Difficile Infection  S/P cataract surgery  S/P ovarian cystectomy  S/P hip replacement  S/P knee replacement, bilateral  Hip Replacement  Knee Replacement      For details regarding the patient's social history, family history, and other miscellaneous elements, please refer the initial infectious diseases consultation and/or the admitting history and physical examination for this admission.    Allergies    erythromycin (Hives)    Intolerances        Medications--  Antibiotics:  ertapenem  IVPB 1000 milliGRAM(s) IV Intermittent every 24 hours    Immunologic:    Other:  ALBUTerol    0.083%  aspirin  chewable  cholecalciferol  enoxaparin Injectable  guaiFENesin    Syrup PRN  ibuprofen  Tablet  iohexol 300 mG (iodine)/mL Oral Solution  lactobacillus acidophilus  levothyroxine  lidocaine/prilocaine Cream  loratadine  metoprolol succinate ER  simvastatin      Review of Systems--  A 10-point review of systems was obtained.     Pertinent positives and negatives--  Constitutional: No fevers. No Chills. No Rigors.   Cardiovascular: No chest pain. No palpitations.  Respiratory: No shortness of breath. No cough.  Gastrointestinal: No nausea or vomiting. No diarrhea or constipation.   Psychiatric: + anxiety and + depression    Review of systems otherwise negative except as previously noted.    Physical Examination--  Vital Signs: T(F): 98.4 (07-10-18 @ 04:38), Max: 98.4 (07-10-18 @ 04:38)  HR: 80 (07-10-18 @ 08:20)  BP: 126/75 (07-10-18 @ 04:38)  RR: 17 (07-10-18 @ 04:38)  SpO2: 95% (07-10-18 @ 04:38)  Wt(kg): --  General: Nontoxic-appearing Female in no acute distress.  HEENT: AT/NC. .   Neck: Not rigid. No sense of mass.  Nodes: None palpable.  Lungs: Clear bilaterally without rales, wheezing or rhonchi  Heart: Regular rate and rhythm. No Murmur.   Abdomen: Bowel sounds present and normoactive. Soft. Nondistended.  Extremities: No cyanosis or clubbing. No edema.   Skin: Warm. Dry. Good turgor. No rash. No vasculitic stigmata.  Psychiatric: crying upset        Laboratory Studies--  CBC                        8.9    15.02 )-----------( 275      ( 09 Jul 2018 06:41 )             27.3       Chemistries  07-09    143  |  111<H>  |  18  ----------------------------<  87  3.9   |  25  |  0.71    Ca    8.2<L>      09 Jul 2018 06:41        Culture Data    Culture - Blood (collected 08 Jul 2018 11:20)  Source: .Blood Blood-Peripheral  Preliminary Report (09 Jul 2018 12:01):    No growth to date.    Culture - Blood (collected 08 Jul 2018 11:19)  Source: .Blood Blood-Venous  Preliminary Report (09 Jul 2018 12:01):    No growth to date.    Culture - Blood (collected 05 Jul 2018 22:38)  Source: .Blood Blood-Peripheral  Gram Stain (07 Jul 2018 04:34):    Growth in anaerobic bottle: Gram Negative Rods  Final Report (09 Jul 2018 11:25):    Growth in anaerobic bottle: Escherichia coli  Organism: Escherichia coli (09 Jul 2018 11:25)  Organism: Escherichia coli (09 Jul 2018 11:25)    Culture - Blood (collected 05 Jul 2018 22:38)  Source: .Blood Blood  Preliminary Report (06 Jul 2018 23:01):    No growth to date.          Urinalysis (07.09.18 @ 17:19)    pH Urine: 6.5    Blood, Urine: Large    Glucose Qualitative, Urine: Negative    Color: Yellow    Urine Appearance: Slightly Turbid    Bilirubin: Negative    Ketone - Urine: Negative    Specific Gravity: 1.010    Protein, Urine: Negative    Urobilinogen: Negative    Nitrite: Negative    Leukocyte Esterase Concentration: Moderate    Urine Microscopic-Add On (NC) (07.09.18 @ 17:19)    Bacteria: Occasional    Epithelial Cells: Occasional    White Blood Cell - Urine: 0-2    Red Blood Cell - Urine: 25-50 /HPF

## 2018-07-10 NOTE — PROGRESS NOTE ADULT - PROBLEM SELECTOR PLAN 3
Chronic anemia, acutely drop from 10.2 to 8.9  Patient wants conservative treatment only, refused offer of FOBT and Colonoscopy Chronic anemia, acutely dropped from 10.2 to 8.9 (7/8)  Patient wants conservative treatment only, refused offer of FOBT and Colonoscopy

## 2018-07-10 NOTE — PROGRESS NOTE ADULT - ATTENDING COMMENTS
I personally conducted a physical examination of the patient. I personally gathered the patient's history. I edited the above listed findings which were prepared by the listed resident physician. I personally discussed the plan of care with the patient. The questions and concerns were addressed to the best of my ability. The patient is in agreement with the listed treatment plan.     A/P: clinically doing well. becoming increasingly frustrated w/ inpatient hospitalization. she is having difficult time understanding severity of bactermia and doesn't understand that she cannot be discharged until completing course of therapy. she agrees to 2 more blood draws on 7/12/18 and 7/14/18. that is plan, continue vitals, supportive care. vaginal candidiasis resolved s/p diflucan.

## 2018-07-10 NOTE — PROGRESS NOTE ADULT - PROBLEM SELECTOR PLAN 8
No hx of hypertension per patient but patient on metoprolol. Denies cardiac hx.   - Continue metoprolol for now Chronic, stable.   - Continue ibuprofen daily

## 2018-07-10 NOTE — PROGRESS NOTE ADULT - PROBLEM SELECTOR PLAN 4
Complaint of vaginal itching, likely secondary to antibiotic treatment  improved w/ diflucan 150mg x1 dose  Follow up UA and UCx Complaint of vaginal itching, likely secondary to antibiotic treatment  improved w/ diflucan 150mg x1 dose  UA + Leuks, + blood, + occasional bacteria - nitrites  UCx - pending

## 2018-07-10 NOTE — PROGRESS NOTE ADULT - PROBLEM SELECTOR PLAN 1
- Bacteremia:  Blood Cx +  for GNR E.Coli 6/30/18, then negative on 7/1/18  - Repeat blood cultures on 7/5/18 are growing gram negative rods - invanz was started on 7/5/18 - ID ordered repeat blood cultures 7/8/18, results show no growth to this point  - Renal US neg for hydronephrosis/abscess collection/obstruction   - no urinary symptoms currently  - wbc downtrending - Bacteremia:  Blood Cx +  for GNR E.Coli 6/30/18, then negative on 7/1/18  - Repeat blood cultures on 7/5/18 are growing gram negative rods - invanz was started on 7/5/18   - ID ordered repeat blood cultures 7/8/18, which show no growth to date   - patient refused am labs, agreed for labs to be drawn on 7/12 (in 2 days) to follow wbc and H&H - Bacteremia:  Blood Cx +  for GNR E.Coli 6/30/18, then negative on 7/1/18  - Repeat blood cultures on 7/5/18 are growing gram negative rods - invanz was started on 7/5/18   - ID ordered repeat blood cultures 7/8/18, which show no growth to date   - patient declined am labs, agreed for labs to be drawn on 7/12 (in 2 days) to follow wbc and H&H

## 2018-07-11 DIAGNOSIS — R42 DIZZINESS AND GIDDINESS: ICD-10-CM

## 2018-07-11 PROCEDURE — 99233 SBSQ HOSP IP/OBS HIGH 50: CPT | Mod: GC

## 2018-07-11 RX ORDER — ACETAMINOPHEN 500 MG
650 TABLET ORAL ONCE
Qty: 0 | Refills: 0 | Status: COMPLETED | OUTPATIENT
Start: 2018-07-11 | End: 2018-07-11

## 2018-07-11 RX ORDER — MECLIZINE HCL 12.5 MG
12.5 TABLET ORAL THREE TIMES A DAY
Qty: 0 | Refills: 0 | Status: DISCONTINUED | OUTPATIENT
Start: 2018-07-11 | End: 2018-07-11

## 2018-07-11 RX ORDER — PETROLATUM,WHITE
1 JELLY (GRAM) TOPICAL THREE TIMES A DAY
Qty: 0 | Refills: 0 | Status: DISCONTINUED | OUTPATIENT
Start: 2018-07-11 | End: 2018-07-16

## 2018-07-11 RX ORDER — MECLIZINE HCL 12.5 MG
12.5 TABLET ORAL EVERY 12 HOURS
Qty: 0 | Refills: 0 | Status: DISCONTINUED | OUTPATIENT
Start: 2018-07-11 | End: 2018-07-16

## 2018-07-11 RX ADMIN — Medication 12.5 MILLIGRAM(S): at 06:33

## 2018-07-11 RX ADMIN — Medication 1000 UNIT(S): at 11:57

## 2018-07-11 RX ADMIN — Medication 800 MILLIGRAM(S): at 11:57

## 2018-07-11 RX ADMIN — Medication 1 APPLICATION(S): at 14:43

## 2018-07-11 RX ADMIN — ERTAPENEM SODIUM 120 MILLIGRAM(S): 1 INJECTION, POWDER, LYOPHILIZED, FOR SOLUTION INTRAMUSCULAR; INTRAVENOUS at 17:59

## 2018-07-11 RX ADMIN — Medication 1 APPLICATION(S): at 22:11

## 2018-07-11 RX ADMIN — Medication 800 MILLIGRAM(S): at 11:59

## 2018-07-11 RX ADMIN — Medication 650 MILLIGRAM(S): at 22:25

## 2018-07-11 RX ADMIN — Medication 650 MILLIGRAM(S): at 22:30

## 2018-07-11 RX ADMIN — LIDOCAINE AND PRILOCAINE CREAM 1 APPLICATION(S): 25; 25 CREAM TOPICAL at 11:58

## 2018-07-11 RX ADMIN — ALBUTEROL 2.5 MILLIGRAM(S): 90 AEROSOL, METERED ORAL at 15:41

## 2018-07-11 RX ADMIN — Medication 75 MICROGRAM(S): at 06:34

## 2018-07-11 RX ADMIN — SIMVASTATIN 10 MILLIGRAM(S): 20 TABLET, FILM COATED ORAL at 22:11

## 2018-07-11 RX ADMIN — ENOXAPARIN SODIUM 30 MILLIGRAM(S): 100 INJECTION SUBCUTANEOUS at 06:34

## 2018-07-11 RX ADMIN — ALBUTEROL 2.5 MILLIGRAM(S): 90 AEROSOL, METERED ORAL at 23:46

## 2018-07-11 RX ADMIN — Medication 1 TABLET(S): at 11:57

## 2018-07-11 RX ADMIN — Medication 81 MILLIGRAM(S): at 11:56

## 2018-07-11 RX ADMIN — ALBUTEROL 2.5 MILLIGRAM(S): 90 AEROSOL, METERED ORAL at 07:14

## 2018-07-11 RX ADMIN — LORATADINE 10 MILLIGRAM(S): 10 TABLET ORAL at 11:58

## 2018-07-11 NOTE — PROGRESS NOTE ADULT - ATTENDING COMMENTS
I personally conducted a physical examination of the patient. I personally gathered the patient's history. I edited the above listed findings which were prepared by the listed resident physician. I personally discussed the plan of care with the patient. The questions and concerns were addressed to the best of my ability. The patient is in agreement with the listed treatment plan.     A/P: 10 days of invanz to complete course for bacteremia. added meclizine prn for dizziness. pt has been declining lab draws as she's been frustrated. I have spoke to RN manager to assist pt w/ factors during hospitalization. I reviewed my role w/ the patient in helping to address her numerous complaints today. she is agreeable w/ d/c plans. pt has multiple psychosocial factors which are contributing to her mental status.

## 2018-07-11 NOTE — PROGRESS NOTE ADULT - PROBLEM SELECTOR PLAN 1
- Bacteremia:  Blood Cx +  for GNR E.Coli 6/30/18, then negative on 7/1/18. Repeat blood cultures on 7/5/18 are growing gram negative rods - invanz was started on 7/5/18. Continue until Saturday 7/14/18   - Repeat blood cultures 7/8/18, which show no growth to date   -  Patient agreed to have bloodwork drawn tomorrow am to follow wbc and H&H

## 2018-07-11 NOTE — PROGRESS NOTE ADULT - PROBLEM SELECTOR PLAN 1
cough on occasion  cough rx regimen  NEBS  asp prec  dysphagia diet  monitor sat  keep sat > 88 pct  assist with ADL  overall frail and weak elderly

## 2018-07-11 NOTE — PROGRESS NOTE ADULT - SUBJECTIVE AND OBJECTIVE BOX
Date/Time Patient Seen:  		  Referring MD:   Data Reviewed	       Patient is a 87y old  Female who presents with a chief complaint of "They sent me to here because I have a UTI"  Brought in by EMS with AMS (05 Jul 2018 13:57)  in bed  seen and examined  vs and meds reviewed  on ABX  am WBC pending        Subjective/HPI     PAST MEDICAL & SURGICAL HISTORY:  Arthritis  Hypothyroid  Hyperlipidemia  Seasonal Allergies  Osteoporosis  Migraine  Cataract: s/p cataract surgery  Edema of Leg: b/l LE  Chronic Edema  Seasonal Rhinitis  Hypercholesteremia  Hypothyroidism  Anxiety  Clostridium Difficile Infection  S/P cataract surgery: bialteral  S/P ovarian cystectomy  S/P hip replacement  S/P knee replacement, bilateral: Partial R) hip  Hip Replacement: left hip  Knee Replacement: b/l        Medication list         MEDICATIONS  (STANDING):  ALBUTerol    0.083% 2.5 milliGRAM(s) Nebulizer every 8 hours  aspirin  chewable 81 milliGRAM(s) Oral daily  cholecalciferol 1000 Unit(s) Oral daily  enoxaparin Injectable 30 milliGRAM(s) SubCutaneous every 24 hours  ertapenem  IVPB 1000 milliGRAM(s) IV Intermittent every 24 hours  ibuprofen  Tablet 800 milliGRAM(s) Oral daily  iohexol 300 mG (iodine)/mL Oral Solution 30 milliLiter(s) Oral once  lactobacillus acidophilus 1 Tablet(s) Oral daily  levothyroxine 75 MICROGram(s) Oral daily  lidocaine/prilocaine Cream 1 Application(s) Topical daily  loratadine 10 milliGRAM(s) Oral daily  metoprolol succinate ER 12.5 milliGRAM(s) Oral daily  simvastatin 10 milliGRAM(s) Oral at bedtime    MEDICATIONS  (PRN):  guaiFENesin    Syrup 200 milliGRAM(s) Oral every 6 hours PRN Cough         Vitals log        ICU Vital Signs Last 24 Hrs  T(C): 36.7 (11 Jul 2018 05:37), Max: 37.2 (10 Jul 2018 20:45)  T(F): 98 (11 Jul 2018 05:37), Max: 99 (10 Jul 2018 20:45)  HR: 83 (11 Jul 2018 05:37) (80 - 86)  BP: 149/77 (11 Jul 2018 05:37) (107/68 - 149/77)  BP(mean): --  ABP: --  ABP(mean): --  RR: 18 (11 Jul 2018 05:37) (17 - 18)  SpO2: 98% (11 Jul 2018 05:37) (95% - 98%)           Input and Output:  I&O's Detail      Lab Data                        8.9    15.02 )-----------( 275      ( 09 Jul 2018 06:41 )             27.3     07-09    143  |  111<H>  |  18  ----------------------------<  87  3.9   |  25  |  0.71    Ca    8.2<L>      09 Jul 2018 06:41              Review of Systems	      Objective     Physical Examination    heart s1s2  lung dec BS  abd soft      Pertinent Lab findings & Imaging      Emily:  NO   Adequate UO     I&O's Detail           Discussed with:     Cultures:	        Radiology

## 2018-07-11 NOTE — PROGRESS NOTE ADULT - SUBJECTIVE AND OBJECTIVE BOX
Patient is a 87y old  Female who presents with a chief complaint of "They sent me to here because I have a UTI"  Brought in by EMS with AMS (2018 13:57)      INTERVAL HPI/OVERNIGHT EVENTS: offers no complaints. No events overnight.     MEDICATIONS  (STANDING):  ALBUTerol    0.083% 2.5 milliGRAM(s) Nebulizer every 8 hours  aspirin  chewable 81 milliGRAM(s) Oral daily  cholecalciferol 1000 Unit(s) Oral daily  enoxaparin Injectable 30 milliGRAM(s) SubCutaneous every 24 hours  ertapenem  IVPB 1000 milliGRAM(s) IV Intermittent every 24 hours  ibuprofen  Tablet 800 milliGRAM(s) Oral daily  iohexol 300 mG (iodine)/mL Oral Solution 30 milliLiter(s) Oral once  lactobacillus acidophilus 1 Tablet(s) Oral daily  levothyroxine 75 MICROGram(s) Oral daily  lidocaine/prilocaine Cream 1 Application(s) Topical daily  loratadine 10 milliGRAM(s) Oral daily  metoprolol succinate ER 12.5 milliGRAM(s) Oral daily  petrolatum white Ointment 1 Application(s) Topical three times a day  simvastatin 10 milliGRAM(s) Oral at bedtime    MEDICATIONS  (PRN):  guaiFENesin    Syrup 200 milliGRAM(s) Oral every 6 hours PRN Cough  meclizine 12.5 milliGRAM(s) Oral three times a day PRN Dizziness      Allergies    erythromycin (Hives)    Intolerances        REVIEW OF SYSTEMS:  CONSTITUTIONAL: No fever or chills  HEENT:  No headache, no sore throat  RESPIRATORY: No cough, wheezing, or shortness of breath  CARDIOVASCULAR: No chest pain, palpitations, or leg swelling  GASTROINTESTINAL: No abd pain, nausea, vomiting, or diarrhea  GENITOURINARY: No dysuria, frequency, or hematuria  NEUROLOGICAL: no focal weakness or dizziness  MUSCULOSKELETAL: no myalgias     Vital Signs Last 24 Hrs  T(C): 36.6 (2018 13:54), Max: 37.2 (10 Jul 2018 20:45)  T(F): 97.8 (2018 13:54), Max: 99 (10 Jul 2018 20:45)  HR: 81 (2018 13:54) (81 - 86)  BP: 90/59 (2018 13:54) (90/59 - 149/77)  BP(mean): --  RR: 18 (2018 13:54) (17 - 18)  SpO2: 98% (2018 13:54) (95% - 98%)    PHYSICAL EXAM:  GENERAL: NAD  HEENT:  EOMI, moist mucous membranes  CHEST/LUNG:  CTA b/l, no rales, wheezes, or rhonchi  HEART:  RRR, S1, S2  ABDOMEN:  BS+, soft, nontender, nondistended  EXTREMITIES: no edema, cyanosis, or calf tenderness  NERVOUS SYSTEM: AA&Ox3    LABS:              Urinalysis Basic - ( 2018 17:19 )    Color: Yellow / Appearance: Slightly Turbid / S.010 / pH: x  Gluc: x / Ketone: Negative  / Bili: Negative / Urobili: Negative   Blood: x / Protein: Negative / Nitrite: Negative   Leuk Esterase: Moderate / RBC: 25-50 /HPF / WBC 0-2   Sq Epi: x / Non Sq Epi: Occasional / Bacteria: Occasional      CAPILLARY BLOOD GLUCOSE            Culture - Urine (collected 18 @ 21:15)  Source: .Urine Clean Catch (Midstream)  Final Report (07-10-18 @ 21:39):    No growth    Culture - Blood (collected 18 @ 19:09)  Source: .Blood Blood-Peripheral  Preliminary Report (07-10-18 @ 20:01):    No growth to date.    Culture - Blood (collected 18 @ 19:09)  Source: .Blood Blood  Preliminary Report (07-10-18 @ 20:01):    No growth to date.    Culture - Blood (collected 18 @ 11:20)  Source: .Blood Blood-Peripheral  Preliminary Report (18 @ 12:01):    No growth to date.    Culture - Blood (collected 18 @ 11:19)  Source: .Blood Blood-Venous  Preliminary Report (18 @ 12:01):    No growth to date.    Culture - Blood (collected 18 @ 22:38)  Source: .Blood Blood-Peripheral  Gram Stain (18 @ 04:34):    Growth in anaerobic bottle: Gram Negative Rods  Final Report (18 @ 11:25):    Growth in anaerobic bottle: Escherichia coli  Organism: Escherichia coli (18 @ 11:25)  Organism: Escherichia coli (18 @ 11:25)      -  Amikacin: S <=8      -  Ampicillin: R >16 These ampicillin results predict results for amoxicillin      -  Ampicillin/Sulbactam: I 16/8      -  Aztreonam: S <=4      -  Cefazolin: S <=2      -  Cefepime: S <=2      -  Cefoxitin: S <=4      -  Ceftriaxone: S <=1 Enterobacter, Citrobacter, and Serratia may develop resistance during prolonged therapy      -  Ciprofloxacin: S <=0.5      -  Ertapenem: S <=0.5      -  Gentamicin: S <=1      -  Imipenem: S <=1      -  Levofloxacin: S <=1      -  Meropenem: S <=1      -  Piperacillin/Tazobactam: S <=8      -  Tobramycin: S <=2      -  Trimethoprim/Sulfamethoxazole: R >38      Method Type: CECY    Culture - Blood (collected 18 @ 22:38)  Source: .Blood Blood  Final Report (07-10-18 @ 23:00):    No growth at 5 days.        RADIOLOGY & ADDITIONAL TESTS:    Personally reviewed.     Consultant(s) Notes Reviewed:  [x] YES  [ ] NO Patient is a 87y old  Female who presents with a chief complaint of "They sent me to here because I have a UTI"  Brought in by EMS with AMS (2018 13:57)      INTERVAL HPI/OVERNIGHT EVENTS: No events overnight. Denies all complaints. Agrees to have blood drawn for labwork tomorrow morning.     MEDICATIONS  (STANDING):  ALBUTerol    0.083% 2.5 milliGRAM(s) Nebulizer every 8 hours  aspirin  chewable 81 milliGRAM(s) Oral daily  cholecalciferol 1000 Unit(s) Oral daily  enoxaparin Injectable 30 milliGRAM(s) SubCutaneous every 24 hours  ertapenem  IVPB 1000 milliGRAM(s) IV Intermittent every 24 hours  ibuprofen  Tablet 800 milliGRAM(s) Oral daily  iohexol 300 mG (iodine)/mL Oral Solution 30 milliLiter(s) Oral once  lactobacillus acidophilus 1 Tablet(s) Oral daily  levothyroxine 75 MICROGram(s) Oral daily  lidocaine/prilocaine Cream 1 Application(s) Topical daily  loratadine 10 milliGRAM(s) Oral daily  metoprolol succinate ER 12.5 milliGRAM(s) Oral daily  petrolatum white Ointment 1 Application(s) Topical three times a day  simvastatin 10 milliGRAM(s) Oral at bedtime    MEDICATIONS  (PRN):  guaiFENesin    Syrup 200 milliGRAM(s) Oral every 6 hours PRN Cough  meclizine 12.5 milliGRAM(s) Oral three times a day PRN Dizziness      Allergies    erythromycin (Hives)    Intolerances        REVIEW OF SYSTEMS:  CONSTITUTIONAL: No fever or chills  HEENT: no difficulty swallowing, no ringing in ears  RESPIRATORY: admits to improving cough, no wheezing, or shortness of breath  CARDIOVASCULAR: No chest pain, palpitations, or leg swelling  GASTROINTESTINAL: No abd pain, nausea, vomiting, or diarrhea  GENITOURINARY: No dysuria, frequency, or hematuria  NEUROLOGICAL: no focal weakness or dizziness  MUSCULOSKELETAL: no myalgias, no  new joint pains  SKIN: admits to bruising from previous blood draws, no new bruises, no rashes       Vital Signs Last 24 Hrs  T(C): 36.6 (2018 13:54), Max: 37.2 (10 Jul 2018 20:45)  T(F): 97.8 (2018 13:54), Max: 99 (10 Jul 2018 20:45)  HR: 81 (2018 13:54) (81 - 86)  BP: 90/59 (2018 13:54) (90/59 - 149/77)  BP(mean): --  RR: 18 (2018 13:54) (17 - 18)  SpO2: 98% (2018 13:54) (95% - 98%)    PHYSICAL EXAM:  GENERAL: NAD, resting comfortably in bed   HEENT:  EOMI, moist mucous membranes  CHEST/LUNG:  CTA b/l, no rales, wheezes, or rhonchi  HEART:  RRR, S1, S2, no murmurs   ABDOMEN:  BS+, soft, nontender, nondistended  EXTREMITIES: no edema, cyanosis, or calf tenderness  NERVOUS SYSTEM: AA&Ox3  SKIN: no rashes or open wounds. diffuse ecchymosis on b/l UE forearms from previous blood draws    LABS:              Urinalysis Basic - ( 2018 17:19 )    Color: Yellow / Appearance: Slightly Turbid / S.010 / pH: x  Gluc: x / Ketone: Negative  / Bili: Negative / Urobili: Negative   Blood: x / Protein: Negative / Nitrite: Negative   Leuk Esterase: Moderate / RBC: 25-50 /HPF / WBC 0-2   Sq Epi: x / Non Sq Epi: Occasional / Bacteria: Occasional      CAPILLARY BLOOD GLUCOSE            Culture - Urine (collected 18 @ 21:15)  Source: .Urine Clean Catch (Midstream)  Final Report (07-10-18 @ 21:39):    No growth    Culture - Blood (collected 18 @ 19:09)  Source: .Blood Blood-Peripheral  Preliminary Report (07-10-18 @ 20:01):    No growth to date.    Culture - Blood (collected 18 @ 19:09)  Source: .Blood Blood  Preliminary Report (07-10-18 @ 20:01):    No growth to date.    Culture - Blood (collected 18 @ 11:20)  Source: .Blood Blood-Peripheral  Preliminary Report (18 @ 12:01):    No growth to date.    Culture - Blood (collected 18 @ 11:19)  Source: .Blood Blood-Venous  Preliminary Report (18 @ 12:01):    No growth to date.    Culture - Blood (collected 18 @ 22:38)  Source: .Blood Blood-Peripheral  Gram Stain (18 @ 04:34):    Growth in anaerobic bottle: Gram Negative Rods  Final Report (18 @ 11:25):    Growth in anaerobic bottle: Escherichia coli  Organism: Escherichia coli (18 @ 11:25)  Organism: Escherichia coli (18 @ 11:25)      -  Amikacin: S <=8      -  Ampicillin: R >16 These ampicillin results predict results for amoxicillin      -  Ampicillin/Sulbactam: I 16/8      -  Aztreonam: S <=4      -  Cefazolin: S <=2      -  Cefepime: S <=2      -  Cefoxitin: S <=4      -  Ceftriaxone: S <=1 Enterobacter, Citrobacter, and Serratia may develop resistance during prolonged therapy      -  Ciprofloxacin: S <=0.5      -  Ertapenem: S <=0.5      -  Gentamicin: S <=1      -  Imipenem: S <=1      -  Levofloxacin: S <=1      -  Meropenem: S <=1      -  Piperacillin/Tazobactam: S <=8      -  Tobramycin: S <=2      -  Trimethoprim/Sulfamethoxazole: R >38      Method Type: CECY    Culture - Blood (collected 18 @ 22:38)  Source: .Blood Blood  Final Report (07-10-18 @ 23:00):    No growth at 5 days.        RADIOLOGY & ADDITIONAL TESTS:    Personally reviewed.     Consultant(s) Notes Reviewed:  [x] YES  [ ] NO Patient is a 87y old  Female who presents with a chief complaint of "They sent me to here because I have a UTI"  Brought in by EMS with AMS (2018 13:57)      INTERVAL HPI/OVERNIGHT EVENTS: No events overnight. Denies all complaints. Agrees to have blood drawn for labwork tomorrow morning. Pt offers multiple complaints but states "I don't feel like talking about it" - pt is eager for d/c    MEDICATIONS  (STANDING):  ALBUTerol    0.083% 2.5 milliGRAM(s) Nebulizer every 8 hours  aspirin  chewable 81 milliGRAM(s) Oral daily  cholecalciferol 1000 Unit(s) Oral daily  enoxaparin Injectable 30 milliGRAM(s) SubCutaneous every 24 hours  ertapenem  IVPB 1000 milliGRAM(s) IV Intermittent every 24 hours  ibuprofen  Tablet 800 milliGRAM(s) Oral daily  iohexol 300 mG (iodine)/mL Oral Solution 30 milliLiter(s) Oral once  lactobacillus acidophilus 1 Tablet(s) Oral daily  levothyroxine 75 MICROGram(s) Oral daily  lidocaine/prilocaine Cream 1 Application(s) Topical daily  loratadine 10 milliGRAM(s) Oral daily  metoprolol succinate ER 12.5 milliGRAM(s) Oral daily  petrolatum white Ointment 1 Application(s) Topical three times a day  simvastatin 10 milliGRAM(s) Oral at bedtime    MEDICATIONS  (PRN):  guaiFENesin    Syrup 200 milliGRAM(s) Oral every 6 hours PRN Cough  meclizine 12.5 milliGRAM(s) Oral three times a day PRN Dizziness      Allergies    erythromycin (Hives)    Intolerances        REVIEW OF SYSTEMS:  CONSTITUTIONAL: No fever or chills  HEENT: no difficulty swallowing, no ringing in ears  RESPIRATORY: admits to improving cough, no wheezing, or shortness of breath  CARDIOVASCULAR: No chest pain, palpitations, or leg swelling  GASTROINTESTINAL: No abd pain, nausea, vomiting, or diarrhea  GENITOURINARY: No dysuria, frequency, or hematuria  NEUROLOGICAL: no focal weakness or dizziness  MUSCULOSKELETAL: no myalgias, no  new joint pains  SKIN: admits to bruising from previous blood draws, no new bruises, no rashes   10 systems reviewed and negative unless otherwise noted      Vital Signs Last 24 Hrs  T(C): 36.6 (2018 13:54), Max: 37.2 (10 Jul 2018 20:45)  T(F): 97.8 (2018 13:54), Max: 99 (10 Jul 2018 20:45)  HR: 81 (2018 13:54) (81 - 86)  BP: 90/59 (2018 13:54) (90/59 - 149/77)  BP(mean): --  RR: 18 (2018 13:54) (17 - 18)  SpO2: 98% (2018 13:54) (95% - 98%)    PHYSICAL EXAM:  GENERAL: NAD, resting comfortably in bed   HEENT:  EOMI, moist mucous membranes  CHEST/LUNG:  CTA b/l, no rales, wheezes, or rhonchi  HEART:  RRR, S1, S2, no murmurs   ABDOMEN:  BS+, soft, nontender, nondistended  EXTREMITIES: no edema, cyanosis, or calf tenderness  NERVOUS SYSTEM: AA&Ox3  SKIN: no rashes or open wounds. diffuse ecchymosis on b/l UE forearms from previous blood draws    LABS:              Urinalysis Basic - ( 2018 17:19 )    Color: Yellow / Appearance: Slightly Turbid / S.010 / pH: x  Gluc: x / Ketone: Negative  / Bili: Negative / Urobili: Negative   Blood: x / Protein: Negative / Nitrite: Negative   Leuk Esterase: Moderate / RBC: 25-50 /HPF / WBC 0-2   Sq Epi: x / Non Sq Epi: Occasional / Bacteria: Occasional      CAPILLARY BLOOD GLUCOSE            Culture - Urine (collected 18 @ 21:15)  Source: .Urine Clean Catch (Midstream)  Final Report (07-10-18 @ 21:39):    No growth    Culture - Blood (collected 18 @ 19:09)  Source: .Blood Blood-Peripheral  Preliminary Report (07-10-18 @ 20:01):    No growth to date.    Culture - Blood (collected 18 @ 19:09)  Source: .Blood Blood  Preliminary Report (07-10-18 @ 20:01):    No growth to date.    Culture - Blood (collected 18 @ 11:20)  Source: .Blood Blood-Peripheral  Preliminary Report (18 @ 12:01):    No growth to date.    Culture - Blood (collected 18 @ 11:19)  Source: .Blood Blood-Venous  Preliminary Report (18 @ 12:01):    No growth to date.    Culture - Blood (collected 18 @ 22:38)  Source: .Blood Blood-Peripheral  Gram Stain (18 @ 04:34):    Growth in anaerobic bottle: Gram Negative Rods  Final Report (18 @ 11:25):    Growth in anaerobic bottle: Escherichia coli  Organism: Escherichia coli (18 @ 11:25)  Organism: Escherichia coli (18 @ 11:25)      -  Amikacin: S <=8      -  Ampicillin: R >16 These ampicillin results predict results for amoxicillin      -  Ampicillin/Sulbactam: I 16/8      -  Aztreonam: S <=4      -  Cefazolin: S <=2      -  Cefepime: S <=2      -  Cefoxitin: S <=4      -  Ceftriaxone: S <=1 Enterobacter, Citrobacter, and Serratia may develop resistance during prolonged therapy      -  Ciprofloxacin: S <=0.5      -  Ertapenem: S <=0.5      -  Gentamicin: S <=1      -  Imipenem: S <=1      -  Levofloxacin: S <=1      -  Meropenem: S <=1      -  Piperacillin/Tazobactam: S <=8      -  Tobramycin: S <=2      -  Trimethoprim/Sulfamethoxazole: R >38      Method Type: CECY    Culture - Blood (collected 18 @ 22:38)  Source: .Blood Blood  Final Report (07-10-18 @ 23:00):    No growth at 5 days.        RADIOLOGY & ADDITIONAL TESTS:    Personally reviewed.     Consultant(s) Notes Reviewed:  [x] YES  [ ] NO

## 2018-07-11 NOTE — PROGRESS NOTE ADULT - PROBLEM SELECTOR PLAN 2
mild cough is dry but improved form previous few days  CXR showed RUL infiltrate, likely secondary to aspiration  Non contrast CT Chest showed multifocal ground glass pneumonia, with RUL consolidation-awaiting records to compare if this is chronic, repeat CXR with similar results  - Continue invanz  - ID following  - Pulmonary Dr. Mack Caldera

## 2018-07-11 NOTE — PROGRESS NOTE ADULT - PROBLEM SELECTOR PLAN 5
Resolved. Complaint of vaginal itching, likely secondary to antibiotic treatment  improved w/ diflucan 150mg x1 dose  UA + Leuks, + blood, + occasional bacteria - nitrites  UCx - pending

## 2018-07-12 LAB
ANION GAP SERPL CALC-SCNC: 6 MMOL/L — SIGNIFICANT CHANGE UP (ref 5–17)
BASOPHILS # BLD AUTO: 0 K/UL — SIGNIFICANT CHANGE UP (ref 0–0.2)
BASOPHILS NFR BLD AUTO: 0 % — SIGNIFICANT CHANGE UP (ref 0–2)
BUN SERPL-MCNC: 21 MG/DL — SIGNIFICANT CHANGE UP (ref 7–23)
CALCIUM SERPL-MCNC: 9 MG/DL — SIGNIFICANT CHANGE UP (ref 8.5–10.1)
CHLORIDE SERPL-SCNC: 112 MMOL/L — HIGH (ref 96–108)
CO2 SERPL-SCNC: 26 MMOL/L — SIGNIFICANT CHANGE UP (ref 22–31)
CREAT SERPL-MCNC: 0.83 MG/DL — SIGNIFICANT CHANGE UP (ref 0.5–1.3)
EOSINOPHIL # BLD AUTO: 0.94 K/UL — HIGH (ref 0–0.5)
EOSINOPHIL NFR BLD AUTO: 11 % — HIGH (ref 0–6)
GLUCOSE SERPL-MCNC: 90 MG/DL — SIGNIFICANT CHANGE UP (ref 70–99)
HCT VFR BLD CALC: 28.9 % — LOW (ref 34.5–45)
HGB BLD-MCNC: 9.4 G/DL — LOW (ref 11.5–15.5)
LYMPHOCYTES # BLD AUTO: 1.72 K/UL — SIGNIFICANT CHANGE UP (ref 1–3.3)
LYMPHOCYTES # BLD AUTO: 20 % — SIGNIFICANT CHANGE UP (ref 13–44)
MAGNESIUM SERPL-MCNC: 2.3 MG/DL — SIGNIFICANT CHANGE UP (ref 1.6–2.6)
MCHC RBC-ENTMCNC: 31.2 PG — SIGNIFICANT CHANGE UP (ref 27–34)
MCHC RBC-ENTMCNC: 32.5 GM/DL — SIGNIFICANT CHANGE UP (ref 32–36)
MCV RBC AUTO: 96 FL — SIGNIFICANT CHANGE UP (ref 80–100)
MONOCYTES # BLD AUTO: 1.29 K/UL — HIGH (ref 0–0.9)
MONOCYTES NFR BLD AUTO: 15 % — HIGH (ref 2–14)
NEUTROPHILS # BLD AUTO: 4.46 K/UL — SIGNIFICANT CHANGE UP (ref 1.8–7.4)
NEUTROPHILS NFR BLD AUTO: 47 % — SIGNIFICANT CHANGE UP (ref 43–77)
PHOSPHATE SERPL-MCNC: 3.2 MG/DL — SIGNIFICANT CHANGE UP (ref 2.5–4.5)
PLATELET # BLD AUTO: 512 K/UL — HIGH (ref 150–400)
POTASSIUM SERPL-MCNC: 4.7 MMOL/L — SIGNIFICANT CHANGE UP (ref 3.5–5.3)
POTASSIUM SERPL-SCNC: 4.7 MMOL/L — SIGNIFICANT CHANGE UP (ref 3.5–5.3)
RBC # BLD: 3.01 M/UL — LOW (ref 3.8–5.2)
RBC # FLD: 15.1 % — HIGH (ref 10.3–14.5)
SODIUM SERPL-SCNC: 144 MMOL/L — SIGNIFICANT CHANGE UP (ref 135–145)
WBC # BLD: 8.58 K/UL — SIGNIFICANT CHANGE UP (ref 3.8–10.5)
WBC # FLD AUTO: 8.58 K/UL — SIGNIFICANT CHANGE UP (ref 3.8–10.5)

## 2018-07-12 PROCEDURE — 99233 SBSQ HOSP IP/OBS HIGH 50: CPT | Mod: GC

## 2018-07-12 RX ORDER — LANOLIN ALCOHOL/MO/W.PET/CERES
3 CREAM (GRAM) TOPICAL ONCE
Qty: 0 | Refills: 0 | Status: COMPLETED | OUTPATIENT
Start: 2018-07-12 | End: 2018-07-12

## 2018-07-12 RX ADMIN — ALBUTEROL 2.5 MILLIGRAM(S): 90 AEROSOL, METERED ORAL at 19:48

## 2018-07-12 RX ADMIN — ERTAPENEM SODIUM 120 MILLIGRAM(S): 1 INJECTION, POWDER, LYOPHILIZED, FOR SOLUTION INTRAMUSCULAR; INTRAVENOUS at 15:07

## 2018-07-12 RX ADMIN — Medication 75 MICROGRAM(S): at 06:23

## 2018-07-12 RX ADMIN — Medication 1 TABLET(S): at 11:34

## 2018-07-12 RX ADMIN — LORATADINE 10 MILLIGRAM(S): 10 TABLET ORAL at 11:35

## 2018-07-12 RX ADMIN — ENOXAPARIN SODIUM 30 MILLIGRAM(S): 100 INJECTION SUBCUTANEOUS at 06:23

## 2018-07-12 RX ADMIN — Medication 81 MILLIGRAM(S): at 11:35

## 2018-07-12 RX ADMIN — SIMVASTATIN 10 MILLIGRAM(S): 20 TABLET, FILM COATED ORAL at 22:35

## 2018-07-12 RX ADMIN — Medication 800 MILLIGRAM(S): at 12:30

## 2018-07-12 RX ADMIN — Medication 1 APPLICATION(S): at 22:35

## 2018-07-12 RX ADMIN — Medication 1 APPLICATION(S): at 06:24

## 2018-07-12 RX ADMIN — Medication 800 MILLIGRAM(S): at 11:34

## 2018-07-12 RX ADMIN — Medication 1000 UNIT(S): at 11:35

## 2018-07-12 RX ADMIN — Medication 12.5 MILLIGRAM(S): at 06:23

## 2018-07-12 RX ADMIN — Medication 3 MILLIGRAM(S): at 23:01

## 2018-07-12 RX ADMIN — LIDOCAINE AND PRILOCAINE CREAM 1 APPLICATION(S): 25; 25 CREAM TOPICAL at 11:41

## 2018-07-12 RX ADMIN — Medication 1 APPLICATION(S): at 15:07

## 2018-07-12 NOTE — PROGRESS NOTE ADULT - ASSESSMENT
87F with PMH of hypothyroidism, HLD, and arthritis presents with weakness, admitted with UTI, GNR bacteremia. Abnormal CT scan of lung. WBC downtrending.

## 2018-07-12 NOTE — PROGRESS NOTE ADULT - ASSESSMENT
86 yo female admitted with confusion and E coli bacteremia likely from a urinary source  L perinephric stranding on chest CT-- pyelonephritis

## 2018-07-12 NOTE — PROGRESS NOTE ADULT - PROBLEM SELECTOR PLAN 4
Chronic anemia, sl Improved today   Patient wants conservative treatment only, refused offer of FOBT and Colonoscopy

## 2018-07-12 NOTE — PROGRESS NOTE ADULT - SUBJECTIVE AND OBJECTIVE BOX
Date/Time Patient Seen:  		  Referring MD:   Data Reviewed	       Patient is a 87y old  Female who presents with a chief complaint of "They sent me to here because I have a UTI"  Brought in by EMS with AMS (05 Jul 2018 13:57)  in bed  seen and examined  vs and meds reviewed      Subjective/HPI     PAST MEDICAL & SURGICAL HISTORY:  Arthritis  Hypothyroid  Hyperlipidemia  Seasonal Allergies  Osteoporosis  Migraine  Cataract: s/p cataract surgery  Edema of Leg: b/l LE  Chronic Edema  Seasonal Rhinitis  Hypercholesteremia  Hypothyroidism  Anxiety  Clostridium Difficile Infection  S/P cataract surgery: bialteral  S/P ovarian cystectomy  S/P hip replacement  S/P knee replacement, bilateral: Partial R) hip  Hip Replacement: left hip  Knee Replacement: b/l        Medication list         MEDICATIONS  (STANDING):  ALBUTerol    0.083% 2.5 milliGRAM(s) Nebulizer every 8 hours  aspirin  chewable 81 milliGRAM(s) Oral daily  cholecalciferol 1000 Unit(s) Oral daily  enoxaparin Injectable 30 milliGRAM(s) SubCutaneous every 24 hours  ertapenem  IVPB 1000 milliGRAM(s) IV Intermittent every 24 hours  ibuprofen  Tablet 800 milliGRAM(s) Oral daily  iohexol 300 mG (iodine)/mL Oral Solution 30 milliLiter(s) Oral once  lactobacillus acidophilus 1 Tablet(s) Oral daily  levothyroxine 75 MICROGram(s) Oral daily  lidocaine/prilocaine Cream 1 Application(s) Topical daily  loratadine 10 milliGRAM(s) Oral daily  metoprolol succinate ER 12.5 milliGRAM(s) Oral daily  petrolatum white Ointment 1 Application(s) Topical three times a day  simvastatin 10 milliGRAM(s) Oral at bedtime    MEDICATIONS  (PRN):  guaiFENesin    Syrup 200 milliGRAM(s) Oral every 6 hours PRN Cough  meclizine 12.5 milliGRAM(s) Oral every 12 hours PRN Dizziness         Vitals log        ICU Vital Signs Last 24 Hrs  T(C): 36.4 (12 Jul 2018 05:06), Max: 37.1 (11 Jul 2018 20:50)  T(F): 97.6 (12 Jul 2018 05:06), Max: 98.8 (11 Jul 2018 20:50)  HR: 88 (12 Jul 2018 05:06) (80 - 88)  BP: 128/70 (12 Jul 2018 05:06) (90/59 - 128/70)  BP(mean): --  ABP: --  ABP(mean): --  RR: 17 (11 Jul 2018 20:50) (17 - 18)  SpO2: 97% (12 Jul 2018 05:06) (95% - 98%)           Input and Output:  I&O's Detail    10 Jul 2018 07:01  -  11 Jul 2018 07:00  --------------------------------------------------------  IN:    Oral Fluid: 660 mL    Solution: 50 mL  Total IN: 710 mL    OUT:  Total OUT: 0 mL    Total NET: 710 mL          Lab Data                  Review of Systems	      Objective     Physical Examination    heart s1s2  lung dec BS  abd soft      Pertinent Lab findings & Imaging      Emily:  NO   Adequate UO     I&O's Detail    10 Jul 2018 07:01  -  11 Jul 2018 07:00  --------------------------------------------------------  IN:    Oral Fluid: 660 mL    Solution: 50 mL  Total IN: 710 mL    OUT:  Total OUT: 0 mL    Total NET: 710 mL               Discussed with:     Cultures:	        Radiology

## 2018-07-12 NOTE — PROGRESS NOTE ADULT - PROBLEM SELECTOR PLAN 1
frail  weak  tolerating room air  on NEBS prn  cough regimen prn  HOB elev  aspiration precs  keep sat > 88 pct  out of bed to chair  dc planning

## 2018-07-12 NOTE — CHART NOTE - NSCHARTNOTEFT_GEN_A_CORE
Assessment:   Pt reports poor -fair appetite. Pt tolerating soft consistency diet.  Agreeable to health shake joe BID (dislikes ensure). Will provide ice cream TID to increase calories and protein in meal plan,. Also pt requesting tea with meals and extra salt packets. Called diet office and will honor food preferences. CBW is low for age.  No wt loss since admission.  No edema or skin breakdown as per EMR review.   Factors impacting intake: [ ] none [ ] nausea  [ ] vomiting [ ] diarrhea [ ] constipation  [ ]chewing problems [ ] swallowing issues  [x ] other: decreased appetite     Diet Presciption: Diet, Dysphagia 3 Soft-Thin Liquids (07-05-18 @ 11:21)    Intake: ~ 50 %     Current Weight: 113.7 lbs (7/11)  % Weight Change    Pertinent Medications: MEDICATIONS  (STANDING):  ALBUTerol    0.083% 2.5 milliGRAM(s) Nebulizer every 8 hours  aspirin  chewable 81 milliGRAM(s) Oral daily  cholecalciferol 1000 Unit(s) Oral daily  enoxaparin Injectable 30 milliGRAM(s) SubCutaneous every 24 hours  ertapenem  IVPB 1000 milliGRAM(s) IV Intermittent every 24 hours  ibuprofen  Tablet 800 milliGRAM(s) Oral daily  iohexol 300 mG (iodine)/mL Oral Solution 30 milliLiter(s) Oral once  lactobacillus acidophilus 1 Tablet(s) Oral daily  levothyroxine 75 MICROGram(s) Oral daily  lidocaine/prilocaine Cream 1 Application(s) Topical daily  loratadine 10 milliGRAM(s) Oral daily  metoprolol succinate ER 12.5 milliGRAM(s) Oral daily  petrolatum white Ointment 1 Application(s) Topical three times a day  simvastatin 10 milliGRAM(s) Oral at bedtime    MEDICATIONS  (PRN):  guaiFENesin    Syrup 200 milliGRAM(s) Oral every 6 hours PRN Cough  meclizine 12.5 milliGRAM(s) Oral every 12 hours PRN Dizziness    Pertinent Labs: 07-12 Na144 mmol/L Glu 90 mg/dL K+ 4.7 mmol/L Cr  0.83 mg/dL BUN 21 mg/dL 07-12 Phos 3.2 mg/dL     CAPILLARY BLOOD GLUCOSE    Skin: intact     Estimated Needs:   [x ] no change since previous assessment  [ ] recalculated:     Previous Nutrition Diagnosis:   [ ] Inadequate Energy Intake [ ]Inadequate Oral Intake [ ] Excessive Energy Intake   [ ] Underweight [ ] Increased Nutrient Needs [ ] Overweight/Obesity   [ ] Altered GI Function [x ] Unintended Weight Loss [ ] Food & Nutrition Related Knowledge Deficit [ ] Malnutrition     Nutrition Diagnosis is [x ] ongoing  [ ] resolved [ ] not applicable     New Nutrition Diagnosis: [ ] not applicable     Goal: deter weight loss, pt to consume > 75% of meals   Interventions:   Recommend  [ ] Change Diet To:  [x ] Nutrition Supplement: health shake joe BID  [ ] Nutrition Support  [x ] Other: provide food preferences, monitor weight, encourage po intake     Monitoring and Evaluation:   [ x] PO intake [ x ] Tolerance to diet prescription [ x ] weights [ x ] labs[ x ] follow up per protocol  [ ] other:

## 2018-07-12 NOTE — PROGRESS NOTE ADULT - ATTENDING COMMENTS
I personally conducted a physical examination of the patient. I personally gathered the patient's history. I edited the above listed findings which were prepared by the listed resident physician. I personally discussed the plan of care with the patient. The questions and concerns were addressed to the best of my ability. The patient is in agreement with the listed treatment plan.     A/P: d/c planning for 7/14/18 after completing 10 days of invanz

## 2018-07-12 NOTE — PROGRESS NOTE ADULT - SUBJECTIVE AND OBJECTIVE BOX
[facility], Division of Infectious Diseases  MARCIN Muniz A. Lee  [phone]  Name: ROSENDO ROSARIO  Age: 87y  Gender: Female  MRN: [id number]    Interval History--  Notes reviewed  feels better spirits today    Past Medical History--  Arthritis  Hypothyroid  Hyperlipidemia  Seasonal Allergies  Osteoporosis  Migraine  Cataract  Edema of Leg  Chronic Edema  Seasonal Rhinitis  Hypercholesteremia  Hypothyroidism  Anxiety  Clostridium Difficile Infection  S/P cataract surgery  S/P ovarian cystectomy  S/P hip replacement  S/P knee replacement, bilateral        For details regarding the patient's social history, family history, and other miscellaneous elements, please refer the initial infectious diseases consultation and/or the admitting history and physical examination for this admission.    Allergies    erythromycin (Hives)    Intolerances        Medications--  Antibiotics:  ertapenem  IVPB 1000 milliGRAM(s) IV Intermittent every 24 hours    Immunologic:    Other:  ALBUTerol    0.083%  aspirin  chewable  cholecalciferol  enoxaparin Injectable  guaiFENesin    Syrup PRN  ibuprofen  Tablet  iohexol 300 mG (iodine)/mL Oral Solution  lactobacillus acidophilus  levothyroxine  lidocaine/prilocaine Cream  loratadine  meclizine PRN  metoprolol succinate ER  petrolatum white Ointment  simvastatin      Review of Systems--  A 10-point review of systems was obtained.   no change    Review of systems otherwise negative except as previously noted.    Physical Examination--  Vital Signs: T(F): 97.6 (07-12-18 @ 05:06), Max: 98.8 (07-11-18 @ 20:50)  HR: 88 (07-12-18 @ 05:06)  BP: 128/70 (07-12-18 @ 05:06)  RR: 17 (07-11-18 @ 20:50)  SpO2: 97% (07-12-18 @ 05:06)  Wt(kg): --  General: Nontoxic-appearing Female in no acute distress.  HEENT: AT/NC.   Neck: Not rigid. No sense of mass.  Nodes: None palpable.  Lungs: Clear bilaterally without rales, wheezing or rhonchi  Heart: Regular rate and rhythm. No Murmur. No rub. No gallop. No palpable thrill.  Abdomen: Bowel sounds present and normoactive. Soft. Nondistended. Nontender.   Back: No spinal tenderness. No costovertebral angle tenderness.   Extremities: No cyanosis or clubbing. No edema.   Skin: Warm. Dry. Good turgor. No rash. No vasculitic stigmata.  Psychiatric: Appropriate affect and mood for situation.         Laboratory Studies--  CBC                        9.4    8.58  )-----------( 512      ( 12 Jul 2018 08:03 )             28.9       Chemistries  07-12    144  |  112<H>  |  21  ----------------------------<  90  4.7   |  26  |  0.83    Ca    9.0      12 Jul 2018 08:03  Phos  3.2     07-12  Mg     2.3     07-12        Culture Data    Culture - Urine (collected 09 Jul 2018 21:15)  Source: .Urine Clean Catch (Midstream)  Final Report (10 Jul 2018 21:39):    No growth    Culture - Blood (collected 09 Jul 2018 19:09)  Source: .Blood Blood-Peripheral  Preliminary Report (10 Jul 2018 20:01):    No growth to date.    Culture - Blood (collected 09 Jul 2018 19:09)  Source: .Blood Blood  Preliminary Report (10 Jul 2018 20:01):    No growth to date.    Culture - Blood (collected 08 Jul 2018 11:20)  Source: .Blood Blood-Peripheral  Preliminary Report (09 Jul 2018 12:01):    No growth to date.    Culture - Blood (collected 08 Jul 2018 11:19)  Source: .Blood Blood-Venous  Preliminary Report (09 Jul 2018 12:01):    No growth to date.    Culture - Blood (collected 05 Jul 2018 22:38)  Source: .Blood Blood-Peripheral  Gram Stain (07 Jul 2018 04:34):    Growth in anaerobic bottle: Gram Negative Rods  Final Report (09 Jul 2018 11:25):    Growth in anaerobic bottle: Escherichia coli  Organism: Escherichia coli (09 Jul 2018 11:25)  Organism: Escherichia coli (09 Jul 2018 11:25)    Culture - Blood (collected 05 Jul 2018 22:38)  Source: .Blood Blood  Final Report (10 Jul 2018 23:00):    No growth at 5 days.

## 2018-07-12 NOTE — PROGRESS NOTE ADULT - PROBLEM SELECTOR PLAN 2
am WBC pending  ID follow up  on emp ABX  supportive care and regimen  prognosis overall guarded  will follow

## 2018-07-12 NOTE — PROGRESS NOTE ADULT - PROBLEM SELECTOR PLAN 1
- Bacteremia:  Blood Cx +  for GNR E.Coli 6/30/18, then negative on 7/1/18. Repeat blood cultures on 7/5/18 are growing gram negative rods - invanz was started on 7/5/18. Continue until Saturday 7/14/18   - Repeat blood cultures 7/8/18, which show no growth to date  - WBC continues to downtrend.    - Patient agreed to final CBC and BMP on Saturday am before d/c

## 2018-07-12 NOTE — PROGRESS NOTE ADULT - PROBLEM SELECTOR PLAN 1
secondary to pyelonephritis  7/8 blood cx neg to date   renal ultrasound without any abscess or stone as nidus for infection  now wbc is decreased to normal  clinically improved  would finish course of ertapenem til July 15

## 2018-07-12 NOTE — PROGRESS NOTE ADULT - SUBJECTIVE AND OBJECTIVE BOX
Patient is a 87y old  Female who presents with a chief complaint of "They sent me to here because I have a UTI"  Brought in by EMS with AMS (05 Jul 2018 13:57)      INTERVAL HPI/OVERNIGHT EVENTS: No events overnight. Patient offers no complaints. Patient reports feeling well.    MEDICATIONS  (STANDING):  ALBUTerol    0.083% 2.5 milliGRAM(s) Nebulizer every 8 hours  aspirin  chewable 81 milliGRAM(s) Oral daily  cholecalciferol 1000 Unit(s) Oral daily  enoxaparin Injectable 30 milliGRAM(s) SubCutaneous every 24 hours  ertapenem  IVPB 1000 milliGRAM(s) IV Intermittent every 24 hours  ibuprofen  Tablet 800 milliGRAM(s) Oral daily  iohexol 300 mG (iodine)/mL Oral Solution 30 milliLiter(s) Oral once  lactobacillus acidophilus 1 Tablet(s) Oral daily  levothyroxine 75 MICROGram(s) Oral daily  lidocaine/prilocaine Cream 1 Application(s) Topical daily  loratadine 10 milliGRAM(s) Oral daily  metoprolol succinate ER 12.5 milliGRAM(s) Oral daily  petrolatum white Ointment 1 Application(s) Topical three times a day  simvastatin 10 milliGRAM(s) Oral at bedtime    MEDICATIONS  (PRN):  guaiFENesin    Syrup 200 milliGRAM(s) Oral every 6 hours PRN Cough  meclizine 12.5 milliGRAM(s) Oral every 12 hours PRN Dizziness      Allergies    erythromycin (Hives)    Intolerances        REVIEW OF SYSTEMS:  CONSTITUTIONAL: No fever or chills, fatigue  HEENT:  No headache, no sore throat, no visual changes, no tinnitis  RESPIRATORY: No cough, wheezing, or shortness of breath  CARDIOVASCULAR: No chest pain, palpitations, or leg swelling  GASTROINTESTINAL: No abd pain, nausea, vomiting, or diarrhea  GENITOURINARY: No dysuria, frequency, or hematuria  NEUROLOGICAL: no focal weakness or dizziness  MUSCULOSKELETAL: no myalgias, no new joint pain  skin: no rashes, no new bruising    Vital Signs Last 24 Hrs  T(C): 36.6 (12 Jul 2018 13:50), Max: 37.1 (11 Jul 2018 20:50)  T(F): 97.8 (12 Jul 2018 13:50), Max: 98.8 (11 Jul 2018 20:50)  HR: 82 (12 Jul 2018 13:50) (82 - 88)  BP: 113/65 (12 Jul 2018 13:50) (113/65 - 128/70)  BP(mean): --  RR: 17 (12 Jul 2018 13:50) (17 - 17)  SpO2: 98% (12 Jul 2018 13:50) (96% - 98%)      PHYSICAL EXAM:  GENERAL: patient appears well, no acute distress, appropriate, pleasant  EYES: sclera clear, no exudates  ENMT:, moist mucous membranes  NECK: supple, soft  LUNGS: good air entry bilaterally,  continues to have coarse breath sounds, symmetric breath sounds, no wheezing or rales appreciated  HEART: soft S1/S2, regular rate and rhythm, no murmurs noted, no lower extremity edema  GASTROINTESTINAL: abdomen is soft, nontender, nondistended, normoactive bowel sounds,   INTEGUMENT:  no lesions noted, no rashes, no new bruises  MUSCULOSKELETAL: no clubbing or cyanosis, no obvious deformity  NEUROLOGIC: awake, alert, oriented x3, good muscle tone in 4 extremities,   PSYCHIATRIC: mood is good, affect is congruent,        LABS:                        9.4    8.58  )-----------( 512      ( 12 Jul 2018 08:03 )             28.9     CBC Full  -  ( 12 Jul 2018 08:03 )  WBC Count : 8.58 K/uL  Hemoglobin : 9.4 g/dL  Hematocrit : 28.9 %  Platelet Count - Automated : 512 K/uL  Mean Cell Volume : 96.0 fl  Mean Cell Hemoglobin : 31.2 pg  Mean Cell Hemoglobin Concentration : 32.5 gm/dL  Auto Neutrophil # : 4.46 K/uL  Auto Lymphocyte # : 1.72 K/uL  Auto Monocyte # : 1.29 K/uL  Auto Eosinophil # : 0.94 K/uL  Auto Basophil # : 0.00 K/uL  Auto Neutrophil % : 47.0 %  Auto Lymphocyte % : 20.0 %  Auto Monocyte % : 15.0 %  Auto Eosinophil % : 11.0 %  Auto Basophil % : 0.0 %    12 Jul 2018 08:03    144    |  112    |  21     ----------------------------<  90     4.7     |  26     |  0.83     Ca    9.0        12 Jul 2018 08:03  Phos  3.2       12 Jul 2018 08:03  Mg     2.3       12 Jul 2018 08:03          CAPILLARY BLOOD GLUCOSE            Culture - Urine (collected 07-09-18 @ 21:15)  Source: .Urine Clean Catch (Midstream)  Final Report (07-10-18 @ 21:39):    No growth    Culture - Blood (collected 07-09-18 @ 19:09)  Source: .Blood Blood-Peripheral  Preliminary Report (07-10-18 @ 20:01):    No growth to date.    Culture - Blood (collected 07-09-18 @ 19:09)  Source: .Blood Blood  Preliminary Report (07-10-18 @ 20:01):    No growth to date.    Culture - Blood (collected 07-08-18 @ 11:20)  Source: .Blood Blood-Peripheral  Preliminary Report (07-09-18 @ 12:01):    No growth to date.    Culture - Blood (collected 07-08-18 @ 11:19)  Source: .Blood Blood-Venous  Preliminary Report (07-09-18 @ 12:01):    No growth to date.    Culture - Blood (collected 07-05-18 @ 22:38)  Source: .Blood Blood-Peripheral  Gram Stain (07-07-18 @ 04:34):    Growth in anaerobic bottle: Gram Negative Rods  Final Report (07-09-18 @ 11:25):    Growth in anaerobic bottle: Escherichia coli  Organism: Escherichia coli (07-09-18 @ 11:25)  Organism: Escherichia coli (07-09-18 @ 11:25)      -  Amikacin: S <=8      -  Ampicillin: R >16 These ampicillin results predict results for amoxicillin      -  Ampicillin/Sulbactam: I 16/8      -  Aztreonam: S <=4      -  Cefazolin: S <=2      -  Cefepime: S <=2      -  Cefoxitin: S <=4      -  Ceftriaxone: S <=1 Enterobacter, Citrobacter, and Serratia may develop resistance during prolonged therapy      -  Ciprofloxacin: S <=0.5      -  Ertapenem: S <=0.5      -  Gentamicin: S <=1      -  Imipenem: S <=1      -  Levofloxacin: S <=1      -  Meropenem: S <=1      -  Piperacillin/Tazobactam: S <=8      -  Tobramycin: S <=2      -  Trimethoprim/Sulfamethoxazole: R >2/38      Method Type: CECY    Culture - Blood (collected 07-05-18 @ 22:38)  Source: .Blood Blood  Final Report (07-10-18 @ 23:00):    No growth at 5 days.        RADIOLOGY & ADDITIONAL TESTS:    Personally reviewed.     Consultant(s) Notes Reviewed:  [x] YES  [ ] NO

## 2018-07-13 LAB
CULTURE RESULTS: SIGNIFICANT CHANGE UP
CULTURE RESULTS: SIGNIFICANT CHANGE UP
GRAM STN FLD: SIGNIFICANT CHANGE UP
SPECIMEN SOURCE: SIGNIFICANT CHANGE UP
SPECIMEN SOURCE: SIGNIFICANT CHANGE UP

## 2018-07-13 PROCEDURE — 99233 SBSQ HOSP IP/OBS HIGH 50: CPT | Mod: GC

## 2018-07-13 RX ADMIN — ALBUTEROL 2.5 MILLIGRAM(S): 90 AEROSOL, METERED ORAL at 19:21

## 2018-07-13 RX ADMIN — LIDOCAINE AND PRILOCAINE CREAM 1 APPLICATION(S): 25; 25 CREAM TOPICAL at 12:21

## 2018-07-13 RX ADMIN — Medication 1 APPLICATION(S): at 22:02

## 2018-07-13 RX ADMIN — Medication 1 TABLET(S): at 12:21

## 2018-07-13 RX ADMIN — Medication 81 MILLIGRAM(S): at 12:21

## 2018-07-13 RX ADMIN — SIMVASTATIN 10 MILLIGRAM(S): 20 TABLET, FILM COATED ORAL at 22:02

## 2018-07-13 RX ADMIN — Medication 75 MICROGRAM(S): at 06:38

## 2018-07-13 RX ADMIN — ALBUTEROL 2.5 MILLIGRAM(S): 90 AEROSOL, METERED ORAL at 16:15

## 2018-07-13 RX ADMIN — Medication 1 APPLICATION(S): at 13:34

## 2018-07-13 RX ADMIN — Medication 800 MILLIGRAM(S): at 13:15

## 2018-07-13 RX ADMIN — ERTAPENEM SODIUM 120 MILLIGRAM(S): 1 INJECTION, POWDER, LYOPHILIZED, FOR SOLUTION INTRAMUSCULAR; INTRAVENOUS at 13:19

## 2018-07-13 RX ADMIN — ALBUTEROL 2.5 MILLIGRAM(S): 90 AEROSOL, METERED ORAL at 08:27

## 2018-07-13 RX ADMIN — Medication 12.5 MILLIGRAM(S): at 06:38

## 2018-07-13 RX ADMIN — ENOXAPARIN SODIUM 30 MILLIGRAM(S): 100 INJECTION SUBCUTANEOUS at 06:38

## 2018-07-13 RX ADMIN — Medication 1 APPLICATION(S): at 06:38

## 2018-07-13 RX ADMIN — Medication 800 MILLIGRAM(S): at 12:21

## 2018-07-13 RX ADMIN — Medication 1000 UNIT(S): at 12:21

## 2018-07-13 RX ADMIN — LORATADINE 10 MILLIGRAM(S): 10 TABLET ORAL at 12:21

## 2018-07-13 NOTE — PROGRESS NOTE ADULT - PROBLEM SELECTOR PLAN 3
now mental status at baseline    Thank you for consulting us and involving us in the management of this most interesting and challenging case.     Please Call with any further questions

## 2018-07-13 NOTE — PROGRESS NOTE ADULT - PROBLEM SELECTOR PLAN 1
- Bacteremia:  Blood Cx +  for GNR E.Coli 6/30/18, then negative on 7/1/18. Repeat blood cultures on 7/5/18 are growing gram negative rods - invanz was started on 7/5/18. Repeat blood cultures 7/9/18 growing gram negative rods. Continue invanz  - WBC continues to downtrend.

## 2018-07-13 NOTE — PROGRESS NOTE ADULT - SUBJECTIVE AND OBJECTIVE BOX
infectious diseases progress note:    ROSENDO ROSARIO is a 87y y. o. Female patient    Patient reports: really anxious to leave the hospital before I get something else like C diff    ROS:    EYES:  Negative  blurry vision or double vision  GASTROINTESTINAL:  Negative for nausea, vomiting, diarrhea  -otherwise negative except for subjective    Allergies    erythromycin (Hives)    Intolerances        ANTIBIOTICS/RELEVANT:  antimicrobials  ertapenem  IVPB 1000 milliGRAM(s) IV Intermittent every 24 hours    immunologic:    OTHER:  ALBUTerol    0.083% 2.5 milliGRAM(s) Nebulizer every 8 hours  aspirin  chewable 81 milliGRAM(s) Oral daily  cholecalciferol 1000 Unit(s) Oral daily  enoxaparin Injectable 30 milliGRAM(s) SubCutaneous every 24 hours  guaiFENesin    Syrup 200 milliGRAM(s) Oral every 6 hours PRN  ibuprofen  Tablet 800 milliGRAM(s) Oral daily  iohexol 300 mG (iodine)/mL Oral Solution 30 milliLiter(s) Oral once  lactobacillus acidophilus 1 Tablet(s) Oral daily  levothyroxine 75 MICROGram(s) Oral daily  lidocaine/prilocaine Cream 1 Application(s) Topical daily  loratadine 10 milliGRAM(s) Oral daily  meclizine 12.5 milliGRAM(s) Oral every 12 hours PRN  metoprolol succinate ER 12.5 milliGRAM(s) Oral daily  petrolatum white Ointment 1 Application(s) Topical three times a day  simvastatin 10 milliGRAM(s) Oral at bedtime      Objective:  Vital Signs Last 24 Hrs  T(C): 36.5 (13 Jul 2018 05:15), Max: 36.9 (12 Jul 2018 20:52)  T(F): 97.7 (13 Jul 2018 05:15), Max: 98.5 (12 Jul 2018 20:52)  HR: 79 (13 Jul 2018 08:28) (78 - 83)  BP: 114/70 (13 Jul 2018 05:15) (106/66 - 114/70)  BP(mean): --  RR: 16 (13 Jul 2018 05:15) (16 - 17)  SpO2: 97% (13 Jul 2018 08:28) (96% - 98%)    T(C): 36.5 (07-13-18 @ 05:15), Max: 37.1 (07-11-18 @ 20:50)  T(C): 36.5 (07-13-18 @ 05:15), Max: 37.2 (07-10-18 @ 20:45)  T(C): 36.5 (07-13-18 @ 05:15), Max: 37.2 (07-10-18 @ 20:45)    PHYSICAL EXAM:  Constitutional: Well-developed, well nourished  Eyes: PERRLA, EOMI  Ear/Nose/Throat: oropharynx normal	  Neck: no JVD, no lymphadenopathy, supple  Respiratory: no accessory muscle use  Cardiovascular: RRR,   Gastrointestinal: soft, NT  Extremities: no clubbing, no cyanosis, edema absent      LABS:                        9.4    8.58  )-----------( 512      ( 12 Jul 2018 08:03 )             28.9       8.58 07-12 @ 08:03  15.02 07-09 @ 06:41  19.34 07-08 @ 06:46  22.10 07-07 @ 06:59      07-12    144  |  112<H>  |  21  ----------------------------<  90  4.7   |  26  |  0.83    Ca    9.0      12 Jul 2018 08:03  Phos  3.2     07-12  Mg     2.3     07-12        Creatinine, Serum: 0.83 mg/dL (07-12-18 @ 08:03)  Creatinine, Serum: 0.71 mg/dL (07-09-18 @ 06:41)  Creatinine, Serum: 0.93 mg/dL (07-08-18 @ 06:46)  Creatinine, Serum: 0.70 mg/dL (07-07-18 @ 06:59)      MICROBIOLOGY:    Culture - Blood (07.09.18 @ 19:09)    Gram Stain:   Growth in anaerobic bottle: Gram Negative Rods    Specimen Source: .Blood Blood    Culture Results:   Growth in anaerobic bottle: Gram Negative Rods          RADIOLOGY & ADDITIONAL STUDIES:

## 2018-07-13 NOTE — PROVIDER CONTACT NOTE (CRITICAL VALUE NOTIFICATION) - ACTION/TREATMENT ORDERED:
Awaiting further interventions.
continue davon MCKEON will speak to ID
No treatment ordered
no new orders- pt on ceftriaxone
will add one dose of vanco

## 2018-07-13 NOTE — PROGRESS NOTE ADULT - SUBJECTIVE AND OBJECTIVE BOX
Patient is a 87y old  Female who presents with a chief complaint of "They sent me to here because I have a UTI"  Brought in by EMS with AMS (05 Jul 2018 13:57)       INTERVAL HPI/OVERNIGHT EVENTS: Patient seen and examined at bedside this am. No acute overnight events. Patient denies all complaints at this time.     MEDICATIONS  (STANDING):  ALBUTerol    0.083% 2.5 milliGRAM(s) Nebulizer every 8 hours  aspirin  chewable 81 milliGRAM(s) Oral daily  cholecalciferol 1000 Unit(s) Oral daily  enoxaparin Injectable 30 milliGRAM(s) SubCutaneous every 24 hours  ertapenem  IVPB 1000 milliGRAM(s) IV Intermittent every 24 hours  ibuprofen  Tablet 800 milliGRAM(s) Oral daily  iohexol 300 mG (iodine)/mL Oral Solution 30 milliLiter(s) Oral once  lactobacillus acidophilus 1 Tablet(s) Oral daily  levothyroxine 75 MICROGram(s) Oral daily  lidocaine/prilocaine Cream 1 Application(s) Topical daily  loratadine 10 milliGRAM(s) Oral daily  metoprolol succinate ER 12.5 milliGRAM(s) Oral daily  petrolatum white Ointment 1 Application(s) Topical three times a day  simvastatin 10 milliGRAM(s) Oral at bedtime    MEDICATIONS  (PRN):  guaiFENesin    Syrup 200 milliGRAM(s) Oral every 6 hours PRN Cough  meclizine 12.5 milliGRAM(s) Oral every 12 hours PRN Dizziness      Allergies    erythromycin (Hives)    Intolerances    REVIEW OF SYSTEMS:  CONSTITUTIONAL: No fever or chills  HEENT: no difficulty swallowing, no ringing in ears  RESPIRATORY: admits to improving cough, no wheezing, or shortness of breath  CARDIOVASCULAR: No chest pain, palpitations, or leg swelling  GASTROINTESTINAL: No abd pain, nausea, vomiting, or diarrhea  GENITOURINARY: No dysuria, frequency, or hematuria  NEUROLOGICAL: no focal weakness or dizziness  MUSCULOSKELETAL: no myalgias, no  new joint pains  SKIN: admits to bruising from previous blood draws, no new bruises, no rashes   10 systems reviewed and negative unless otherwise noted      Vital Signs Last 24 Hrs  T(C): 36.7 (13 Jul 2018 14:31), Max: 36.9 (12 Jul 2018 20:52)  T(F): 98 (13 Jul 2018 14:31), Max: 98.5 (12 Jul 2018 20:52)  HR: 80 (13 Jul 2018 15:54) (78 - 83)  BP: 103/66 (13 Jul 2018 14:31) (103/66 - 114/70)  BP(mean): --  RR: 17 (13 Jul 2018 14:31) (16 - 17)  SpO2: 96% (13 Jul 2018 15:54) (96% - 99%)    PHYSICAL EXAM:  GENERAL: NAD, resting comfortably in bed   HEENT:  EOMI, moist mucous membranes  CHEST/LUNG:  CTA b/l, no rales, wheezes, or rhonchi  HEART:  RRR, S1, S2, no murmurs   ABDOMEN:  BS+, soft, nontender, nondistended  EXTREMITIES: no edema, cyanosis, or calf tenderness  NERVOUS SYSTEM: AA&Ox3  SKIN: no rashes or open wounds. diffuse ecchymosis on b/l UE forearms from previous blood draws, improving     LABS:    CBC Full  -  ( 12 Jul 2018 08:03 )  WBC Count : 8.58 K/uL  Hemoglobin : 9.4 g/dL  Hematocrit : 28.9 %  Platelet Count - Automated : 512 K/uL  Mean Cell Volume : 96.0 fl  Mean Cell Hemoglobin : 31.2 pg  Mean Cell Hemoglobin Concentration : 32.5 gm/dL  Auto Neutrophil # : 4.46 K/uL  Auto Lymphocyte # : 1.72 K/uL  Auto Monocyte # : 1.29 K/uL  Auto Eosinophil # : 0.94 K/uL  Auto Basophil # : 0.00 K/uL  Auto Neutrophil % : 47.0 %  Auto Lymphocyte % : 20.0 %  Auto Monocyte % : 15.0 %  Auto Eosinophil % : 11.0 %  Auto Basophil % : 0.0 %      Ca    9.0        12 Jul 2018 08:03          CAPILLARY BLOOD GLUCOSE            Culture - Urine (collected 07-09-18 @ 21:15)  Source: .Urine Clean Catch (Midstream)  Final Report (07-10-18 @ 21:39):    No growth    Culture - Blood (collected 07-09-18 @ 19:09)  Source: .Blood Blood-Peripheral  Preliminary Report (07-10-18 @ 20:01):    No growth to date.    Culture - Blood (collected 07-09-18 @ 19:09)  Source: .Blood Blood  Gram Stain (07-13-18 @ 09:00):    Growth in anaerobic bottle: Gram Negative Rods  Preliminary Report (07-13-18 @ 09:00):    Growth in anaerobic bottle: Gram Negative Rods    Culture - Blood (collected 07-08-18 @ 11:20)  Source: .Blood Blood-Peripheral  Final Report (07-13-18 @ 12:00):    No growth at 5 days.    Culture - Blood (collected 07-08-18 @ 11:19)  Source: .Blood Blood-Venous  Final Report (07-13-18 @ 12:00):    No growth at 5 days.        RADIOLOGY & ADDITIONAL TESTS:    Personally reviewed.     Consultant(s) Notes Reviewed:  [x] YES  [ ] NO

## 2018-07-13 NOTE — PROGRESS NOTE ADULT - PROBLEM SELECTOR PLAN 1
considering that this appears to be secondary to pyelonephritis  it is not clear that the presence of detected bacteremia is associated with any increase in risk or should impact recommendations for therapy.   would finish course of ertapenem with last dose 7/14. Although it would be ideal to document negative blood cultures prior to discharge with this clinical scenario and noted susceptible E coli understand patient's not wanting further testing.  If patient has fever, discomfort or any clinical concerns she will need to reconsider her decision.

## 2018-07-13 NOTE — PROGRESS NOTE ADULT - PROBLEM SELECTOR PLAN 1
WBC trending down  responding to ABX  ID follow up  am WBC pending  oral and skin care  supportive measures  dc planning under way

## 2018-07-13 NOTE — PROGRESS NOTE ADULT - SUBJECTIVE AND OBJECTIVE BOX
Date/Time Patient Seen:  		  Referring MD:   Data Reviewed	       Patient is a 87y old  Female who presents with a chief complaint of "They sent me to here because I have a UTI"  Brought in by EMS with [facility]MS (05 Jul 2018 13:57)  in bed  seen and examined  vs and meds reviewed        Subjective/HPI     PAST MEDICAL & SURGICAL HISTORY:  Arthritis  Hypothyroid  Hyperlipidemia  Seasonal Allergies  Osteoporosis  Migraine  Cataract: s/p cataract surgery  Edema of Leg: b/l LE  Chronic Edema  Seasonal Rhinitis  Hypercholesteremia  Hypothyroidism  Anxiety  Clostridium Difficile Infection  S/P cataract surgery: bialteral  S/P ovarian cystectomy  S/P hip replacement  S/P knee replacement, bilateral: Partial R) hip  Hip Replacement: left hip  Knee Replacement: b/l        Medication list         MEDICATIONS  (STANDING):  ALBUTerol    0.083% 2.5 milliGRAM(s) Nebulizer every 8 hours  aspirin  chewable 81 milliGRAM(s) Oral daily  cholecalciferol 1000 Unit(s) Oral daily  enoxaparin Injectable 30 milliGRAM(s) SubCutaneous every 24 hours  ertapenem  IVPB 1000 milliGRAM(s) IV Intermittent every 24 hours  ibuprofen  Tablet 800 milliGRAM(s) Oral daily  iohexol 300 mG (iodine)/mL Oral Solution 30 milliLiter(s) Oral once  lactobacillus acidophilus 1 Tablet(s) Oral daily  levothyroxine 75 MICROGram(s) Oral daily  lidocaine/prilocaine Cream 1 Application(s) Topical daily  loratadine 10 milliGRAM(s) Oral daily  metoprolol succinate ER 12.5 milliGRAM(s) Oral daily  petrolatum white Ointment 1 Application(s) Topical three times a day  simvastatin 10 milliGRAM(s) Oral at bedtime    MEDICATIONS  (PRN):  guaiFENesin    Syrup 200 milliGRAM(s) Oral every 6 hours PRN Cough  meclizine 12.5 milliGRAM(s) Oral every 12 hours PRN Dizziness         Vitals log        ICU Vital Signs Last 24 Hrs  T(C): 36.5 (13 Jul 2018 05:15), Max: 36.9 (12 Jul 2018 20:52)  T(F): 97.7 (13 Jul 2018 05:15), Max: 98.5 (12 Jul 2018 20:52)  HR: 78 (13 Jul 2018 05:15) (78 - 83)  BP: 114/70 (13 Jul 2018 05:15) (106/66 - 114/70)  BP(mean): --  ABP: --  ABP(mean): --  RR: 16 (13 Jul 2018 05:15) (16 - 17)  SpO2: 96% (13 Jul 2018 05:15) (96% - 98%)           Input and Output:  I&O's Detail    11 Jul 2018 07:01  -  12 Jul 2018 07:00  --------------------------------------------------------  IN:    Oral Fluid: 550 mL  Total IN: 550 mL    OUT:  Total OUT: 0 mL    Total NET: 550 mL      12 Jul 2018 07:01  -  13 Jul 2018 06:19  --------------------------------------------------------  IN:    Oral Fluid: 720 mL    Solution: 50 mL  Total IN: 770 mL    OUT:  Total OUT: 0 mL    Total NET: 770 mL          Lab Data                        9.4    8.58  )-----------( 512      ( 12 Jul 2018 08:03 )             28.9     07-12    144  |  112<H>  |  21  ----------------------------<  90  4.7   |  26  |  0.83    Ca    9.0      12 Jul 2018 08:03  Phos  3.2     07-12  Mg     2.3     07-12              Review of Systems	      Objective     Physical Examination    heart s1s2  lung dec BS  abd soft      Pertinent Lab findings & Imaging      Emily:  NO   Adequate UO     I&O's Detail    11 Jul 2018 07:01  -  12 Jul 2018 07:00  --------------------------------------------------------  IN:    Oral Fluid: 550 mL  Total IN: 550 mL    OUT:  Total OUT: 0 mL    Total NET: 550 mL      12 Jul 2018 07:01  -  13 Jul 2018 06:19  --------------------------------------------------------  IN:    Oral Fluid: 720 mL    Solution: 50 mL  Total IN: 770 mL    OUT:  Total OUT: 0 mL    Total NET: 770 mL               Discussed with:     Cultures:	        Radiology

## 2018-07-13 NOTE — PROGRESS NOTE ADULT - ATTENDING COMMENTS
I personally conducted a physical examination of the patient. I personally gathered the patient's history. I edited the above listed findings which were prepared by the listed resident physician. I personally discussed the plan of care with the patient. The questions and concerns were addressed to the best of my ability. The patient is in agreement with the listed treatment plan.     A/P: blood cultures positive. will observe over the weekend. if afebrile, will d/c to [facility] Monday. tomorrow is 10th and final day of invanz. I personally conducted a physical examination of the patient. I personally gathered the patient's history. I edited the above listed findings which were prepared by the listed resident physician. I personally discussed the plan of care with the patient. The questions and concerns were addressed to the best of my ability. The patient is in agreement with the listed treatment plan.     A/P: blood cultures positive. will observe over the weekend. if afebrile, will d/c to [facility] Monday. tomorrow is 10th and final day of invanz. pt declining further lab draws.

## 2018-07-13 NOTE — PROGRESS NOTE ADULT - PROBLEM SELECTOR PLAN 2
CT chest reviewed  GGO  dysphagia  asp prec  albuterol NEBS  may need Diuresis PRN  oral and skin care  HOB elev  assist with ADL  dc planning for [facility] in progress

## 2018-07-13 NOTE — PROGRESS NOTE ADULT - ASSESSMENT
88 yo female admitted with confusion and E coli bacteremia likely from a urinary source  L perinephric stranding on chest CT-- pyelonephritis patient on effective antibiotics starting 6/30

## 2018-07-14 LAB
CULTURE RESULTS: SIGNIFICANT CHANGE UP
SPECIMEN SOURCE: SIGNIFICANT CHANGE UP

## 2018-07-14 PROCEDURE — 99233 SBSQ HOSP IP/OBS HIGH 50: CPT

## 2018-07-14 RX ORDER — LANOLIN ALCOHOL/MO/W.PET/CERES
3 CREAM (GRAM) TOPICAL ONCE
Qty: 0 | Refills: 0 | Status: COMPLETED | OUTPATIENT
Start: 2018-07-14 | End: 2018-07-14

## 2018-07-14 RX ADMIN — ENOXAPARIN SODIUM 30 MILLIGRAM(S): 100 INJECTION SUBCUTANEOUS at 06:31

## 2018-07-14 RX ADMIN — Medication 1 APPLICATION(S): at 13:34

## 2018-07-14 RX ADMIN — Medication 81 MILLIGRAM(S): at 13:33

## 2018-07-14 RX ADMIN — LIDOCAINE AND PRILOCAINE CREAM 1 APPLICATION(S): 25; 25 CREAM TOPICAL at 12:00

## 2018-07-14 RX ADMIN — ALBUTEROL 2.5 MILLIGRAM(S): 90 AEROSOL, METERED ORAL at 23:17

## 2018-07-14 RX ADMIN — Medication 12.5 MILLIGRAM(S): at 06:31

## 2018-07-14 RX ADMIN — Medication 3 MILLIGRAM(S): at 00:35

## 2018-07-14 RX ADMIN — Medication 200 MILLIGRAM(S): at 00:35

## 2018-07-14 RX ADMIN — SIMVASTATIN 10 MILLIGRAM(S): 20 TABLET, FILM COATED ORAL at 22:40

## 2018-07-14 RX ADMIN — Medication 1000 UNIT(S): at 13:33

## 2018-07-14 RX ADMIN — Medication 1 TABLET(S): at 13:33

## 2018-07-14 RX ADMIN — LORATADINE 10 MILLIGRAM(S): 10 TABLET ORAL at 13:33

## 2018-07-14 RX ADMIN — ALBUTEROL 2.5 MILLIGRAM(S): 90 AEROSOL, METERED ORAL at 15:26

## 2018-07-14 RX ADMIN — Medication 75 MICROGRAM(S): at 06:31

## 2018-07-14 RX ADMIN — Medication 800 MILLIGRAM(S): at 13:33

## 2018-07-14 RX ADMIN — ERTAPENEM SODIUM 120 MILLIGRAM(S): 1 INJECTION, POWDER, LYOPHILIZED, FOR SOLUTION INTRAMUSCULAR; INTRAVENOUS at 13:49

## 2018-07-14 RX ADMIN — Medication 1 APPLICATION(S): at 22:40

## 2018-07-14 RX ADMIN — Medication 1 APPLICATION(S): at 06:31

## 2018-07-14 RX ADMIN — Medication 800 MILLIGRAM(S): at 14:00

## 2018-07-14 RX ADMIN — ALBUTEROL 2.5 MILLIGRAM(S): 90 AEROSOL, METERED ORAL at 07:45

## 2018-07-14 NOTE — PROGRESS NOTE ADULT - SUBJECTIVE AND OBJECTIVE BOX
Date/Time Patient Seen:  		  Referring MD:   Data Reviewed	       Patient is a 87y old  Female who presents with a chief complaint of "They sent me to here because I have a UTI"  Brought in by EMS with AMS (05 Jul 2018 13:57)  on room air      Subjective/HPI     PAST MEDICAL & SURGICAL HISTORY:  Arthritis  Hypothyroid  Hyperlipidemia  Seasonal Allergies  Osteoporosis  Migraine  Cataract: s/p cataract surgery  Edema of Leg: b/l LE  Chronic Edema  Seasonal Rhinitis  Hypercholesteremia  Hypothyroidism  Anxiety  Clostridium Difficile Infection  S/P cataract surgery: bialteral  S/P ovarian cystectomy  S/P hip replacement  S/P knee replacement, bilateral: Partial R) hip  Hip Replacement: left hip  Knee Replacement: b/l        Medication list         MEDICATIONS  (STANDING):  ALBUTerol    0.083% 2.5 milliGRAM(s) Nebulizer every 8 hours  aspirin  chewable 81 milliGRAM(s) Oral daily  cholecalciferol 1000 Unit(s) Oral daily  enoxaparin Injectable 30 milliGRAM(s) SubCutaneous every 24 hours  ertapenem  IVPB 1000 milliGRAM(s) IV Intermittent every 24 hours  ibuprofen  Tablet 800 milliGRAM(s) Oral daily  iohexol 300 mG (iodine)/mL Oral Solution 30 milliLiter(s) Oral once  lactobacillus acidophilus 1 Tablet(s) Oral daily  levothyroxine 75 MICROGram(s) Oral daily  lidocaine/prilocaine Cream 1 Application(s) Topical daily  loratadine 10 milliGRAM(s) Oral daily  metoprolol succinate ER 12.5 milliGRAM(s) Oral daily  petrolatum white Ointment 1 Application(s) Topical three times a day  simvastatin 10 milliGRAM(s) Oral at bedtime    MEDICATIONS  (PRN):  guaiFENesin    Syrup 200 milliGRAM(s) Oral every 6 hours PRN Cough  meclizine 12.5 milliGRAM(s) Oral every 12 hours PRN Dizziness         Vitals log        ICU Vital Signs Last 24 Hrs  T(C): 36.6 (14 Jul 2018 05:16), Max: 36.7 (13 Jul 2018 14:31)  T(F): 97.9 (14 Jul 2018 05:16), Max: 98 (13 Jul 2018 14:31)  HR: 75 (14 Jul 2018 05:16) (70 - 86)  BP: 128/74 (14 Jul 2018 05:16) (103/66 - 128/74)  BP(mean): --  ABP: --  ABP(mean): --  RR: 16 (14 Jul 2018 05:16) (16 - 17)  SpO2: 98% (14 Jul 2018 05:16) (96% - 100%)           Input and Output:  I&O's Detail    12 Jul 2018 07:01  -  13 Jul 2018 07:00  --------------------------------------------------------  IN:    Oral Fluid: 720 mL    Solution: 50 mL  Total IN: 770 mL    OUT:  Total OUT: 0 mL    Total NET: 770 mL      13 Jul 2018 07:01  -  14 Jul 2018 05:55  --------------------------------------------------------  IN:    Solution: 50 mL  Total IN: 50 mL    OUT:  Total OUT: 0 mL    Total NET: 50 mL          Lab Data                        9.4    8.58  )-----------( 512      ( 12 Jul 2018 08:03 )             28.9     07-12    144  |  112<H>  |  21  ----------------------------<  90  4.7   |  26  |  0.83    Ca    9.0      12 Jul 2018 08:03  Phos  3.2     07-12  Mg     2.3     07-12              Review of Systems	      Objective     Physical Examination    heart s1s2  lung dec BS  abd soft      Pertinent Lab findings & Imaging      Emily:  NO   Adequate UO     I&O's Detail    12 Jul 2018 07:01  -  13 Jul 2018 07:00  --------------------------------------------------------  IN:    Oral Fluid: 720 mL    Solution: 50 mL  Total IN: 770 mL    OUT:  Total OUT: 0 mL    Total NET: 770 mL      13 Jul 2018 07:01  -  14 Jul 2018 05:55  --------------------------------------------------------  IN:    Solution: 50 mL  Total IN: 50 mL    OUT:  Total OUT: 0 mL    Total NET: 50 mL               Discussed with:     Cultures:	        Radiology

## 2018-07-14 NOTE — PROGRESS NOTE ADULT - SUBJECTIVE AND OBJECTIVE BOX
Patient is a 87y old  Female who presents with a chief complaint of "They sent me to here because I have a UTI"  Brought in by EMS with AMS (05 Jul 2018 13:57)       INTERVAL HPI/ OVERNIGHT EVENTS: Feeling better. Denies any new symptoms, complaints.     MEDICATIONS  (STANDING):  ALBUTerol    0.083% 2.5 milliGRAM(s) Nebulizer every 8 hours  aspirin  chewable 81 milliGRAM(s) Oral daily  cholecalciferol 1000 Unit(s) Oral daily  enoxaparin Injectable 30 milliGRAM(s) SubCutaneous every 24 hours  ertapenem  IVPB 1000 milliGRAM(s) IV Intermittent every 24 hours  ibuprofen  Tablet 800 milliGRAM(s) Oral daily  iohexol 300 mG (iodine)/mL Oral Solution 30 milliLiter(s) Oral once  lactobacillus acidophilus 1 Tablet(s) Oral daily  levothyroxine 75 MICROGram(s) Oral daily  lidocaine/prilocaine Cream 1 Application(s) Topical daily  loratadine 10 milliGRAM(s) Oral daily  metoprolol succinate ER 12.5 milliGRAM(s) Oral daily  petrolatum white Ointment 1 Application(s) Topical three times a day  simvastatin 10 milliGRAM(s) Oral at bedtime    MEDICATIONS  (PRN):  guaiFENesin    Syrup 200 milliGRAM(s) Oral every 6 hours PRN Cough  meclizine 12.5 milliGRAM(s) Oral every 12 hours PRN Dizziness      Allergies    erythromycin (Hives)    Intolerances        REVIEW OF SYSTEMS:  All negative except as above.   Vital Signs Last 24 Hrs  T(C): 36.6 (14 Jul 2018 05:16), Max: 36.7 (13 Jul 2018 14:31)  T(F): 97.9 (14 Jul 2018 05:16), Max: 98 (13 Jul 2018 14:31)  HR: 75 (14 Jul 2018 05:16) (70 - 86)  BP: 128/74 (14 Jul 2018 05:16) (103/66 - 128/74)  BP(mean): --  RR: 16 (14 Jul 2018 05:16) (16 - 17)  SpO2: 98% (14 Jul 2018 05:16) (96% - 100%)    PHYSICAL EXAM:  GENERAL: NAD, Awake, Alert   HEAD:  Atraumatic, Normocephalic  EYES: EOMI, PERRLA, conjunctiva and sclera clear  ENMT: No tonsillar erythema, exudates, or enlargement; Moist mucous membranes  NECK: Supple, No JVD, Normal thyroid  NERVOUS SYSTEM:  Alert & Oriented X3, Good concentration, non focal   CHEST/LUNG: Clear to auscultation bilaterally; No rales, rhonchi, wheezing, or rubs  HEART: S1S2+, Regular rate and rhythm  ABDOMEN: Soft, Nontender, Nondistended; Bowel sounds present  EXTREMITIES:  2+ Peripheral Pulses, No clubbing, cyanosis, or edema  LYMPH: No lymphadenopathy noted      LABS:            CAPILLARY BLOOD GLUCOSE        BLOOD CULTURE    RADIOLOGY & ADDITIONAL TESTS:    Imaging Personally Reviewed:  [ ] YES     Consultant(s) Notes Reviewed:      Care Discussed with Consultants/Other Providers:

## 2018-07-14 NOTE — PROGRESS NOTE ADULT - PROBLEM SELECTOR PLAN 2
ct chest reviewed  aspiration in differential  dysphagia diet  NEBS  chest pt  suction prn  assist with ADL  monitor vs and HD and Sat  keep sat > 88 pct  will need follow up as outpatient and ct chest in 12 weeks

## 2018-07-14 NOTE — PROGRESS NOTE ADULT - ATTENDING COMMENTS
Continue IV Invanz   Repeat blood cultures.  ID f/u  Patient agreeing to stay. D/w Daughter at bedside.

## 2018-07-14 NOTE — PROGRESS NOTE ADULT - PROBLEM SELECTOR PLAN 1
- Bacteremia:  Blood Cx +  for GNR E.Coli 6/30/18, then negative on 7/1/18. Repeat blood cultures on 7/5/18 are growing gram negative rods - invanz was started on 7/5/18. Repeat blood cultures 7/9/18 growing gram negative rods. Continue invanz  - WBC continues to downtrend.  -Will order repeat blood cultures for today

## 2018-07-14 NOTE — PROGRESS NOTE ADULT - ASSESSMENT
87F with PMH of hypothyroidism, HLD, and arthritis presents with weakness, admitted with UTI, GNR bacteremia

## 2018-07-15 LAB
-  AMIKACIN: SIGNIFICANT CHANGE UP
-  AMPICILLIN/SULBACTAM: SIGNIFICANT CHANGE UP
-  AMPICILLIN: SIGNIFICANT CHANGE UP
-  AZTREONAM: SIGNIFICANT CHANGE UP
-  CEFAZOLIN: SIGNIFICANT CHANGE UP
-  CEFEPIME: SIGNIFICANT CHANGE UP
-  CEFOXITIN: SIGNIFICANT CHANGE UP
-  CEFTRIAXONE: SIGNIFICANT CHANGE UP
-  CIPROFLOXACIN: SIGNIFICANT CHANGE UP
-  ERTAPENEM: SIGNIFICANT CHANGE UP
-  GENTAMICIN: SIGNIFICANT CHANGE UP
-  IMIPENEM: SIGNIFICANT CHANGE UP
-  LEVOFLOXACIN: SIGNIFICANT CHANGE UP
-  MEROPENEM: SIGNIFICANT CHANGE UP
-  PIPERACILLIN/TAZOBACTAM: SIGNIFICANT CHANGE UP
-  TOBRAMYCIN: SIGNIFICANT CHANGE UP
-  TRIMETHOPRIM/SULFAMETHOXAZOLE: SIGNIFICANT CHANGE UP
ANION GAP SERPL CALC-SCNC: 7 MMOL/L — SIGNIFICANT CHANGE UP (ref 5–17)
BUN SERPL-MCNC: 22 MG/DL — SIGNIFICANT CHANGE UP (ref 7–23)
CALCIUM SERPL-MCNC: 8.6 MG/DL — SIGNIFICANT CHANGE UP (ref 8.5–10.1)
CHLORIDE SERPL-SCNC: 112 MMOL/L — HIGH (ref 96–108)
CO2 SERPL-SCNC: 26 MMOL/L — SIGNIFICANT CHANGE UP (ref 22–31)
CREAT SERPL-MCNC: 0.82 MG/DL — SIGNIFICANT CHANGE UP (ref 0.5–1.3)
CULTURE RESULTS: SIGNIFICANT CHANGE UP
GLUCOSE SERPL-MCNC: 89 MG/DL — SIGNIFICANT CHANGE UP (ref 70–99)
HCT VFR BLD CALC: 26 % — LOW (ref 34.5–45)
HGB BLD-MCNC: 8.2 G/DL — LOW (ref 11.5–15.5)
MCHC RBC-ENTMCNC: 30.6 PG — SIGNIFICANT CHANGE UP (ref 27–34)
MCHC RBC-ENTMCNC: 31.5 GM/DL — LOW (ref 32–36)
MCV RBC AUTO: 97 FL — SIGNIFICANT CHANGE UP (ref 80–100)
METHOD TYPE: SIGNIFICANT CHANGE UP
NRBC # BLD: 0 /100 WBCS — SIGNIFICANT CHANGE UP (ref 0–0)
ORGANISM # SPEC MICROSCOPIC CNT: SIGNIFICANT CHANGE UP
ORGANISM # SPEC MICROSCOPIC CNT: SIGNIFICANT CHANGE UP
PLATELET # BLD AUTO: 564 K/UL — HIGH (ref 150–400)
POTASSIUM SERPL-MCNC: 3.9 MMOL/L — SIGNIFICANT CHANGE UP (ref 3.5–5.3)
POTASSIUM SERPL-SCNC: 3.9 MMOL/L — SIGNIFICANT CHANGE UP (ref 3.5–5.3)
RBC # BLD: 2.68 M/UL — LOW (ref 3.8–5.2)
RBC # FLD: 14.6 % — HIGH (ref 10.3–14.5)
SODIUM SERPL-SCNC: 145 MMOL/L — SIGNIFICANT CHANGE UP (ref 135–145)
SPECIMEN SOURCE: SIGNIFICANT CHANGE UP
WBC # BLD: 8.91 K/UL — SIGNIFICANT CHANGE UP (ref 3.8–10.5)
WBC # FLD AUTO: 8.91 K/UL — SIGNIFICANT CHANGE UP (ref 3.8–10.5)

## 2018-07-15 PROCEDURE — 99233 SBSQ HOSP IP/OBS HIGH 50: CPT

## 2018-07-15 RX ORDER — ACETAMINOPHEN 500 MG
650 TABLET ORAL EVERY 6 HOURS
Qty: 0 | Refills: 0 | Status: DISCONTINUED | OUTPATIENT
Start: 2018-07-15 | End: 2018-07-16

## 2018-07-15 RX ADMIN — Medication 200 MILLIGRAM(S): at 21:48

## 2018-07-15 RX ADMIN — ALBUTEROL 2.5 MILLIGRAM(S): 90 AEROSOL, METERED ORAL at 13:42

## 2018-07-15 RX ADMIN — ALBUTEROL 2.5 MILLIGRAM(S): 90 AEROSOL, METERED ORAL at 22:50

## 2018-07-15 RX ADMIN — LIDOCAINE AND PRILOCAINE CREAM 1 APPLICATION(S): 25; 25 CREAM TOPICAL at 11:33

## 2018-07-15 RX ADMIN — ENOXAPARIN SODIUM 30 MILLIGRAM(S): 100 INJECTION SUBCUTANEOUS at 06:46

## 2018-07-15 RX ADMIN — SIMVASTATIN 10 MILLIGRAM(S): 20 TABLET, FILM COATED ORAL at 21:21

## 2018-07-15 RX ADMIN — Medication 800 MILLIGRAM(S): at 11:27

## 2018-07-15 RX ADMIN — Medication 75 MICROGRAM(S): at 06:46

## 2018-07-15 RX ADMIN — Medication 81 MILLIGRAM(S): at 11:28

## 2018-07-15 RX ADMIN — Medication 1000 UNIT(S): at 11:28

## 2018-07-15 RX ADMIN — Medication 650 MILLIGRAM(S): at 22:52

## 2018-07-15 RX ADMIN — Medication 1 APPLICATION(S): at 21:21

## 2018-07-15 RX ADMIN — ALBUTEROL 2.5 MILLIGRAM(S): 90 AEROSOL, METERED ORAL at 07:39

## 2018-07-15 RX ADMIN — Medication 12.5 MILLIGRAM(S): at 06:45

## 2018-07-15 RX ADMIN — Medication 650 MILLIGRAM(S): at 22:23

## 2018-07-15 RX ADMIN — Medication 1 TABLET(S): at 11:28

## 2018-07-15 RX ADMIN — Medication 800 MILLIGRAM(S): at 11:32

## 2018-07-15 RX ADMIN — Medication 1 APPLICATION(S): at 06:46

## 2018-07-15 RX ADMIN — Medication 1 APPLICATION(S): at 11:37

## 2018-07-15 NOTE — PROGRESS NOTE ADULT - PROBLEM SELECTOR PLAN 1
completed ABX  nebs prn  oral and skin care  asp prec  dysphagia diet  will need CT chest repeat in 12 weeks  tolerating room air   dc planning under way  am WBC pending  out of bed as tolerated  nutrition  pt is DNR

## 2018-07-15 NOTE — PROGRESS NOTE ADULT - SUBJECTIVE AND OBJECTIVE BOX
Patient is a 87y old  Female who presents with a chief complaint of "They sent me to here because I have a UTI"  Brought in by EMS with AMS (05 Jul 2018 13:57)       INTERVAL HPI/OVERNIGHT EVENTS: No new symptoms, complaints, events. Denies chest pain, palpitation, sob. + bowel movement     MEDICATIONS  (STANDING):  ALBUTerol    0.083% 2.5 milliGRAM(s) Nebulizer every 8 hours  aspirin  chewable 81 milliGRAM(s) Oral daily  cholecalciferol 1000 Unit(s) Oral daily  enoxaparin Injectable 30 milliGRAM(s) SubCutaneous every 24 hours  ibuprofen  Tablet 800 milliGRAM(s) Oral daily  iohexol 300 mG (iodine)/mL Oral Solution 30 milliLiter(s) Oral once  lactobacillus acidophilus 1 Tablet(s) Oral daily  levothyroxine 75 MICROGram(s) Oral daily  lidocaine/prilocaine Cream 1 Application(s) Topical daily  loratadine 10 milliGRAM(s) Oral daily  metoprolol succinate ER 12.5 milliGRAM(s) Oral daily  petrolatum white Ointment 1 Application(s) Topical three times a day  simvastatin 10 milliGRAM(s) Oral at bedtime    MEDICATIONS  (PRN):  guaiFENesin    Syrup 200 milliGRAM(s) Oral every 6 hours PRN Cough  meclizine 12.5 milliGRAM(s) Oral every 12 hours PRN Dizziness      Allergies    erythromycin (Hives)    Intolerances        REVIEW OF SYSTEMS:  All negative except as above     Vital Signs Last 24 Hrs  T(C): 36.8 (15 Jul 2018 04:31), Max: 36.8 (14 Jul 2018 20:43)  T(F): 98.2 (15 Jul 2018 04:31), Max: 98.2 (14 Jul 2018 20:43)  HR: 84 (15 Jul 2018 07:41) (71 - 87)  BP: 112/67 (15 Jul 2018 04:31) (105/64 - 116/70)  BP(mean): --  RR: 16 (15 Jul 2018 04:31) (16 - 17)  SpO2: 96% (15 Jul 2018 07:41) (96% - 98%)    PHYSICAL EXAM:  GENERAL: NAD, Frail, Awake, alert   HEAD:  Atraumatic, Normocephalic  EYES: EOMI, PERRLA, conjunctiva and sclera clear  ENMT: No tonsillar erythema, exudates, or enlargement; Moist mucous membranes  NECK: Supple, No JVD, Normal thyroid  NERVOUS SYSTEM:  Alert & Awake, non focal   CHEST/LUNG: Clear to auscultation bilaterally; No rales, rhonchi, wheezing, or rubs  HEART: S1S2+, Regular rate and rhythm  ABDOMEN: Soft, Nontender, Nondistended; Bowel sounds present  EXTREMITIES:  2+ Peripheral Pulses, No clubbing, cyanosis, or edema  LYMPH: No lymphadenopathy noted    LABS:                        8.2    8.91  )-----------( 564      ( 15 Jul 2018 05:08 )             26.0     15 Jul 2018 05:08    145    |  112    |  22     ----------------------------<  89     3.9     |  26     |  0.82     Ca    8.6        15 Jul 2018 05:08        CAPILLARY BLOOD GLUCOSE        BLOOD CULTURE    RADIOLOGY & ADDITIONAL TESTS:    Imaging Personally Reviewed:  [ ] YES     Consultant(s) Notes Reviewed:      Care Discussed with Consultants/Other Providers:

## 2018-07-15 NOTE — PROGRESS NOTE ADULT - PROBLEM SELECTOR PLAN 1
- Bacteremia:  Blood Cx +  for GNR E.Coli 6/30/18, then negative on 7/1/18. Repeat blood cultures on 7/5/18 are growing gram negative rods - invanz was started on 7/5/18. Repeat blood cultures 7/9/18 growing gram negative rods. Continue invanz  - WBC continues to downtrend.  -Repeat blood cultures pending

## 2018-07-15 NOTE — PROGRESS NOTE ADULT - NSHPATTENDINGPLANDISCUSS_GEN_ALL_CORE
Dr Owen
housestaff
pt, RN
pt, RN
patient, daughter Corry, consults
pt, RN, SW, CM, residency team
patient, family, resident, consult
patient, daughter, consult
patient, daughter, resident, consult
Patient, Daughter, RN
pt, RN, SW, CM, residency team, ID
pt, residency team, RN, SW, CM
Patient, RN, Consultants
pt, RN, SW, RN manager, SW, Cm, residency team
patient, daughter, resident, consult
pt, RN, SW, CM, residency team, ID, family
pt, RN, family, RN manager, residency team, RANDOLPH SCHUMACHER - d/c planning for Saturday 7/14/18

## 2018-07-15 NOTE — PROGRESS NOTE ADULT - SUBJECTIVE AND OBJECTIVE BOX
Date/Time Patient Seen:  		  Referring MD:   Data Reviewed	       Patient is a 87y old  Female who presents with a chief complaint of "They sent me to here because I have a UTI"  Brought in by EMS with AMS (05 Jul 2018 13:57)  in bed  on room air      Subjective/HPI     PAST MEDICAL & SURGICAL HISTORY:  Arthritis  Hypothyroid  Hyperlipidemia  Seasonal Allergies  Osteoporosis  Migraine  Cataract: s/p cataract surgery  Edema of Leg: b/l LE  Chronic Edema  Seasonal Rhinitis  Hypercholesteremia  Hypothyroidism  Anxiety  Clostridium Difficile Infection  S/P cataract surgery: bialteral  S/P ovarian cystectomy  S/P hip replacement  S/P knee replacement, bilateral: Partial R) hip  Hip Replacement: left hip  Knee Replacement: b/l        Medication list         MEDICATIONS  (STANDING):  ALBUTerol    0.083% 2.5 milliGRAM(s) Nebulizer every 8 hours  aspirin  chewable 81 milliGRAM(s) Oral daily  cholecalciferol 1000 Unit(s) Oral daily  enoxaparin Injectable 30 milliGRAM(s) SubCutaneous every 24 hours  ibuprofen  Tablet 800 milliGRAM(s) Oral daily  iohexol 300 mG (iodine)/mL Oral Solution 30 milliLiter(s) Oral once  lactobacillus acidophilus 1 Tablet(s) Oral daily  levothyroxine 75 MICROGram(s) Oral daily  lidocaine/prilocaine Cream 1 Application(s) Topical daily  loratadine 10 milliGRAM(s) Oral daily  metoprolol succinate ER 12.5 milliGRAM(s) Oral daily  petrolatum white Ointment 1 Application(s) Topical three times a day  simvastatin 10 milliGRAM(s) Oral at bedtime    MEDICATIONS  (PRN):  guaiFENesin    Syrup 200 milliGRAM(s) Oral every 6 hours PRN Cough  meclizine 12.5 milliGRAM(s) Oral every 12 hours PRN Dizziness         Vitals log        ICU Vital Signs Last 24 Hrs  T(C): 36.8 (15 Jul 2018 04:31), Max: 36.8 (14 Jul 2018 20:43)  T(F): 98.2 (15 Jul 2018 04:31), Max: 98.2 (14 Jul 2018 20:43)  HR: 84 (15 Jul 2018 04:31) (71 - 87)  BP: 112/67 (15 Jul 2018 04:31) (105/64 - 116/70)  BP(mean): --  ABP: --  ABP(mean): --  RR: 16 (15 Jul 2018 04:31) (16 - 17)  SpO2: 96% (15 Jul 2018 04:31) (96% - 98%)           Input and Output:  I&O's Detail    13 Jul 2018 07:01  -  14 Jul 2018 07:00  --------------------------------------------------------  IN:    Oral Fluid: 560 mL    Solution: 50 mL  Total IN: 610 mL    OUT:  Total OUT: 0 mL    Total NET: 610 mL      14 Jul 2018 07:01  -  15 Jul 2018 06:01  --------------------------------------------------------  IN:    Solution: 50 mL  Total IN: 50 mL    OUT:  Total OUT: 0 mL    Total NET: 50 mL          Lab Data                        8.2    8.91  )-----------( 564      ( 15 Jul 2018 05:08 )             26.0     07-15    145  |  112<H>  |  22  ----------------------------<  89  3.9   |  26  |  0.82    Ca    8.6      15 Jul 2018 05:08              Review of Systems	      Objective     Physical Examination    heart s1s2  lung dec BS  abd soft      Pertinent Lab findings & Imaging      Emily:  NO   Adequate UO     I&O's Detail    13 Jul 2018 07:01  -  14 Jul 2018 07:00  --------------------------------------------------------  IN:    Oral Fluid: 560 mL    Solution: 50 mL  Total IN: 610 mL    OUT:  Total OUT: 0 mL    Total NET: 610 mL      14 Jul 2018 07:01  -  15 Jul 2018 06:01  --------------------------------------------------------  IN:    Solution: 50 mL  Total IN: 50 mL    OUT:  Total OUT: 0 mL    Total NET: 50 mL               Discussed with:     Cultures:	        Radiology

## 2018-07-15 NOTE — PROGRESS NOTE ADULT - PROBLEM SELECTOR PLAN 10
DVT ppx:  Lovenox 30 mg daily + asa 81 mg
IMPROVE VTE Individual Risk Assessment          RISK                                                          Points  [  ] Previous VTE                                                3  [  ] Thrombophilia                                            2  [  ] Lower limb paralysis                                  2        (unable to hold up >15 seconds)    [  ] Current Cancer                                            2         (within 6 months)  [  ] Immobilization > 24 hrs                             1  [  ] ICU/CCU stay > 24 hours                           1  [ X ] Age > 60                                                1    IMPROVE VTE Score:  1    DVT ppx: heparin
DVT ppx:  Lovenox 30 mg daily + asa 81 mg
IMPROVE VTE Individual Risk Assessment          RISK                                                          Points  [  ] Previous VTE                                                3  [  ] Thrombophilia                                            2  [  ] Lower limb paralysis                                  2        (unable to hold up >15 seconds)    [  ] Current Cancer                                            2         (within 6 months)  [  ] Immobilization > 24 hrs                             1  [  ] ICU/CCU stay > 24 hours                           1  [ X ] Age > 60                                                1    IMPROVE VTE Score:  1    DVT ppx: heparin
DVT ppx:  Lovenox 30 mg daily + asa 81 mg
IMPROVE VTE Individual Risk Assessment          RISK                                                          Points  [  ] Previous VTE                                                3  [  ] Thrombophilia                                            2  [  ] Lower limb paralysis                                  2        (unable to hold up >15 seconds)    [  ] Current Cancer                                            2         (within 6 months)  [  ] Immobilization > 24 hrs                             1  [  ] ICU/CCU stay > 24 hours                           1  [ X ] Age > 60                                                1    IMPROVE VTE Score:  1    DVT ppx: heparin

## 2018-07-16 VITALS — OXYGEN SATURATION: 98 %

## 2018-07-16 PROCEDURE — 99239 HOSP IP/OBS DSCHRG MGMT >30: CPT

## 2018-07-16 RX ORDER — IBUPROFEN 200 MG
1 TABLET ORAL
Qty: 0 | Refills: 0 | COMMUNITY

## 2018-07-16 RX ORDER — METOPROLOL TARTRATE 50 MG
0.5 TABLET ORAL
Qty: 0 | Refills: 0 | COMMUNITY

## 2018-07-16 RX ORDER — PETROLATUM,WHITE
1 JELLY (GRAM) TOPICAL
Qty: 0 | Refills: 0 | COMMUNITY
Start: 2018-07-16

## 2018-07-16 RX ORDER — ENOXAPARIN SODIUM 100 MG/ML
30 INJECTION SUBCUTANEOUS
Qty: 0 | Refills: 0 | COMMUNITY
Start: 2018-07-16

## 2018-07-16 RX ORDER — LANOLIN ALCOHOL/MO/W.PET/CERES
1 CREAM (GRAM) TOPICAL
Qty: 0 | Refills: 0 | COMMUNITY

## 2018-07-16 RX ORDER — METOPROLOL TARTRATE 50 MG
12.5 TABLET ORAL
Qty: 0 | Refills: 0 | DISCHARGE
Start: 2018-07-16

## 2018-07-16 RX ADMIN — LORATADINE 10 MILLIGRAM(S): 10 TABLET ORAL at 12:05

## 2018-07-16 RX ADMIN — Medication 12.5 MILLIGRAM(S): at 05:48

## 2018-07-16 RX ADMIN — Medication 1 APPLICATION(S): at 05:49

## 2018-07-16 RX ADMIN — ALBUTEROL 2.5 MILLIGRAM(S): 90 AEROSOL, METERED ORAL at 08:05

## 2018-07-16 RX ADMIN — Medication 1 TABLET(S): at 12:05

## 2018-07-16 RX ADMIN — Medication 800 MILLIGRAM(S): at 12:05

## 2018-07-16 RX ADMIN — Medication 1 APPLICATION(S): at 14:22

## 2018-07-16 RX ADMIN — LIDOCAINE AND PRILOCAINE CREAM 1 APPLICATION(S): 25; 25 CREAM TOPICAL at 12:05

## 2018-07-16 RX ADMIN — Medication 75 MICROGRAM(S): at 05:48

## 2018-07-16 RX ADMIN — ALBUTEROL 2.5 MILLIGRAM(S): 90 AEROSOL, METERED ORAL at 15:45

## 2018-07-16 RX ADMIN — ENOXAPARIN SODIUM 30 MILLIGRAM(S): 100 INJECTION SUBCUTANEOUS at 05:48

## 2018-07-16 RX ADMIN — Medication 800 MILLIGRAM(S): at 12:40

## 2018-07-16 RX ADMIN — Medication 1000 UNIT(S): at 12:05

## 2018-07-16 RX ADMIN — Medication 81 MILLIGRAM(S): at 12:05

## 2018-07-16 NOTE — PROGRESS NOTE ADULT - SUBJECTIVE AND OBJECTIVE BOX
[facility], Division of Infectious Diseases  MARCIN Muniz A. Lee  [phone]  Name: ROSENDO ROSARIO  Age: 87y  Gender: Female  MRN: [id number]    Interval History--  Notes reviewed  no complaints  "Im going home today"    Past Medical History--  Arthritis  Hypothyroid  Hyperlipidemia  Seasonal Allergies  Osteoporosis  Migraine  Cataract  Edema of Leg  Chronic Edema  Seasonal Rhinitis  Hypercholesteremia  Hypothyroidism  Anxiety  Clostridium Difficile Infection  S/P cataract surgery  S/P ovarian cystectomy  S/P hip replacement  S/P knee replacement, bilateral  Hip Replacement  Knee Replacement      For details regarding the patient's social history, family history, and other miscellaneous elements, please refer the initial infectious diseases consultation and/or the admitting history and physical examination for this admission.    Allergies    erythromycin (Hives)    Intolerances        Medications--  Antibiotics:    Immunologic:    Other:  acetaminophen   Tablet. PRN  ALBUTerol    0.083%  aspirin  chewable  cholecalciferol  enoxaparin Injectable  guaiFENesin    Syrup PRN  ibuprofen  Tablet  iohexol 300 mG (iodine)/mL Oral Solution  lactobacillus acidophilus  levothyroxine  lidocaine/prilocaine Cream  loratadine  meclizine PRN  metoprolol succinate ER  petrolatum white Ointment  simvastatin      Review of Systems--  A 10-point review of systems was obtained.     Pertinent positives and negatives--  Constitutional: No fevers. No Chills. No Rigors.   Cardiovascular: No chest pain. No palpitations.  Respiratory: No shortness of breath. No cough.  Gastrointestinal: No nausea or vomiting. No diarrhea or constipation.   Psychiatric: + anxiety    Review of systems otherwise negative except as previously noted.    Physical Examination--  Vital Signs: T(F): 98.1 (07-16-18 @ 04:57), Max: 98.4 (07-15-18 @ 20:44)  HR: 79 (07-16-18 @ 08:05)  BP: 112/65 (07-16-18 @ 04:57)  RR: 17 (07-16-18 @ 04:57)  SpO2: 97% (07-16-18 @ 08:05)  Wt(kg): --  General: Nontoxic-appearing Female in no acute distress.  HEENT: AT/NC.nicteric. Conjunctiva pink and moist. Oropharynx clear. Dentition fair.  Neck: Not rigid. No sense of mass.  Nodes: None palpable.  Lungs: Clear bilaterally without rales, wheezing or rhonchi  Heart: Regular rate and rhythm. No Murmur. No rub. No gallop. No palpable thrill.  Abdomen: Bowel sounds present and normoactive. Soft. Nondistended. Nontender.  Back: No spinal tenderness. No costovertebral angle tenderness.   Extremities: No cyanosis or clubbing. No edema.   Skin: Warm. Dry. Good turgor. No rash. No vasculitic stigmata.  Psychiatric: Appropriate affect and mood for situation.         Laboratory Studies--  CBC                        8.2    8.91  )-----------( 564      ( 15 Jul 2018 05:08 )             26.0       Chemistries  07-15    145  |  112<H>  |  22  ----------------------------<  89  3.9   |  26  |  0.82    Ca    8.6      15 Jul 2018 05:08        Culture Data    Culture - Blood (collected 14 Jul 2018 16:17)  Source: .Blood Blood  Preliminary Report (15 Jul 2018 17:01):    No growth to date.    Culture - Blood (collected 14 Jul 2018 16:17)  Source: .Blood Blood  Preliminary Report (15 Jul 2018 17:01):    No growth to date.    Culture - Urine (collected 09 Jul 2018 21:15)  Source: .Urine Clean Catch (Midstream)  Final Report (10 Jul 2018 21:39):    No growth    Culture - Blood (collected 09 Jul 2018 19:09)  Source: .Blood Blood-Peripheral  Final Report (14 Jul 2018 20:00):    No growth at 5 days.    Culture - Blood (collected 09 Jul 2018 19:09)  Source: .Blood Blood  Gram Stain (13 Jul 2018 09:00):    Growth in anaerobic bottle: Gram Negative Rods  Final Report (15 Jul 2018 12:17):    Growth in anaerobic bottle: Escherichia coli  Organism: Escherichia coli (15 Jul 2018 12:17)  Organism: Escherichia coli (15 Jul 2018 12:17)

## 2018-07-16 NOTE — PROGRESS NOTE ADULT - PROBLEM SELECTOR PROBLEM 3
Vertigo
Vertigo
Anemia
Abnormal CAT scan
Altered mental status, unspecified
Cough
Cough
KEVIN (acute kidney injury)
Altered mental status, unspecified
Anemia
Generalized weakness
Generalized weakness
KEVIN (acute kidney injury)
Vertigo
KEVIN (acute kidney injury)
Vertigo
Vertigo

## 2018-07-16 NOTE — PROGRESS NOTE ADULT - PROBLEM SELECTOR PLAN 1
considering that this appears to be secondary to pyelonephritis  it is not clear that the presence of detected bacteremia is associated with any increase in risk or should impact recommendations for therapy.   finished course of ertapenem with last dose 7/14.    pt does not want any further testing or workup  7/14 blood cx ntd

## 2018-07-16 NOTE — PROGRESS NOTE ADULT - PROBLEM SELECTOR PLAN 1
GGO on ct chest, will need 12 week repeat  HOB elev  dysphagia diet  asp risk   keep sat > 88 pct, tolerating room air  on NEBS  completed ABX  am labs pending  supportive measures  dc planning under way, yanci STEWART  will follow

## 2018-07-16 NOTE — PROGRESS NOTE ADULT - SUBJECTIVE AND OBJECTIVE BOX
Date/Time Patient Seen:  		  Referring MD:   Data Reviewed	       Patient is a 87y old  Female who presents with a chief complaint of "They sent me to here because I have a UTI"  Brought in by EMS with AMS (05 Jul 2018 13:57)  in bed  on room air      Subjective/HPI     PAST MEDICAL & SURGICAL HISTORY:  Arthritis  Hypothyroid  Hyperlipidemia  Seasonal Allergies  Osteoporosis  Migraine  Cataract: s/p cataract surgery  Edema of Leg: b/l LE  Chronic Edema  Seasonal Rhinitis  Hypercholesteremia  Hypothyroidism  Anxiety  Clostridium Difficile Infection  S/P cataract surgery: bialteral  S/P ovarian cystectomy  S/P hip replacement  S/P knee replacement, bilateral: Partial R) hip  Hip Replacement: left hip  Knee Replacement: b/l        Medication list         MEDICATIONS  (STANDING):  ALBUTerol    0.083% 2.5 milliGRAM(s) Nebulizer every 8 hours  aspirin  chewable 81 milliGRAM(s) Oral daily  cholecalciferol 1000 Unit(s) Oral daily  enoxaparin Injectable 30 milliGRAM(s) SubCutaneous every 24 hours  ibuprofen  Tablet 800 milliGRAM(s) Oral daily  iohexol 300 mG (iodine)/mL Oral Solution 30 milliLiter(s) Oral once  lactobacillus acidophilus 1 Tablet(s) Oral daily  levothyroxine 75 MICROGram(s) Oral daily  lidocaine/prilocaine Cream 1 Application(s) Topical daily  loratadine 10 milliGRAM(s) Oral daily  metoprolol succinate ER 12.5 milliGRAM(s) Oral daily  petrolatum white Ointment 1 Application(s) Topical three times a day  simvastatin 10 milliGRAM(s) Oral at bedtime    MEDICATIONS  (PRN):  acetaminophen   Tablet. 650 milliGRAM(s) Oral every 6 hours PRN Mild Pain (1 - 3)  guaiFENesin    Syrup 200 milliGRAM(s) Oral every 6 hours PRN Cough  meclizine 12.5 milliGRAM(s) Oral every 12 hours PRN Dizziness         Vitals log        ICU Vital Signs Last 24 Hrs  T(C): 36.7 (16 Jul 2018 04:57), Max: 36.9 (15 Jul 2018 20:44)  T(F): 98.1 (16 Jul 2018 04:57), Max: 98.4 (15 Jul 2018 20:44)  HR: 75 (16 Jul 2018 04:57) (75 - 87)  BP: 112/65 (16 Jul 2018 04:57) (97/62 - 114/67)  BP(mean): --  ABP: --  ABP(mean): --  RR: 17 (16 Jul 2018 04:57) (17 - 18)  SpO2: 97% (16 Jul 2018 04:57) (94% - 98%)           Input and Output:  I&O's Detail    14 Jul 2018 07:01  -  15 Jul 2018 07:00  --------------------------------------------------------  IN:    Solution: 50 mL  Total IN: 50 mL    OUT:  Total OUT: 0 mL    Total NET: 50 mL          Lab Data                        8.2    8.91  )-----------( 564      ( 15 Jul 2018 05:08 )             26.0     07-15    145  |  112<H>  |  22  ----------------------------<  89  3.9   |  26  |  0.82    Ca    8.6      15 Jul 2018 05:08              Review of Systems	      Objective     Physical Examination    heart s1s2  lung dec BS  abd soft      Pertinent Lab findings & Imaging      Emily:  NO   Adequate UO     I&O's Detail    14 Jul 2018 07:01  -  15 Jul 2018 07:00  --------------------------------------------------------  IN:    Solution: 50 mL  Total IN: 50 mL    OUT:  Total OUT: 0 mL    Total NET: 50 mL               Discussed with:     Cultures:	        Radiology

## 2018-07-16 NOTE — PROGRESS NOTE ADULT - ASSESSMENT
86 yo female admitted with confusion and E coli bacteremia likely from a urinary source  L perinephric stranding on chest CT-- pyelonephritis patient on effective antibiotics starting 6/30

## 2018-07-16 NOTE — PROGRESS NOTE ADULT - PROBLEM SELECTOR PROBLEM 1
Pyelonephritis, acute
Abnormal CAT scan
Altered mental status, unspecified
Bacteremia due to Escherichia coli
Cough
Leukocytosis
Leukocytosis
UTI (urinary tract infection)
Bacteremia due to Escherichia coli
UTI (urinary tract infection)

## 2018-07-16 NOTE — PROGRESS NOTE ADULT - PROBLEM SELECTOR PROBLEM 2
Pneumonia
Abnormal CAT scan
Abnormal CAT scan
Generalized weakness
Leukocytosis
Pneumonia
Pyelonephritis, acute
UTI (urinary tract infection)
Dehydration
Dehydration
Generalized weakness
Pneumonia

## 2018-07-16 NOTE — PROGRESS NOTE ADULT - PROVIDER SPECIALTY LIST ADULT
Hospitalist
Infectious Disease
Pulmonology
Infectious Disease
Hospitalist

## 2018-07-19 LAB
CULTURE RESULTS: SIGNIFICANT CHANGE UP
CULTURE RESULTS: SIGNIFICANT CHANGE UP
SPECIMEN SOURCE: SIGNIFICANT CHANGE UP
SPECIMEN SOURCE: SIGNIFICANT CHANGE UP

## 2018-10-24 PROCEDURE — 84443 ASSAY THYROID STIM HORMONE: CPT

## 2018-10-24 PROCEDURE — 97162 PT EVAL MOD COMPLEX 30 MIN: CPT

## 2018-10-24 PROCEDURE — 80048 BASIC METABOLIC PNL TOTAL CA: CPT

## 2018-10-24 PROCEDURE — 84145 PROCALCITONIN (PCT): CPT

## 2018-10-24 PROCEDURE — 94640 AIRWAY INHALATION TREATMENT: CPT

## 2018-10-24 PROCEDURE — 85027 COMPLETE CBC AUTOMATED: CPT

## 2018-10-24 PROCEDURE — 87150 DNA/RNA AMPLIFIED PROBE: CPT

## 2018-10-24 PROCEDURE — 84436 ASSAY OF TOTAL THYROXINE: CPT

## 2018-10-24 PROCEDURE — 83735 ASSAY OF MAGNESIUM: CPT

## 2018-10-24 PROCEDURE — 94760 N-INVAS EAR/PLS OXIMETRY 1: CPT

## 2018-10-24 PROCEDURE — 36415 COLL VENOUS BLD VENIPUNCTURE: CPT

## 2018-10-24 PROCEDURE — 84480 ASSAY TRIIODOTHYRONINE (T3): CPT

## 2018-10-24 PROCEDURE — 82553 CREATINE MB FRACTION: CPT

## 2018-10-24 PROCEDURE — 96365 THER/PROPH/DIAG IV INF INIT: CPT

## 2018-10-24 PROCEDURE — 80053 COMPREHEN METABOLIC PANEL: CPT

## 2018-10-24 PROCEDURE — 83880 ASSAY OF NATRIURETIC PEPTIDE: CPT

## 2018-10-24 PROCEDURE — 84484 ASSAY OF TROPONIN QUANT: CPT

## 2018-10-24 PROCEDURE — 93306 TTE W/DOPPLER COMPLETE: CPT

## 2018-10-24 PROCEDURE — 97116 GAIT TRAINING THERAPY: CPT

## 2018-10-24 PROCEDURE — 99285 EMERGENCY DEPT VISIT HI MDM: CPT | Mod: 25

## 2018-10-24 PROCEDURE — 87040 BLOOD CULTURE FOR BACTERIA: CPT

## 2018-10-24 PROCEDURE — 94664 DEMO&/EVAL PT USE INHALER: CPT

## 2018-10-24 PROCEDURE — 87186 SC STD MICRODIL/AGAR DIL: CPT

## 2018-10-24 PROCEDURE — 97530 THERAPEUTIC ACTIVITIES: CPT

## 2018-10-24 PROCEDURE — 83605 ASSAY OF LACTIC ACID: CPT

## 2018-10-24 PROCEDURE — 87086 URINE CULTURE/COLONY COUNT: CPT

## 2018-10-24 PROCEDURE — 99231 SBSQ HOSP IP/OBS SF/LOW 25: CPT

## 2018-10-24 PROCEDURE — 81001 URINALYSIS AUTO W/SCOPE: CPT

## 2018-10-24 PROCEDURE — C8929: CPT

## 2018-10-24 PROCEDURE — 71250 CT THORAX DX C-: CPT

## 2018-10-24 PROCEDURE — 71045 X-RAY EXAM CHEST 1 VIEW: CPT

## 2018-10-24 PROCEDURE — 99221 1ST HOSP IP/OBS SF/LOW 40: CPT

## 2018-10-24 PROCEDURE — 93005 ELECTROCARDIOGRAM TRACING: CPT

## 2018-10-24 PROCEDURE — 84100 ASSAY OF PHOSPHORUS: CPT

## 2018-10-24 PROCEDURE — 85730 THROMBOPLASTIN TIME PARTIAL: CPT

## 2018-10-24 PROCEDURE — 97110 THERAPEUTIC EXERCISES: CPT

## 2018-10-24 PROCEDURE — 85610 PROTHROMBIN TIME: CPT

## 2018-10-24 PROCEDURE — 82550 ASSAY OF CK (CPK): CPT

## 2018-10-24 PROCEDURE — 76775 US EXAM ABDO BACK WALL LIM: CPT

## 2019-03-11 ENCOUNTER — TRANSCRIPTION ENCOUNTER (OUTPATIENT)
Age: 84
End: 2019-03-11

## 2019-03-12 ENCOUNTER — INPATIENT (INPATIENT)
Facility: HOSPITAL | Age: 84
LOS: 1 days | Discharge: HOME | DRG: 159 | End: 2019-03-14
Attending: INTERNAL MEDICINE | Admitting: INTERNAL MEDICINE
Payer: MEDICARE

## 2019-03-12 VITALS
WEIGHT: 117.07 LBS | HEART RATE: 72 BPM | TEMPERATURE: 98 F | SYSTOLIC BLOOD PRESSURE: 205 MMHG | OXYGEN SATURATION: 96 % | RESPIRATION RATE: 20 BRPM | DIASTOLIC BLOOD PRESSURE: 93 MMHG

## 2019-03-12 DIAGNOSIS — S01.512A LACERATION WITHOUT FOREIGN BODY OF ORAL CAVITY, INITIAL ENCOUNTER: ICD-10-CM

## 2019-03-12 LAB
ALBUMIN SERPL ELPH-MCNC: 4 G/DL — SIGNIFICANT CHANGE UP (ref 3.3–5)
ALP SERPL-CCNC: 69 U/L — SIGNIFICANT CHANGE UP (ref 40–120)
ALT FLD-CCNC: 21 U/L — SIGNIFICANT CHANGE UP (ref 12–78)
ANION GAP SERPL CALC-SCNC: 7 MMOL/L — SIGNIFICANT CHANGE UP (ref 5–17)
APTT BLD: 29.1 SEC — SIGNIFICANT CHANGE UP (ref 28.5–37)
AST SERPL-CCNC: 19 U/L — SIGNIFICANT CHANGE UP (ref 15–37)
BASOPHILS # BLD AUTO: 0.07 K/UL — SIGNIFICANT CHANGE UP (ref 0–0.2)
BASOPHILS NFR BLD AUTO: 0.7 % — SIGNIFICANT CHANGE UP (ref 0–2)
BILIRUB SERPL-MCNC: 0.4 MG/DL — SIGNIFICANT CHANGE UP (ref 0.2–1.2)
BUN SERPL-MCNC: 24 MG/DL — HIGH (ref 7–23)
CALCIUM SERPL-MCNC: 9.8 MG/DL — SIGNIFICANT CHANGE UP (ref 8.5–10.1)
CHLORIDE SERPL-SCNC: 108 MMOL/L — SIGNIFICANT CHANGE UP (ref 96–108)
CO2 SERPL-SCNC: 26 MMOL/L — SIGNIFICANT CHANGE UP (ref 22–31)
CREAT SERPL-MCNC: 0.75 MG/DL — SIGNIFICANT CHANGE UP (ref 0.5–1.3)
EOSINOPHIL # BLD AUTO: 0.6 K/UL — HIGH (ref 0–0.5)
EOSINOPHIL NFR BLD AUTO: 5.8 % — SIGNIFICANT CHANGE UP (ref 0–6)
GLUCOSE SERPL-MCNC: 114 MG/DL — HIGH (ref 70–99)
HCT VFR BLD CALC: 39.3 % — SIGNIFICANT CHANGE UP (ref 34.5–45)
HGB BLD-MCNC: 12.4 G/DL — SIGNIFICANT CHANGE UP (ref 11.5–15.5)
IMM GRANULOCYTES NFR BLD AUTO: 1.1 % — SIGNIFICANT CHANGE UP (ref 0–1.5)
INR BLD: 1.03 RATIO — SIGNIFICANT CHANGE UP (ref 0.88–1.16)
LYMPHOCYTES # BLD AUTO: 1.82 K/UL — SIGNIFICANT CHANGE UP (ref 1–3.3)
LYMPHOCYTES # BLD AUTO: 17.5 % — SIGNIFICANT CHANGE UP (ref 13–44)
MCHC RBC-ENTMCNC: 30.2 PG — SIGNIFICANT CHANGE UP (ref 27–34)
MCHC RBC-ENTMCNC: 31.6 GM/DL — LOW (ref 32–36)
MCV RBC AUTO: 95.9 FL — SIGNIFICANT CHANGE UP (ref 80–100)
MONOCYTES # BLD AUTO: 1.18 K/UL — HIGH (ref 0–0.9)
MONOCYTES NFR BLD AUTO: 11.4 % — SIGNIFICANT CHANGE UP (ref 2–14)
NEUTROPHILS # BLD AUTO: 6.61 K/UL — SIGNIFICANT CHANGE UP (ref 1.8–7.4)
NEUTROPHILS NFR BLD AUTO: 63.5 % — SIGNIFICANT CHANGE UP (ref 43–77)
NRBC # BLD: 0 /100 WBCS — SIGNIFICANT CHANGE UP (ref 0–0)
PLATELET # BLD AUTO: 315 K/UL — SIGNIFICANT CHANGE UP (ref 150–400)
POTASSIUM SERPL-MCNC: 4.4 MMOL/L — SIGNIFICANT CHANGE UP (ref 3.5–5.3)
POTASSIUM SERPL-SCNC: 4.4 MMOL/L — SIGNIFICANT CHANGE UP (ref 3.5–5.3)
PROT SERPL-MCNC: 7.1 G/DL — SIGNIFICANT CHANGE UP (ref 6–8.3)
PROTHROM AB SERPL-ACNC: 11.8 SEC — SIGNIFICANT CHANGE UP (ref 10–12.9)
RBC # BLD: 4.1 M/UL — SIGNIFICANT CHANGE UP (ref 3.8–5.2)
RBC # FLD: 12.5 % — SIGNIFICANT CHANGE UP (ref 10.3–14.5)
SODIUM SERPL-SCNC: 141 MMOL/L — SIGNIFICANT CHANGE UP (ref 135–145)
WBC # BLD: 10.39 K/UL — SIGNIFICANT CHANGE UP (ref 3.8–10.5)
WBC # FLD AUTO: 10.39 K/UL — SIGNIFICANT CHANGE UP (ref 3.8–10.5)

## 2019-03-12 PROCEDURE — 99285 EMERGENCY DEPT VISIT HI MDM: CPT

## 2019-03-12 PROCEDURE — 72125 CT NECK SPINE W/O DYE: CPT | Mod: 26

## 2019-03-12 PROCEDURE — 70450 CT HEAD/BRAIN W/O DYE: CPT | Mod: 26

## 2019-03-12 PROCEDURE — 71260 CT THORAX DX C+: CPT | Mod: 26

## 2019-03-12 PROCEDURE — 73140 X-RAY EXAM OF FINGER(S): CPT | Mod: 26,LT

## 2019-03-12 PROCEDURE — 70487 CT MAXILLOFACIAL W/DYE: CPT | Mod: 26

## 2019-03-12 RX ORDER — METOPROLOL TARTRATE 50 MG
12.5 TABLET ORAL DAILY
Qty: 0 | Refills: 0 | Status: DISCONTINUED | OUTPATIENT
Start: 2019-03-12 | End: 2019-03-14

## 2019-03-12 RX ORDER — AMPICILLIN SODIUM AND SULBACTAM SODIUM 250; 125 MG/ML; MG/ML
1.5 INJECTION, POWDER, FOR SUSPENSION INTRAMUSCULAR; INTRAVENOUS EVERY 6 HOURS
Qty: 0 | Refills: 0 | Status: DISCONTINUED | OUTPATIENT
Start: 2019-03-13 | End: 2019-03-14

## 2019-03-12 RX ORDER — ACETAMINOPHEN 500 MG
650 TABLET ORAL EVERY 6 HOURS
Qty: 0 | Refills: 0 | Status: DISCONTINUED | OUTPATIENT
Start: 2019-03-12 | End: 2019-03-14

## 2019-03-12 RX ORDER — THROMBIN (BOVINE) 10000 UNIT
5000 KIT TOPICAL ONCE
Qty: 0 | Refills: 0 | Status: COMPLETED | OUTPATIENT
Start: 2019-03-12 | End: 2019-03-12

## 2019-03-12 RX ORDER — MORPHINE SULFATE 50 MG/1
2 CAPSULE, EXTENDED RELEASE ORAL ONCE
Qty: 0 | Refills: 0 | Status: DISCONTINUED | OUTPATIENT
Start: 2019-03-12 | End: 2019-03-12

## 2019-03-12 RX ORDER — LEVOTHYROXINE SODIUM 125 MCG
75 TABLET ORAL DAILY
Qty: 0 | Refills: 0 | Status: DISCONTINUED | OUTPATIENT
Start: 2019-03-12 | End: 2019-03-14

## 2019-03-12 RX ORDER — ATORVASTATIN CALCIUM 80 MG/1
10 TABLET, FILM COATED ORAL AT BEDTIME
Qty: 0 | Refills: 0 | Status: DISCONTINUED | OUTPATIENT
Start: 2019-03-12 | End: 2019-03-14

## 2019-03-12 RX ORDER — AMPICILLIN SODIUM AND SULBACTAM SODIUM 250; 125 MG/ML; MG/ML
3 INJECTION, POWDER, FOR SUSPENSION INTRAMUSCULAR; INTRAVENOUS ONCE
Qty: 0 | Refills: 0 | Status: COMPLETED | OUTPATIENT
Start: 2019-03-12 | End: 2019-03-12

## 2019-03-12 RX ADMIN — AMPICILLIN SODIUM AND SULBACTAM SODIUM 200 GRAM(S): 250; 125 INJECTION, POWDER, FOR SUSPENSION INTRAMUSCULAR; INTRAVENOUS at 23:30

## 2019-03-12 RX ADMIN — MORPHINE SULFATE 2 MILLIGRAM(S): 50 CAPSULE, EXTENDED RELEASE ORAL at 23:20

## 2019-03-12 NOTE — ED PROVIDER NOTE - CARE PLAN
Principal Discharge DX:	Laceration of lower gum, initial encounter  Secondary Diagnosis:	Hematoma  Secondary Diagnosis:	Laceration of index finger without foreign body without damage to nail, unspecified laterality, initial encounter

## 2019-03-12 NOTE — ED PROVIDER NOTE - PROGRESS NOTE DETAILS
discussed case with Dr. SISI Escobedo will admit.  Dr. Chamberlain repaired inner mouth/gum laceration recommend IV abx, admit.  Dr. Chamberlain explored wound, found large inner mouth laceration

## 2019-03-12 NOTE — ED PROVIDER NOTE - SECONDARY DIAGNOSIS.
Hematoma Laceration of index finger without foreign body without damage to nail, unspecified laterality, initial encounter

## 2019-03-12 NOTE — ED PROVIDER NOTE - OBJECTIVE STATEMENT
86 yo female hx of arthritis, hld s/p mechanical fall in garage, face and hand landed on concrete around 7:30pm, no LOC, no neck pain, here for jaw pain, large hematoma and left 2nd digit laceration.  Tetanus 5 years ago.  No shortness of breath, no chest pain, ambulatory.  Initially went to urgent care first, but then sent here for evaluation. 86 yo female hx of arthritis, hld s/p mechanical fall in garage, face and hand landed on concrete around 7:30pm, no LOC, no neck pain, here for jaw pain, large hematoma and left 2nd digit laceration.  Tetanus 5 years ago.  No shortness of breath, no chest pain, ambulatory.  Initially went to urgent care first, but then sent here for evaluation.    pmd Dr. Martin

## 2019-03-12 NOTE — ED ADULT NURSE NOTE - OBJECTIVE STATEMENT
Received patient to the ED a&ox4, s/p fall. Accompanied by her neighbors. Patient noted to have swelling on left side of jaw accompanied with ecchymosis. Right knee swelling and ecchymosis.  Frequent suctioning provided.

## 2019-03-12 NOTE — ED ADULT NURSE NOTE - PMH
Arthritis    Cataract  s/p cataract surgery  Edema of Leg  b/l LE  Hyperlipidemia    Hypothyroidism    Migraine    Osteoporosis

## 2019-03-12 NOTE — ED PROVIDER NOTE - NS ED ROS FT
Constitutional: - Fever, - Chills, - Anorexia, - Fatigue, - Night sweats  Eyes: - Discharge, - Irritation, - Redness, - Visual changes, - Light sensitivity, - Pain  EARS: - Ear Pain, - Tinnitus, - Decreased hearing  NOSE: - Congestion, - Bloody nose  MOUTH/THROAT: - Vocal Changes, - Drooling, - Sore throat  NECK: - Lumps, - Stiffness, - Pain  CV: - Palpitations, - Chest Pain, - Edema, - Syncope  RESP:  - Cough, - Shortness of Breath, - Dyspnea on Exertion, - Trouble speaking, - Pleuritic pain - Wheezing  GI: - Diarrhea, - Constipation, - Bloody stools, - Nausea, - Vomiting, - Abdominal Pain  : - Dysuria, -Frequency, - Hematuria, - Hesitancy, - Incontinence, - Saddle Anesthesia, - Abnormal discharge  MSK: - Myalgias, - Arthralgias, - Weakness, - Deformities, - Injuries  SKIN: - Color change, - Rash, + Swelling, +Ecchymosis, - Abrasion, + laceration  NEURO: - Change in behavior, - Dec. Alertness, - Headache, - Dizziness, - Change in speech, - Weakness, - Seizure-like activity, - Difficulty ambulating

## 2019-03-12 NOTE — ED PROVIDER NOTE - PHYSICAL EXAMINATION
Gen: Alert, NAD  Head/eyes: NC/AT, PERRL, EOMI, normal lids/conjunctiva  ENT: Bilateral TM WNL, normal hearing, patent oropharynx, +large lower jaw expanding hematoma with small   Neck: supple, no tenderness/meningismus/JVD, Trachea midline  Pulm/lung: Bilateral clear BS, normal resp effort, no wheeze/stridor/retractions  CV/heart: RRR, no M/R/G, +2 dist pulses (radial, pedal DP/PT, popliteal)  GI/Abd: soft, NT/ND, +BS, no guarding/rebound tenderness  Musculoskeletal: no edema/erythema/cyanosis, FROM in all extremities, no C/T/L spine ttp  Skin: no rash, no vesicles, no petechaie, no ecchymosis, no swelling  Neuro: AAOx3, CN 2-12 intact, normal sensation, 5/5 motor strength in all extremities, normal gait, no dysmetria Gen: Alert, NAD  Head/eyes: NC/AT, PERRL, EOMI, normal lids/conjunctiva  ENT: Bilateral TM WNL, normal hearing, patent oropharynx, +large lower jaw expanding hematoma with small punctate bleeding noted  Neck: supple, no tenderness/meningismus/JVD, Trachea midline  Pulm/lung: Bilateral clear BS, normal resp effort, no wheeze/stridor/retractions  CV/heart: RRR, no M/R/G, +2 dist pulses (radial, pedal DP/PT, popliteal)  GI/Abd: soft, NT/ND, +BS, no guarding/rebound tenderness  Musculoskeletal: no edema/erythema/cyanosis, FROM in all extremities, no C/T/L spine ttp  Skin: no rash, no vesicles, no petechaie, +exterior jaw ecchymosis, + jaw swelling, +left 2nd digit 1cm curvelinear laceration  Neuro: AAOx3, CN 2-12 intact, normal sensation, 5/5 motor strength in all extremities, normal gait Gen: Alert, NAD  Head/eyes: NC/AT, PERRL, EOMI, normal lids/conjunctiva  ENT: Bilateral TM WNL, normal hearing, patent oropharynx, +large lower jaw expanding hematoma with small punctate bleeding noted, no loose teeth  Neck: supple, no tenderness/meningismus/JVD, Trachea midline  Pulm/lung: Bilateral clear BS, normal resp effort, no wheeze/stridor/retractions  CV/heart: RRR, no M/R/G, +2 dist pulses (radial, pedal DP/PT, popliteal)  GI/Abd: soft, NT/ND, +BS, no guarding/rebound tenderness  Musculoskeletal: no edema/erythema/cyanosis, FROM in all extremities, no C/T/L spine ttp  Skin: no rash, no vesicles, no petechaie, +exterior jaw ecchymosis, + jaw swelling, +left 2nd digit 1cm curvelinear laceration  Neuro: AAOx3, CN 2-12 intact, normal sensation, 5/5 motor strength in all extremities, normal gait

## 2019-03-13 PROBLEM — M19.90 UNSPECIFIED OSTEOARTHRITIS, UNSPECIFIED SITE: Chronic | Status: ACTIVE | Noted: 2018-06-30

## 2019-03-13 LAB
ANION GAP SERPL CALC-SCNC: 8 MMOL/L — SIGNIFICANT CHANGE UP (ref 5–17)
BUN SERPL-MCNC: 26 MG/DL — HIGH (ref 7–23)
CALCIUM SERPL-MCNC: 8.7 MG/DL — SIGNIFICANT CHANGE UP (ref 8.5–10.1)
CHLORIDE SERPL-SCNC: 108 MMOL/L — SIGNIFICANT CHANGE UP (ref 96–108)
CO2 SERPL-SCNC: 26 MMOL/L — SIGNIFICANT CHANGE UP (ref 22–31)
CREAT SERPL-MCNC: 0.69 MG/DL — SIGNIFICANT CHANGE UP (ref 0.5–1.3)
GLUCOSE SERPL-MCNC: 141 MG/DL — HIGH (ref 70–99)
HCT VFR BLD CALC: 33.9 % — LOW (ref 34.5–45)
HCT VFR BLD CALC: 34.4 % — LOW (ref 34.5–45)
HCT VFR BLD CALC: 34.6 % — SIGNIFICANT CHANGE UP (ref 34.5–45)
HGB BLD-MCNC: 10.9 G/DL — LOW (ref 11.5–15.5)
HGB BLD-MCNC: 11.2 G/DL — LOW (ref 11.5–15.5)
HGB BLD-MCNC: 11.3 G/DL — LOW (ref 11.5–15.5)
MCHC RBC-ENTMCNC: 30.4 PG — SIGNIFICANT CHANGE UP (ref 27–34)
MCHC RBC-ENTMCNC: 30.6 PG — SIGNIFICANT CHANGE UP (ref 27–34)
MCHC RBC-ENTMCNC: 31.1 PG — SIGNIFICANT CHANGE UP (ref 27–34)
MCHC RBC-ENTMCNC: 32.2 GM/DL — SIGNIFICANT CHANGE UP (ref 32–36)
MCHC RBC-ENTMCNC: 32.4 GM/DL — SIGNIFICANT CHANGE UP (ref 32–36)
MCHC RBC-ENTMCNC: 32.8 GM/DL — SIGNIFICANT CHANGE UP (ref 32–36)
MCV RBC AUTO: 94.5 FL — SIGNIFICANT CHANGE UP (ref 80–100)
MCV RBC AUTO: 94.7 FL — SIGNIFICANT CHANGE UP (ref 80–100)
MCV RBC AUTO: 94.8 FL — SIGNIFICANT CHANGE UP (ref 80–100)
NRBC # BLD: 0 /100 WBCS — SIGNIFICANT CHANGE UP (ref 0–0)
PLATELET # BLD AUTO: 283 K/UL — SIGNIFICANT CHANGE UP (ref 150–400)
PLATELET # BLD AUTO: 284 K/UL — SIGNIFICANT CHANGE UP (ref 150–400)
PLATELET # BLD AUTO: 288 K/UL — SIGNIFICANT CHANGE UP (ref 150–400)
POTASSIUM SERPL-MCNC: 3.8 MMOL/L — SIGNIFICANT CHANGE UP (ref 3.5–5.3)
POTASSIUM SERPL-SCNC: 3.8 MMOL/L — SIGNIFICANT CHANGE UP (ref 3.5–5.3)
RBC # BLD: 3.58 M/UL — LOW (ref 3.8–5.2)
RBC # BLD: 3.63 M/UL — LOW (ref 3.8–5.2)
RBC # BLD: 3.66 M/UL — LOW (ref 3.8–5.2)
RBC # FLD: 12.3 % — SIGNIFICANT CHANGE UP (ref 10.3–14.5)
RBC # FLD: 12.4 % — SIGNIFICANT CHANGE UP (ref 10.3–14.5)
RBC # FLD: 12.6 % — SIGNIFICANT CHANGE UP (ref 10.3–14.5)
SODIUM SERPL-SCNC: 142 MMOL/L — SIGNIFICANT CHANGE UP (ref 135–145)
WBC # BLD: 11.34 K/UL — HIGH (ref 3.8–10.5)
WBC # BLD: 13.75 K/UL — HIGH (ref 3.8–10.5)
WBC # BLD: 15.49 K/UL — HIGH (ref 3.8–10.5)
WBC # FLD AUTO: 11.34 K/UL — HIGH (ref 3.8–10.5)
WBC # FLD AUTO: 13.75 K/UL — HIGH (ref 3.8–10.5)
WBC # FLD AUTO: 15.49 K/UL — HIGH (ref 3.8–10.5)

## 2019-03-13 RX ORDER — CHOLECALCIFEROL (VITAMIN D3) 125 MCG
1000 CAPSULE ORAL DAILY
Qty: 0 | Refills: 0 | Status: DISCONTINUED | OUTPATIENT
Start: 2019-03-13 | End: 2019-03-14

## 2019-03-13 RX ORDER — ONDANSETRON 8 MG/1
4 TABLET, FILM COATED ORAL EVERY 6 HOURS
Qty: 0 | Refills: 0 | Status: DISCONTINUED | OUTPATIENT
Start: 2019-03-13 | End: 2019-03-14

## 2019-03-13 RX ORDER — LACTOBACILLUS ACIDOPHILUS 100MM CELL
1 CAPSULE ORAL
Qty: 0 | Refills: 0 | Status: DISCONTINUED | OUTPATIENT
Start: 2019-03-13 | End: 2019-03-14

## 2019-03-13 RX ORDER — ESCITALOPRAM OXALATE 10 MG/1
10 TABLET, FILM COATED ORAL DAILY
Qty: 0 | Refills: 0 | Status: DISCONTINUED | OUTPATIENT
Start: 2019-03-13 | End: 2019-03-14

## 2019-03-13 RX ORDER — LEVOCETIRIZINE DIHYDROCHLORIDE 0.5 MG/ML
1 SOLUTION ORAL
Qty: 0 | Refills: 0 | COMMUNITY

## 2019-03-13 RX ADMIN — Medication 650 MILLIGRAM(S): at 23:25

## 2019-03-13 RX ADMIN — AMPICILLIN SODIUM AND SULBACTAM SODIUM 100 GRAM(S): 250; 125 INJECTION, POWDER, FOR SUSPENSION INTRAMUSCULAR; INTRAVENOUS at 05:47

## 2019-03-13 RX ADMIN — ONDANSETRON 4 MILLIGRAM(S): 8 TABLET, FILM COATED ORAL at 13:01

## 2019-03-13 RX ADMIN — AMPICILLIN SODIUM AND SULBACTAM SODIUM 100 GRAM(S): 250; 125 INJECTION, POWDER, FOR SUSPENSION INTRAMUSCULAR; INTRAVENOUS at 23:23

## 2019-03-13 RX ADMIN — Medication 650 MILLIGRAM(S): at 13:56

## 2019-03-13 RX ADMIN — Medication 75 MICROGRAM(S): at 05:42

## 2019-03-13 RX ADMIN — Medication 650 MILLIGRAM(S): at 06:24

## 2019-03-13 RX ADMIN — AMPICILLIN SODIUM AND SULBACTAM SODIUM 100 GRAM(S): 250; 125 INJECTION, POWDER, FOR SUSPENSION INTRAMUSCULAR; INTRAVENOUS at 12:56

## 2019-03-13 RX ADMIN — AMPICILLIN SODIUM AND SULBACTAM SODIUM 100 GRAM(S): 250; 125 INJECTION, POWDER, FOR SUSPENSION INTRAMUSCULAR; INTRAVENOUS at 17:38

## 2019-03-13 RX ADMIN — Medication 650 MILLIGRAM(S): at 12:56

## 2019-03-13 RX ADMIN — Medication 1 TABLET(S): at 17:36

## 2019-03-13 RX ADMIN — Medication 1000 UNIT(S): at 14:36

## 2019-03-13 RX ADMIN — Medication 650 MILLIGRAM(S): at 00:30

## 2019-03-13 RX ADMIN — ATORVASTATIN CALCIUM 10 MILLIGRAM(S): 80 TABLET, FILM COATED ORAL at 21:55

## 2019-03-13 RX ADMIN — Medication 12.5 MILLIGRAM(S): at 05:45

## 2019-03-13 RX ADMIN — Medication 1 TABLET(S): at 14:36

## 2019-03-13 RX ADMIN — ESCITALOPRAM OXALATE 10 MILLIGRAM(S): 10 TABLET, FILM COATED ORAL at 14:36

## 2019-03-13 NOTE — CONSULT NOTE ADULT - SUBJECTIVE AND OBJECTIVE BOX
Full note to follow  Extensive facial trauma after trip and fall over a small ledge in her garage area  States tetanus up to date  Use of AMP/SB as prophylaxis reasonable  Thank you for the courtesy of this referral.  Alex Macario MD  [phone]  -------------------------------  [facility], Division of Infectious Diseases  Lev Macario, MARCIN Connell, ANALISA ROSARIO, ROSENDO  87y, Female  [id number]    HPI--  *** insert HPI ***    PMH/PSH--  Arthritis  Hypothyroid  Hyperlipidemia  Seasonal Allergies  Osteoporosis  Migraine  Cataract  Edema of Leg  Chronic Edema  Seasonal Rhinitis  Hypercholesteremia  Hypothyroidism  Anxiety  Clostridium Difficile Infection  S/P cataract surgery  S/P ovarian cystectomy  S/P hip replacement  S/P knee replacement, bilateral  Hip Replacement  Knee Replacement      Allergies--      Medications--  Antibiotics: ampicillin/sulbactam  IVPB 1.5 Gram(s) IV Intermittent every 6 hours    Immunologic:   Other: acetaminophen   Tablet .. PRN  atorvastatin  levothyroxine  metoprolol succinate ER  ondansetron Injectable PRN      Social History--  EtOH: denies ***  Tobacco: denies ***  Drug Use: denies ***    Family/Marital History--  No pertinent family history in first degree relatives    Remainder not relevant to clinical concern.    Travel/Environmental/Occupational History:  *** insert T/E/O Hx ***    Review of Systems:  A >=10-point review of systems was obtained.     Pertinent positives and negatives--  Constitutional: No fevers. No Chills. No Rigors.   Eyes:  ENMT:  Cardiovascular: No chest pain. No palpitations.  Respiratory: No shortness of breath. No cough.  Gastrointestinal: No nausea or vomiting. No diarrhea or constipation.   Genitourinary:  Musculoskeletal:  Skin:  Neurologic:  Psychiatric: Pleasant. Appropriate affect.  Endocrine:  Heme/Lymphatic:  Allergy/Immunologic:    Review of systems otherwise negative except as previously noted.    Physical Exam--  Vital Signs: T(F): 98.5 (03-13-19 @ 08:35), Max: 98.5 (03-13-19 @ 08:35)  HR: 61 (03-13-19 @ 10:23)  BP: 88/50 (03-13-19 @ 10:23)  RR: 16 (03-13-19 @ 10:23)  SpO2: 98% (03-13-19 @ 10:23)  Wt(kg): --  General: Nontoxic-appearing Female in no acute distress.  HEENT: AT/NC. PERRL. EOMI. Anicteric. Conjunctiva pink and moist. Oropharynx clear. Dentition fair.  Neck: Not rigid. No sense of mass.  Nodes: None palpable.  Lungs: Clear bilaterally without rales, wheezing or rhonchi  Heart: Regular rate and rhythm. No Murmur. No rub. No gallop. No palpable thrill.  Abdomen: Bowel sounds present and normoactive. Soft. Nondistended. Nontender. No sense of mass. No organomegaly.  Back: No spinal tenderness. No costovertebral angle tenderness.   Extremities: No cyanosis or clubbing. No edema.   Skin: Warm. Dry. Good turgor. No rash. No vasculitic stigmata.  Psychiatric: Appropriate affect and mood for situation.         Laboratory & Imaging Data--  CBC                        10.9   15.49 )-----------( 283      ( 13 Mar 2019 06:21 )             33.9       Chemistries  03-13    142  |  108  |  26<H>  ----------------------------<  141<H>  3.8   |  26  |  0.69    Ca    8.7      13 Mar 2019 06:21    TPro  7.1  /  Alb  4.0  /  TBili  0.4  /  DBili  x   /  AST  19  /  ALT  21  /  AlkPhos  69  03-12      Culture Data          Assessment--      Suggestions--        Alex Macario MD  [phone] Full note to follow  Extensive facial trauma after trip and fall over a small ledge in her garage area  States tetanus up to date  Use of AMP/SB as prophylaxis reasonable  Thank you for the courtesy of this referral.  Alex Macario MD  [phone]  -------------------------------  [facility], Division of Infectious Diseases  Lev Macario, MARCIN Connell, ANALISA ROSARIO, ROSENDO  87y, Female  [id number]    HPI--  87F previously known to our group for E. coli septicemia secondary to L sided pyelonephritis, prior C. difficile, hypothyroidism, who tripped and fell over a "ledge" in her garage and sustained extensive facial trauma. Patient in [employer] prior without any complaints concerning for infection or decline in her performance status.    Patient brought to ED where she was seen by plastic surgery, with repair including intra-oral injury. Patient admitted and ID cosnult requested for prophylactic antibiotics. Patient states Td <5 years ago.     PMH/PSH--  Arthritis  Hypothyroid  Hyperlipidemia  Seasonal Allergies  Osteoporosis  Migraine  Cataract  Edema of Leg  Chronic Edema  Seasonal Rhinitis  Hypercholesteremia  Hypothyroidism  Anxiety  Clostridium Difficile Infection  S/P cataract surgery  S/P ovarian cystectomy  S/P hip replacement  S/P knee replacement, bilateral  Hip Replacement  Knee Replacement      Allergies-- erythromycin rash/hives      Medications--  Antibiotics: ampicillin/sulbactam  IVPB 1.5 Gram(s) IV Intermittent every 6 hours    Immunologic:   Other: acetaminophen   Tablet .. PRN  atorvastatin  levothyroxine  metoprolol succinate ER  ondansetron Injectable PRN      Social History--  EtOH: denies   Tobacco: denies   Drug Use: denies     Family/Marital History--    No pertinent family history in first degree relatives  Remainder not relevant to clinical concern.      Review of Systems:  A >=10-point review of systems was obtained.   Review of systems otherwise negative except as previously noted.    Physical Exam--  Vital Signs: T(F): 98.5 (03-13-19 @ 08:35), Max: 98.5 (03-13-19 @ 08:35)  HR: 61 (03-13-19 @ 10:23)  BP: 88/50 (03-13-19 @ 10:23)  RR: 16 (03-13-19 @ 10:23)  SpO2: 98% (03-13-19 @ 10:23)  Wt(kg): --  General: Nontoxic but frail-appearing Female in no acute distress.  HEENT: Mild bitemporal wasting. Head wrapped in dressing, limiting exam, Anicteric. Conjunctiva pink and moist. Oropharynx/dentition unable. Extensive  facial hematoma/ecchymoses, L>R, mandibular area >upper, includes lip.   Neck: Not rigid. No sense of mass. Ecchymotic change appears to be spreading along tissue plane from jaw inferiorly down neck on L side.   Nodes: None palpable.  Lungs: Diminihsed breath sounds bilaterally without rales, wheezing or rhonchi  Heart: Regular rate and rhythm. No Murmur. No rub. No gallop. No palpable thrill.  Abdomen: Bowel sounds present and normoactive. Soft. Nondistended. Nontender. No sense of mass. No organomegaly.  Extremities: No cyanosis or clubbing. No edema.   Skin: Warm. Dry. Good turgor. No rash. No vasculitic stigmata.  Psychiatric: Appropriate affect and mood for situation.         Laboratory & Imaging Data--  CBC                        10.9   15.49 )-----------( 283      ( 13 Mar 2019 06:21 )             33.9       Chemistries  03-13    142  |  108  |  26<H>  ----------------------------<  141<H>  3.8   |  26  |  0.69    Ca    8.7      13 Mar 2019 06:21    TPro  7.1  /  Alb  4.0  /  TBili  0.4  /  DBili  x   /  AST  19  /  ALT  21  /  AlkPhos  69  03-12      Culture Data  None    < from: CT Head No Cont (03.12.19 @ 22:07) >    EXAM:  CT MAXILLOFACIAL  IC                          EXAM:  CT CERVICAL SPINE                          EXAM:  CT BRAIN                            PROCEDURE DATE:  03/12/2019          INTERPRETATION:  CT OF THE BRAIN, MAXILLOFACIAL BONES, & CERVICALSPINE    INDICATION: trauma, fall with head, facial and neck pain, expanding   hematoma involving the jaw    TECHNIQUE:  BRAIN: Axial images were obtained, along with reformatted coronal and   sagittal images.  MAXILLOFACIAL BONES: Axial thin cut images were obtained from the frontal   bones, through hyoid, along with reformatted coronal and sagittal images.   90 cc of Omnipaque 350 was administered intravenously without   complication and 10 cc discarded.    CERVICAL SPINE: Axial thin cut imageswere obtained, along with   reformatted coronal and sagittal images.    COMPARISON: Brain CT of June 30, 2018    FINDINGS:    BRAIN:  There is no intracranial hemorrhage, mass effect, midline shift or large   acute cortical infarct.    White matter lucencies nonspecific but probably represent microvascular   ischemic disease. Old bilateral thalamic lacunar infarcts. Old infarct in   the right posterior temporal lobe.    There is no significant extracranial soft tissue swelling.  There is no depressed skull fracture.      MAXILLOFACIAL BONES:    There is evidence of contrast extravasation anterior to the left   parasymphyseal region of the mandible. There are foci of soft tissue gas.   There is a moderate soft tissue hematoma anterior to the mandible.  There is no significant soft tissue swelling. The orbits are symmetric,   without evidence for intraocular hemorrhage, globe rupture, or lens   dislocation. There is no retrobulbar fat stranding. The patient is status   post bilateral lens replacement for cataract disease.    Evaluation of the maxilla and mandible are degraded by streak artifact   from patient's dental amalgam hardware. There is no acute facial   fracture. The nasal bones, orbital rims, zygomatic arches,   temporomandibular joints, and mandible are intact.    There is no blood in the paranasal sinuses. The mastoid air cells and   visualized paranasal sinuses are well aerated.    Visualized nasopharynx and oropharynx are unremarkable.    CERVICAL SPINE:    There is noacute displaced fracture or traumatic malalignment.  There is   no prevertebral soft tissue swelling.    The bones are diffusely osteopenic. There is maintenance of the cervical   lordosis. There is severe multilevel disc space narrowing between C3-T1.   There is mild grade 1 retrolisthesis of C5 on C6. There is mild grade 1   anterolisthesis of C6 on C7 and C7 on T1. There is moderate uncinate and   facet hypertrophy which results in moderate multilevel bilateral   neuroforaminal narrowing. Fibrotic changes at the right lung apex.    IMPRESSION:  BRAIN: No intracranial hemorrhage, mass effect or depressed skull   fracture.   MAXILLOFACIAL BONES: No acute facial fracture. Active bleeding in the   soft tissues anterior to the left parasymphyseal region of the chin   associated with soft tissue hematoma.  CERVICAL SPINE:  No acute displaced fracture or traumatic malalignment.  CAM HUNT M.D, ATTENDING RADIOLOGIST  This document has been electronically signed. Mar 12 2019 10:28PM    < end of copied text >    < from: CT Chest w/ IV Cont (03.12.19 @ 22:10) >    EXAM:  CT CHEST IC                            PROCEDURE DATE:  03/12/2019          INTERPRETATION:  CLINICAL INFORMATION: Status post fall with mid chest   pain.    TECHNIQUE: CT scan of the chest was performed from the thoracic inlet to   the adrenal glands after the administration of intravenous contrast.   Selected thin section high resolution images were also obtained through   the lung parenchyma. Axial MIP reformats were additionally performed. 100   mls of Omnipaque 350 was administered without complication and 0 mls were   discarded.    COMPARISON: CT chest from 7/3/2018.    FINDINGS:     Lungs: No pulmonary nodules, masses or consolidations are demonstrated.   There is no pleural effusion. Persistent interstitial opacities are seen  with a peripheral predominance and associated mild traction   bronchiectasis concerning for an interstitial lung disease. A calcified   granuloma is seen in the right lower lobe.    Airways: The tracheobronchial tree is patent.    Mediastinum: There is no axillary, mediastinal or hilar adenopathy.     Heart/vasculature: The heart size is enlarged. There is no pericardial   effusion. Moderate coronary artery calcifications are noted.     Miscellaneous: A 1.3 cm right thyroid lobe nodule is againseen with   associated right thyroid lobe calcifications.    Upper abdomen: A subcentimeter hypodense lesion is seen in the liver   which is too small for accurate characterization. Bilateral renal cysts   are noted. Additional subcentimeter hypodense lesions are seen in both   kidneys which are too small for accurate characterization. The remaining   visualized upper abdominal organs are unremarkable inclusive of the   adrenal glands.    Bone: Compression fracture deformities of the T8, T9, T11,T12 and L2   vertebral bodies are again seen. Degenerative changes of the spine are   seen. Old anterior left fourth and fifth rib fractures are noted. No   acute fracture deformity is demonstrated.    IMPRESSION:     1. No acute intrathoracic trauma.   2. Persistent interstitial opacities with a peripheral predominance and   associated mild traction bronchiectasis concerning for an interstitial   lung disease.     SANDIE LOZA M.D., ATTENDING RADIOLOGIST  This document has been electronically signed. Mar 12 2019 10:40PM    < end of copied text >

## 2019-03-13 NOTE — H&P ADULT - HISTORY OF PRESENT ILLNESS
88 yo female hx of arthritis, hld s/p mechanical fall in garage, face and hand landed on concrete around 7:30pm, no LOC, no neck pain, here for jaw pain, large hematoma and left 2nd digit laceration.  Tetanus 5 years ago.  No shortness of breath, no chest pain, ambulatory.  Initially went to urgent care first, but then sent here for evaluation.

## 2019-03-13 NOTE — PHYSICAL THERAPY INITIAL EVALUATION ADULT - ADDITIONAL COMMENTS
24 hour home care aide, 5 steps to enter with one handrail. Stair glide to 2nd floor. Pt states she owns a  cane. ,

## 2019-03-13 NOTE — H&P ADULT - NSICDXPASTSURGICALHX_GEN_ALL_CORE_FT
PAST SURGICAL HISTORY:  Knee Replacement b/l    S/P cataract surgery bialteral    S/P hip replacement     S/P knee replacement, bilateral Partial R) hip    S/P ovarian cystectomy

## 2019-03-13 NOTE — PROGRESS NOTE ADULT - SUBJECTIVE AND OBJECTIVE BOX
Pt denies any new complaints at this time. She states that there has been no more bleeding.    AVSS    Hgb: 12 down to 10    HEENT: Lower lip with stble hematoma, +ecchymosis, no erythema, neck soft, no bleeding from suture line.     A/P: Pt POD #1 s/p evac of lower lip hematoma and repair of lac and repair of finger lac, doing well  -f/u 2 more serial HH  -cont abx  -if h/h stable then ok to send on abx tomorrow.

## 2019-03-13 NOTE — H&P ADULT - NSICDXPASTMEDICALHX_GEN_ALL_CORE_FT
PAST MEDICAL HISTORY:  Arthritis     Cataract s/p cataract surgery    Edema of Leg b/l LE    Hyperlipidemia     Hypothyroidism     Migraine     Osteoporosis

## 2019-03-13 NOTE — H&P ADULT - ASSESSMENT
This is an 87 F comes s/p mechanical fall    Laceration of lower gum, initial encounter; Hematoma  Admit  Patient seen by plastics -- wound cleaned and sutured  Continue local wound care as per plastics  Await official note/consult  Serial CBCs  Empiric iv unasyn  Pan-culture  ID consult  Further work-up/management pending clinical course.    HTN  Continue metoprolol    HYPOTHYROID  Continue synthroid

## 2019-03-13 NOTE — CONSULT NOTE ADULT - ASSESSMENT
S/P trauma  Antibiotic prophylaxis reasonable given intra-oral involvement  Would hope to limit Abx use as able given prior history of C. diffcile    Suggestions--  Continue Unasyn  Transition to PO when feasible  Hope for limited course of antibiotics    Thank you for the courtesy of this referral.  Alex Macario MD  [phone]

## 2019-03-14 ENCOUNTER — TRANSCRIPTION ENCOUNTER (OUTPATIENT)
Age: 84
End: 2019-03-14

## 2019-03-14 VITALS
OXYGEN SATURATION: 98 % | TEMPERATURE: 98 F | RESPIRATION RATE: 16 BRPM | HEART RATE: 66 BPM | DIASTOLIC BLOOD PRESSURE: 73 MMHG | SYSTOLIC BLOOD PRESSURE: 125 MMHG

## 2019-03-14 LAB
ANION GAP SERPL CALC-SCNC: 7 MMOL/L — SIGNIFICANT CHANGE UP (ref 5–17)
BUN SERPL-MCNC: 27 MG/DL — HIGH (ref 7–23)
CALCIUM SERPL-MCNC: 8.7 MG/DL — SIGNIFICANT CHANGE UP (ref 8.5–10.1)
CHLORIDE SERPL-SCNC: 109 MMOL/L — HIGH (ref 96–108)
CO2 SERPL-SCNC: 27 MMOL/L — SIGNIFICANT CHANGE UP (ref 22–31)
CREAT SERPL-MCNC: 0.84 MG/DL — SIGNIFICANT CHANGE UP (ref 0.5–1.3)
GLUCOSE SERPL-MCNC: 102 MG/DL — HIGH (ref 70–99)
HCT VFR BLD CALC: 29.6 % — LOW (ref 34.5–45)
HGB BLD-MCNC: 9.6 G/DL — LOW (ref 11.5–15.5)
MAGNESIUM SERPL-MCNC: 2.3 MG/DL — SIGNIFICANT CHANGE UP (ref 1.6–2.6)
MCHC RBC-ENTMCNC: 30.8 PG — SIGNIFICANT CHANGE UP (ref 27–34)
MCHC RBC-ENTMCNC: 32.4 GM/DL — SIGNIFICANT CHANGE UP (ref 32–36)
MCV RBC AUTO: 94.9 FL — SIGNIFICANT CHANGE UP (ref 80–100)
NRBC # BLD: 0 /100 WBCS — SIGNIFICANT CHANGE UP (ref 0–0)
PLATELET # BLD AUTO: 252 K/UL — SIGNIFICANT CHANGE UP (ref 150–400)
POTASSIUM SERPL-MCNC: 3.5 MMOL/L — SIGNIFICANT CHANGE UP (ref 3.5–5.3)
POTASSIUM SERPL-SCNC: 3.5 MMOL/L — SIGNIFICANT CHANGE UP (ref 3.5–5.3)
RBC # BLD: 3.12 M/UL — LOW (ref 3.8–5.2)
RBC # FLD: 12.5 % — SIGNIFICANT CHANGE UP (ref 10.3–14.5)
SODIUM SERPL-SCNC: 143 MMOL/L — SIGNIFICANT CHANGE UP (ref 135–145)
WBC # BLD: 10.13 K/UL — SIGNIFICANT CHANGE UP (ref 3.8–10.5)
WBC # FLD AUTO: 10.13 K/UL — SIGNIFICANT CHANGE UP (ref 3.8–10.5)

## 2019-03-14 PROCEDURE — 97116 GAIT TRAINING THERAPY: CPT

## 2019-03-14 PROCEDURE — 36415 COLL VENOUS BLD VENIPUNCTURE: CPT

## 2019-03-14 PROCEDURE — 85610 PROTHROMBIN TIME: CPT

## 2019-03-14 PROCEDURE — 70450 CT HEAD/BRAIN W/O DYE: CPT

## 2019-03-14 PROCEDURE — 85027 COMPLETE CBC AUTOMATED: CPT

## 2019-03-14 PROCEDURE — 85730 THROMBOPLASTIN TIME PARTIAL: CPT

## 2019-03-14 PROCEDURE — 96374 THER/PROPH/DIAG INJ IV PUSH: CPT | Mod: XU

## 2019-03-14 PROCEDURE — 83735 ASSAY OF MAGNESIUM: CPT

## 2019-03-14 PROCEDURE — 99285 EMERGENCY DEPT VISIT HI MDM: CPT | Mod: 25

## 2019-03-14 PROCEDURE — 73140 X-RAY EXAM OF FINGER(S): CPT

## 2019-03-14 PROCEDURE — 71260 CT THORAX DX C+: CPT

## 2019-03-14 PROCEDURE — 86901 BLOOD TYPING SEROLOGIC RH(D): CPT

## 2019-03-14 PROCEDURE — 97530 THERAPEUTIC ACTIVITIES: CPT

## 2019-03-14 PROCEDURE — 86900 BLOOD TYPING SEROLOGIC ABO: CPT

## 2019-03-14 PROCEDURE — 86850 RBC ANTIBODY SCREEN: CPT

## 2019-03-14 PROCEDURE — 70487 CT MAXILLOFACIAL W/DYE: CPT

## 2019-03-14 PROCEDURE — 80053 COMPREHEN METABOLIC PANEL: CPT

## 2019-03-14 PROCEDURE — 97162 PT EVAL MOD COMPLEX 30 MIN: CPT

## 2019-03-14 PROCEDURE — 96375 TX/PRO/DX INJ NEW DRUG ADDON: CPT

## 2019-03-14 PROCEDURE — 80048 BASIC METABOLIC PNL TOTAL CA: CPT

## 2019-03-14 PROCEDURE — 87040 BLOOD CULTURE FOR BACTERIA: CPT

## 2019-03-14 PROCEDURE — 72125 CT NECK SPINE W/O DYE: CPT

## 2019-03-14 RX ORDER — ASPIRIN/CALCIUM CARB/MAGNESIUM 324 MG
1 TABLET ORAL
Qty: 0 | Refills: 0 | COMMUNITY

## 2019-03-14 RX ADMIN — ESCITALOPRAM OXALATE 10 MILLIGRAM(S): 10 TABLET, FILM COATED ORAL at 12:07

## 2019-03-14 RX ADMIN — Medication 12.5 MILLIGRAM(S): at 05:45

## 2019-03-14 RX ADMIN — Medication 1 TABLET(S): at 08:15

## 2019-03-14 RX ADMIN — Medication 650 MILLIGRAM(S): at 00:11

## 2019-03-14 RX ADMIN — Medication 1000 UNIT(S): at 12:07

## 2019-03-14 RX ADMIN — Medication 1 TABLET(S): at 12:07

## 2019-03-14 RX ADMIN — Medication 75 MICROGRAM(S): at 06:22

## 2019-03-14 RX ADMIN — AMPICILLIN SODIUM AND SULBACTAM SODIUM 100 GRAM(S): 250; 125 INJECTION, POWDER, FOR SUSPENSION INTRAMUSCULAR; INTRAVENOUS at 05:46

## 2019-03-14 NOTE — PROGRESS NOTE ADULT - ASSESSMENT
This is an 87 F comes s/p mechanical fall    Laceration of lower gum, initial encounter; Hematoma  Patient seen by plastics -- wound cleaned and sutured  Clinically improving  WBC normal  Tolerating diet  Spoke with plastics -- stable for d/c home with outpatient f/u next week    HTN  Continue metoprolol    HYPOTHYROID  Continue synthroid

## 2019-03-14 NOTE — DISCHARGE NOTE NURSING/CASE MANAGEMENT/SOCIAL WORK - NSDCDPATPORTLINK_GEN_ALL_CORE
You can access the [facility] Patient Portal, offered by [facility], by registering with the following website: [email or web address]

## 2019-03-14 NOTE — DISCHARGE NOTE PROVIDER - HOSPITAL COURSE
86 yo female hx of arthritis, hld s/p mechanical fall in garage, face and hand landed on concrete around 7:30pm, no LOC, no neck pain, here for jaw pain, large hematoma and left 2nd digit laceration.  Tetanus 5 years ago.  No shortness of breath, no chest pain, ambulatory.  Initially went to urgent care first, but then sent here for evaluation.        Laceration of lower gum, initial encounter; Hematoma    Patient seen by plastics -- wound cleaned and sutured    Clinically improving    WBC normal    Tolerating diet    Augmentin x 2 days    Spoke with plastics -- stable for d/c home with outpatient f/u next week

## 2019-03-14 NOTE — PROGRESS NOTE ADULT - ASSESSMENT
88 yo female admitted after fall and started on  Antibiotic prophylaxis   -reasonable given intra-oral involvement  Would hope to limit Abx use as able given prior history of C. diffcile    Suggestions--  -augmentin 500mg PO BID today and last 2 doses 3/15    From an ID standpoint no further requirement for inpatient status for the management of ID issues. Fine with discharge from ID standpoint when other medical issues no longer require inpatient care and social issues allow for a safe discharge plan.    Thank you for consulting us and involving us in the management of this most interesting and challenging case.     Please Call with any further questions

## 2019-03-14 NOTE — PROGRESS NOTE ADULT - SUBJECTIVE AND OBJECTIVE BOX
infectious diseases progress note:    ROSENDO ROSARIO is a 87y y. o. Female patient    Patient reports: feeling better    ROS:    EYES:  Negative  blurry vision or double vision  GASTROINTESTINAL:  Negative for nausea, vomiting, diarrhea  -otherwise negative except for subjective    Allergies    erythromycin (Hives)    Intolerances        ANTIBIOTICS/RELEVANT:  antimicrobials  amoxicillin  500 milliGRAM(s)/clavulanate 1 Tablet(s) Oral two times a day    immunologic:    OTHER:  acetaminophen   Tablet .. 650 milliGRAM(s) Oral every 6 hours PRN  atorvastatin 10 milliGRAM(s) Oral at bedtime  cholecalciferol 1000 Unit(s) Oral daily  escitalopram 10 milliGRAM(s) Oral daily  lactobacillus acidophilus 1 Tablet(s) Oral three times a day with meals  levothyroxine 75 MICROGram(s) Oral daily  metoprolol succinate ER 12.5 milliGRAM(s) Oral daily  ondansetron Injectable 4 milliGRAM(s) IV Push every 6 hours PRN      Objective:  Vital Signs Last 24 Hrs  T(C): 36.4 (14 Mar 2019 07:26), Max: 37.1 (13 Mar 2019 16:38)  T(F): 97.6 (14 Mar 2019 07:26), Max: 98.7 (13 Mar 2019 16:38)  HR: 66 (14 Mar 2019 07:26) (66 - 79)  BP: 125/73 (14 Mar 2019 07:26) (113/70 - 159/67)  BP(mean): --  RR: 16 (14 Mar 2019 07:26) (16 - 18)  SpO2: 98% (14 Mar 2019 07:26) (96% - 98%)    T(C): 36.4 (03-14-19 @ 07:26), Max: 37.1 (03-13-19 @ 16:38)  T(C): 36.4 (03-14-19 @ 07:26), Max: 37.1 (03-13-19 @ 16:38)  T(C): 36.4 (03-14-19 @ 07:26), Max: 37.1 (03-13-19 @ 16:38)    PHYSICAL EXAM:  Constitutional: Well-developed, well nourished  Eyes: PERRLA, EOMI  Ear/Nose/Throat: oropharynx normal	  Neck: no JVD, no lymphadenopathy, supple  Respiratory: no accessory muscle use  Cardiovascular: RRR,   Gastrointestinal: soft, NT  Extremities: no clubbing, no cyanosis, edema absent  Skin: swollen and bruised face      LABS:                        9.6    10.13 )-----------( 252      ( 14 Mar 2019 06:37 )             29.6       10.13 03-14 @ 06:37  11.34 03-13 @ 16:50  13.75 03-13 @ 10:48  15.49 03-13 @ 06:21  10.39 03-12 @ 20:36      03-14    143  |  109<H>  |  27<H>  ----------------------------<  102<H>  3.5   |  27  |  0.84    Ca    8.7      14 Mar 2019 06:37  Mg     2.3     03-14    TPro  7.1  /  Alb  4.0  /  TBili  0.4  /  DBili  x   /  AST  19  /  ALT  21  /  AlkPhos  69  03-12      Creatinine, Serum: 0.84 mg/dL (03-14-19 @ 06:37)  Creatinine, Serum: 0.69 mg/dL (03-13-19 @ 06:21)  Creatinine, Serum: 0.75 mg/dL (03-12-19 @ 20:36)      PT/INR - ( 12 Mar 2019 20:36 )   PT: 11.8 sec;   INR: 1.03 ratio         PTT - ( 12 Mar 2019 20:36 )  PTT:29.1 sec      MICROBIOLOGY:        RADIOLOGY & ADDITIONAL STUDIES:

## 2019-03-14 NOTE — DISCHARGE NOTE PROVIDER - NSDCCPCAREPLAN_GEN_ALL_CORE_FT
PRINCIPAL DISCHARGE DIAGNOSIS  Diagnosis: Laceration of lower gum, initial encounter  Assessment and Plan of Treatment: Finish course of antibiotics.  Follow-up with Dr Chamberlain within 1 week  Don't take aspirin until you see Dr Chamberlain next week      SECONDARY DISCHARGE DIAGNOSES  Diagnosis: Laceration of index finger without foreign body without damage to nail, unspecified laterality, initial encounter  Assessment and Plan of Treatment:     Diagnosis: Hematoma  Assessment and Plan of Treatment:

## 2019-03-14 NOTE — PROGRESS NOTE ADULT - SUBJECTIVE AND OBJECTIVE BOX
Patient is a 87y old  Female who presents with a chief complaint of S/P fall (14 Mar 2019 11:25)      INTERVAL HPI/OVERNIGHT EVENTS: Patient seen and examined. NAD. No complaints. Doing better. Tolerating diet.     Vital Signs Last 24 Hrs  T(C): 36.4 (14 Mar 2019 07:26), Max: 37.1 (13 Mar 2019 16:38)  T(F): 97.6 (14 Mar 2019 07:26), Max: 98.7 (13 Mar 2019 16:38)  HR: 66 (14 Mar 2019 07:26) (66 - 79)  BP: 125/73 (14 Mar 2019 07:26) (113/70 - 159/67)  BP(mean): --  RR: 16 (14 Mar 2019 07:26) (16 - 18)  SpO2: 98% (14 Mar 2019 07:26) (96% - 98%)    03-14    143  |  109<H>  |  27<H>  ----------------------------<  102<H>  3.5   |  27  |  0.84    Ca    8.7      14 Mar 2019 06:37  Mg     2.3     03-14    TPro  7.1  /  Alb  4.0  /  TBili  0.4  /  DBili  x   /  AST  19  /  ALT  21  /  AlkPhos  69  03-12                          9.6    10.13 )-----------( 252      ( 14 Mar 2019 06:37 )             29.6     PT/INR - ( 12 Mar 2019 20:36 )   PT: 11.8 sec;   INR: 1.03 ratio         PTT - ( 12 Mar 2019 20:36 )  PTT:29.1 sec  CAPILLARY BLOOD GLUCOSE                  acetaminophen   Tablet .. 650 milliGRAM(s) Oral every 6 hours PRN  amoxicillin  500 milliGRAM(s)/clavulanate 1 Tablet(s) Oral two times a day  atorvastatin 10 milliGRAM(s) Oral at bedtime  cholecalciferol 1000 Unit(s) Oral daily  escitalopram 10 milliGRAM(s) Oral daily  lactobacillus acidophilus 1 Tablet(s) Oral three times a day with meals  levothyroxine 75 MICROGram(s) Oral daily  metoprolol succinate ER 12.5 milliGRAM(s) Oral daily  ondansetron Injectable 4 milliGRAM(s) IV Push every 6 hours PRN          REVIEW OF SYSTEMS:  CONSTITUTIONAL: No fever, no weight loss, or no fatigue  NECK: No pain, no stiffness  RESPIRATORY: No cough, no wheezing, no chills, no hemoptysis, No shortness of breath  CARDIOVASCULAR: No chest pain, no palpitations, no dizziness, no leg swelling  GASTROINTESTINAL: No abdominal pain. No nausea, no vomiting, no hematemesis; No diarrhea, no constipation. No melena, no hematochezia.  GENITOURINARY: No dysuria, no frequency, no hematuria, no incontinence  NEUROLOGICAL: No headaches, no loss of strength, no numbness, no tremors  SKIN: No itching, no burning  MUSCULOSKELETAL: No joint pain, no swelling; No muscle, no back, no extremity pain  PSYCHIATRIC: No depression, no mood swings,   HEME/LYMPH: No easy bruising, no bleeding gums  ALLERY AND IMMUNOLOGIC: No hives       Consultant(s) Notes Reviewed:  [X] YES  [ ] NO    PHYSICAL EXAM:  GENERAL: NAD  HEAD:  Ecchymosis and swelling of jaw, improving  EYES: EOMI, PERRLA, conjunctiva and sclera clear  ENMT: No tonsillar erythema, exudates, or enlargement; Moist mucous membranes  NECK: Supple, No JVD  NERVOUS SYSTEM:  Awake & alert  CHEST/LUNG: Clear to auscultation bilaterally; No rales, rhonchi, wheezing,  HEART: Regular rate and rhythm  ABDOMEN: Soft, Nontender, Nondistended; Bowel sounds present  EXTREMITIES:  No clubbing, cyanosis, or edema  LYMPH: No lymphadenopathy noted  SKIN: No rashes      Advanced care planning discussed with patient/family [X] YES   [ ] NO    Advanced care planning discussed with patient/family. Advanced care planning forms reviewed/discussed/completed. 20 minutes spent.

## 2019-03-14 NOTE — DISCHARGE NOTE PROVIDER - CARE PROVIDER_API CALL
Rehan Chamberlain (MD)  Plastic Surgery  [address], [place], [address]  [place] [address]  Phone: [phone]  Fax: [phone]  Follow Up Time:

## 2019-03-23 ENCOUNTER — EMERGENCY (EMERGENCY)
Facility: HOSPITAL | Age: 84
LOS: 1 days | Discharge: HOME | End: 2019-03-23
Attending: INTERNAL MEDICINE | Admitting: INTERNAL MEDICINE
Payer: MEDICARE

## 2019-03-23 VITALS
TEMPERATURE: 98 F | SYSTOLIC BLOOD PRESSURE: 164 MMHG | OXYGEN SATURATION: 98 % | RESPIRATION RATE: 16 BRPM | DIASTOLIC BLOOD PRESSURE: 65 MMHG | HEART RATE: 67 BPM

## 2019-03-23 VITALS
TEMPERATURE: 98 F | SYSTOLIC BLOOD PRESSURE: 146 MMHG | OXYGEN SATURATION: 100 % | HEART RATE: 67 BPM | RESPIRATION RATE: 16 BRPM | DIASTOLIC BLOOD PRESSURE: 68 MMHG

## 2019-03-23 PROCEDURE — 99284 EMERGENCY DEPT VISIT MOD MDM: CPT | Mod: 25

## 2019-03-23 PROCEDURE — 70450 CT HEAD/BRAIN W/O DYE: CPT | Mod: 26

## 2019-03-23 PROCEDURE — 12011 RPR F/E/E/N/L/M 2.5 CM/<: CPT

## 2019-03-23 PROCEDURE — 70450 CT HEAD/BRAIN W/O DYE: CPT

## 2019-03-23 RX ORDER — ACETAMINOPHEN 500 MG
650 TABLET ORAL ONCE
Qty: 0 | Refills: 0 | Status: COMPLETED | OUTPATIENT
Start: 2019-03-23 | End: 2019-03-23

## 2019-03-23 RX ADMIN — Medication 650 MILLIGRAM(S): at 02:05

## 2019-03-23 RX ADMIN — Medication 650 MILLIGRAM(S): at 02:10

## 2019-03-23 NOTE — ED PROVIDER NOTE - OBJECTIVE STATEMENT
89 y/o WF, slip and fall with head trauma. No Headache,  No LOC, no neck pain, no CP, no SOB, no acute stroke symptoms. 87 y/o WF, slip and fall with head trauma. No Headache,  No LOC, no neck pain, no CP, no SOB, no acute stroke symptoms. She has a forehead hematoma and 1.5 cm laceration. She has a bruise to the chin from a previous fall. The patient lives at home and has nursing assistance.

## 2019-03-23 NOTE — ED PROVIDER NOTE - SIGNIFICANT NEGATIVE FINDINGS
no headache, no LOC, no neck pain,  no chest pain, no SOB, no palpitations, no n/v, no urinary symptoms, no bleeding. no neuro changes.

## 2019-03-23 NOTE — ED ADULT NURSE NOTE - NSIMPLEMENTINTERV_GEN_ALL_ED
Implemented All Fall with Harm Risk Interventions:  [place] to call system. Call bell, personal items and telephone within reach. Instruct patient to call for assistance. Room bathroom lighting operational. Non-slip footwear when patient is off stretcher. Physically safe environment: no spills, clutter or unnecessary equipment. Stretcher in lowest position, wheels locked, appropriate side rails in place. Provide visual cue, wrist band, yellow gown, etc. Monitor gait and stability. Monitor for mental status changes and reorient to person, place, and time. Review medications for side effects contributing to fall risk. Reinforce activity limits and safety measures with patient and family. Provide visual clues: red socks.

## 2019-03-23 NOTE — ED PROVIDER NOTE - PSH
Knee Replacement  b/l  S/P cataract surgery  bialteral  S/P hip replacement    S/P knee replacement, bilateral  Partial R) hip  S/P ovarian cystectomy

## 2019-03-23 NOTE — ED ADULT NURSE NOTE - OBJECTIVE STATEMENT
Patient came in to ED brought in by EMS head injury s/p tripped and fall tonight. Noted with swelling and small lac on the forehead, no active bleeding. Patient denies nausea/ vomiting, no loss of consciousness. Noted with bruise on the chin.

## 2019-03-23 NOTE — ED ADULT NURSE REASSESSMENT NOTE - NS ED NURSE REASSESS COMMENT FT1
Patient went home on stable condition accompanied by daughter per wheelchair assisted to her car. Instructed for suture removal after 7 days.

## 2019-03-23 NOTE — ED PROVIDER NOTE - ENMT, MLM
Airway patent, Nasal mucosa clear. Mouth with normal mucosa. Throat has no vesicles, no oropharyngeal exudates and uvula is midline. fore head hematoma with 1.5cm laceration

## 2019-04-27 ENCOUNTER — INPATIENT (INPATIENT)
Facility: HOSPITAL | Age: 84
LOS: 3 days | Discharge: HOME | DRG: 871 | End: 2019-05-01
Attending: STUDENT IN AN ORGANIZED HEALTH CARE EDUCATION/TRAINING PROGRAM | Admitting: INTERNAL MEDICINE
Payer: MEDICARE

## 2019-04-27 VITALS
OXYGEN SATURATION: 92 % | WEIGHT: 113.1 LBS | HEART RATE: 97 BPM | DIASTOLIC BLOOD PRESSURE: 68 MMHG | TEMPERATURE: 99 F | SYSTOLIC BLOOD PRESSURE: 115 MMHG | RESPIRATION RATE: 16 BRPM

## 2019-04-27 NOTE — ED ADULT NURSE NOTE - OBJECTIVE STATEMENT
patient here for syncope and fever, alert and oriented x 3 but does not remember incident of passing out. Overall well appearing and in no distress. Patient states that she "always has rales" and that is not new for her.

## 2019-04-28 DIAGNOSIS — N39.0 URINARY TRACT INFECTION, SITE NOT SPECIFIED: ICD-10-CM

## 2019-04-28 LAB
ALBUMIN SERPL ELPH-MCNC: 3.4 G/DL — SIGNIFICANT CHANGE UP (ref 3.3–5)
ALP SERPL-CCNC: 169 U/L — HIGH (ref 40–120)
ALT FLD-CCNC: 51 U/L — SIGNIFICANT CHANGE UP (ref 12–78)
ANION GAP SERPL CALC-SCNC: 10 MMOL/L — SIGNIFICANT CHANGE UP (ref 5–17)
APPEARANCE UR: ABNORMAL
AST SERPL-CCNC: 58 U/L — HIGH (ref 15–37)
BACTERIA # UR AUTO: ABNORMAL
BILIRUB SERPL-MCNC: 0.8 MG/DL — SIGNIFICANT CHANGE UP (ref 0.2–1.2)
BILIRUB UR-MCNC: NEGATIVE — SIGNIFICANT CHANGE UP
BUN SERPL-MCNC: 29 MG/DL — HIGH (ref 7–23)
CALCIUM SERPL-MCNC: 9.1 MG/DL — SIGNIFICANT CHANGE UP (ref 8.5–10.1)
CHLORIDE SERPL-SCNC: 109 MMOL/L — HIGH (ref 96–108)
CO2 SERPL-SCNC: 20 MMOL/L — LOW (ref 22–31)
COLOR SPEC: YELLOW — SIGNIFICANT CHANGE UP
CREAT SERPL-MCNC: 1 MG/DL — SIGNIFICANT CHANGE UP (ref 0.5–1.3)
DIFF PNL FLD: ABNORMAL
E COLI DNA BLD POS QL NAA+NON-PROBE: SIGNIFICANT CHANGE UP
EPI CELLS # UR: NEGATIVE — SIGNIFICANT CHANGE UP
FLU A RESULT: SIGNIFICANT CHANGE UP
FLU A RESULT: SIGNIFICANT CHANGE UP
FLUAV AG NPH QL: SIGNIFICANT CHANGE UP
FLUBV AG NPH QL: SIGNIFICANT CHANGE UP
GLUCOSE SERPL-MCNC: 114 MG/DL — HIGH (ref 70–99)
GLUCOSE UR QL: NEGATIVE — SIGNIFICANT CHANGE UP
GRAM STN FLD: SIGNIFICANT CHANGE UP
HCT VFR BLD CALC: 34.4 % — LOW (ref 34.5–45)
HGB BLD-MCNC: 11 G/DL — LOW (ref 11.5–15.5)
INR BLD: 1.14 RATIO — SIGNIFICANT CHANGE UP (ref 0.88–1.16)
KETONES UR-MCNC: NEGATIVE — SIGNIFICANT CHANGE UP
LACTATE SERPL-SCNC: 1.6 MMOL/L — SIGNIFICANT CHANGE UP (ref 0.7–2)
LEUKOCYTE ESTERASE UR-ACNC: ABNORMAL
MCHC RBC-ENTMCNC: 30.5 PG — SIGNIFICANT CHANGE UP (ref 27–34)
MCHC RBC-ENTMCNC: 32 GM/DL — SIGNIFICANT CHANGE UP (ref 32–36)
MCV RBC AUTO: 95.3 FL — SIGNIFICANT CHANGE UP (ref 80–100)
METHOD TYPE: SIGNIFICANT CHANGE UP
NITRITE UR-MCNC: NEGATIVE — SIGNIFICANT CHANGE UP
NRBC # BLD: 0 /100 WBCS — SIGNIFICANT CHANGE UP (ref 0–0)
PH UR: 5 — SIGNIFICANT CHANGE UP (ref 5–8)
PLATELET # BLD AUTO: 218 K/UL — SIGNIFICANT CHANGE UP (ref 150–400)
POTASSIUM SERPL-MCNC: 3.6 MMOL/L — SIGNIFICANT CHANGE UP (ref 3.5–5.3)
POTASSIUM SERPL-SCNC: 3.6 MMOL/L — SIGNIFICANT CHANGE UP (ref 3.5–5.3)
PROT SERPL-MCNC: 6.8 G/DL — SIGNIFICANT CHANGE UP (ref 6–8.3)
PROT UR-MCNC: 150 MG/DL
PROTHROM AB SERPL-ACNC: 12.9 SEC — SIGNIFICANT CHANGE UP (ref 10–12.9)
RBC # BLD: 3.61 M/UL — LOW (ref 3.8–5.2)
RBC # FLD: 13.5 % — SIGNIFICANT CHANGE UP (ref 10.3–14.5)
RBC CASTS # UR COMP ASSIST: ABNORMAL /HPF (ref 0–4)
RSV RESULT: SIGNIFICANT CHANGE UP
RSV RNA RESP QL NAA+PROBE: SIGNIFICANT CHANGE UP
SODIUM SERPL-SCNC: 139 MMOL/L — SIGNIFICANT CHANGE UP (ref 135–145)
SP GR SPEC: 1.01 — SIGNIFICANT CHANGE UP (ref 1.01–1.02)
SPECIMEN SOURCE: SIGNIFICANT CHANGE UP
TROPONIN I SERPL-MCNC: 0.04 NG/ML — SIGNIFICANT CHANGE UP (ref 0.01–0.04)
TROPONIN I SERPL-MCNC: 0.07 NG/ML — HIGH (ref 0.01–0.04)
TROPONIN I SERPL-MCNC: 0.08 NG/ML — HIGH (ref 0.01–0.04)
UROBILINOGEN FLD QL: NEGATIVE — SIGNIFICANT CHANGE UP
VIT B12 SERPL-MCNC: 473 PG/ML — SIGNIFICANT CHANGE UP (ref 232–1245)
WBC # BLD: 12.61 K/UL — HIGH (ref 3.8–10.5)
WBC # FLD AUTO: 12.61 K/UL — HIGH (ref 3.8–10.5)
WBC UR QL: >50

## 2019-04-28 PROCEDURE — 99223 1ST HOSP IP/OBS HIGH 75: CPT | Mod: AI

## 2019-04-28 PROCEDURE — 93306 TTE W/DOPPLER COMPLETE: CPT | Mod: 26

## 2019-04-28 PROCEDURE — 70450 CT HEAD/BRAIN W/O DYE: CPT | Mod: 26

## 2019-04-28 PROCEDURE — 71045 X-RAY EXAM CHEST 1 VIEW: CPT | Mod: 26

## 2019-04-28 PROCEDURE — 12345: CPT | Mod: NC

## 2019-04-28 PROCEDURE — 99281 EMR DPT VST MAYX REQ PHY/QHP: CPT

## 2019-04-28 PROCEDURE — 74176 CT ABD & PELVIS W/O CONTRAST: CPT | Mod: 26

## 2019-04-28 PROCEDURE — 93010 ELECTROCARDIOGRAM REPORT: CPT

## 2019-04-28 RX ORDER — ASPIRIN/CALCIUM CARB/MAGNESIUM 324 MG
81 TABLET ORAL DAILY
Qty: 0 | Refills: 0 | Status: DISCONTINUED | OUTPATIENT
Start: 2019-04-28 | End: 2019-05-01

## 2019-04-28 RX ORDER — ATORVASTATIN CALCIUM 80 MG/1
10 TABLET, FILM COATED ORAL AT BEDTIME
Qty: 0 | Refills: 0 | Status: DISCONTINUED | OUTPATIENT
Start: 2019-04-28 | End: 2019-05-01

## 2019-04-28 RX ORDER — LEVOTHYROXINE SODIUM 125 MCG
75 TABLET ORAL DAILY
Qty: 0 | Refills: 0 | Status: DISCONTINUED | OUTPATIENT
Start: 2019-04-28 | End: 2019-05-01

## 2019-04-28 RX ORDER — MEROPENEM 1 G/30ML
1000 INJECTION INTRAVENOUS ONCE
Qty: 0 | Refills: 0 | Status: COMPLETED | OUTPATIENT
Start: 2019-04-28 | End: 2019-04-28

## 2019-04-28 RX ORDER — ASPIRIN/CALCIUM CARB/MAGNESIUM 324 MG
325 TABLET ORAL ONCE
Qty: 0 | Refills: 0 | Status: COMPLETED | OUTPATIENT
Start: 2019-04-28 | End: 2019-04-28

## 2019-04-28 RX ORDER — MEROPENEM 1 G/30ML
INJECTION INTRAVENOUS
Qty: 0 | Refills: 0 | Status: DISCONTINUED | OUTPATIENT
Start: 2019-04-28 | End: 2019-04-28

## 2019-04-28 RX ORDER — CEFTRIAXONE 500 MG/1
1 INJECTION, POWDER, FOR SOLUTION INTRAMUSCULAR; INTRAVENOUS EVERY 24 HOURS
Qty: 0 | Refills: 0 | Status: DISCONTINUED | OUTPATIENT
Start: 2019-04-28 | End: 2019-04-28

## 2019-04-28 RX ORDER — SODIUM CHLORIDE 9 MG/ML
1000 INJECTION INTRAMUSCULAR; INTRAVENOUS; SUBCUTANEOUS ONCE
Qty: 0 | Refills: 0 | Status: COMPLETED | OUTPATIENT
Start: 2019-04-28 | End: 2019-04-28

## 2019-04-28 RX ORDER — SODIUM CHLORIDE 9 MG/ML
500 INJECTION, SOLUTION INTRAVENOUS
Qty: 0 | Refills: 0 | Status: COMPLETED | OUTPATIENT
Start: 2019-04-28 | End: 2019-04-28

## 2019-04-28 RX ORDER — ENOXAPARIN SODIUM 100 MG/ML
40 INJECTION SUBCUTANEOUS EVERY 24 HOURS
Qty: 0 | Refills: 0 | Status: DISCONTINUED | OUTPATIENT
Start: 2019-04-28 | End: 2019-05-01

## 2019-04-28 RX ORDER — ESCITALOPRAM OXALATE 10 MG/1
10 TABLET, FILM COATED ORAL DAILY
Qty: 0 | Refills: 0 | Status: DISCONTINUED | OUTPATIENT
Start: 2019-04-28 | End: 2019-05-01

## 2019-04-28 RX ORDER — CEFTRIAXONE 500 MG/1
1 INJECTION, POWDER, FOR SOLUTION INTRAMUSCULAR; INTRAVENOUS ONCE
Qty: 0 | Refills: 0 | Status: COMPLETED | OUTPATIENT
Start: 2019-04-28 | End: 2019-04-28

## 2019-04-28 RX ORDER — ACETAMINOPHEN 500 MG
650 TABLET ORAL EVERY 6 HOURS
Qty: 0 | Refills: 0 | Status: DISCONTINUED | OUTPATIENT
Start: 2019-04-28 | End: 2019-05-01

## 2019-04-28 RX ORDER — LANOLIN ALCOHOL/MO/W.PET/CERES
5 CREAM (GRAM) TOPICAL ONCE
Qty: 0 | Refills: 0 | Status: COMPLETED | OUTPATIENT
Start: 2019-04-28 | End: 2019-04-28

## 2019-04-28 RX ORDER — LACTOBACILLUS ACIDOPHILUS 100MM CELL
1 CAPSULE ORAL DAILY
Qty: 0 | Refills: 0 | Status: DISCONTINUED | OUTPATIENT
Start: 2019-04-28 | End: 2019-05-01

## 2019-04-28 RX ORDER — METOPROLOL TARTRATE 50 MG
12.5 TABLET ORAL DAILY
Qty: 0 | Refills: 0 | Status: DISCONTINUED | OUTPATIENT
Start: 2019-04-28 | End: 2019-05-01

## 2019-04-28 RX ORDER — MEROPENEM 1 G/30ML
1000 INJECTION INTRAVENOUS EVERY 12 HOURS
Qty: 0 | Refills: 0 | Status: DISCONTINUED | OUTPATIENT
Start: 2019-04-29 | End: 2019-04-29

## 2019-04-28 RX ORDER — ACETAMINOPHEN 500 MG
650 TABLET ORAL ONCE
Qty: 0 | Refills: 0 | Status: COMPLETED | OUTPATIENT
Start: 2019-04-28 | End: 2019-04-28

## 2019-04-28 RX ADMIN — Medication 325 MILLIGRAM(S): at 07:36

## 2019-04-28 RX ADMIN — SODIUM CHLORIDE 1000 MILLILITER(S): 9 INJECTION INTRAMUSCULAR; INTRAVENOUS; SUBCUTANEOUS at 00:56

## 2019-04-28 RX ADMIN — ESCITALOPRAM OXALATE 10 MILLIGRAM(S): 10 TABLET, FILM COATED ORAL at 12:11

## 2019-04-28 RX ADMIN — SODIUM CHLORIDE 75 MILLILITER(S): 9 INJECTION, SOLUTION INTRAVENOUS at 07:00

## 2019-04-28 RX ADMIN — Medication 1 TABLET(S): at 12:11

## 2019-04-28 RX ADMIN — Medication 650 MILLIGRAM(S): at 00:54

## 2019-04-28 RX ADMIN — Medication 650 MILLIGRAM(S): at 03:53

## 2019-04-28 RX ADMIN — SODIUM CHLORIDE 1000 MILLILITER(S): 9 INJECTION INTRAMUSCULAR; INTRAVENOUS; SUBCUTANEOUS at 03:53

## 2019-04-28 RX ADMIN — Medication 12.5 MILLIGRAM(S): at 07:37

## 2019-04-28 RX ADMIN — Medication 650 MILLIGRAM(S): at 17:27

## 2019-04-28 RX ADMIN — CEFTRIAXONE 1 GRAM(S): 500 INJECTION, POWDER, FOR SOLUTION INTRAMUSCULAR; INTRAVENOUS at 01:35

## 2019-04-28 RX ADMIN — MEROPENEM 100 MILLIGRAM(S): 1 INJECTION INTRAVENOUS at 22:29

## 2019-04-28 RX ADMIN — Medication 81 MILLIGRAM(S): at 12:11

## 2019-04-28 RX ADMIN — Medication 650 MILLIGRAM(S): at 16:52

## 2019-04-28 RX ADMIN — ATORVASTATIN CALCIUM 10 MILLIGRAM(S): 80 TABLET, FILM COATED ORAL at 21:47

## 2019-04-28 RX ADMIN — Medication 75 MICROGRAM(S): at 07:38

## 2019-04-28 RX ADMIN — ENOXAPARIN SODIUM 40 MILLIGRAM(S): 100 INJECTION SUBCUTANEOUS at 12:11

## 2019-04-28 RX ADMIN — CEFTRIAXONE 100 GRAM(S): 500 INJECTION, POWDER, FOR SOLUTION INTRAMUSCULAR; INTRAVENOUS at 00:55

## 2019-04-28 NOTE — H&P ADULT - HISTORY OF PRESENT ILLNESS
87 y/o with HLD, hypothyroid, dementia, brought in for lethargy, confusion, ?fall yesterday but no report of LOC, and today with fever x 1 day to 100.7F.  Pty is lethargic but arousable and responsive, does not know why she's in the hospital. poor historian.  No other complaints.  As per daughter, pt with frequent hx of UTI.  Urine with mod leuk.  CT head neg, renal stone hunt unremarkable.  Received a dose of Ceftriaxone in the ED.  Troponin was 0.07. No EKG changes.

## 2019-04-28 NOTE — CHART NOTE - NSCHARTNOTEFT_GEN_A_CORE
called by rn with +bcx. GNR. on rocephin. UA +. UCx pending. broaden coverage to meropenen awaiting ucx. then deescalate as appropriate. repeat blood cx.

## 2019-04-28 NOTE — ED PROVIDER NOTE - OBJECTIVE STATEMENT
fever- today unknown fall yesterday  fever fever- today unknown fall yesterday  [id number] previously known to our group for E. coli septicemia secondary to L sided pyelonephritis, prior C. difficile, hypothyroidism, who tripped and fell over a "ledge" in her garage and sustained extensive facial trauma. Patient in [facility] prior without any complaints concerning for infection or decline in her performance status. 87 y/o WF C/C fever x 1 day to 100.7F in the ED, the patient appears lethargic and confused to why she came to the hospital. She fell yesterday, unknown if there was head trauma. She has h/o  E. coli septicemia secondary to L sided pyelonephritis, prior C. difficile, hypothyroidism. Recent admission  a fall, sustaining extensive facial trauma.

## 2019-04-28 NOTE — CHART NOTE - NSCHARTNOTEFT_GEN_A_CORE
Patient admitted early this AM, please see admission H&P for more details.    Briefly, she is an 89 y/o with HLD, hypothyroid, dementia, brought in for lethargy, confusion, ?fall yesterday but no report of LOC, and today with fever x 1 day to 100.7F.  Reportedly has a history of recurrent UTIs. Patient was lethargic but arousable and responsive at time of admission, but did not know why she was in the hospital.    Patient seen and examined. Labs, vitals, imaging, and orders reviewed.     She is more alert. Denies any complaints at this time. She is awake and alert, answering questions. She knows she was brought to the hospital for a fall. It appears that her mental status has improved at this time. She denies any urinary symptoms.     Assessment:   -Fever  -Weakness  -r/o UTI  -elevated troponin    Plan:  -Will continue empiric Rocephin for UTI and await cultures.  -Encourage po intake.   -Trops now WNL, were likely due to demand. Not consistent with ACS.

## 2019-04-28 NOTE — ED PROVIDER NOTE - SIGNIFICANT NEGATIVE FINDINGS
no headache, no chest pain, no SOB, no palpitations, no abdominal pain, no n/v/d, no GI  bleeding. no neuro changes.

## 2019-04-28 NOTE — ED ADULT NURSE REASSESSMENT NOTE - NS ED NURSE REASSESS COMMENT FT1
patient straight cath for urine, approx 300 mL foul smelling cloudy urine obtained, hx of UTI in the past. Patient denies any complaints at this time, v/s improved, resting comfortably with family at bedside. Family spoke to Dr Mims, state patient fell yesterday. Patient with sutures to chin and forehead from last week, bruising now appearing below left eyebrow. Patient's valuables jewlery taken home by family at this time. To be admitted, awaiting to be seen by admitting doctors.

## 2019-04-28 NOTE — ED PROVIDER NOTE - CARE PLAN
Principal Discharge DX:	UTI (urinary tract infection)  Secondary Diagnosis:	ACS (acute coronary syndrome)  Secondary Diagnosis:	Sepsis  Secondary Diagnosis:	Fall

## 2019-04-28 NOTE — H&P ADULT - ASSESSMENT
89 y/o with HLD, hypothyroid, dementia, brought in for lethargy, confusion, ?fall yesterday, today febrile  ?UTI, mild dehydration  very mild elev of trop    Admit  IV Hydration  Empiric Ceftriaxone pending cultures  Trend trop, check TSH, ffup labs,   Cont other meds  DVT prophylaxis  Echo    IMPROVE VTE Individual Risk Assessment  RISK                                                                Points  [  ] Previous VTE                                                  3  [  ] Thrombophilia                                               2  [  ] Lower limb paralysis                                      2        (unable to hold up >15 seconds)    [  ] Current Cancer                                              2        (within 6 months)  [  ] Immobilization > 24 hrs                                1  [  ] ICU/CCU stay > 24 hours                              1  [x  ] Age > 60                                                      1  IMPROVE VTE Score __1___    IMPROVE Score 0-1: Low Risk, No VTE prophylaxis required for most patients, encourage ambulation.   IMPROVE Score 2-3: At risk, pharmacologic VTE prophylaxis is indicated for most patients (in the absence of a contraindication)  IMPROVE Score > or = 4: High Risk, pharmacologic VTE prophylaxis is indicated for most patients (in the absence of a contraindication) 87 y/o with HLD, hypothyroid, dementia, brought in for lethargy, confusion, ?fall yesterday, today febrile  ?UTI, mild dehydration  very mild elev of trop    Admit  IV Hydration  Empiric Ceftriaxone pending cultures  Trend trop, check TSH, ffup labs,   Cont other meds, aspirin  DVT prophylaxis  Echo    IMPROVE VTE Individual Risk Assessment  RISK                                                                Points  [  ] Previous VTE                                                  3  [  ] Thrombophilia                                               2  [  ] Lower limb paralysis                                      2        (unable to hold up >15 seconds)    [  ] Current Cancer                                              2        (within 6 months)  [  ] Immobilization > 24 hrs                                1  [  ] ICU/CCU stay > 24 hours                              1  [x  ] Age > 60                                                      1  IMPROVE VTE Score __1___    IMPROVE Score 0-1: Low Risk, No VTE prophylaxis required for most patients, encourage ambulation.   IMPROVE Score 2-3: At risk, pharmacologic VTE prophylaxis is indicated for most patients (in the absence of a contraindication)  IMPROVE Score > or = 4: High Risk, pharmacologic VTE prophylaxis is indicated for most patients (in the absence of a contraindication)

## 2019-04-28 NOTE — H&P ADULT - NSHPPHYSICALEXAM_GEN_ALL_CORE
Vital Signs (24 Hrs):  T(C): 37.9 (04-28-19 @ 03:40), Max: 38.2 (04-28-19 @ 00:00)  HR: 82 (04-28-19 @ 03:40) (82 - 97)  BP: 111/47 (04-28-19 @ 03:40) (99/59 - 115/68)  RR: 18 (04-28-19 @ 03:40) (16 - 18)  SpO2: 96% (04-28-19 @ 03:40) (92% - 96%)

## 2019-04-28 NOTE — ED PROVIDER NOTE - CLINICAL SUMMARY MEDICAL DECISION MAKING FREE TEXT BOX
87 y/o wf with uti sepsis , recent head trauma , no chest pain, elevated troponin, ekg non acute  Plan IVF labs u/a xray ekg enzyme ASA culture IV AB  CT head, stone diggs , admit to telemetry
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
## 2019-04-29 DIAGNOSIS — A41.9 SEPSIS, UNSPECIFIED ORGANISM: ICD-10-CM

## 2019-04-29 DIAGNOSIS — E78.5 HYPERLIPIDEMIA, UNSPECIFIED: ICD-10-CM

## 2019-04-29 DIAGNOSIS — R78.81 BACTEREMIA: ICD-10-CM

## 2019-04-29 DIAGNOSIS — N39.0 URINARY TRACT INFECTION, SITE NOT SPECIFIED: ICD-10-CM

## 2019-04-29 DIAGNOSIS — E03.9 HYPOTHYROIDISM, UNSPECIFIED: ICD-10-CM

## 2019-04-29 DIAGNOSIS — N10 ACUTE PYELONEPHRITIS: ICD-10-CM

## 2019-04-29 DIAGNOSIS — R60.0 LOCALIZED EDEMA: ICD-10-CM

## 2019-04-29 LAB
ANION GAP SERPL CALC-SCNC: 7 MMOL/L — SIGNIFICANT CHANGE UP (ref 5–17)
BASOPHILS # BLD AUTO: 0.07 K/UL — SIGNIFICANT CHANGE UP (ref 0–0.2)
BASOPHILS NFR BLD AUTO: 0.4 % — SIGNIFICANT CHANGE UP (ref 0–2)
BUN SERPL-MCNC: 29 MG/DL — HIGH (ref 7–23)
CALCIUM SERPL-MCNC: 8.8 MG/DL — SIGNIFICANT CHANGE UP (ref 8.5–10.1)
CHLORIDE SERPL-SCNC: 109 MMOL/L — HIGH (ref 96–108)
CO2 SERPL-SCNC: 25 MMOL/L — SIGNIFICANT CHANGE UP (ref 22–31)
CREAT SERPL-MCNC: 0.97 MG/DL — SIGNIFICANT CHANGE UP (ref 0.5–1.3)
EOSINOPHIL # BLD AUTO: 0.33 K/UL — SIGNIFICANT CHANGE UP (ref 0–0.5)
EOSINOPHIL NFR BLD AUTO: 1.8 % — SIGNIFICANT CHANGE UP (ref 0–6)
GLUCOSE SERPL-MCNC: 125 MG/DL — HIGH (ref 70–99)
GRAM STN FLD: SIGNIFICANT CHANGE UP
HCT VFR BLD CALC: 32.9 % — LOW (ref 34.5–45)
HGB BLD-MCNC: 10.5 G/DL — LOW (ref 11.5–15.5)
IMM GRANULOCYTES NFR BLD AUTO: 0.8 % — SIGNIFICANT CHANGE UP (ref 0–1.5)
LYMPHOCYTES # BLD AUTO: 0.83 K/UL — LOW (ref 1–3.3)
LYMPHOCYTES # BLD AUTO: 4.5 % — LOW (ref 13–44)
MCHC RBC-ENTMCNC: 30.6 PG — SIGNIFICANT CHANGE UP (ref 27–34)
MCHC RBC-ENTMCNC: 31.9 GM/DL — LOW (ref 32–36)
MCV RBC AUTO: 95.9 FL — SIGNIFICANT CHANGE UP (ref 80–100)
MONOCYTES # BLD AUTO: 1.5 K/UL — HIGH (ref 0–0.9)
MONOCYTES NFR BLD AUTO: 8.1 % — SIGNIFICANT CHANGE UP (ref 2–14)
NEUTROPHILS # BLD AUTO: 15.59 K/UL — HIGH (ref 1.8–7.4)
NEUTROPHILS NFR BLD AUTO: 84.4 % — HIGH (ref 43–77)
NRBC # BLD: 0 /100 WBCS — SIGNIFICANT CHANGE UP (ref 0–0)
PLATELET # BLD AUTO: 231 K/UL — SIGNIFICANT CHANGE UP (ref 150–400)
POTASSIUM SERPL-MCNC: 4 MMOL/L — SIGNIFICANT CHANGE UP (ref 3.5–5.3)
POTASSIUM SERPL-SCNC: 4 MMOL/L — SIGNIFICANT CHANGE UP (ref 3.5–5.3)
RBC # BLD: 3.43 M/UL — LOW (ref 3.8–5.2)
RBC # FLD: 13.5 % — SIGNIFICANT CHANGE UP (ref 10.3–14.5)
SODIUM SERPL-SCNC: 141 MMOL/L — SIGNIFICANT CHANGE UP (ref 135–145)
SPECIMEN SOURCE: SIGNIFICANT CHANGE UP
WBC # BLD: 18.47 K/UL — HIGH (ref 3.8–10.5)
WBC # FLD AUTO: 18.47 K/UL — HIGH (ref 3.8–10.5)

## 2019-04-29 PROCEDURE — 99233 SBSQ HOSP IP/OBS HIGH 50: CPT | Mod: GC

## 2019-04-29 RX ORDER — CEFTRIAXONE 500 MG/1
1 INJECTION, POWDER, FOR SOLUTION INTRAMUSCULAR; INTRAVENOUS EVERY 24 HOURS
Qty: 0 | Refills: 0 | Status: DISCONTINUED | OUTPATIENT
Start: 2019-04-30 | End: 2019-05-01

## 2019-04-29 RX ORDER — CEFTRIAXONE 500 MG/1
1 INJECTION, POWDER, FOR SOLUTION INTRAMUSCULAR; INTRAVENOUS ONCE
Qty: 0 | Refills: 0 | Status: COMPLETED | OUTPATIENT
Start: 2019-04-29 | End: 2019-04-29

## 2019-04-29 RX ORDER — CEFTRIAXONE 500 MG/1
INJECTION, POWDER, FOR SOLUTION INTRAMUSCULAR; INTRAVENOUS
Qty: 0 | Refills: 0 | Status: DISCONTINUED | OUTPATIENT
Start: 2019-04-29 | End: 2019-05-01

## 2019-04-29 RX ADMIN — ENOXAPARIN SODIUM 40 MILLIGRAM(S): 100 INJECTION SUBCUTANEOUS at 11:53

## 2019-04-29 RX ADMIN — CEFTRIAXONE 100 GRAM(S): 500 INJECTION, POWDER, FOR SOLUTION INTRAMUSCULAR; INTRAVENOUS at 17:40

## 2019-04-29 RX ADMIN — Medication 12.5 MILLIGRAM(S): at 06:49

## 2019-04-29 RX ADMIN — MEROPENEM 100 MILLIGRAM(S): 1 INJECTION INTRAVENOUS at 11:09

## 2019-04-29 RX ADMIN — ATORVASTATIN CALCIUM 10 MILLIGRAM(S): 80 TABLET, FILM COATED ORAL at 21:21

## 2019-04-29 RX ADMIN — Medication 5 MILLIGRAM(S): at 00:01

## 2019-04-29 RX ADMIN — Medication 1 TABLET(S): at 11:53

## 2019-04-29 RX ADMIN — Medication 81 MILLIGRAM(S): at 11:53

## 2019-04-29 RX ADMIN — Medication 75 MICROGRAM(S): at 06:49

## 2019-04-29 RX ADMIN — ESCITALOPRAM OXALATE 10 MILLIGRAM(S): 10 TABLET, FILM COATED ORAL at 11:53

## 2019-04-29 NOTE — PROGRESS NOTE ADULT - ASSESSMENT
87 y/o with HLD, hypothyroid, dementia, brought in for lethargy, confusion, fall yesterday, w/ UTI    Admit  IV Hydration  Empiric Ceftriaxone pending cultures  Trend trop, check TSH, ffup labs,   Cont other meds, aspirin  DVT prophylaxis  Echo    IMPROVE VTE Individual Risk Assessment  RISK                                                                Points  [  ] Previous VTE                                                  3  [  ] Thrombophilia                                               2  [  ] Lower limb paralysis                                      2        (unable to hold up >15 seconds)    [  ] Current Cancer                                              2        (within 6 months)  [  ] Immobilization > 24 hrs                                1  [  ] ICU/CCU stay > 24 hours                              1  [x  ] Age > 60                                                      1  IMPROVE VTE Score __1___    IMPROVE Score 0-1: Low Risk, No VTE prophylaxis required for most patients, encourage ambulation.   IMPROVE Score 2-3: At risk, pharmacologic VTE prophylaxis is indicated for most patients (in the absence of a contraindication)  IMPROVE Score > or = 4: High Risk, pharmacologic VTE prophylaxis is indicated for most patients (in the absence of a contraindication) 87 y/o with HLD, hypothyroid, dementia, brought in for lethargy, confusion, fall yesterday, w/ UTI

## 2019-04-29 NOTE — PHYSICAL THERAPY INITIAL EVALUATION ADULT - TRANSFER TRAINING, PT EVAL
(STG 2-3 sessions): sit<->stand transfer w/ supervision (STG 2-3 sessions): sit<->stand transfer w/ supervision/independent

## 2019-04-29 NOTE — PHYSICAL THERAPY INITIAL EVALUATION ADULT - CRITERIA FOR SKILLED THERAPEUTIC INTERVENTIONS
impairments found/rehab potential/anticipated equipment needs at discharge impairments found/rehab potential

## 2019-04-29 NOTE — PROVIDER CONTACT NOTE (CRITICAL VALUE NOTIFICATION) - TEST AND RESULT REPORTED:
Blood culture from 4/28 at 0005 preliminary results in aerobic bottle gm negative rods
abnormal blood culture
Blood culture from 4/28 preliminary report Gm negative rods in aerobic bottle

## 2019-04-29 NOTE — PHYSICAL THERAPY INITIAL EVALUATION ADULT - GAIT TRAINING, PT EVAL
(STG 2-3 sessions): Pillo 150' w/ RW and supervision (STG 2-3 sessions): Pillo 150' w/ RW and supervision/independent

## 2019-04-29 NOTE — PHYSICAL THERAPY INITIAL EVALUATION ADULT - PERTINENT HX OF CURRENT PROBLEM, REHAB EVAL
As per H&P: "89 y/o with HLD, hypothyroid, dementia, brought in for lethargy, confusion, ?fall yesterday, today febrile" Adm 4/28. As per H&P: "89 y/o female with HLD, hypothyroid, dementia, brought in for lethargy, confusion, ?fall yesterday, today febrile" Adm 4/28.

## 2019-04-29 NOTE — CONSULT NOTE ADULT - SUBJECTIVE AND OBJECTIVE BOX
HPI:  87 y/o with HLD, hypothyroid, dementia, brought in for lethargy, confusion, fell and did report LOC, and  fever x 1 day to 100.7F. Patient reports no urinary symptoms but has a history of frequent E coli UTIs.    PAST MEDICAL & SURGICAL HISTORY:  Arthritis  Hyperlipidemia  Osteoporosis  Migraine  Cataract: s/p cataract surgery  Edema of Leg: b/l LE  Hypothyroidism  S/P cataract surgery: bialteral  S/P ovarian cystectomy  S/P hip replacement  S/P knee replacement, bilateral: Partial R) hip  Knee Replacement: b/l      Antimicrobials  meropenem  IVPB 1000 milliGRAM(s) IV Intermittent every 12 hours      Immunological      Other  acetaminophen   Tablet .. 650 milliGRAM(s) Oral every 6 hours PRN  aspirin enteric coated 81 milliGRAM(s) Oral daily  atorvastatin 10 milliGRAM(s) Oral at bedtime  enoxaparin Injectable 40 milliGRAM(s) SubCutaneous every 24 hours  escitalopram 10 milliGRAM(s) Oral daily  lactobacillus acidophilus 1 Tablet(s) Oral daily  levothyroxine 75 MICROGram(s) Oral daily  metoprolol tartrate 12.5 milliGRAM(s) Oral daily      Allergies    erythromycin (Hives)    Intolerances      SOCIAL HISTORY: n toxic habits    FAMILY HISTORY:  No pertinent family history in first degree relatives      ROS:    EYES:  Negative  blurry vision or double vision  GASTROINTESTINAL:  Negative for nausea, vomiting, diarrhea  -otherwise negative except for subjective    Vital Signs Last 24 Hrs  T(C): 36.9 (2019 11:50), Max: 37.2 (2019 19:12)  T(F): 98.5 (2019 11:50), Max: 98.9 (2019 19:12)  HR: 74 (2019 11:50) (72 - 87)  BP: 124/73 (2019 11:50) (107/69 - 154/76)  BP(mean): --  RR: 20 (2019 11:50) (17 - 24)  SpO2: 97% (2019 11:50) (90% - 97%)    PE:  WDWN in no distress  HEENT:  NC, PERRL, sclerae anicteric, conjunctivae clear, EOMI.  Sinuses nontender, no nasal exudate.  No buccal or pharyngeal lesions, erythema or exudate  Neck:  Supple, no adenopathy  Lungs:  No accessory muscle use, bilaterally clear to auscultation  Cor:  RRR, S1, S2, no murmur appreciated  Abd:  Symmetric, normoactive BS.  Soft, nontender, no masses, guarding or rebound.  Liver and spleen not enlarged  Extrem:  No cyanosis or edema  Skin:  bruising on face  Neuro: grossly intact  Musc: moving all limbs freely, no focal deficits    LABS:                        10.5   18.47 )-----------( 231      ( 2019 07:00 )             32.9       WBC Count: 18.47 K/uL (19 @ 07:00)  WBC Count: 12.61 K/uL (19 @ 00:14)          141  |  109<H>  |  29<H>  ----------------------------<  125<H>  4.0   |  25  |  0.97    Ca    8.8      2019 07:00    TPro  6.8  /  Alb  3.4  /  TBili  0.8  /  DBili  x   /  AST  58<H>  /  ALT  51  /  AlkPhos  169<H>        Creatinine, Serum: 0.97 mg/dL (19 @ 07:00)  Creatinine, Serum: 1.00 mg/dL (19 @ 00:14)      Urinalysis Basic - ( 2019 03:20 )    Color: Yellow / Appearance: Slightly Turbid / S.015 / pH: x  Gluc: x / Ketone: Negative  / Bili: Negative / Urobili: Negative   Blood: x / Protein: 150 mg/dL / Nitrite: Negative   Leuk Esterase: Small / RBC: 11-25 /HPF / WBC >50   Sq Epi: x / Non Sq Epi: Negative / [facility]      MICROBIOLOGY:    Culture - Blood (19 @ 11:47)    -  Escherichia coli: Detec    Gram Stain:   Growth in aerobic bottle: Gram Negative Rods  Growth in anaerobic bottle: Gram Negative Rods    Specimen Source: .Blood Blood    Organism: Blood Culture PCR    Culture Results:   Growth in anaerobic bottle: Gram Negative Rods  Growth in aerobic bottle: Gram Negative Rods    Culture - Blood (18 @ 19:09)    Gram Stain:   Growth in anaerobic bottle: Gram Negative Rods    -  Amikacin: S <=8    -  Meropenem: S <=1    -  Levofloxacin: S <=1    -  Imipenem: S <=1    -  Tobramycin: S <=2    -  Piperacillin/Tazobactam: S <=8    -  Trimethoprim/Sulfamethoxazole: R >2/38    -  Ertapenem: S <=0.5    -  Gentamicin: S <=1    -  Ciprofloxacin: S <=0.5    -  Ceftriaxone: S <=1 Enterobacter, Citrobacter, and Serratia may develop resistance during prolonged therapy    -  Cefoxitin: S <=4    -  Cefepime: S <=2    -  Cefazolin: S <=2    -  Aztreonam: S <=4    -  Ampicillin: R >16 These ampicillin results predict results for amoxicillin    -  Ampicillin/Sulbactam: I     Specimen Source: .Blood Blood    Organism: Escherichia coli    Culture Results:   Growth in anaerobic bottle: Escherichia coli    Organism Identification: Escherichia coli    Method Type: CECY      RADIOLOGY & ADDITIONAL STUDIES:    --< from: CT Renal Stone Hunt (.19 @ 02:33) >    EXAM:  CT RENAL STONE HUNT                            PROCEDURE DATE:  2019          INTERPRETATION:  CLINICAL INFORMATION: E. coli sepsis, pyelonephritis.    COMPARISON: None available.    PROCEDURE:   CT of the Abdomen and Pelvis was performed without intravenous contrast.   Intravenous contrast: None.  Oral contrast: None.  Sagittal and coronal reformats were performed.    FINDINGS:    LOWER CHEST: Scattered subsegmental atelectasis and or mild reticular   airspace opacities. Coronary artery calcification.    Evaluation of vascular structures and abdominal organs is limited without   the administration of intravenous contrast.    LIVER: Low-attenuation lesion within the right hepatic lobe too small to   accurately characterize.  BILE DUCTS: Normal caliber.  GALLBLADDER: Moderately distended without discrete wall thickening or   pericholecystic inflammatory change.  SPLEEN: Within normal limits.  PANCREAS: Within normal limits.  ADRENALS: Within normal limits.  KIDNEYS/URETERS: Bilateral renal cysts. No hydronephrosis.    BLADDER: Within normal limits.  REPRODUCTIVE ORGANS: The uterus and adnexa are within normal limits.    BOWEL: No bowel obstruction. Mild colonic diverticulosis without   diverticulitis Appendix normal.  PERITONEUM: No ascites.  VESSELS:  Atherosclerosis of the abdominal aorta and its branch vessels.  RETROPERITONEUM: No lymphadenopathy.    ABDOMINAL WALL: Within normal limits.  BONES: Healed right inferior pubic ramus fracture. Status post left hip   arthroplasty. Multilevel degenerative changes of the spine. Chronic   compression deformities of the T9, T11, T12 and L2 vertebral bodies. Mild   interspaces of L4 and L5 secondary to severe facet joint arthrosis.    IMPRESSION:    No acute intra-abdominal pathology on noncontrast CT.

## 2019-04-29 NOTE — PHYSICAL THERAPY INITIAL EVALUATION ADULT - ADDITIONAL COMMENTS
Pt lives with  in house, 4 ALICE with bilateral HR. Pt with chair lift inside to upstairs bedroom. Pt utilized SAC for ambulation PTA; however, has RW for use as needed. Pt's  has 24/7 HHA, helps pt as needed. Pt was independent in all ADLs PTA.

## 2019-04-29 NOTE — PHYSICAL THERAPY INITIAL EVALUATION ADULT - BED MOBILITY TRAINING, PT EVAL
(STG 2-3 sessions): sup<->sit transfer w/ supervision (STG 2-3 sessions): sup<->sit transfer w/ supervision/independent

## 2019-04-29 NOTE — CONSULT NOTE ADULT - ASSESSMENT
89 y/o with HLD, hypothyroid, dementia, brought in for lethargy, confusion, fall, fever, E coli bacteremia likely from pyelonephritis

## 2019-04-29 NOTE — PROVIDER CONTACT NOTE (CRITICAL VALUE NOTIFICATION) - SITUATION
2 nd Blood culture from 4/28  preliminary report shows growth of gm negative rods
4/28/19 blood culture shows gram negative rods in aerobic and anaerobic bottle
Patient is 88 yr old female admitted with UTI blood cultures drawn on admission with preliminary growtth

## 2019-04-29 NOTE — PROGRESS NOTE ADULT - PROBLEM SELECTOR PLAN 2
-Pt currenlty afebrile, overall improving  -c/w Fluids  -Bcx G Neg Rods prelim  -Most likely related to UTI  -C/w Iv abx.

## 2019-04-29 NOTE — PHYSICAL THERAPY INITIAL EVALUATION ADULT - PHYSICAL ASSIST/NONPHYSICAL ASSIST: STAND/SIT, REHAB EVAL
nonverbal cues (demo/gestures)/verbal cues/Cues for hand placement, eccentric control/1 person assist

## 2019-04-29 NOTE — PROGRESS NOTE ADULT - PROBLEM SELECTOR PLAN 1
-Urine Cx : Gram Neg rods prelim, will FU for sensitivities  -Bcx: G Neg Rods   -Pt with symptomatic Urinary symptoms  -C/w Meropenem until Ucx sensitivities result. Plan to transition to PO abx  -Afebrile -Urine Cx : Gram Neg rods prelim, will FU for sensitivities  -Bcx: G Neg Rods, repeat set pending.  -Pt with symptomatic Urinary symptoms  -C/w Meropenem until Ucx sensitivities result. Plan to transition to PO abx  -Afebrile  -ID consulted given bacteremia

## 2019-04-29 NOTE — PHYSICAL THERAPY INITIAL EVALUATION ADULT - GAIT DEVIATIONS NOTED, PT EVAL
decreased nadeem/increased time in double stance/decreased stride length/decreased step length/decreased weight-shifting ability

## 2019-04-29 NOTE — CONSULT NOTE ADULT - PROBLEM SELECTOR RECOMMENDATION 3
possibly related to acute infection    Thank you for consulting us and involving us in the management of this most interesting and challenging case.     We will follow along in the care of this patient.

## 2019-04-29 NOTE — PROGRESS NOTE ADULT - SUBJECTIVE AND OBJECTIVE BOX
Patient is a 88y old  Female who presents with a chief complaint of fever, confusion, fall (28 Apr 2019 04:10)    89 y/o with HLD, hypothyroid, dementia, brought in for lethargy, confusion, ?fall yesterday but no report of LOC, and today with fever x 1 day to 100.7F.  Pty is lethargic but arousable and responsive, does not know why she's in the hospital. poor historian.  No other complaints.  As per daughter, pt with frequent hx of UTI.  Urine with mod leuk.  CT head neg, renal stone hunt unremarkable.  Received a dose of Ceftriaxone in the ED.  Troponin was 0.07. No EKG changes.    OVERNIGHT EVENTS/INTERVAL HPI:  Patient seen and examined at bedside. No acute overnight events. Afebrile this am. NO other complaints at this time.     REVIEW OF SYSTEMS:  CONSTITUTIONAL: No weakness, fevers or chills  EYES/ENT: No visual changes, no throat pain   RESPIRATORY: No cough, wheezing, shortness of breath  CARDIOVASCULAR: No chest pain or palpitations  GASTROINTESTINAL: No abdominal pain, nausea, vomiting, diarrhea, constipation, melena, hematochezia  GENITOURINARY: No dysuria, frequency or hematuria  NEUROLOGICAL: No dizziness, numbness, or weakness  SKIN: No itching, burning, rashes, or lesions   All other review of systems is negative unless indicated above.    Vital Signs  T(C): 36.4 (29 Apr 2019 09:20), Max: 37.2 (28 Apr 2019 19:12)  T(F): 97.6 (29 Apr 2019 09:20), Max: 98.9 (28 Apr 2019 19:12)  HR: 77 (29 Apr 2019 10:39) (72 - 87)  BP: 130/78 (29 Apr 2019 10:39) (103/66 - 154/76)  RR: 17 (29 Apr 2019 09:20) (17 - 24)  SpO2: 95% (29 Apr 2019 10:39) (90% - 97%)    PHYSICAL EXAM:   GENERAL: no acute distress  HEENT: NC/AT, EOMI, neck supple, MMM  RESPIRATORY: LCTAB/L, no rhonchi, rales, or wheezing  CARDIOVASCULAR: RRR, no murmurs, gallops, rubs  ABDOMINAL: soft, non-tender, non-distended, positive bowel sounds   EXTREMITIES: no clubbing, cyanosis, or edema  NEUROLOGICAL: alert and oriented x 3, non-focal  SKIN: no rashes or lesions   MUSCULOSKELETAL: no gross joint deformity                          10.5   18.47 )-----------( 231      ( 29 Apr 2019 07:00 )             32.9     04-29    141  |  109<H>  |  29<H>  ----------------------------<  125<H>  4.0   |  25  |  0.97    Ca    8.8      29 Apr 2019 07:00    TPro  6.8  /  Alb  3.4  /  TBili  0.8  /  DBili  x   /  AST  58<H>  /  ALT  51  /  AlkPhos  169<H>  04-28    CAPILLARY BLOOD GLUCOSE          MEDICATIONS  (STANDING):  aspirin enteric coated 81 milliGRAM(s) Oral daily  atorvastatin 10 milliGRAM(s) Oral at bedtime  enoxaparin Injectable 40 milliGRAM(s) SubCutaneous every 24 hours  escitalopram 10 milliGRAM(s) Oral daily  lactobacillus acidophilus 1 Tablet(s) Oral daily  levothyroxine 75 MICROGram(s) Oral daily  meropenem  IVPB 1000 milliGRAM(s) IV Intermittent every 12 hours  metoprolol tartrate 12.5 milliGRAM(s) Oral daily    MEDICATIONS  (PRN):  acetaminophen   Tablet .. 650 milliGRAM(s) Oral every 6 hours PRN Temp greater or equal to 38C (100.4F), Mild Pain (1 - 3)      RADIOLOGY Patient is a 88y old  Female who presents with a chief complaint of fever, confusion, fall (28 Apr 2019 04:10)    87 y/o with HLD, hypothyroid, dementia, brought in for lethargy, confusion, ?fall yesterday but no report of LOC, and today with fever x 1 day to 100.7F.  Pty is lethargic but arousable and responsive, does not know why she's in the hospital. poor historian.  No other complaints.  As per daughter, pt with frequent hx of UTI.  Urine with mod leuk.  CT head neg, renal stone hunt unremarkable.  Received a dose of Ceftriaxone in the ED.  Troponin was 0.07. No EKG changes.    OVERNIGHT EVENTS/INTERVAL HPI:  Patient seen and examined at bedside. No acute overnight events. Afebrile this am. NO other complaints at this time.     REVIEW OF SYSTEMS:  CONSTITUTIONAL: No weakness, fevers or chills  EYES/ENT: No visual changes, no throat pain   RESPIRATORY: No cough, wheezing, shortness of breath  CARDIOVASCULAR: No chest pain or palpitations  GASTROINTESTINAL: No abdominal pain, nausea, vomiting, diarrhea, constipation, melena, hematochezia  GENITOURINARY: No dysuria, frequency or hematuria  NEUROLOGICAL: No dizziness, numbness, or weakness  SKIN: No itching, burning, rashes, or lesions   All other review of systems is negative unless indicated above.    Vital Signs  T(C): 36.4 (29 Apr 2019 09:20), Max: 37.2 (28 Apr 2019 19:12)  T(F): 97.6 (29 Apr 2019 09:20), Max: 98.9 (28 Apr 2019 19:12)  HR: 77 (29 Apr 2019 10:39) (72 - 87)  BP: 130/78 (29 Apr 2019 10:39) (103/66 - 154/76)  RR: 17 (29 Apr 2019 09:20) (17 - 24)  SpO2: 95% (29 Apr 2019 10:39) (90% - 97%)    PHYSICAL EXAM:   GENERAL: no acute distress  HEENT: NC/AT, EOMI, neck supple, MMM  RESPIRATORY:  no rhonchi, rales, or wheezing, + Crackles BL Lower lobes.   CARDIOVASCULAR: RRR, no murmurs, gallops, rubs  ABDOMINAL: soft, non-tender, non-distended, positive bowel sounds   EXTREMITIES: no clubbing, cyanosis, or edema  NEUROLOGICAL: alert and oriented x 3, non-focal  SKIN: no rashes or lesions   MUSCULOSKELETAL: no gross joint deformity                          10.5   18.47 )-----------( 231      ( 29 Apr 2019 07:00 )             32.9     04-29    141  |  109<H>  |  29<H>  ----------------------------<  125<H>  4.0   |  25  |  0.97    Ca    8.8      29 Apr 2019 07:00    TPro  6.8  /  Alb  3.4  /  TBili  0.8  /  DBili  x   /  AST  58<H>  /  ALT  51  /  AlkPhos  169<H>  04-28    CAPILLARY BLOOD GLUCOSE          MEDICATIONS  (STANDING):  aspirin enteric coated 81 milliGRAM(s) Oral daily  atorvastatin 10 milliGRAM(s) Oral at bedtime  enoxaparin Injectable 40 milliGRAM(s) SubCutaneous every 24 hours  escitalopram 10 milliGRAM(s) Oral daily  lactobacillus acidophilus 1 Tablet(s) Oral daily  levothyroxine 75 MICROGram(s) Oral daily  meropenem  IVPB 1000 milliGRAM(s) IV Intermittent every 12 hours  metoprolol tartrate 12.5 milliGRAM(s) Oral daily    MEDICATIONS  (PRN):  acetaminophen   Tablet .. 650 milliGRAM(s) Oral every 6 hours PRN Temp greater or equal to 38C (100.4F), Mild Pain (1 - 3)      RADIOLOGY Patient is a 88y old  Female who presents with a chief complaint of fever, confusion, fall (28 Apr 2019 04:10)    89 y/o with HLD, hypothyroid, dementia, brought in for lethargy, confusion, ?fall yesterday but no report of LOC, and today with fever x 1 day to 100.7F.  Pty is lethargic but arousable and responsive, does not know why she's in the hospital. poor historian.  No other complaints.  As per daughter, pt with frequent hx of UTI.  Urine with mod leuk.  CT head neg, renal stone hunt unremarkable.  Received a dose of Ceftriaxone in the ED.  Troponin was 0.07. No EKG changes.    OVERNIGHT EVENTS/INTERVAL HPI:  Patient seen and examined at bedside. No acute overnight events. Afebrile this am. NO other complaints at this time.     REVIEW OF SYSTEMS:  CONSTITUTIONAL: No weakness, fevers or chills  EYES/ENT: No visual changes, no throat pain   RESPIRATORY: No cough, wheezing, shortness of breath  CARDIOVASCULAR: No chest pain or palpitations  GASTROINTESTINAL: No abdominal pain, nausea, vomiting, diarrhea, constipation, melena, hematochezia  GENITOURINARY: No dysuria, frequency or hematuria  NEUROLOGICAL: No dizziness, numbness, or weakness  SKIN: No itching, burning, rashes, or lesions   All other review of systems is negative unless indicated above.    Vital Signs  T(C): 36.4 (29 Apr 2019 09:20), Max: 37.2 (28 Apr 2019 19:12)  T(F): 97.6 (29 Apr 2019 09:20), Max: 98.9 (28 Apr 2019 19:12)  HR: 77 (29 Apr 2019 10:39) (72 - 87)  BP: 130/78 (29 Apr 2019 10:39) (103/66 - 154/76)  RR: 17 (29 Apr 2019 09:20) (17 - 24)  SpO2: 95% (29 Apr 2019 10:39) (90% - 97%)    PHYSICAL EXAM:   GENERAL: no acute distress  HEENT: NC/AT, EOMI, neck supple, MMM  RESPIRATORY:  no rhonchi, rales, or wheezing, + Crackles BL Lower lobes.   CARDIOVASCULAR: RRR, no murmurs, gallops, rubs  ABDOMINAL: soft, non-tender, non-distended, positive bowel sounds   EXTREMITIES: no clubbing, cyanosis, or edema  NEUROLOGICAL: alert and oriented x 3, non-focal  SKIN: warm, dry, intact  MUSCULOSKELETAL: no gross joint deformity                          10.5   18.47 )-----------( 231      ( 29 Apr 2019 07:00 )             32.9     04-29    141  |  109<H>  |  29<H>  ----------------------------<  125<H>  4.0   |  25  |  0.97    Ca    8.8      29 Apr 2019 07:00    TPro  6.8  /  Alb  3.4  /  TBili  0.8  /  DBili  x   /  AST  58<H>  /  ALT  51  /  AlkPhos  169<H>  04-28    CAPILLARY BLOOD GLUCOSE          MEDICATIONS  (STANDING):  aspirin enteric coated 81 milliGRAM(s) Oral daily  atorvastatin 10 milliGRAM(s) Oral at bedtime  enoxaparin Injectable 40 milliGRAM(s) SubCutaneous every 24 hours  escitalopram 10 milliGRAM(s) Oral daily  lactobacillus acidophilus 1 Tablet(s) Oral daily  levothyroxine 75 MICROGram(s) Oral daily  meropenem  IVPB 1000 milliGRAM(s) IV Intermittent every 12 hours  metoprolol tartrate 12.5 milliGRAM(s) Oral daily    MEDICATIONS  (PRN):  acetaminophen   Tablet .. 650 milliGRAM(s) Oral every 6 hours PRN Temp greater or equal to 38C (100.4F), Mild Pain (1 - 3)      RADIOLOGY

## 2019-04-29 NOTE — CONSULT NOTE ADULT - PROBLEM SELECTOR RECOMMENDATION 9
Suspect urinary source with history and organism despite lack of symptoms. No compelling reason for further imaging at this point but with recurrences suggest evaluation to prevent recurrences once patient improved as pt may benefit from chronic suppressive abx  -with prior isolates will narrow to ceftriaxone pending sensitivities, repeat blood cultures to document clearance, recs on choice of agent and duration to follow based on micro

## 2019-04-30 LAB
-  AMIKACIN: SIGNIFICANT CHANGE UP
-  AMIKACIN: SIGNIFICANT CHANGE UP
-  AMPICILLIN/SULBACTAM: SIGNIFICANT CHANGE UP
-  AMPICILLIN/SULBACTAM: SIGNIFICANT CHANGE UP
-  AMPICILLIN: SIGNIFICANT CHANGE UP
-  AMPICILLIN: SIGNIFICANT CHANGE UP
-  AZTREONAM: SIGNIFICANT CHANGE UP
-  AZTREONAM: SIGNIFICANT CHANGE UP
-  CEFAZOLIN: SIGNIFICANT CHANGE UP
-  CEFAZOLIN: SIGNIFICANT CHANGE UP
-  CEFEPIME: SIGNIFICANT CHANGE UP
-  CEFEPIME: SIGNIFICANT CHANGE UP
-  CEFOXITIN: SIGNIFICANT CHANGE UP
-  CEFOXITIN: SIGNIFICANT CHANGE UP
-  CEFTRIAXONE: SIGNIFICANT CHANGE UP
-  CEFTRIAXONE: SIGNIFICANT CHANGE UP
-  CIPROFLOXACIN: SIGNIFICANT CHANGE UP
-  CIPROFLOXACIN: SIGNIFICANT CHANGE UP
-  ERTAPENEM: SIGNIFICANT CHANGE UP
-  ERTAPENEM: SIGNIFICANT CHANGE UP
-  GENTAMICIN: SIGNIFICANT CHANGE UP
-  GENTAMICIN: SIGNIFICANT CHANGE UP
-  IMIPENEM: SIGNIFICANT CHANGE UP
-  IMIPENEM: SIGNIFICANT CHANGE UP
-  LEVOFLOXACIN: SIGNIFICANT CHANGE UP
-  LEVOFLOXACIN: SIGNIFICANT CHANGE UP
-  MEROPENEM: SIGNIFICANT CHANGE UP
-  MEROPENEM: SIGNIFICANT CHANGE UP
-  NITROFURANTOIN: SIGNIFICANT CHANGE UP
-  PIPERACILLIN/TAZOBACTAM: SIGNIFICANT CHANGE UP
-  PIPERACILLIN/TAZOBACTAM: SIGNIFICANT CHANGE UP
-  TIGECYCLINE: SIGNIFICANT CHANGE UP
-  TOBRAMYCIN: SIGNIFICANT CHANGE UP
-  TOBRAMYCIN: SIGNIFICANT CHANGE UP
-  TRIMETHOPRIM/SULFAMETHOXAZOLE: SIGNIFICANT CHANGE UP
-  TRIMETHOPRIM/SULFAMETHOXAZOLE: SIGNIFICANT CHANGE UP
ANION GAP SERPL CALC-SCNC: 8 MMOL/L — SIGNIFICANT CHANGE UP (ref 5–17)
BUN SERPL-MCNC: 20 MG/DL — SIGNIFICANT CHANGE UP (ref 7–23)
CALCIUM SERPL-MCNC: 9.1 MG/DL — SIGNIFICANT CHANGE UP (ref 8.5–10.1)
CHLORIDE SERPL-SCNC: 106 MMOL/L — SIGNIFICANT CHANGE UP (ref 96–108)
CO2 SERPL-SCNC: 25 MMOL/L — SIGNIFICANT CHANGE UP (ref 22–31)
CREAT SERPL-MCNC: 0.88 MG/DL — SIGNIFICANT CHANGE UP (ref 0.5–1.3)
CULTURE RESULTS: SIGNIFICANT CHANGE UP
GLUCOSE SERPL-MCNC: 106 MG/DL — HIGH (ref 70–99)
HCT VFR BLD CALC: 35 % — SIGNIFICANT CHANGE UP (ref 34.5–45)
HGB BLD-MCNC: 11.2 G/DL — LOW (ref 11.5–15.5)
MCHC RBC-ENTMCNC: 30.4 PG — SIGNIFICANT CHANGE UP (ref 27–34)
MCHC RBC-ENTMCNC: 32 GM/DL — SIGNIFICANT CHANGE UP (ref 32–36)
MCV RBC AUTO: 94.9 FL — SIGNIFICANT CHANGE UP (ref 80–100)
METHOD TYPE: SIGNIFICANT CHANGE UP
METHOD TYPE: SIGNIFICANT CHANGE UP
NRBC # BLD: 0 /100 WBCS — SIGNIFICANT CHANGE UP (ref 0–0)
ORGANISM # SPEC MICROSCOPIC CNT: SIGNIFICANT CHANGE UP
PLATELET # BLD AUTO: 243 K/UL — SIGNIFICANT CHANGE UP (ref 150–400)
POTASSIUM SERPL-MCNC: 3.7 MMOL/L — SIGNIFICANT CHANGE UP (ref 3.5–5.3)
POTASSIUM SERPL-SCNC: 3.7 MMOL/L — SIGNIFICANT CHANGE UP (ref 3.5–5.3)
RBC # BLD: 3.69 M/UL — LOW (ref 3.8–5.2)
RBC # FLD: 13.5 % — SIGNIFICANT CHANGE UP (ref 10.3–14.5)
SODIUM SERPL-SCNC: 139 MMOL/L — SIGNIFICANT CHANGE UP (ref 135–145)
SPECIMEN SOURCE: SIGNIFICANT CHANGE UP
WBC # BLD: 11.24 K/UL — HIGH (ref 3.8–10.5)
WBC # FLD AUTO: 11.24 K/UL — HIGH (ref 3.8–10.5)

## 2019-04-30 PROCEDURE — 99233 SBSQ HOSP IP/OBS HIGH 50: CPT | Mod: GC

## 2019-04-30 RX ADMIN — Medication 12.5 MILLIGRAM(S): at 05:49

## 2019-04-30 RX ADMIN — ESCITALOPRAM OXALATE 10 MILLIGRAM(S): 10 TABLET, FILM COATED ORAL at 12:14

## 2019-04-30 RX ADMIN — CEFTRIAXONE 100 GRAM(S): 500 INJECTION, POWDER, FOR SOLUTION INTRAMUSCULAR; INTRAVENOUS at 14:21

## 2019-04-30 RX ADMIN — ENOXAPARIN SODIUM 40 MILLIGRAM(S): 100 INJECTION SUBCUTANEOUS at 12:14

## 2019-04-30 RX ADMIN — ATORVASTATIN CALCIUM 10 MILLIGRAM(S): 80 TABLET, FILM COATED ORAL at 21:15

## 2019-04-30 RX ADMIN — Medication 81 MILLIGRAM(S): at 12:13

## 2019-04-30 RX ADMIN — Medication 1 TABLET(S): at 12:14

## 2019-04-30 RX ADMIN — Medication 75 MICROGRAM(S): at 05:49

## 2019-04-30 NOTE — GOALS OF CARE CONVERSATION - PERSONAL ADVANCE DIRECTIVE - CONVERSATION DETAILS
met pt, A&O, reviewed copy of molst from last hospitalization: pt confirms she is a dnr dni, unsure where original molst is located. contacted daughter Corry on phone, unsure where original molst is located, agrees with pt for dnr dni,  pt aware no original molst: molst reviewed: pt agrees to dnr dni no discussion regarding CASEY, PEG, wants trial IVF & antibiotics. molst completed by Dr García, order received.

## 2019-04-30 NOTE — PROGRESS NOTE ADULT - SUBJECTIVE AND OBJECTIVE BOX
Patient is a 88y old  Female who presents with a chief complaint of fever, confusion, fall (30 Apr 2019 10:19)    Patient is a 88y old  Female who presents with a chief complaint of fever, confusion, fall (28 Apr 2019 04:10)    87 y/o with HLD, hypothyroid, dementia, brought in for lethargy, confusion, ?fall yesterday but no report of LOC, and today with fever x 1 day to 100.7F.  Pty is lethargic but arousable and responsive, does not know why she's in the hospital. poor historian.  No other complaints.  As per daughter, pt with frequent hx of UTI.  Urine with mod leuk.  CT head neg, renal stone hunt unremarkable.  Received a dose of Ceftriaxone in the ED.  Troponin was 0.07. No EKG changes.      OVERNIGHT EVENTS/INTERVAL HPI:   Patient seen and examined at bedside. No acute overnight events. Afebrile this am, labs/vitals stable. Denies fever, chills, night sweats, sob. No other complaints at this time.     REVIEW OF SYSTEMS:  CONSTITUTIONAL: No weakness, fevers or chills  EYES/ENT: No visual changes, no throat pain   RESPIRATORY: No cough, wheezing, shortness of breath  CARDIOVASCULAR: No chest pain or palpitations  GASTROINTESTINAL: No abdominal pain, nausea, vomiting, diarrhea, constipation, melena, hematochezia  GENITOURINARY: No dysuria, frequency or hematuria  NEUROLOGICAL: No dizziness, numbness, or weakness  SKIN: No itching, burning, rashes, or lesions   All other review of systems is negative unless indicated above.    Vital Signs  T(C): 36.8 (30 Apr 2019 07:26), Max: 37.6 (29 Apr 2019 15:53)  T(F): 98.3 (30 Apr 2019 07:26), Max: 99.6 (29 Apr 2019 15:53)  HR: 67 (30 Apr 2019 07:26) (67 - 81)  BP: 145/71 (30 Apr 2019 07:26) (124/73 - 146/79)  RR: 19 (30 Apr 2019 07:26) (18 - 20)  SpO2: 96% (30 Apr 2019 07:26) (95% - 97%)    PHYSICAL EXAM:   GENERAL: no acute distress  HEENT: NC/AT, EOMI, neck supple, MMM, Contusion over middle upper lip.   RESPIRATORY:  no rhonchi, rales, or wheezing, + Crackles BL Lower lobes.   CARDIOVASCULAR: RRR, no murmurs, gallops, rubs  ABDOMINAL: soft, non-tender, non-distended, positive bowel sounds   EXTREMITIES: no clubbing, cyanosis, or edema  NEUROLOGICAL: alert and oriented x 3, non-focal  SKIN: warm, dry, intact  MUSCULOSKELETAL: no gross joint deformity                        11.2   11.24 )-----------( 243      ( 30 Apr 2019 06:59 )             35.0     04-30    139  |  106  |  20  ----------------------------<  106<H>  3.7   |  25  |  0.88    Ca    9.1      30 Apr 2019 06:59      CAPILLARY BLOOD GLUCOSE          MEDICATIONS  (STANDING):  aspirin enteric coated 81 milliGRAM(s) Oral daily  atorvastatin 10 milliGRAM(s) Oral at bedtime  cefTRIAXone   IVPB 1 Gram(s) IV Intermittent every 24 hours  cefTRIAXone   IVPB      enoxaparin Injectable 40 milliGRAM(s) SubCutaneous every 24 hours  escitalopram 10 milliGRAM(s) Oral daily  lactobacillus acidophilus 1 Tablet(s) Oral daily  levothyroxine 75 MICROGram(s) Oral daily  metoprolol tartrate 12.5 milliGRAM(s) Oral daily    MEDICATIONS  (PRN):  acetaminophen   Tablet .. 650 milliGRAM(s) Oral every 6 hours PRN Temp greater or equal to 38C (100.4F), Mild Pain (1 - 3)      RADIOLOGY Patient is a 88y old  Female who presents with a chief complaint of fever, confusion, fall (30 Apr 2019 10:19)    Patient is a 88y old  Female who presents with a chief complaint of fever, confusion, fall (28 Apr 2019 04:10)    87 y/o with HLD, hypothyroid, dementia, brought in for lethargy, confusion, ?fall yesterday but no report of LOC, and today with fever x 1 day to 100.7F.  Pty is lethargic but arousable and responsive, does not know why she's in the hospital. poor historian.  No other complaints.  As per daughter, pt with frequent hx of UTI.  Urine with mod leuk.  CT head neg, renal stone hunt unremarkable.  Received a dose of Ceftriaxone in the ED.  Troponin was 0.07. No EKG changes.      OVERNIGHT EVENTS/INTERVAL HPI:   Patient seen and examined at bedside. No acute overnight events. Afebrile this am, labs/vitals stable. Denies fever, chills, night sweats, sob. No other complaints at this time.     REVIEW OF SYSTEMS:  CONSTITUTIONAL: No weakness, fevers or chills  EYES/ENT: No visual changes, no throat pain   RESPIRATORY: No cough, wheezing, shortness of breath  CARDIOVASCULAR: No chest pain or palpitations  GASTROINTESTINAL: No abdominal pain, nausea, vomiting, diarrhea, constipation, melena, hematochezia  GENITOURINARY: No dysuria, frequency or hematuria  NEUROLOGICAL: No dizziness, numbness, or weakness  SKIN: No itching, burning, rashes, or lesions   All other review of systems is negative unless indicated above.    Vital Signs  T(C): 36.8 (30 Apr 2019 07:26), Max: 37.6 (29 Apr 2019 15:53)  T(F): 98.3 (30 Apr 2019 07:26), Max: 99.6 (29 Apr 2019 15:53)  HR: 67 (30 Apr 2019 07:26) (67 - 81)  BP: 145/71 (30 Apr 2019 07:26) (124/73 - 146/79)  RR: 19 (30 Apr 2019 07:26) (18 - 20)  SpO2: 96% (30 Apr 2019 07:26) (95% - 97%)    PHYSICAL EXAM:   GENERAL: no acute distress  HEENT: NC/AT, EOMI, neck supple, MMM, Contusion over middle upper lip.   RESPIRATORY:  no rhonchi, rales, or wheezing, + Crackles BL Lower lobes.   CARDIOVASCULAR: RRR, no murmurs, gallops, rubs  ABDOMINAL: soft, non-tender, non-distended, positive bowel sounds   EXTREMITIES: no clubbing, cyanosis, or edema  NEUROLOGICAL: alert and oriented x 3, non-focal  SKIN: warm, dry, intact, ecchymoses over left eye and over lip.  MUSCULOSKELETAL: no gross joint deformity                        11.2   11.24 )-----------( 243      ( 30 Apr 2019 06:59 )             35.0     04-30    139  |  106  |  20  ----------------------------<  106<H>  3.7   |  25  |  0.88    Ca    9.1      30 Apr 2019 06:59      CAPILLARY BLOOD GLUCOSE          MEDICATIONS  (STANDING):  aspirin enteric coated 81 milliGRAM(s) Oral daily  atorvastatin 10 milliGRAM(s) Oral at bedtime  cefTRIAXone   IVPB 1 Gram(s) IV Intermittent every 24 hours  cefTRIAXone   IVPB      enoxaparin Injectable 40 milliGRAM(s) SubCutaneous every 24 hours  escitalopram 10 milliGRAM(s) Oral daily  lactobacillus acidophilus 1 Tablet(s) Oral daily  levothyroxine 75 MICROGram(s) Oral daily  metoprolol tartrate 12.5 milliGRAM(s) Oral daily    MEDICATIONS  (PRN):  acetaminophen   Tablet .. 650 milliGRAM(s) Oral every 6 hours PRN Temp greater or equal to 38C (100.4F), Mild Pain (1 - 3)      RADIOLOGY

## 2019-04-30 NOTE — PROGRESS NOTE ADULT - ASSESSMENT
87 y/o with HLD, hypothyroid, dementia, brought in for lethargy, confusion, fall, fever, E coli bacteremia likely from pyelonephritis

## 2019-04-30 NOTE — PROGRESS NOTE ADULT - SUBJECTIVE AND OBJECTIVE BOX
infectious diseases progress note:    ROSENDO ROSARIO is a 88y y. o. Female patient    Patient reports: feeling better and anxious to go home    ROS:    EYES:  Negative  blurry vision or double vision  GASTROINTESTINAL:  Negative for nausea, vomiting, diarrhea  -otherwise negative except for subjective    Allergies    erythromycin (Hives)    Intolerances        ANTIBIOTICS/RELEVANT:  antimicrobials  cefTRIAXone   IVPB 1 Gram(s) IV Intermittent every 24 hours  cefTRIAXone   IVPB        immunologic:    OTHER:  acetaminophen   Tablet .. 650 milliGRAM(s) Oral every 6 hours PRN  aspirin enteric coated 81 milliGRAM(s) Oral daily  atorvastatin 10 milliGRAM(s) Oral at bedtime  enoxaparin Injectable 40 milliGRAM(s) SubCutaneous every 24 hours  escitalopram 10 milliGRAM(s) Oral daily  lactobacillus acidophilus 1 Tablet(s) Oral daily  levothyroxine 75 MICROGram(s) Oral daily  metoprolol tartrate 12.5 milliGRAM(s) Oral daily      Objective:  Vital Signs Last 24 Hrs  T(C): 36.8 (30 Apr 2019 07:26), Max: 37.6 (29 Apr 2019 15:53)  T(F): 98.3 (30 Apr 2019 07:26), Max: 99.6 (29 Apr 2019 15:53)  HR: 67 (30 Apr 2019 07:26) (67 - 81)  BP: 145/71 (30 Apr 2019 07:26) (124/73 - 146/79)  BP(mean): --  RR: 19 (30 Apr 2019 07:26) (18 - 20)  SpO2: 96% (30 Apr 2019 07:26) (95% - 97%)    T(C): 36.8 (04-30-19 @ 07:26), Max: 37.6 (04-29-19 @ 15:53)  T(C): 36.8 (04-30-19 @ 07:26), Max: 38.2 (04-28-19 @ 00:00)  T(C): 36.8 (04-30-19 @ 07:26), Max: 38.2 (04-28-19 @ 00:00)    PHYSICAL EXAM:  Constitutional: Well-developed, well nourished  Eyes: PERRLA, EOMI  Ear/Nose/Throat: oropharynx normal	  Neck: no JVD, no lymphadenopathy, supple  Respiratory: no accessory muscle use  Cardiovascular: RRR,   Gastrointestinal: soft, NT  Extremities: no clubbing, no cyanosis, edema absent      LABS:                        11.2   11.24 )-----------( 243      ( 30 Apr 2019 06:59 )             35.0       11.24 04-30 @ 06:59  18.47 04-29 @ 07:00  12.61 04-28 @ 00:14      04-30    139  |  106  |  20  ----------------------------<  106<H>  3.7   |  25  |  0.88    Ca    9.1      30 Apr 2019 06:59        Creatinine, Serum: 0.88 mg/dL (04-30-19 @ 06:59)  Creatinine, Serum: 0.97 mg/dL (04-29-19 @ 07:00)  Creatinine, Serum: 1.00 mg/dL (04-28-19 @ 00:14)    MICROBIOLOGY:      Culture - Blood (collected 29 Apr 2019 01:06)  Source: .Blood Blood  Preliminary Report (30 Apr 2019 02:02):    No growth to date.    Culture - Blood (collected 29 Apr 2019 01:05)  Source: .Blood Blood  Preliminary Report (30 Apr 2019 02:02):    No growth to date.    Culture - Blood (collected 28 Apr 2019 11:47)  Source: .Blood Blood  Gram Stain (29 Apr 2019 01:04):    Growth in aerobic bottle: Gram Negative Rods    Growth in anaerobic bottle: Gram Negative Rods  Preliminary Report (29 Apr 2019 18:33):    Growth in aerobic and anaerobic bottles: Escherichia coli    "Due to technical problems, Proteus sp. will Not be reported as part of    the BCID panel until further notice"    ***Blood Panel PCR results on this specimen are available    approximately 3 hours after the Gram stain result.***    Gram stain, PCR, and/or culture results may not always    correspond due to difference in methodologies.    ************************************************************    This PCR assay was performed using [employer].    The following targets are tested for: Enterococcus,    vancomycin resistant enterococci, Listeria monocytogenes,    coagulase negative staphylococci, S. aureus,    methicillin resistant S. aureus, Streptococcus agalactiae    (Group B), S. pneumoniae, S.pyogenes (Group A),    Acinetobacter baumannii, Enterobacter cloacae, E. coli,    Klebsiella oxytoca, K. pneumoniae, Proteus sp.,    Serratia marcescens, Haemophilus influenzae,    Neisseria meningitidis, Pseudomonas aeruginosa, Candida    albicans, C. glabrata, C krusei, C parapsilosis,    C. tropicalis and the KPC resistance gene.  Organism: Blood Culture PCR (28 Apr 2019 23:22)  Organism: Blood Culture PCR (28 Apr 2019 23:22)    Culture - Blood (collected 28 Apr 2019 11:47)  Source: .Blood Blood  Gram Stain (29 Apr 2019 13:16):    Growth in aerobic bottle:    Gram Negative Rods    Growth in anaerobic bottle: Gram Negative Rods  Preliminary Report (29 Apr 2019 13:16):    Growth in aerobic bottle:    Gram Negative Rods    Growth in anaerobic bottle: Gram Negative Rods    Culture - Urine (collected 28 Apr 2019 11:05)  Source: .Urine Clean Catch (Midstream)  Preliminary Report (29 Apr 2019 08:14):    >100,000 CFU/ml Gram Negative Rods        RADIOLOGY & ADDITIONAL STUDIES:

## 2019-04-30 NOTE — PROGRESS NOTE ADULT - PROBLEM SELECTOR PLAN 1
-Urine Cx : Gram Neg rods prelim, will FU for sensitivities  -Bcx: G Neg Rods, repeat set pending.  -Pt with symptomatic Urinary symptoms  -C/w Meropenem until Ucx sensitivities result. Plan to transition to PO abx  -Afebrile  -ID consulted given bacteremia -Urine Cx : Gram Neg rods prelim, will FU for sensitivities  -Bcx: G Neg Rods, repeat BCx No Growth to date   -Pt with symptomatic Urinary symptoms  -C/w Rocephin until Ucx sensitivities result. Plan to transition to PO abx  -Afebrile  -ID consulted given bacteremia, recommend c/w Rocephin until sensitivities result.

## 2019-04-30 NOTE — PROGRESS NOTE ADULT - NSHPATTENDINGPLANDISCUSS_GEN_ALL_CORE
patient, daughter re: antibiotics, awaiting sensitivities, anticipated change to oral antibiotics tomorrow, discharge planning
patient, ID, re: blood cultures, antibiotics, repeat cultures, anticipated hospital course, discharge planning

## 2019-05-01 ENCOUNTER — TRANSCRIPTION ENCOUNTER (OUTPATIENT)
Age: 84
End: 2019-05-01

## 2019-05-01 VITALS
SYSTOLIC BLOOD PRESSURE: 165 MMHG | TEMPERATURE: 98 F | DIASTOLIC BLOOD PRESSURE: 74 MMHG | HEART RATE: 79 BPM | OXYGEN SATURATION: 95 % | RESPIRATION RATE: 18 BRPM

## 2019-05-01 DIAGNOSIS — A41.9 SEPSIS, UNSPECIFIED ORGANISM: ICD-10-CM

## 2019-05-01 LAB
ANION GAP SERPL CALC-SCNC: 8 MMOL/L — SIGNIFICANT CHANGE UP (ref 5–17)
BUN SERPL-MCNC: 19 MG/DL — SIGNIFICANT CHANGE UP (ref 7–23)
CALCIUM SERPL-MCNC: 9.1 MG/DL — SIGNIFICANT CHANGE UP (ref 8.5–10.1)
CHLORIDE SERPL-SCNC: 106 MMOL/L — SIGNIFICANT CHANGE UP (ref 96–108)
CO2 SERPL-SCNC: 26 MMOL/L — SIGNIFICANT CHANGE UP (ref 22–31)
CREAT SERPL-MCNC: 0.85 MG/DL — SIGNIFICANT CHANGE UP (ref 0.5–1.3)
GLUCOSE SERPL-MCNC: 105 MG/DL — HIGH (ref 70–99)
HCT VFR BLD CALC: 31.4 % — LOW (ref 34.5–45)
HGB BLD-MCNC: 10.2 G/DL — LOW (ref 11.5–15.5)
MCHC RBC-ENTMCNC: 30.4 PG — SIGNIFICANT CHANGE UP (ref 27–34)
MCHC RBC-ENTMCNC: 32.5 GM/DL — SIGNIFICANT CHANGE UP (ref 32–36)
MCV RBC AUTO: 93.5 FL — SIGNIFICANT CHANGE UP (ref 80–100)
NRBC # BLD: 0 /100 WBCS — SIGNIFICANT CHANGE UP (ref 0–0)
PLATELET # BLD AUTO: 231 K/UL — SIGNIFICANT CHANGE UP (ref 150–400)
POTASSIUM SERPL-MCNC: 4 MMOL/L — SIGNIFICANT CHANGE UP (ref 3.5–5.3)
POTASSIUM SERPL-SCNC: 4 MMOL/L — SIGNIFICANT CHANGE UP (ref 3.5–5.3)
RBC # BLD: 3.36 M/UL — LOW (ref 3.8–5.2)
RBC # FLD: 13.5 % — SIGNIFICANT CHANGE UP (ref 10.3–14.5)
SODIUM SERPL-SCNC: 140 MMOL/L — SIGNIFICANT CHANGE UP (ref 135–145)
WBC # BLD: 9.36 K/UL — SIGNIFICANT CHANGE UP (ref 3.8–10.5)
WBC # FLD AUTO: 9.36 K/UL — SIGNIFICANT CHANGE UP (ref 3.8–10.5)

## 2019-05-01 PROCEDURE — 97161 PT EVAL LOW COMPLEX 20 MIN: CPT

## 2019-05-01 PROCEDURE — 87186 SC STD MICRODIL/AGAR DIL: CPT

## 2019-05-01 PROCEDURE — 81001 URINALYSIS AUTO W/SCOPE: CPT

## 2019-05-01 PROCEDURE — 87086 URINE CULTURE/COLONY COUNT: CPT

## 2019-05-01 PROCEDURE — 85610 PROTHROMBIN TIME: CPT

## 2019-05-01 PROCEDURE — 84484 ASSAY OF TROPONIN QUANT: CPT

## 2019-05-01 PROCEDURE — 85027 COMPLETE CBC AUTOMATED: CPT

## 2019-05-01 PROCEDURE — 80053 COMPREHEN METABOLIC PANEL: CPT

## 2019-05-01 PROCEDURE — 36415 COLL VENOUS BLD VENIPUNCTURE: CPT

## 2019-05-01 PROCEDURE — 93005 ELECTROCARDIOGRAM TRACING: CPT

## 2019-05-01 PROCEDURE — 82607 VITAMIN B-12: CPT

## 2019-05-01 PROCEDURE — 96365 THER/PROPH/DIAG IV INF INIT: CPT

## 2019-05-01 PROCEDURE — 97530 THERAPEUTIC ACTIVITIES: CPT

## 2019-05-01 PROCEDURE — 87040 BLOOD CULTURE FOR BACTERIA: CPT

## 2019-05-01 PROCEDURE — 83605 ASSAY OF LACTIC ACID: CPT

## 2019-05-01 PROCEDURE — 74176 CT ABD & PELVIS W/O CONTRAST: CPT

## 2019-05-01 PROCEDURE — 71045 X-RAY EXAM CHEST 1 VIEW: CPT

## 2019-05-01 PROCEDURE — 97116 GAIT TRAINING THERAPY: CPT

## 2019-05-01 PROCEDURE — 84443 ASSAY THYROID STIM HORMONE: CPT

## 2019-05-01 PROCEDURE — 99285 EMERGENCY DEPT VISIT HI MDM: CPT | Mod: 25

## 2019-05-01 PROCEDURE — 99239 HOSP IP/OBS DSCHRG MGMT >30: CPT

## 2019-05-01 PROCEDURE — 87150 DNA/RNA AMPLIFIED PROBE: CPT

## 2019-05-01 PROCEDURE — 93306 TTE W/DOPPLER COMPLETE: CPT

## 2019-05-01 PROCEDURE — 70450 CT HEAD/BRAIN W/O DYE: CPT

## 2019-05-01 PROCEDURE — 87631 RESP VIRUS 3-5 TARGETS: CPT

## 2019-05-01 PROCEDURE — 80048 BASIC METABOLIC PNL TOTAL CA: CPT

## 2019-05-01 RX ORDER — CEFUROXIME AXETIL 250 MG
500 TABLET ORAL ONCE
Qty: 0 | Refills: 0 | Status: COMPLETED | OUTPATIENT
Start: 2019-05-01 | End: 2019-05-01

## 2019-05-01 RX ORDER — ASPIRIN/CALCIUM CARB/MAGNESIUM 324 MG
1 TABLET ORAL
Qty: 30 | Refills: 0
Start: 2019-05-01 | End: 2019-05-30

## 2019-05-01 RX ORDER — CEFUROXIME AXETIL 250 MG
500 TABLET ORAL EVERY 12 HOURS
Qty: 0 | Refills: 0 | Status: DISCONTINUED | OUTPATIENT
Start: 2019-05-01 | End: 2019-05-01

## 2019-05-01 RX ORDER — CEFTRIAXONE 500 MG/1
1 INJECTION, POWDER, FOR SOLUTION INTRAMUSCULAR; INTRAVENOUS EVERY 24 HOURS
Qty: 0 | Refills: 0 | Status: DISCONTINUED | OUTPATIENT
Start: 2019-05-01 | End: 2019-05-01

## 2019-05-01 RX ORDER — CEFUROXIME AXETIL 250 MG
1 TABLET ORAL
Qty: 24 | Refills: 0
Start: 2019-05-01 | End: 2019-05-12

## 2019-05-01 RX ADMIN — Medication 500 MILLIGRAM(S): at 12:58

## 2019-05-01 RX ADMIN — Medication 1 TABLET(S): at 11:42

## 2019-05-01 RX ADMIN — Medication 81 MILLIGRAM(S): at 11:42

## 2019-05-01 RX ADMIN — Medication 12.5 MILLIGRAM(S): at 05:19

## 2019-05-01 RX ADMIN — Medication 75 MICROGRAM(S): at 05:19

## 2019-05-01 RX ADMIN — ESCITALOPRAM OXALATE 10 MILLIGRAM(S): 10 TABLET, FILM COATED ORAL at 11:42

## 2019-05-01 RX ADMIN — ENOXAPARIN SODIUM 40 MILLIGRAM(S): 100 INJECTION SUBCUTANEOUS at 11:42

## 2019-05-01 NOTE — DISCHARGE NOTE PROVIDER - HOSPITAL COURSE
ADMISSION H+P:        HPI:    87 y/o with HLD, hypothyroid, dementia, presented to the ED for lethargy, confusion, and possible fall, but no report of LOC. Pt had fever x 1 day to 100.7F. In ED Pt is lethargic but arousable and responsive. Pt did not know why she's was in the hospital. Pt is a poor historian. There were No other complaints.    As per daughter, pt with frequent hx of UTI.  Urine with mod leuk.    CT head neg, renal stone hunt unremarkable.    Received a dose of Ceftriaxone in the ED.    Troponin was 0.07. No EKG changes. (28 Apr 2019 04:10)    ---    HOSPITAL COURSE:     The patient is a 88 year old Female who was admitted for urinary tract infection and bacteremia. Infectious disease was consulted for antibiotic recommendations. On Hospital day 4 urine culture sensitivities were resulted and patient was transitioned to PO antibiotics. Repeat Blood cultures were negative for bacteremia. Patient remained afebrile during the hospital stay, and clinically improved. Patient was placed on Lovenox for anticoagulation. All home medications were continued.  The patient received physical therapy daily and daily labs were followed. The rest of the hospital stay was unremarkable.     Patient was medically optimized and improved clinically throughout hospital course. Patient seen and examined on day of discharge.        Vital Signs Last 24 Hrs    T(C): 37 (01 May 2019 11:50), Max: 37.2 (30 Apr 2019 20:34)    T(F): 98.6 (01 May 2019 11:50), Max: 98.9 (30 Apr 2019 20:34)    HR: 74 (01 May 2019 11:50) (68 - 79)    BP: 144/75 (01 May 2019 11:50) (124/74 - 149/84)    BP(mean): --    RR: 21 (01 May 2019 11:50) (17 - 21)    SpO2: 96% (01 May 2019 11:50) (93% - 96%)        Physical Exam:    General: Well developed, well nourished, in no acute distress    HEENT: NCAT, PERRLA, EOMI bl, moist mucous membranes     Neck: Supple, nontender, no mass    Neurology: A&Ox3, nonfocal, CN II-XII grossly intact, sensation intact, no gait abnormalities     Respiratory: CTA B/L, No wheezing, rales, or rhonchi    CV: RRR, S1/S2 present, no murmurs, rubs, or gallops    Abdominal: Soft, nontender, non-distended, normoactive bowel sounds    Extremities: No C/C/E, peripheral pulses present    MSK: Normal ROM, no joint erythema or warmth, no joint swelling     Skin: warm, dry, normal color, no obvious rash or abnormal lesions        Patient is medically stable for discharge to home with outpatient follow up.    ---    CONSULTANTS:         ---    FINAL DISCHARGE DIAGNOSIS LIST:    Please see last daily progress note for final discharge diagnoses ADMISSION H+P:        HPI:    89 y/o with HLD, hypothyroid, dementia, presented to the ED for lethargy, confusion, and possible fall, but no report of LOC. Pt had fever x 1 day to 100.7F. In ED Pt is lethargic but arousable and responsive. Pt did not know why she's was in the hospital. Pt is a poor historian. There were No other complaints.    As per daughter, pt with frequent hx of UTI.  Urine with mod leuk.    CT head neg, renal stone hunt unremarkable.    Received a dose of Ceftriaxone in the ED.    Troponin was 0.07. No EKG changes. (28 Apr 2019 04:10)    ---    HOSPITAL COURSE:     The patient is a 88 year old Female who was admitted for urinary tract infection and found to have  gram negative bacteremia. Infectious disease was consulted for antibiotic recommendations. On Hospital day 4 urine/blood culture sensitivities were resulted and patient was transitioned to PO antibiotics. Repeat Blood cultures were negative for bacteremia. Patient remained afebrile during the hospital stay, and clinically improved. Patient was placed on Lovenox for anticoagulation. All home medications were continued.  The patient received physical therapy daily and daily labs were followed.  Patient was medically optimized and improved clinically throughout hospital course. Patient seen and examined on day of discharge.        Vital Signs Last 24 Hrs    T(C): 37 (01 May 2019 11:50), Max: 37.2 (30 Apr 2019 20:34)    T(F): 98.6 (01 May 2019 11:50), Max: 98.9 (30 Apr 2019 20:34)    HR: 74 (01 May 2019 11:50) (68 - 79)    BP: 144/75 (01 May 2019 11:50) (124/74 - 149/84)    BP(mean): --    RR: 21 (01 May 2019 11:50) (17 - 21)    SpO2: 96% (01 May 2019 11:50) (93% - 96%)        Physical Exam:    General: Well developed, well nourished, in no acute distress    HEENT: NCAT, PERRLA, EOMI bl, moist mucous membranes     Neck: Supple, nontender, no mass    Neurology: A&Ox3, nonfocal, CN II-XII grossly intact, sensation intact, no gait abnormalities     Respiratory: CTA B/L, No wheezing, rales, or rhonchi    CV: RRR, S1/S2 present, no murmurs, rubs, or gallops    Abdominal: Soft, nontender, non-distended, normoactive bowel sounds    Extremities: No C/C/E, peripheral pulses present    MSK: Normal ROM, no joint erythema or warmth, no joint swelling     Skin: warm, dry, normal color, no obvious rash or abnormal lesions        Patient is medically stable for discharge to home with outpatient follow up.    ---    CONSULTANTS:     ANTONIETA Tidwell)    PT / SW / CM        ---    FINAL DISCHARGE DIAGNOSIS LIST:    Please see last daily progress note for final discharge diagnoses ADMISSION H+P:        HPI:    87 y/o with HLD, hypothyroid, dementia, presented to the ED for lethargy, confusion, and possible fall, but no report of LOC. Pt had fever x 1 day to 100.7F. In ED Pt is lethargic but arousable and responsive. Pt did not know why she's was in the hospital. Pt is a poor historian. There were No other complaints.    As per daughter, pt with frequent hx of UTI.  Urine with mod leuk.    CT head neg, renal stone hunt unremarkable.    Received a dose of Ceftriaxone in the ED.    Troponin was 0.07. No EKG changes. (28 Apr 2019 04:10)    ---    HOSPITAL COURSE:     The patient is a 88 year old Female who was admitted for urinary tract infection and found to have  gram negative bacteremia. Infectious disease was consulted for antibiotic recommendations. On Hospital day 4 urine/blood culture sensitivities were resulted and patient was transitioned to PO antibiotics. Repeat Blood cultures were negative for bacteremia. Patient remained afebrile during the hospital stay, and clinically improved. Patient was placed on Lovenox for anticoagulation. All home medications were continued.  The patient received physical therapy daily and daily labs were followed.  Patient was medically optimized and improved clinically throughout hospital course. Patient seen and examined on day of discharge.        Vital Signs Last 24 Hrs    T(C): 37 (01 May 2019 11:50), Max: 37.2 (30 Apr 2019 20:34)    T(F): 98.6 (01 May 2019 11:50), Max: 98.9 (30 Apr 2019 20:34)    HR: 74 (01 May 2019 11:50) (68 - 79)    BP: 144/75 (01 May 2019 11:50) (124/74 - 149/84)    BP(mean): --    RR: 21 (01 May 2019 11:50) (17 - 21)    SpO2: 96% (01 May 2019 11:50) (93% - 96%)        Physical Exam:    General: Well developed, well nourished, in no acute distress    HEENT: NCAT, PERRLA, EOMI bl, moist mucous membranes     Neck: Supple, nontender, no mass    Neurology: A&Ox3, nonfocal, CN II-XII grossly intact, sensation intact, no gait abnormalities     Respiratory: CTA B/L, No wheezing, rales, or rhonchi    CV: RRR, S1/S2 present, no murmurs, rubs, or gallops    Abdominal: Soft, nontender, non-distended, normoactive bowel sounds    Extremities: No C/C/E, peripheral pulses present    MSK: Normal ROM, no joint erythema or warmth, no joint swelling     Skin: warm, dry, normal color, no obvious rash or abnormal lesions        Patient is medically stable for discharge to home with outpatient follow up.    ---    CONSULTANTS:     ANTONIETA Tidwell)    PT / SW / CM        ---    FINAL DISCHARGE DIAGNOSIS LIST:    Please see last daily progress note for final discharge diagnoses        ---    The total amount of time spent reviewing the hospital notes, laboratory values, imaging findings, assessing/counseling the patient, discussing with consultant physicians, social work, nursing staff took 51 minutes

## 2019-05-01 NOTE — PROGRESS NOTE ADULT - SUBJECTIVE AND OBJECTIVE BOX
[facility], Division of Infectious Diseases  MARCIN Muniz A. Lee  [phone]    Name: ROSENDO ROSARIO  Age: 88y  Gender: Female  MRN: [id number]    Interval History--  Notes reviewed  pt without any complaints  really feels ready to go home.    Past Medical History--  Arthritis  Hypothyroid  Hyperlipidemia  Seasonal Allergies  Osteoporosis  Migraine  Cataract  Edema of Leg  Chronic Edema  Seasonal Rhinitis  Hypercholesteremia  Hypothyroidism  Anxiety  Clostridium Difficile Infection  S/P cataract surgery  S/P ovarian cystectomy  S/P hip replacement  S/P knee replacement, bilateral  Hip Replacement  Knee Replacement      For details regarding the patient's social history, family history, and other miscellaneous elements, please refer the initial infectious diseases consultation and/or the admitting history and physical examination for this admission.    Allergies    erythromycin (Hives)    Intolerances        Medications--  Antibiotics:  cefTRIAXone   IVPB 1 Gram(s) IV Intermittent every 24 hours  cefTRIAXone   IVPB        Immunologic:    Other:  acetaminophen   Tablet .. PRN  aspirin enteric coated  atorvastatin  enoxaparin Injectable  escitalopram  lactobacillus acidophilus  levothyroxine  metoprolol tartrate      Review of Systems--  A 10-point review of systems was obtained.     Pertinent positives and negatives--  Constitutional: No fevers. No Chills. No Rigors.   Cardiovascular: No chest pain. No palpitations.  Respiratory: No shortness of breath. No cough.  Gastrointestinal: No nausea or vomiting. No diarrhea or constipation.   Psychiatric: no depression    Review of systems otherwise negative except as previously noted.    Physical Examination--  Vital Signs: T(F): 97.9 (05-01-19 @ 08:21), Max: 98.9 (04-30-19 @ 20:34)  HR: 68 (05-01-19 @ 08:21)  BP: 147/81 (05-01-19 @ 08:21)  RR: 17 (05-01-19 @ 08:21)  SpO2: 93% (05-01-19 @ 08:21)  Wt(kg): --  General: Nontoxic-appearing Female in no acute distress.  HEENT: trauma to face  Anicteric. Conjunctiva pink and moist. .  Neck: Not rigid. No sense of mass.  Nodes: None palpable.  Lungs: Clear bilaterally without rales, wheezing or rhonchi  Heart: Regular rate and rhythm. + murmur.  Abdomen: Bowel sounds present and normoactive. Soft. Nondistended. Nontender.  Back: No spinal tenderness. No costovertebral angle tenderness.   Extremities: No cyanosis or clubbing. No edema.   Skin: Warm. Dry. Good turgor. No rash. No vasculitic stigmata.  Psychiatric: Appropriate affect and mood for situation.         Laboratory Studies--  CBC                        10.2   9.36  )-----------( 231      ( 01 May 2019 06:58 )             31.4       Chemistries  05-01    140  |  106  |  19  ----------------------------<  105<H>  4.0   |  26  |  0.85    Ca    9.1      01 May 2019 06:58        Culture Data    Culture - Blood (collected 29 Apr 2019 01:06)  Source: .Blood Blood  Preliminary Report (30 Apr 2019 02:02):    No growth to date.    Culture - Blood (collected 29 Apr 2019 01:05)  Source: .Blood Blood  Preliminary Report (30 Apr 2019 02:02):    No growth to date.    Culture - Blood (collected 28 Apr 2019 11:47)  Source: .Blood Blood  Gram Stain (29 Apr 2019 01:04):    Growth in aerobic bottle: Gram Negative Rods    Growth in anaerobic bottle: Gram Negative Rods  Final Report (30 Apr 2019 16:15):    Growth in aerobic and anaerobic bottles: Escherichia coli    "Due to technical problems, Proteus sp. will Not be reported as part of    the BCID panel until further notice"    ***Blood Panel PCR results on this specimen are available    approximately 3 hours after the Gram stain result.***    Gram stain, PCR, and/or culture results may not always    correspond due to difference in methodologies.    ************************************************************    This PCR assay was performed using [employer]    The following targets are tested for: Enterococcus,    vancomycin resistant enterococci, Listeria monocytogenes,    coagulase negative staphylococci, S. aureus,    methicillin resistant S. aureus, Streptococcus agalactiae    (Group B), S. pneumoniae, S.pyogenes (Group A),    Acinetobacter baumannii, Enterobacter cloacae, E. coli,    Klebsiella oxytoca, K. pneumoniae, Proteus sp.,    Serratia marcescens, Haemophilus influenzae,    Neisseria meningitidis, Pseudomonas aeruginosa, Candida    albicans, C. glabrata, C krusei, C parapsilosis,    C. tropicalis and the KPC resistance gene.  Organism: Blood Culture PCR  Escherichia coli (30 Apr 2019 16:15)  Organism: Escherichia coli (30 Apr 2019 16:15)  Organism: Blood Culture PCR (30 Apr 2019 16:15)    Culture - Blood (collected 28 Apr 2019 11:47)  Source: .Blood Blood  Gram Stain (29 Apr 2019 13:16):    Growth in aerobic bottle:    Gram Negative Rods    Growth in anaerobic bottle: Gram Negative Rods  Final Report (30 Apr 2019 16:16):    Growth in aerobic and anaerobic bottles: Escherichia coli    See previous culture [id number]    Culture - Urine (collected 28 Apr 2019 11:05)  Source: .Urine Clean Catch (Midstream)  Final Report (30 Apr 2019 14:46):    >100,000 CFU/ml Escherichia coli  Organism: Escherichia coli (30 Apr 2019 14:46)  Organism: Escherichia coli (30 Apr 2019 14:46)

## 2019-05-01 NOTE — PROGRESS NOTE ADULT - PROBLEM SELECTOR PLAN 1
sensitive to ceftriaxone  4/30 blood cx neg to date now 48 hours  leukocytosis resolved  afeb  can change to oral cefuroxime 500 mg po bid til May 14 sensitive to ceftriaxone  4/30 blood cx neg to date now  leukocytosis resolved  afeb  once cx neg 48 hours, can change to oral cefuroxime 500 mg po bid til May 14 sensitive to ceftriaxone  4/29 blood cx neg to date now  leukocytosis resolved  afeb  , can change to oral cefuroxime 500 mg po bid til May 13

## 2019-05-01 NOTE — CHART NOTE - NSCHARTNOTEFT_GEN_A_CORE
The patient was seen and examined on the day of discharge by the attending physician. Pt stable for discharge. Please see discharge note for additional information regarding the hospital course and the day of discharge.

## 2019-05-01 NOTE — DISCHARGE NOTE PROVIDER - NSDCCPCAREPLAN_GEN_ALL_CORE_FT
PRINCIPAL DISCHARGE DIAGNOSIS  Diagnosis: UTI (urinary tract infection)  Assessment and Plan of Treatment: Patient found to have ecoli urinary tract infection  -Davonte take antibiotics Cefuroxime 500mg twice a day until May 13.   -Ecnouraged to drink plenty of clear fluids  -Please monitor for fever, chills, confusion. If these arise please contact PCP or go to nearest ER.   -Please follow up with Primary Care Physician when discharged from the hospital.      SECONDARY DISCHARGE DIAGNOSES  Diagnosis: Fall  Assessment and Plan of Treatment: -Please continue with fall precautions  -Please use cane/walker as needed with ambulation    Diagnosis: ACS (acute coronary syndrome)  Assessment and Plan of Treatment: Controlled  -Please follow up with Primary care physician when discharged from the hospital for outpatient cardiac follow up care.    Diagnosis: Bacteremia due to Escherichia coli  Assessment and Plan of Treatment: Bacteremia due to Escherichia coli  Patient found to have ecoli urinary tract infection  -Davonte take antibiotics Cefuroxime 500mg twice a day until May 13.   -Ecnouraged to drink plenty of clear fluids  -Please monitor for fever, chills, confusion. If these arise please contact PCP or go to nearest ER.   -Please follow up with Primary Care Physician when discharged from the hospital.    Diagnosis: Sepsis  Assessment and Plan of Treatment:

## 2019-05-01 NOTE — DISCHARGE NOTE NURSING/CASE MANAGEMENT/SOCIAL WORK - NSDCDPATPORTLINK_GEN_ALL_CORE
You can access the [facility] [facility] Patient Portal, offered by [facility], by registering with the following website: [email or web address]

## 2019-07-22 NOTE — PROVIDER CONTACT NOTE (CRITICAL VALUE NOTIFICATION) - ACTION/TREATMENT ORDERED:
no change at present
MD will review chart and order repeat blood cultures
Continue on meropenum
None

## 2019-08-05 NOTE — DIETITIAN INITIAL EVALUATION ADULT. - PROBLEM SELECTOR PLAN 1
Detail Level: Detailed
Admit to medicine.   - Continue with ceftriaxone  - f/u blood and urine culture

## 2019-09-14 ENCOUNTER — EMERGENCY (EMERGENCY)
Facility: HOSPITAL | Age: 84
LOS: 1 days | Discharge: HOME | End: 2019-09-14
Attending: INTERNAL MEDICINE | Admitting: INTERNAL MEDICINE
Payer: MEDICARE

## 2019-09-14 VITALS
OXYGEN SATURATION: 98 % | SYSTOLIC BLOOD PRESSURE: 122 MMHG | RESPIRATION RATE: 20 BRPM | TEMPERATURE: 98 F | DIASTOLIC BLOOD PRESSURE: 74 MMHG | HEART RATE: 71 BPM

## 2019-09-14 VITALS
HEIGHT: 62 IN | DIASTOLIC BLOOD PRESSURE: 79 MMHG | HEART RATE: 79 BPM | TEMPERATURE: 98 F | RESPIRATION RATE: 16 BRPM | SYSTOLIC BLOOD PRESSURE: 128 MMHG | OXYGEN SATURATION: 94 % | WEIGHT: 111.99 LBS

## 2019-09-14 PROCEDURE — 93010 ELECTROCARDIOGRAM REPORT: CPT

## 2019-09-14 PROCEDURE — 70450 CT HEAD/BRAIN W/O DYE: CPT | Mod: 26

## 2019-09-14 PROCEDURE — 72125 CT NECK SPINE W/O DYE: CPT

## 2019-09-14 PROCEDURE — 93005 ELECTROCARDIOGRAM TRACING: CPT

## 2019-09-14 PROCEDURE — 99284 EMERGENCY DEPT VISIT MOD MDM: CPT | Mod: 25

## 2019-09-14 PROCEDURE — 70450 CT HEAD/BRAIN W/O DYE: CPT

## 2019-09-14 PROCEDURE — 72125 CT NECK SPINE W/O DYE: CPT | Mod: 26

## 2019-09-14 PROCEDURE — 99284 EMERGENCY DEPT VISIT MOD MDM: CPT

## 2019-09-14 RX ORDER — ACETAMINOPHEN 500 MG
650 TABLET ORAL ONCE
Refills: 0 | Status: DISCONTINUED | OUTPATIENT
Start: 2019-09-14 | End: 2019-09-18

## 2019-09-14 NOTE — ED PROVIDER NOTE - CLINICAL SUMMARY MEDICAL DECISION MAKING FREE TEXT BOX
unsteady with multiple falls with  head, neck pain, elbow bruise  CT head and neck, EKG  decline lab test

## 2019-09-14 NOTE — ED ADULT NURSE NOTE - CHIEF COMPLAINT QUOTE
trip and fall, c/o pain to the right side of neck- denies any LOC, was feeling dizzy on and off all day- not feeling well, nausea present

## 2019-09-14 NOTE — ED ADULT NURSE NOTE - OBJECTIVE STATEMENT
87yo female BIBA, as per EMS "she tripped and fell". pt indicates she "turned the wrong way" and fell to the ground. pt denies loc.

## 2019-09-14 NOTE — ED PROVIDER NOTE - CARE PLAN
Principal Discharge DX:	Fall  Secondary Diagnosis:	Multiple trauma Principal Discharge DX:	Fall  Secondary Diagnosis:	Multiple trauma  Secondary Diagnosis:	Neck pain  Secondary Diagnosis:	Head trauma

## 2019-09-14 NOTE — ED ADULT TRIAGE NOTE - CHIEF COMPLAINT QUOTE
trip and fall, c/o pain to the right scooby eof neck- denies any LOC, was feeling dizzy on and off all day- not feeling well, nausea present

## 2019-09-14 NOTE — ED PROVIDER NOTE - SIGNIFICANT NEGATIVE FINDINGS
no headache, no LOC,  no chest pain, no SOB, no palpitations, no n/v/d, no urinary symptoms, no bleeding. no neuro changes.

## 2019-09-14 NOTE — ED PROVIDER NOTE - PATIENT PORTAL LINK FT
You can access the FollowMyHealth Patient Portal offered by [facility] by registering at the following website: [email or web address]. By joining [facility]’s FollowMyHealth portal, you will also be able to view your health information using other applications (apps) compatible with our system.

## 2019-09-14 NOTE — ED PROVIDER NOTE - OBJECTIVE STATEMENT
89 y/o wf who lives with health care assistant and is unsteady and has had multiple falls, today he had a mechanical fall. Paramedics note that she may be experiencing anorexia and generalized weakness, the patient is declining IVF and lab analysis

## 2019-10-14 NOTE — PROGRESS NOTE ADULT - PROBLEM SELECTOR PROBLEM 4
Anemia
Anemia
Vaginitis
Hypothyroidism
Anemia
Dehydration
Dehydration
Hypothyroidism
Hypothyroidism
KEVIN (acute kidney injury)
KEVIN (acute kidney injury)
Leukocytosis
Vaginitis
Anemia
Dehydration
Anemia
CN II-XII grossly intact, Visual fields grossly WNL in all quadrants, smooth eye traction in all directions (-) nystagmus noted

## 2019-12-27 NOTE — DISCHARGE NOTE NURSING/CASE MANAGEMENT/SOCIAL WORK - NSDCVIVACCINE_GEN_ALL_CORE_FT
US today: SLIUP; CRL 6.8 mm 6w4d; JUDIT 8/17/20  Pt is here for initial OB visit.  Pt reports complaints of migraines.  Is otherwise doing well.  Risk assessment reviewed.  Labs in progress.  Welcome to pregnancy.  A-Z book.  Gc Ct done.  Discussed flu vaccine in pregnancy.  Pt already has been vaccinated this season in late 10/2019.  Refer to PE tab for exam details.  Discussed CNM collaboration.  Desires to continue care with me for now.  
No Vaccines Administered.

## 2020-08-19 ENCOUNTER — TRANSCRIPTION ENCOUNTER (OUTPATIENT)
Age: 85
End: 2020-08-19

## 2020-08-19 ENCOUNTER — INPATIENT (INPATIENT)
Facility: HOSPITAL | Age: 85
LOS: 4 days | Discharge: NURSING, REHAB OR LONG-TERM CARE | DRG: 480 | End: 2020-08-24
Attending: INTERNAL MEDICINE | Admitting: INTERNAL MEDICINE
Payer: MEDICARE

## 2020-08-19 VITALS
HEIGHT: 66 IN | HEART RATE: 76 BPM | DIASTOLIC BLOOD PRESSURE: 76 MMHG | RESPIRATION RATE: 14 BRPM | WEIGHT: 110.01 LBS | SYSTOLIC BLOOD PRESSURE: 189 MMHG | OXYGEN SATURATION: 99 % | TEMPERATURE: 98 F

## 2020-08-19 DIAGNOSIS — S72.002A FRACTURE OF UNSPECIFIED PART OF NECK OF LEFT FEMUR, INITIAL ENCOUNTER FOR CLOSED FRACTURE: ICD-10-CM

## 2020-08-19 LAB
ALBUMIN SERPL ELPH-MCNC: 3.8 G/DL — SIGNIFICANT CHANGE UP (ref 3.3–5)
ALP SERPL-CCNC: 95 U/L — SIGNIFICANT CHANGE UP (ref 40–120)
ALT FLD-CCNC: 19 U/L — SIGNIFICANT CHANGE UP (ref 12–78)
ANION GAP SERPL CALC-SCNC: 3 MMOL/L — LOW (ref 5–17)
APTT BLD: 29.9 SEC — SIGNIFICANT CHANGE UP (ref 27.5–35.5)
AST SERPL-CCNC: 23 U/L — SIGNIFICANT CHANGE UP (ref 15–37)
BASOPHILS # BLD AUTO: 0.07 K/UL — SIGNIFICANT CHANGE UP (ref 0–0.2)
BASOPHILS NFR BLD AUTO: 0.5 % — SIGNIFICANT CHANGE UP (ref 0–2)
BILIRUB SERPL-MCNC: 0.3 MG/DL — SIGNIFICANT CHANGE UP (ref 0.2–1.2)
BUN SERPL-MCNC: 23 MG/DL — SIGNIFICANT CHANGE UP (ref 7–23)
CALCIUM SERPL-MCNC: 9.5 MG/DL — SIGNIFICANT CHANGE UP (ref 8.5–10.1)
CHLORIDE SERPL-SCNC: 109 MMOL/L — HIGH (ref 96–108)
CO2 SERPL-SCNC: 30 MMOL/L — SIGNIFICANT CHANGE UP (ref 22–31)
CREAT SERPL-MCNC: 0.8 MG/DL — SIGNIFICANT CHANGE UP (ref 0.5–1.3)
EOSINOPHIL # BLD AUTO: 0.84 K/UL — HIGH (ref 0–0.5)
EOSINOPHIL NFR BLD AUTO: 6.3 % — HIGH (ref 0–6)
GLUCOSE SERPL-MCNC: 116 MG/DL — HIGH (ref 70–99)
HCT VFR BLD CALC: 40.2 % — SIGNIFICANT CHANGE UP (ref 34.5–45)
HGB BLD-MCNC: 12.9 G/DL — SIGNIFICANT CHANGE UP (ref 11.5–15.5)
IMM GRANULOCYTES NFR BLD AUTO: 0.6 % — SIGNIFICANT CHANGE UP (ref 0–1.5)
INR BLD: 1.01 RATIO — SIGNIFICANT CHANGE UP (ref 0.88–1.16)
LYMPHOCYTES # BLD AUTO: 1.49 K/UL — SIGNIFICANT CHANGE UP (ref 1–3.3)
LYMPHOCYTES # BLD AUTO: 11.2 % — LOW (ref 13–44)
MCHC RBC-ENTMCNC: 31 PG — SIGNIFICANT CHANGE UP (ref 27–34)
MCHC RBC-ENTMCNC: 32.1 GM/DL — SIGNIFICANT CHANGE UP (ref 32–36)
MCV RBC AUTO: 96.6 FL — SIGNIFICANT CHANGE UP (ref 80–100)
MONOCYTES # BLD AUTO: 1.07 K/UL — HIGH (ref 0–0.9)
MONOCYTES NFR BLD AUTO: 8 % — SIGNIFICANT CHANGE UP (ref 2–14)
NEUTROPHILS # BLD AUTO: 9.79 K/UL — HIGH (ref 1.8–7.4)
NEUTROPHILS NFR BLD AUTO: 73.4 % — SIGNIFICANT CHANGE UP (ref 43–77)
NRBC # BLD: 0 /100 WBCS — SIGNIFICANT CHANGE UP (ref 0–0)
PLATELET # BLD AUTO: 336 K/UL — SIGNIFICANT CHANGE UP (ref 150–400)
POTASSIUM SERPL-MCNC: 4 MMOL/L — SIGNIFICANT CHANGE UP (ref 3.5–5.3)
POTASSIUM SERPL-SCNC: 4 MMOL/L — SIGNIFICANT CHANGE UP (ref 3.5–5.3)
PROT SERPL-MCNC: 7.2 G/DL — SIGNIFICANT CHANGE UP (ref 6–8.3)
PROTHROM AB SERPL-ACNC: 11.8 SEC — SIGNIFICANT CHANGE UP (ref 10.6–13.6)
RBC # BLD: 4.16 M/UL — SIGNIFICANT CHANGE UP (ref 3.8–5.2)
RBC # FLD: 13.1 % — SIGNIFICANT CHANGE UP (ref 10.3–14.5)
SODIUM SERPL-SCNC: 142 MMOL/L — SIGNIFICANT CHANGE UP (ref 135–145)
WBC # BLD: 13.34 K/UL — HIGH (ref 3.8–10.5)
WBC # FLD AUTO: 13.34 K/UL — HIGH (ref 3.8–10.5)

## 2020-08-19 PROCEDURE — 99284 EMERGENCY DEPT VISIT MOD MDM: CPT

## 2020-08-19 PROCEDURE — 93010 ELECTROCARDIOGRAM REPORT: CPT

## 2020-08-19 PROCEDURE — 73552 X-RAY EXAM OF FEMUR 2/>: CPT | Mod: 26,LT

## 2020-08-19 PROCEDURE — 73501 X-RAY EXAM HIP UNI 1 VIEW: CPT | Mod: 26,59,LT

## 2020-08-19 PROCEDURE — 71045 X-RAY EXAM CHEST 1 VIEW: CPT | Mod: 26

## 2020-08-19 PROCEDURE — 73521 X-RAY EXAM HIPS BI 2 VIEWS: CPT | Mod: 26

## 2020-08-19 RX ORDER — HEPARIN SODIUM 5000 [USP'U]/ML
5000 INJECTION INTRAVENOUS; SUBCUTANEOUS EVERY 8 HOURS
Refills: 0 | Status: COMPLETED | OUTPATIENT
Start: 2020-08-19 | End: 2020-08-19

## 2020-08-19 RX ORDER — MORPHINE SULFATE 50 MG/1
2 CAPSULE, EXTENDED RELEASE ORAL ONCE
Refills: 0 | Status: DISCONTINUED | OUTPATIENT
Start: 2020-08-19 | End: 2020-08-19

## 2020-08-19 RX ORDER — SODIUM CHLORIDE 9 MG/ML
1000 INJECTION, SOLUTION INTRAVENOUS
Refills: 0 | Status: DISCONTINUED | OUTPATIENT
Start: 2020-08-19 | End: 2020-08-20

## 2020-08-19 RX ORDER — ACETAMINOPHEN 500 MG
1000 TABLET ORAL ONCE
Refills: 0 | Status: COMPLETED | OUTPATIENT
Start: 2020-08-19 | End: 2020-08-19

## 2020-08-19 RX ADMIN — MORPHINE SULFATE 2 MILLIGRAM(S): 50 CAPSULE, EXTENDED RELEASE ORAL at 23:07

## 2020-08-19 RX ADMIN — MORPHINE SULFATE 2 MILLIGRAM(S): 50 CAPSULE, EXTENDED RELEASE ORAL at 22:52

## 2020-08-19 RX ADMIN — HEPARIN SODIUM 5000 UNIT(S): 5000 INJECTION INTRAVENOUS; SUBCUTANEOUS at 23:46

## 2020-08-19 NOTE — ED ADULT NURSE NOTE - OBJECTIVE STATEMENT
Pt. complaining of left hip pain, stated she slid out of chair at home landing on left side. Pt. denied hitting head or LOC. Per patient fall witnessed by caregiver.

## 2020-08-19 NOTE — ED ADULT NURSE NOTE - NSIMPLEMENTINTERV_GEN_ALL_ED
Implemented All Fall with Harm Risk Interventions:  [place] to call system. Call bell, personal items and telephone within reach. Instruct patient to call for assistance. Room bathroom lighting operational. Non-slip footwear when patient is off stretcher. Physically safe environment: no spills, clutter or unnecessary equipment. Stretcher in lowest position, wheels locked, appropriate side rails in place. Provide visual cue, wrist band, yellow gown, etc. Monitor gait and stability. Monitor for mental status changes and reorient to person, place, and time. Review medications for side effects contributing to fall risk. Reinforce activity limits and safety measures with patient and family. Provide visual clues: red socks.

## 2020-08-19 NOTE — ED ADULT NURSE NOTE - NSFALLRSKHRMRISKTYPE_ED_ALL_ED
Andria Terry   MRN: [id number]    Department:  [facility] Emergency Department   Date of Visit:  6/18/2018           Disclosure     Insurance plans vary and the physician(s) referred by the ER may not be covered by your plan.  Please contact CARE PHYSICIAN AT ONCE OR RETURN IMMEDIATELY TO THE EMERGENCY DEPARTMENT. If you have been prescribed any medication(s), please fill your prescription right away and begin taking the medication(s) as directed.   If you believe that any of the medications bones(Osteoporosis,prev fx,steroid use,metastatic bone ca)

## 2020-08-19 NOTE — ED PROVIDER NOTE - PMH
No Arthritis    Cataract  s/p cataract surgery  Edema of Leg  b/l LE  Hyperlipidemia    Hypothyroidism    Migraine    Osteoporosis

## 2020-08-19 NOTE — ED PROVIDER NOTE - ATTENDING CONTRIBUTION TO CARE
89 yo F p/w mech fall today - landed on L hip. NO head inj / loc., NO HA/n/v/dizzy. no neck / back pain. Pt co L hip pain, unable to amb. No fever/chills/ recent illness. no known covid exposure. No other inj or co. no anticoag use  exam: No ext signs of head trauma. no spinal tend (c,t,l). L hip with pos tend, ext rot / shortned. Nl dist str/sens equal bl, 2+ pulses. nl cap refill. no signs of truncal trauma.  Check xr, labs, Ortho

## 2020-08-19 NOTE — ED ADULT NURSE NOTE - PMH
clean/healed Arthritis    Cataract  s/p cataract surgery  Edema of Leg  b/l LE  Hyperlipidemia    Hypothyroidism    Migraine    Osteoporosis

## 2020-08-19 NOTE — ED PROVIDER NOTE - OBJECTIVE STATEMENT
89 yo F PMHx Arthritis, HLD, hypothyroidism, osteoporosis BIBEMS for left knee pain s/p mechanical slip and fall out of chair just prior to arrival. Pt presents with aide who witnessed fall, states they were sitting talking when pt turned to the left and slid out of her chair onto her left side. Denies head trauma/LOC. Pt not on AC. Denies numbness/tingling.

## 2020-08-19 NOTE — CONSULT NOTE ADULT - SUBJECTIVE AND OBJECTIVE BOX
89y Female presents with LEFT hip pain s/p slip out of chair. Denies numbness/tingling in the affected extremity. Denies head strike/LOC/other orthopedic injuries at this time. Patient ambulates with cane at baseline. Hx of L TKA 30 years ago, L hip hemiarthroplasty and R TKA 20+ years ago with orthopedic surgeon in [facility]. Unable to recall name of surgeon. Full time aide at bedside. Daughter Corry Delgado ([facility]) [phone].    PAST MEDICAL & SURGICAL HISTORY:  Arthritis  Hyperlipidemia  Osteoporosis  Migraine  Cataract: s/p cataract surgery  Edema of Leg: b/l LE  Hypothyroidism  S/P cataract surgery: bialteral  S/P ovarian cystectomy  S/P hip replacement  S/P knee replacement, bilateral: Partial R) hip  Knee Replacement: b/l    Home Medications:  Acidophilus oral tablet: 1 tab(s) orally 2 times a day (13 Mar 2019 11:56)  escitalopram 10 mg oral tablet: 1 tab(s) orally once a day (13 Mar 2019 11:56)  levothyroxine 75 mcg (0.075 mg) oral tablet: 1 tab(s) orally once a day (13 Mar 2019 11:56)  metoprolol: 12.5 milligram(s) orally once a day (13 Mar 2019 11:56)  pravastatin 20 mg oral tablet: 1 tab(s) orally once a day (13 Mar 2019 11:56)  Vitamin D3 1000 intl units oral tablet: 1 tab(s) orally once a day (13 Mar 2019 11:56)    Allergies    erythromycin (Hives)    Intolerances                              12.9   13.34 )-----------( 336      ( 19 Aug 2020 22:28 )             40.2     08-19    142  |  109<H>  |  23  ----------------------------<  116<H>  4.0   |  30  |  0.80    Ca    9.5      19 Aug 2020 22:28    TPro  7.2  /  Alb  3.8  /  TBili  0.3  /  DBili  x   /  AST  23  /  ALT  19  /  AlkPhos  95  08-19    PT/INR - ( 19 Aug 2020 22:28 )   PT: 11.8 sec;   INR: 1.01 ratio         PTT - ( 19 Aug 2020 22:28 )  PTT:29.9 sec        Vital Signs Last 24 Hrs  T(C): 36.6 (19 Aug 2020 21:44), Max: 36.6 (19 Aug 2020 21:44)  T(F): 97.9 (19 Aug 2020 21:44), Max: 97.9 (19 Aug 2020 21:44)  HR: 80 (19 Aug 2020 22:55) (76 - 80)  BP: 202/96 (19 Aug 2020 22:55) (189/76 - 202/96)  BP(mean): --  RR: 16 (19 Aug 2020 22:55) (14 - 16)  SpO2: 98% (19 Aug 2020 22:55) (98% - 99%)    PHYSICAL EXAM  General: NAD, Awake and Alert    LEFT Lower Extremity:   Skin intact  No ecchymosis or swelling  Shortened and externally rotated   TTP over the bony prominences of the hip  NTTP over the bony prominences of the knee/ankle/foot/toes  L2-S1 SILT  +EHL/FHL/TA/GSC  +DP pulses  Calf nontender  Compartments soft and compressible      SECONDARY EXAM: Benign, Skin intact, SILT throughout, motor grossly intact throughout, no other orthopedic injuries at this time, compartments soft and compressible    SPINE: Skin intact, no bony tenderness or step-offs appreciated throughout cervical/thoracic/lumbar/sacral spine    B/l UE: Skin intact, no erythema, ecchymosis, edema, gross deformity, NTTP over the bony prominences of the shoulder/elbow/wrist/hand, painless passive/active ROM of the shoulder/elbow/wrist/hand, C5-T1 SILT, motor grossly intact throughout axillary/musculocutaneous/radial/median/ulnar nerves, + radial pulse    RLE: Skin intact, no erythema, ecchymosis, edema, gross deformity, NTTP over the bony prominences of the hip/knee/ankle/foot, painless passive/active ROM of the hip/knee/ankle/foot, L2-S1 SILT, motor grossly intact throughout Hip Flexors/Quadriceps/Hamstrings/TA/EHL/FHL/GSC, + DP/PT pulses, no pain with log roll, no pain on axial loading, compartments soft and compressible, calf nontender        IMAGING:  XR LEFT Hip: LEFT intertrochanteric fracture  XR LEFT Femur: No evidence of other fracture or dislocation

## 2020-08-20 ENCOUNTER — TRANSCRIPTION ENCOUNTER (OUTPATIENT)
Age: 85
End: 2020-08-20

## 2020-08-20 DIAGNOSIS — E78.5 HYPERLIPIDEMIA, UNSPECIFIED: ICD-10-CM

## 2020-08-20 DIAGNOSIS — F32.9 MAJOR DEPRESSIVE DISORDER, SINGLE EPISODE, UNSPECIFIED: ICD-10-CM

## 2020-08-20 DIAGNOSIS — Z29.9 ENCOUNTER FOR PROPHYLACTIC MEASURES, UNSPECIFIED: ICD-10-CM

## 2020-08-20 DIAGNOSIS — M81.0 AGE-RELATED OSTEOPOROSIS WITHOUT CURRENT PATHOLOGICAL FRACTURE: ICD-10-CM

## 2020-08-20 DIAGNOSIS — S72.002A FRACTURE OF UNSPECIFIED PART OF NECK OF LEFT FEMUR, INITIAL ENCOUNTER FOR CLOSED FRACTURE: ICD-10-CM

## 2020-08-20 DIAGNOSIS — E03.9 HYPOTHYROIDISM, UNSPECIFIED: ICD-10-CM

## 2020-08-20 LAB
ANION GAP SERPL CALC-SCNC: 3 MMOL/L — LOW (ref 5–17)
ANION GAP SERPL CALC-SCNC: 6 MMOL/L — SIGNIFICANT CHANGE UP (ref 5–17)
APTT BLD: 40.9 SEC — HIGH (ref 27.5–35.5)
BUN SERPL-MCNC: 16 MG/DL — SIGNIFICANT CHANGE UP (ref 7–23)
BUN SERPL-MCNC: 19 MG/DL — SIGNIFICANT CHANGE UP (ref 7–23)
CALCIUM SERPL-MCNC: 8.3 MG/DL — LOW (ref 8.5–10.1)
CALCIUM SERPL-MCNC: 9.3 MG/DL — SIGNIFICANT CHANGE UP (ref 8.5–10.1)
CHLORIDE SERPL-SCNC: 108 MMOL/L — SIGNIFICANT CHANGE UP (ref 96–108)
CHLORIDE SERPL-SCNC: 110 MMOL/L — HIGH (ref 96–108)
CO2 SERPL-SCNC: 25 MMOL/L — SIGNIFICANT CHANGE UP (ref 22–31)
CO2 SERPL-SCNC: 28 MMOL/L — SIGNIFICANT CHANGE UP (ref 22–31)
CREAT SERPL-MCNC: 0.79 MG/DL — SIGNIFICANT CHANGE UP (ref 0.5–1.3)
CREAT SERPL-MCNC: 0.81 MG/DL — SIGNIFICANT CHANGE UP (ref 0.5–1.3)
GLUCOSE SERPL-MCNC: 151 MG/DL — HIGH (ref 70–99)
GLUCOSE SERPL-MCNC: 167 MG/DL — HIGH (ref 70–99)
HCT VFR BLD CALC: 33 % — LOW (ref 34.5–45)
HCT VFR BLD CALC: 36.4 % — SIGNIFICANT CHANGE UP (ref 34.5–45)
HGB BLD-MCNC: 10.4 G/DL — LOW (ref 11.5–15.5)
HGB BLD-MCNC: 11.6 G/DL — SIGNIFICANT CHANGE UP (ref 11.5–15.5)
INR BLD: 1.1 RATIO — SIGNIFICANT CHANGE UP (ref 0.88–1.16)
MCHC RBC-ENTMCNC: 30.7 PG — SIGNIFICANT CHANGE UP (ref 27–34)
MCHC RBC-ENTMCNC: 31 PG — SIGNIFICANT CHANGE UP (ref 27–34)
MCHC RBC-ENTMCNC: 31.5 GM/DL — LOW (ref 32–36)
MCHC RBC-ENTMCNC: 31.9 GM/DL — LOW (ref 32–36)
MCV RBC AUTO: 97.3 FL — SIGNIFICANT CHANGE UP (ref 80–100)
MCV RBC AUTO: 97.3 FL — SIGNIFICANT CHANGE UP (ref 80–100)
NRBC # BLD: 0 /100 WBCS — SIGNIFICANT CHANGE UP (ref 0–0)
NRBC # BLD: 0 /100 WBCS — SIGNIFICANT CHANGE UP (ref 0–0)
PLATELET # BLD AUTO: 261 K/UL — SIGNIFICANT CHANGE UP (ref 150–400)
PLATELET # BLD AUTO: 284 K/UL — SIGNIFICANT CHANGE UP (ref 150–400)
POTASSIUM SERPL-MCNC: 3.8 MMOL/L — SIGNIFICANT CHANGE UP (ref 3.5–5.3)
POTASSIUM SERPL-MCNC: 4.4 MMOL/L — SIGNIFICANT CHANGE UP (ref 3.5–5.3)
POTASSIUM SERPL-SCNC: 3.8 MMOL/L — SIGNIFICANT CHANGE UP (ref 3.5–5.3)
POTASSIUM SERPL-SCNC: 4.4 MMOL/L — SIGNIFICANT CHANGE UP (ref 3.5–5.3)
PROTHROM AB SERPL-ACNC: 12.8 SEC — SIGNIFICANT CHANGE UP (ref 10.6–13.6)
RBC # BLD: 3.39 M/UL — LOW (ref 3.8–5.2)
RBC # BLD: 3.74 M/UL — LOW (ref 3.8–5.2)
RBC # FLD: 12.9 % — SIGNIFICANT CHANGE UP (ref 10.3–14.5)
RBC # FLD: 12.9 % — SIGNIFICANT CHANGE UP (ref 10.3–14.5)
SARS-COV-2 RNA SPEC QL NAA+PROBE: SIGNIFICANT CHANGE UP
SODIUM SERPL-SCNC: 139 MMOL/L — SIGNIFICANT CHANGE UP (ref 135–145)
SODIUM SERPL-SCNC: 141 MMOL/L — SIGNIFICANT CHANGE UP (ref 135–145)
WBC # BLD: 15.86 K/UL — HIGH (ref 3.8–10.5)
WBC # BLD: 20.73 K/UL — HIGH (ref 3.8–10.5)
WBC # FLD AUTO: 15.86 K/UL — HIGH (ref 3.8–10.5)
WBC # FLD AUTO: 20.73 K/UL — HIGH (ref 3.8–10.5)

## 2020-08-20 PROCEDURE — 73501 X-RAY EXAM HIP UNI 1 VIEW: CPT | Mod: 26,LT

## 2020-08-20 PROCEDURE — 99222 1ST HOSP IP/OBS MODERATE 55: CPT

## 2020-08-20 RX ORDER — ESCITALOPRAM OXALATE 10 MG/1
1 TABLET, FILM COATED ORAL
Qty: 0 | Refills: 0 | DISCHARGE

## 2020-08-20 RX ORDER — LORATADINE 10 MG/1
10 TABLET ORAL DAILY
Refills: 0 | Status: DISCONTINUED | OUTPATIENT
Start: 2020-08-20 | End: 2020-08-24

## 2020-08-20 RX ORDER — ESCITALOPRAM OXALATE 10 MG/1
10 TABLET, FILM COATED ORAL DAILY
Refills: 0 | Status: DISCONTINUED | OUTPATIENT
Start: 2020-08-20 | End: 2020-08-20

## 2020-08-20 RX ORDER — ESCITALOPRAM OXALATE 10 MG/1
10 TABLET, FILM COATED ORAL DAILY
Refills: 0 | Status: DISCONTINUED | OUTPATIENT
Start: 2020-08-20 | End: 2020-08-24

## 2020-08-20 RX ORDER — LANOLIN ALCOHOL/MO/W.PET/CERES
3 CREAM (GRAM) TOPICAL AT BEDTIME
Refills: 0 | Status: DISCONTINUED | OUTPATIENT
Start: 2020-08-20 | End: 2020-08-24

## 2020-08-20 RX ORDER — CEFAZOLIN SODIUM 1 G
2000 VIAL (EA) INJECTION EVERY 8 HOURS
Refills: 0 | Status: COMPLETED | OUTPATIENT
Start: 2020-08-20 | End: 2020-08-21

## 2020-08-20 RX ORDER — LEVOTHYROXINE SODIUM 125 MCG
1 TABLET ORAL
Qty: 0 | Refills: 0 | DISCHARGE

## 2020-08-20 RX ORDER — CEFAZOLIN SODIUM 1 G
2000 VIAL (EA) INJECTION ONCE
Refills: 0 | Status: COMPLETED | OUTPATIENT
Start: 2020-08-20 | End: 2020-08-20

## 2020-08-20 RX ORDER — OXYCODONE HYDROCHLORIDE 5 MG/1
2.5 TABLET ORAL EVERY 4 HOURS
Refills: 0 | Status: DISCONTINUED | OUTPATIENT
Start: 2020-08-20 | End: 2020-08-21

## 2020-08-20 RX ORDER — CEFAZOLIN SODIUM 1 G
2000 VIAL (EA) INJECTION EVERY 8 HOURS
Refills: 0 | Status: DISCONTINUED | OUTPATIENT
Start: 2020-08-20 | End: 2020-08-20

## 2020-08-20 RX ORDER — ACETAMINOPHEN 500 MG
650 TABLET ORAL EVERY 6 HOURS
Refills: 0 | Status: DISCONTINUED | OUTPATIENT
Start: 2020-08-20 | End: 2020-08-24

## 2020-08-20 RX ORDER — MAGNESIUM HYDROXIDE 400 MG/1
30 TABLET, CHEWABLE ORAL DAILY
Refills: 0 | Status: DISCONTINUED | OUTPATIENT
Start: 2020-08-20 | End: 2020-08-24

## 2020-08-20 RX ORDER — METOPROLOL TARTRATE 50 MG
12.5 TABLET ORAL DAILY
Refills: 0 | Status: DISCONTINUED | OUTPATIENT
Start: 2020-08-20 | End: 2020-08-20

## 2020-08-20 RX ORDER — ONDANSETRON 8 MG/1
4 TABLET, FILM COATED ORAL EVERY 6 HOURS
Refills: 0 | Status: DISCONTINUED | OUTPATIENT
Start: 2020-08-20 | End: 2020-08-24

## 2020-08-20 RX ORDER — METOPROLOL TARTRATE 50 MG
12.5 TABLET ORAL DAILY
Refills: 0 | Status: DISCONTINUED | OUTPATIENT
Start: 2020-08-20 | End: 2020-08-24

## 2020-08-20 RX ORDER — SODIUM CHLORIDE 9 MG/ML
1000 INJECTION, SOLUTION INTRAVENOUS
Refills: 0 | Status: DISCONTINUED | OUTPATIENT
Start: 2020-08-20 | End: 2020-08-21

## 2020-08-20 RX ORDER — LEVOTHYROXINE SODIUM 125 MCG
75 TABLET ORAL DAILY
Refills: 0 | Status: DISCONTINUED | OUTPATIENT
Start: 2020-08-20 | End: 2020-08-20

## 2020-08-20 RX ORDER — POLYETHYLENE GLYCOL 3350 17 G/17G
17 POWDER, FOR SOLUTION ORAL DAILY
Refills: 0 | Status: DISCONTINUED | OUTPATIENT
Start: 2020-08-20 | End: 2020-08-21

## 2020-08-20 RX ORDER — LEVOTHYROXINE SODIUM 125 MCG
75 TABLET ORAL DAILY
Refills: 0 | Status: DISCONTINUED | OUTPATIENT
Start: 2020-08-20 | End: 2020-08-24

## 2020-08-20 RX ORDER — ONDANSETRON 8 MG/1
4 TABLET, FILM COATED ORAL ONCE
Refills: 0 | Status: DISCONTINUED | OUTPATIENT
Start: 2020-08-20 | End: 2020-08-20

## 2020-08-20 RX ORDER — ENOXAPARIN SODIUM 100 MG/ML
40 INJECTION SUBCUTANEOUS EVERY 24 HOURS
Refills: 0 | Status: DISCONTINUED | OUTPATIENT
Start: 2020-08-21 | End: 2020-08-24

## 2020-08-20 RX ORDER — ATORVASTATIN CALCIUM 80 MG/1
10 TABLET, FILM COATED ORAL AT BEDTIME
Refills: 0 | Status: DISCONTINUED | OUTPATIENT
Start: 2020-08-20 | End: 2020-08-24

## 2020-08-20 RX ORDER — ACETAMINOPHEN 500 MG
1000 TABLET ORAL ONCE
Refills: 0 | Status: COMPLETED | OUTPATIENT
Start: 2020-08-20 | End: 2020-08-20

## 2020-08-20 RX ORDER — TRAMADOL HYDROCHLORIDE 50 MG/1
25 TABLET ORAL EVERY 4 HOURS
Refills: 0 | Status: DISCONTINUED | OUTPATIENT
Start: 2020-08-20 | End: 2020-08-20

## 2020-08-20 RX ORDER — SODIUM CHLORIDE 9 MG/ML
1000 INJECTION, SOLUTION INTRAVENOUS
Refills: 0 | Status: DISCONTINUED | OUTPATIENT
Start: 2020-08-20 | End: 2020-08-20

## 2020-08-20 RX ORDER — HYDROMORPHONE HYDROCHLORIDE 2 MG/ML
0.5 INJECTION INTRAMUSCULAR; INTRAVENOUS; SUBCUTANEOUS EVERY 4 HOURS
Refills: 0 | Status: DISCONTINUED | OUTPATIENT
Start: 2020-08-20 | End: 2020-08-21

## 2020-08-20 RX ORDER — METOPROLOL TARTRATE 50 MG
0.5 TABLET ORAL
Qty: 0 | Refills: 0 | DISCHARGE

## 2020-08-20 RX ORDER — BENZOCAINE AND MENTHOL 5; 1 G/100ML; G/100ML
1 LIQUID ORAL THREE TIMES A DAY
Refills: 0 | Status: DISCONTINUED | OUTPATIENT
Start: 2020-08-20 | End: 2020-08-24

## 2020-08-20 RX ORDER — HEPARIN SODIUM 5000 [USP'U]/ML
5000 INJECTION INTRAVENOUS; SUBCUTANEOUS ONCE
Refills: 0 | Status: DISCONTINUED | OUTPATIENT
Start: 2020-08-20 | End: 2020-08-20

## 2020-08-20 RX ORDER — CHOLECALCIFEROL (VITAMIN D3) 125 MCG
1 CAPSULE ORAL
Qty: 0 | Refills: 0 | DISCHARGE

## 2020-08-20 RX ORDER — OXYCODONE HYDROCHLORIDE 5 MG/1
5 TABLET ORAL EVERY 4 HOURS
Refills: 0 | Status: DISCONTINUED | OUTPATIENT
Start: 2020-08-20 | End: 2020-08-21

## 2020-08-20 RX ORDER — SENNA PLUS 8.6 MG/1
2 TABLET ORAL AT BEDTIME
Refills: 0 | Status: DISCONTINUED | OUTPATIENT
Start: 2020-08-20 | End: 2020-08-24

## 2020-08-20 RX ORDER — OXYCODONE AND ACETAMINOPHEN 5; 325 MG/1; MG/1
1 TABLET ORAL EVERY 6 HOURS
Refills: 0 | Status: DISCONTINUED | OUTPATIENT
Start: 2020-08-20 | End: 2020-08-20

## 2020-08-20 RX ORDER — CHOLECALCIFEROL (VITAMIN D3) 125 MCG
2000 CAPSULE ORAL DAILY
Refills: 0 | Status: DISCONTINUED | OUTPATIENT
Start: 2020-08-20 | End: 2020-08-24

## 2020-08-20 RX ORDER — OXYCODONE AND ACETAMINOPHEN 5; 325 MG/1; MG/1
2 TABLET ORAL EVERY 6 HOURS
Refills: 0 | Status: DISCONTINUED | OUTPATIENT
Start: 2020-08-20 | End: 2020-08-20

## 2020-08-20 RX ORDER — SENNA PLUS 8.6 MG/1
2 TABLET ORAL AT BEDTIME
Refills: 0 | Status: DISCONTINUED | OUTPATIENT
Start: 2020-08-20 | End: 2020-08-20

## 2020-08-20 RX ORDER — LACTOBACILLUS ACIDOPHILUS 100MM CELL
1 CAPSULE ORAL
Qty: 0 | Refills: 0 | DISCHARGE

## 2020-08-20 RX ORDER — LORATADINE 10 MG/1
10 TABLET ORAL DAILY
Refills: 0 | Status: DISCONTINUED | OUTPATIENT
Start: 2020-08-20 | End: 2020-08-20

## 2020-08-20 RX ORDER — ATORVASTATIN CALCIUM 80 MG/1
10 TABLET, FILM COATED ORAL AT BEDTIME
Refills: 0 | Status: DISCONTINUED | OUTPATIENT
Start: 2020-08-20 | End: 2020-08-20

## 2020-08-20 RX ORDER — FEXOFENADINE HCL 30 MG
1 TABLET ORAL
Qty: 0 | Refills: 0 | DISCHARGE

## 2020-08-20 RX ORDER — MORPHINE SULFATE 50 MG/1
1 CAPSULE, EXTENDED RELEASE ORAL
Refills: 0 | Status: DISCONTINUED | OUTPATIENT
Start: 2020-08-20 | End: 2020-08-20

## 2020-08-20 RX ADMIN — Medication 75 MICROGRAM(S): at 05:37

## 2020-08-20 RX ADMIN — OXYCODONE HYDROCHLORIDE 5 MILLIGRAM(S): 5 TABLET ORAL at 22:25

## 2020-08-20 RX ADMIN — SODIUM CHLORIDE 60 MILLILITER(S): 9 INJECTION, SOLUTION INTRAVENOUS at 00:12

## 2020-08-20 RX ADMIN — ATORVASTATIN CALCIUM 10 MILLIGRAM(S): 80 TABLET, FILM COATED ORAL at 21:03

## 2020-08-20 RX ADMIN — OXYCODONE HYDROCHLORIDE 5 MILLIGRAM(S): 5 TABLET ORAL at 21:03

## 2020-08-20 RX ADMIN — Medication 12.5 MILLIGRAM(S): at 05:37

## 2020-08-20 RX ADMIN — OXYCODONE AND ACETAMINOPHEN 2 TABLET(S): 5; 325 TABLET ORAL at 04:00

## 2020-08-20 RX ADMIN — SENNA PLUS 2 TABLET(S): 8.6 TABLET ORAL at 21:03

## 2020-08-20 RX ADMIN — Medication 400 MILLIGRAM(S): at 11:58

## 2020-08-20 RX ADMIN — OXYCODONE AND ACETAMINOPHEN 2 TABLET(S): 5; 325 TABLET ORAL at 02:45

## 2020-08-20 RX ADMIN — Medication 1000 MILLIGRAM(S): at 12:28

## 2020-08-20 RX ADMIN — Medication 400 MILLIGRAM(S): at 00:12

## 2020-08-20 RX ADMIN — SODIUM CHLORIDE 60 MILLILITER(S): 9 INJECTION, SOLUTION INTRAVENOUS at 02:43

## 2020-08-20 RX ADMIN — SODIUM CHLORIDE 75 MILLILITER(S): 9 INJECTION, SOLUTION INTRAVENOUS at 17:38

## 2020-08-20 RX ADMIN — Medication 1000 MILLIGRAM(S): at 01:34

## 2020-08-20 NOTE — H&P ADULT - PROBLEM SELECTOR PLAN 1
LEFT intertrochanteric fracture  Bedrest, NWB LEFT lower extremity   Plan for IMN of LEFT hip in AM   NPO pMN, IVF while NPO  pain control as ordered  Ortho following   will consult cardiology [facility] for cardiac clearance LEFT intertrochanteric fracture  Bedrest, NWB LEFT lower extremity   Plan for IMN of LEFT hip in AM   NPO pMN, IVF while NPO  pain control as ordered  Ortho following   will consult cardiology [facility] for cardiac clearance given 1st degree block and RBBB on ekg LEFT intertrochanteric fracture  Bedrest, NWB LEFT lower extremity   Plan for IMN of LEFT hip in AM   NPO pMN, IVF while NPO  pain control as ordered with Tramadol, Oxycodone  Ortho Consult in ER-   - cardiology [facility] for cardiac clearance given 1st degree block and RBBB on ekg  1 dose of SC Heparin 5000 SC x 1 dose.

## 2020-08-20 NOTE — DIETITIAN INITIAL EVALUATION ADULT. - PROBLEM SELECTOR PLAN 1
LEFT intertrochanteric fracture  Bedrest, NWB LEFT lower extremity   Plan for IMN of LEFT hip in AM   NPO pMN, IVF while NPO  pain control as ordered  Ortho following   will consult cardiology [facility] for cardiac clearance given 1st degree block and RBBB on ekg

## 2020-08-20 NOTE — DIETITIAN INITIAL EVALUATION ADULT. - PHYSICAL APPEARANCE
underweight/other (specify)/BMI 17.8 (Based on stated height 66" and weight 110lbs) Per nutrition focused physical exam severe muscle wasting observed in temples, clavicles, shoulders and severe fat depletion in orbital region, buccal region and triceps

## 2020-08-20 NOTE — DIETITIAN INITIAL EVALUATION ADULT. - PROBLEM SELECTOR PLAN 5
no chemical ppx in preparation for OR   on Allegra at home, will continue Claritin daily while admitted.  IMPROVE VTE Individual Risk Assessment          RISK                                                          Points    [  ] Previous VTE                                                3  [  ] Thrombophilia                                             2  [  ] Lower limb paralysis                                   2        (unable to hold up >15 seconds)    [  ] Current Cancer                                            2         (within 6 months)  [  ] Immobilization > 24 hrs                              1  [  ] ICU/CCU stay > 24 hours                            1  [  ] Age > 60                                                    1    IMPROVE VTE Score _____1____

## 2020-08-20 NOTE — PROGRESS NOTE ADULT - SUBJECTIVE AND OBJECTIVE BOX
Post operative check. Pt doing well following surgery with no complaints at this time. Denies fever, chills NV.    Vital Signs Last 24 Hrs  T(C): 36.6 (20 Aug 2020 19:20), Max: 37 (20 Aug 2020 13:27)  T(F): 97.8 (20 Aug 2020 19:20), Max: 98.6 (20 Aug 2020 13:27)  HR: 93 (20 Aug 2020 19:20) (74 - 96)  BP: 108/63 (20 Aug 2020 19:20) (98/57 - 202/96)  BP(mean): --  RR: 17 (20 Aug 2020 19:20) (10 - 18)  SpO2: 91% (20 Aug 2020 19:20) (91% - 100%)    PE:  Gen: Laying in bed comfortably, NAD    LLE:   Dressing clean dry and intact  +EHL/FHL/TA/GSC  SILT L3-S1  + DP pulse  Compartments soft and compressible  No calf tenderness b/l

## 2020-08-20 NOTE — PROGRESS NOTE ADULT - SUBJECTIVE AND OBJECTIVE BOX
Patient is a 89y old  Female who presents with a chief complaint of hip fracture (20 Aug 2020 08:22)      INTERVAL HPI/OVERNIGHT EVENTS: Patient seen and examined bedside. No overnight events reported. Patient experiencing left hip discomfort but tolerable, offers no other complaints at this time.     MEDICATIONS  (STANDING):  atorvastatin 10 milliGRAM(s) Oral at bedtime  escitalopram 10 milliGRAM(s) Oral daily  lactated ringers. 1000 milliLiter(s) (60 mL/Hr) IV Continuous <Continuous>  levothyroxine 75 MICROGram(s) Oral daily  loratadine 10 milliGRAM(s) Oral daily  metoprolol succinate ER 12.5 milliGRAM(s) Oral daily    MEDICATIONS  (PRN):  oxycodone    5 mG/acetaminophen 325 mG 1 Tablet(s) Oral every 6 hours PRN Moderate Pain (4 - 6)  oxycodone    5 mG/acetaminophen 325 mG 2 Tablet(s) Oral every 6 hours PRN Severe Pain (7 - 10)  senna 2 Tablet(s) Oral at bedtime PRN Constipation  traMADol 25 milliGRAM(s) Oral every 4 hours PRN Mild Pain (1 - 3)    REVIEW OF SYSTEMS:  CONSTITUTIONAL: No fever, weight loss, or fatigue  EYES: No eye pain, visual disturbances, or discharge  NECK: No pain or stiffness  RESPIRATORY: No cough, wheezing, chills or hemoptysis; No shortness of breath  CARDIOVASCULAR: No chest pain, palpitations, dizziness, or leg swelling  GASTROINTESTINAL: No abdominal or epigastric pain. No nausea, vomiting, or hematemesis; No diarrhea or constipation  GENITOURINARY: No dysuria, frequency, hematuria, or incontinence  NEUROLOGICAL: No headaches, memory loss, loss of strength, numbness, or tremors  SKIN: No itching, burning, rashes, or lesions   MUSCULOSKELETAL: left hip pain    Vital Signs Last 24 Hrs  T(C): 36.4 (20 Aug 2020 05:31), Max: 36.7 (20 Aug 2020 01:35)  T(F): 97.6 (20 Aug 2020 05:31), Max: 98 (20 Aug 2020 01:35)  HR: 76 (20 Aug 2020 05:31) (74 - 82)  BP: 139/74 (20 Aug 2020 05:31) (139/74 - 202/96)  BP(mean): --  RR: 18 (20 Aug 2020 05:31) (14 - 18)  SpO2: 93% (20 Aug 2020 05:31) (92% - 99%)    PHYSICAL EXAM:  GENERAL: NAD, well-groomed, well-developed  HEAD:  Atraumatic, Normocephalic  EYES: EOMI, PERRLA, conjunctiva and sclera clear  ENMT: No tonsillar erythema, exudates, or enlargement; Moist mucous membranes  NECK: Supple, No JVD  NERVOUS SYSTEM:  Alert & Oriented X3, Good concentration, sensation grossly intact  CHEST/LUNG: Clear to auscultation bilaterally; No rales, rhonchi, wheezing, or rubs  HEART: Regular rate and rhythm; No murmurs, rubs, or gallops  ABDOMEN: Soft, Nontender, Nondistended; Bowel sounds present  EXTREMITIES:  2+ Peripheral Pulses, No clubbing, cyanosis, or edema  SKIN: No rashes or lesions    LABS:                        11.6   15.86 )-----------( 284      ( 20 Aug 2020 04:34 )             36.4     08-20    141  |  110<H>  |  19  ----------------------------<  151<H>  4.4   |  28  |  0.79    Ca    9.3      20 Aug 2020 04:34    TPro  6.4  /  Alb  3.5  /  TBili  0.3  /  DBili  .10  /  AST  17  /  ALT  18  /  AlkPhos  85  08-20    PT/INR - ( 20 Aug 2020 04:34 )   PT: 12.8 sec;   INR: 1.10 ratio         PTT - ( 20 Aug 2020 04:34 )  PTT:40.9 sec    CAPILLARY BLOOD GLUCOSE    RADIOLOGY & ADDITIONAL TESTS:    Imaging Personally Reviewed:  [ ] YES  [ ] NO    Consultant(s) Notes Reviewed:  [ ] YES  [ ] NO    Care Discussed with Consultants/Other Providers [ ] YES  [ ] NO Patient is a 89y old  Female who presents with a chief complaint of hip fracture (20 Aug 2020 08:22)      INTERVAL HPI/OVERNIGHT EVENTS: Patient seen and examined bedside. No overnight events reported. Patient experiencing left hip discomfort but tolerable, offers no other complaints at this time.     MEDICATIONS  (STANDING):  atorvastatin 10 milliGRAM(s) Oral at bedtime  escitalopram 10 milliGRAM(s) Oral daily  lactated ringers. 1000 milliLiter(s) (60 mL/Hr) IV Continuous <Continuous>  levothyroxine 75 MICROGram(s) Oral daily  loratadine 10 milliGRAM(s) Oral daily  metoprolol succinate ER 12.5 milliGRAM(s) Oral daily    MEDICATIONS  (PRN):  oxycodone    5 mG/acetaminophen 325 mG 1 Tablet(s) Oral every 6 hours PRN Moderate Pain (4 - 6)  oxycodone    5 mG/acetaminophen 325 mG 2 Tablet(s) Oral every 6 hours PRN Severe Pain (7 - 10)  senna 2 Tablet(s) Oral at bedtime PRN Constipation  traMADol 25 milliGRAM(s) Oral every 4 hours PRN Mild Pain (1 - 3)    REVIEW OF SYSTEMS:  CONSTITUTIONAL: No fever, weight loss, or fatigue  EYES: No eye pain, visual disturbances, or discharge  NECK: No pain or stiffness  RESPIRATORY: No cough, wheezing, chills or hemoptysis; No shortness of breath  CARDIOVASCULAR: No chest pain, palpitations, dizziness, or leg swelling  GASTROINTESTINAL: No abdominal or epigastric pain. No nausea, vomiting, or hematemesis; No diarrhea or constipation  GENITOURINARY: No dysuria, frequency, hematuria, or incontinence  NEUROLOGICAL: No headaches, memory loss, loss of strength, numbness, or tremors  SKIN: No itching, burning, rashes, or lesions   MUSCULOSKELETAL: left hip pain    Vital Signs Last 24 Hrs  T(C): 36.4 (20 Aug 2020 05:31), Max: 36.7 (20 Aug 2020 01:35)  T(F): 97.6 (20 Aug 2020 05:31), Max: 98 (20 Aug 2020 01:35)  HR: 76 (20 Aug 2020 05:31) (74 - 82)  BP: 139/74 (20 Aug 2020 05:31) (139/74 - 202/96)  BP(mean): --  RR: 18 (20 Aug 2020 05:31) (14 - 18)  SpO2: 93% (20 Aug 2020 05:31) (92% - 99%)    PHYSICAL EXAM:  GENERAL: NAD, well-groomed, well-developed  HEAD:  Atraumatic, Normocephalic  EYES: EOMI, PERRLA, conjunctiva and sclera clear  ENMT: No tonsillar erythema, exudates, or enlargement; Moist mucous membranes  NECK: Supple, No JVD  NERVOUS SYSTEM:  Alert & Oriented X3, Good concentration, sensation grossly intact  CHEST/LUNG: Clear to auscultation bilaterally; No rales, rhonchi, wheezing, or rubs  HEART: Regular rate and rhythm; No murmurs, rubs, or gallops  ABDOMEN: Soft, Nontender, Nondistended; Bowel sounds present  EXTREMITIES:  2+ Peripheral Pulses, No clubbing, cyanosis, or edema  SKIN: No rashes or lesions    LABS:                        11.6   15.86 )-----------( 284      ( 20 Aug 2020 04:34 )             36.4     08-20    141  |  110<H>  |  19  ----------------------------<  151<H>  4.4   |  28  |  0.79    Ca    9.3      20 Aug 2020 04:34    TPro  6.4  /  Alb  3.5  /  TBili  0.3  /  DBili  .10  /  AST  17  /  ALT  18  /  AlkPhos  85  08-20    PT/INR - ( 20 Aug 2020 04:34 )   PT: 12.8 sec;   INR: 1.10 ratio         PTT - ( 20 Aug 2020 04:34 )  PTT:40.9 sec    CAPILLARY BLOOD GLUCOSE    RADIOLOGY & ADDITIONAL TESTS:  < from: Xray Hip 1 View, Left (08.19.20 @ 22:49) >    EXAM:  XR HIP 1V LT                            PROCEDURE DATE:  08/19/2020          INTERPRETATION:  Left hip    HISTORY: Traction view    COMPARISON: Earlier the same night     Frontal view of the left hip, obtained with traction, shows improved angulation at the intertrochanteric fracture line.    IMPRESSION: Traction view.      Thank you for this referral.    < end of copied text > Patient is a 89y old  Female who presents with a chief complaint of hip fracture (20 Aug 2020 08:22)  88 yo F PMHx Arthritis, HLD, hypothyroidism, osteoporosis presents with left hip pain s/p mechanical slip and fall out of chair. Patient states that she was sitting in her chair and when she went to move she slipped out of her chair hitting her left hip. Per chart review aide states they were sitting talking when pt turned to the left and slid out of her chair onto her left side. Denies any dizziness or pre syncope prior to fall. Patient denies hitting her head. No loss of consciousness.  Patient states she has history of frequent falls. Denies chest pain, SOB, palpitations.     In the ED  VS: T 97.9, HR 76, /76, RR 14, SpO2 99 on RA  Labs: WBC 13.34, COVID negative   EKG: sinus rhythm with first degree AV block and RBBB, similar to prior ekg   Given morphine 2mg IV x1, ofivermev x1, seen by ortho and diagnosed with left intertrochanteric fracture.         INTERVAL HPI/OVERNIGHT EVENTS: Patient seen and examined bedside. No overnight events reported. Patient experiencing left hip discomfort but tolerable, offers no other complaints at this time. Leukocytosis & Low H/H     MEDICATIONS  (STANDING):  atorvastatin 10 milliGRAM(s) Oral at bedtime  escitalopram 10 milliGRAM(s) Oral daily  lactated ringers. 1000 milliLiter(s) (60 mL/Hr) IV Continuous <Continuous>  levothyroxine 75 MICROGram(s) Oral daily  loratadine 10 milliGRAM(s) Oral daily  metoprolol succinate ER 12.5 milliGRAM(s) Oral daily    MEDICATIONS  (PRN):  oxycodone    5 mG/acetaminophen 325 mG 1 Tablet(s) Oral every 6 hours PRN Moderate Pain (4 - 6)  oxycodone    5 mG/acetaminophen 325 mG 2 Tablet(s) Oral every 6 hours PRN Severe Pain (7 - 10)  senna 2 Tablet(s) Oral at bedtime PRN Constipation  traMADol 25 milliGRAM(s) Oral every 4 hours PRN Mild Pain (1 - 3)    REVIEW OF SYSTEMS:  CONSTITUTIONAL: No fever, weight loss, or fatigue  EYES: No eye pain, visual disturbances, or discharge  NECK: No pain or stiffness  RESPIRATORY: No cough, wheezing, chills or hemoptysis; No shortness of breath  CARDIOVASCULAR: No chest pain, palpitations, dizziness, or leg swelling  GASTROINTESTINAL: No abdominal or epigastric pain. No nausea, vomiting, or hematemesis; No diarrhea or constipation  GENITOURINARY: No dysuria, frequency, hematuria, or incontinence  NEUROLOGICAL: No headaches, memory loss, loss of strength, numbness, or tremors  SKIN: No itching, burning, rashes, or lesions   MUSCULOSKELETAL: left hip pain    Vital Signs Last 24 Hrs  T(C): 36.4 (20 Aug 2020 05:31), Max: 36.7 (20 Aug 2020 01:35)  T(F): 97.6 (20 Aug 2020 05:31), Max: 98 (20 Aug 2020 01:35)  HR: 76 (20 Aug 2020 05:31) (74 - 82)  BP: 139/74 (20 Aug 2020 05:31) (139/74 - 202/96)  BP(mean): --  RR: 18 (20 Aug 2020 05:31) (14 - 18)  SpO2: 93% (20 Aug 2020 05:31) (92% - 99%)    PHYSICAL EXAM:  GENERAL: NAD, well-groomed, well-developed  HEAD:  Atraumatic, Normocephalic  EYES: EOMI, PERRLA, conjunctiva and sclera clear  ENMT: No tonsillar erythema, exudates, or enlargement; Moist mucous membranes  NECK: Supple, No JVD  NERVOUS SYSTEM:  Alert & Oriented X3, Good concentration, sensation grossly intact  CHEST/LUNG: Clear to auscultation bilaterally; No rales, rhonchi, wheezing, or rubs  HEART: Regular rate and rhythm; No murmurs, rubs, or gallops  ABDOMEN: Soft, Nontender, Nondistended; Bowel sounds present  EXTREMITIES:  2+ Peripheral Pulses, No clubbing, cyanosis, or edema  SKIN: No rashes or lesions    LABS:                        11.6   15.86 )-----------( 284      ( 20 Aug 2020 04:34 )             36.4     08-20    141  |  110<H>  |  19  ----------------------------<  151<H>  4.4   |  28  |  0.79    Ca    9.3      20 Aug 2020 04:34    TPro  6.4  /  Alb  3.5  /  TBili  0.3  /  DBili  .10  /  AST  17  /  ALT  18  /  AlkPhos  85  08-20    PT/INR - ( 20 Aug 2020 04:34 )   PT: 12.8 sec;   INR: 1.10 ratio         PTT - ( 20 Aug 2020 04:34 )  PTT:40.9 sec    CAPILLARY BLOOD GLUCOSE    RADIOLOGY & ADDITIONAL TESTS:  < from: Xray Hip 1 View, Left (08.19.20 @ 22:49) >    EXAM:  XR HIP 1V LT                            PROCEDURE DATE:  08/19/2020          INTERPRETATION:  Left hip    HISTORY: Traction view    COMPARISON: Earlier the same night     Frontal view of the left hip, obtained with traction, shows improved angulation at the intertrochanteric fracture line.    IMPRESSION: Traction view.      Thank you for this referral.    < end of copied text >  Care Discussed with [ x ] Consultants, [ x ] Patient, [ x ] Family, [  ] HCP, [x  ] , [  ] Social Service, [x  ] RN, [  ] Physical Therapy, [x  ] Palliative Care Team  DVT PPX: [ x ] Lovenox, [  ] SC Heparin, [  ] Coumadin, [  ] Xarelto, [  ] Eliquis, [  ] Pradaxa, [  ] IV Heparin drip, [ x ] SCD, [  ] Ambulation, [  ] Contraindicated 2/2 GI Bleed, [  ] Contraindicated 2/2  Bleed, [  ] Contraindicated 2/2 Brain Bleed  Advanced Directive: [  ] None, [ x ] DNR/DNI

## 2020-08-20 NOTE — H&P ADULT - HISTORY OF PRESENT ILLNESS
89 yo F PMHx Arthritis, HLD, hypothyroidism, osteoporosis presents with left hip pain s/p mechanical slip and fall out of chair. Patient states that she was sitting in her chair and when she went to move she slipped out of her chair hitting her left hip. Per chart review aide states they were sitting talking when pt turned to the left and slid out of her chair onto her left side. Denies any dizziness or pre syncope prior to fall. Patient denies hitting her head. No loss of consciousness.  Patient states she has history of frequent falls. Denies chest pain, SOB, palpitations.     In the ED  VS: T 97.9, HR 76, /76, RR 14, SpO2 99 on RA  Labs: WBC 13.34, COVID negative   EKG: sinus rhythm with first degree AV block and RBBB, similar to prior ekg   Given morphine 2mg IV x1, ofivermev x1, seen by ortho and diagnosed with left intertrochanteric fracture.

## 2020-08-20 NOTE — H&P ADULT - ATTENDING COMMENTS
Pt seen, examined, case & care plan d/w pt, residents at detail.  -Pt is medically optimized for schedule Orthopedic procedure.. Left IM Nail .  - Pre Op IV Antibiotics as per orthopedics  -Post op DVT prophylaxis   -Post op Incentive spirometry.

## 2020-08-20 NOTE — DISCHARGE NOTE PROVIDER - CARE PROVIDER_API CALL
Librado Sorto (MD)  Orthopaedic Surgery  [address] [place] [address]  Phone: [phone]  Fax: [phone]  Follow Up Time: Librado Sorto (MD)  Orthopaedic Surgery  [address] [place] [address]  Phone: [phone]  Fax: [phone]  Follow Up Time:     Eduardo Martin  [id number] [place] [address]  Phone: [phone]  Fax: ()-  Established Patient  Follow Up Time: 1 week Librado Sorto (MD)  Orthopaedic Surgery  [address] [place] [address]  Phone: [phone]  Fax: [phone]  Follow Up Time:     Eduardo Martin  [id number] [place] [address]  Phone: ()-  Fax: ()-  Established Patient  Follow Up Time: 1 week    Gavin Olson  [facility]  [address] [facility], [address]  [place] [address]  Phone: [phone]  Fax: [phone]  Follow Up Time:

## 2020-08-20 NOTE — CONSULT NOTE ADULT - SUBJECTIVE AND OBJECTIVE BOX
[facility] - Emelia Rowan, Fabrizio, Donita, Khang, Virgil Cristobal  Office Number: [phone]    Initial Consult Note    CHIEF COMPLAINT: Patient is a 89y old  Female who presents with a chief complaint of hip fracture (20 Aug 2020 07:49)      HPI:  88 yo F PMHx Arthritis, HLD, hypothyroidism, osteoporosis presents with left hip pain s/p mechanical slip and fall out of chair. Patient states that she was sitting in her chair and when she went to move she slipped out of her chair hitting her left hip. Per chart review aide states they were sitting talking when pt turned to the left and slid out of her chair onto her left side. Denies any dizziness or pre syncope prior to fall. Patient denies hitting her head. No loss of consciousness.  Patient states she has history of frequent falls. Denies chest pain, SOB, palpitations.     In the ED  VS: T 97.9, HR 76, /76, RR 14, SpO2 99 on RA  Labs: WBC 13.34, COVID negative   EKG: sinus rhythm with first degree AV block and RBBB, similar to prior ekg   Given morphine 2mg IV x1, ofivermev x1, seen by ortho and diagnosed with left intertrochanteric fracture. (20 Aug 2020 00:57)      PAST MEDICAL & SURGICAL HISTORY:  Arthritis  Hyperlipidemia  Osteoporosis  Migraine  Cataract: s/p cataract surgery  Edema of Leg: b/l LE  Hypothyroidism  S/P cataract surgery: bialteral  S/P ovarian cystectomy  S/P hip replacement  S/P knee replacement, bilateral: Partial R) hip  Knee Replacement: b/l      SOCIAL HISTORY:  No tobacco, ethanol, or drug abuse.    FAMILY HISTORY:  No pertinent family history in first degree relatives    No family history of acute MI or sudden cardiac death.    MEDICATIONS  (STANDING):  atorvastatin 10 milliGRAM(s) Oral at bedtime  escitalopram 10 milliGRAM(s) Oral daily  lactated ringers. 1000 milliLiter(s) (60 mL/Hr) IV Continuous <Continuous>  levothyroxine 75 MICROGram(s) Oral daily  loratadine 10 milliGRAM(s) Oral daily  metoprolol succinate ER 12.5 milliGRAM(s) Oral daily    MEDICATIONS  (PRN):  oxycodone    5 mG/acetaminophen 325 mG 1 Tablet(s) Oral every 6 hours PRN Moderate Pain (4 - 6)  oxycodone    5 mG/acetaminophen 325 mG 2 Tablet(s) Oral every 6 hours PRN Severe Pain (7 - 10)  senna 2 Tablet(s) Oral at bedtime PRN Constipation  traMADol 25 milliGRAM(s) Oral every 4 hours PRN Mild Pain (1 - 3)      Allergies    erythromycin (Hives)    Intolerances        REVIEW OF SYSTEMS:    CONSTITUTIONAL: No weakness, fevers or chills  EYES/ENT: No visual changes;  No vertigo or throat pain   NECK: No pain or stiffness  RESPIRATORY: No cough, wheezing, hemoptysis; No shortness of breath  CARDIOVASCULAR: No chest pain or palpitations  GASTROINTESTINAL: No abdominal pain. No nausea, vomiting, or hematemesis; No diarrhea or constipation. No melena or hematochezia.  GENITOURINARY: No dysuria, frequency or hematuria  NEUROLOGICAL: No numbness or weakness  SKIN: No itching or rash  All other review of systems is negative unless indicated above    VITAL SIGNS:   Vital Signs Last 24 Hrs  T(C): 36.4 (20 Aug 2020 05:31), Max: 36.7 (20 Aug 2020 01:35)  T(F): 97.6 (20 Aug 2020 05:31), Max: 98 (20 Aug 2020 01:35)  HR: 76 (20 Aug 2020 05:31) (74 - 82)  BP: 139/74 (20 Aug 2020 05:31) (139/74 - 202/96)  BP(mean): --  RR: 18 (20 Aug 2020 05:31) (14 - 18)  SpO2: 93% (20 Aug 2020 05:31) (92% - 99%)    I&O's Summary    19 Aug 2020 07:01  -  20 Aug 2020 07:00  --------------------------------------------------------  IN: 240 mL / OUT: 250 mL / NET: -10 mL        On Exam:    Constitutional: NAD, alert and oriented x 3  Lungs:  Non-labored, breath sounds are clear bilaterally, No wheezing, rales or rhonchi  Cardiovascular: RRR.  S1 and S2 positive.  + sys murmur at ru/lusb, no rubs, gallops or clicks  Gastrointestinal: Bowel Sounds present, soft, nontender.   Lymph: No peripheral edema. No cervical lymphadenopathy.  Neurological: Alert, no focal deficits  Skin: No rashes or ulcers   Psych:  Mood & affect appropriate.    LABS: All Labs Reviewed:                        11.6   15.86 )-----------( 284      ( 20 Aug 2020 04:34 )             36.4                         12.9   13.34 )-----------( 336      ( 19 Aug 2020 22:28 )             40.2     20 Aug 2020 04:34    141    |  110    |  19     ----------------------------<  151    4.4     |  28     |  0.79   19 Aug 2020 22:28    142    |  109    |  23     ----------------------------<  116    4.0     |  30     |  0.80     Ca    9.3        20 Aug 2020 04:34  Ca    9.5        19 Aug 2020 22:28    TPro  6.4    /  Alb  3.5    /  TBili  0.3    /  DBili  .10    /  AST  17     /  ALT  18     /  AlkPhos  85     20 Aug 2020 04:34  TPro  7.2    /  Alb  3.8    /  TBili  0.3    /  DBili  x      /  AST  23     /  ALT  19     /  AlkPhos  95     19 Aug 2020 22:28    PT/INR - ( 20 Aug 2020 04:34 )   PT: 12.8 sec;   INR: 1.10 ratio         PTT - ( 20 Aug 2020 04:34 )  PTT:40.9 sec      Blood Culture:         RADIOLOGY:    EKG: sr 1st degree av block, rbbb

## 2020-08-20 NOTE — SBIRT NOTE ADULT - NSSBIRTALCPOSREINDET_GEN_A_CORE
patient reports she has a glass of wine with dinner a few times per month denies problem with alcohol intake

## 2020-08-20 NOTE — DISCHARGE NOTE PROVIDER - NSDCACTIVITY_GEN_ALL_CORE
Walking - Indoors allowed/No heavy lifting/straining/Do not make important decisions/Bathing allowed/Do not drive or operate machinery

## 2020-08-20 NOTE — DISCHARGE NOTE PROVIDER - PROVIDER TOKENS
PROVIDER:[TOKEN:[[id number]]] PROVIDER:[TOKEN:[[id number]]],PROVIDER:[TOKEN:[[id number]],FOLLOWUP:[1 week],ESTABLISHEDPATIENT:[T]] PROVIDER:[TOKEN:[[id number]]],PROVIDER:[TOKEN:[[id number]],FOLLOWUP:[1 week],ESTABLISHEDPATIENT:[T]],PROVIDER:[TOKEN:[[id number]]]

## 2020-08-20 NOTE — PROGRESS NOTE ADULT - SUBJECTIVE AND OBJECTIVE BOX
Pt seen and examined at bedside. Pt did well overnight with no overnight complaints. Denies fever, chills, NV.    Vital Signs Last 24 Hrs  T(C): 36.4 (20 Aug 2020 05:31), Max: 36.7 (20 Aug 2020 01:35)  T(F): 97.6 (20 Aug 2020 05:31), Max: 98 (20 Aug 2020 01:35)  HR: 76 (20 Aug 2020 05:31) (74 - 82)  BP: 139/74 (20 Aug 2020 05:31) (139/74 - 202/96)  BP(mean): --  RR: 18 (20 Aug 2020 05:31) (14 - 18)  SpO2: 93% (20 Aug 2020 05:31) (92% - 99%)    PE:  General: NAD, Awake and Alert    LLE:  Skin intact  L2-S1 SILT  +EHL/FHL/TA/GSC  +DP pulses  Calf nontender  Compartments soft and compressible

## 2020-08-20 NOTE — DISCHARGE NOTE PROVIDER - NSDCFUADDINST_GEN_ALL_CORE_FT
1.	Pain Control  2.	Walking with full weight bearing as tolerated, with assistive devices (walker/Cane as Needed)  3.	DVT Prophylaxis for 30 days  4.	PT as needed  5.	Follow up with Dr. Sorto as Outpatient in 10-14 Days after Discharge from the Hospital or Rehab. Call Office For Appointment.  6.	Remove Staples Post-Op Day 14, and Remove Dressing Post-Op Day 10, with Daily Dressing Changes as Need.  7.	Ice affected area as Needed  8.	Keep Dressing  Clean and dry.

## 2020-08-20 NOTE — PROGRESS NOTE ADULT - PROBLEM SELECTOR PLAN 7
-no chemical ppx in preparation for OR   -on Allegra at home, will continue Claritin daily while admitted.  IMPROVE VTE Individual Risk Assessment          RISK                                                          Points    [  ] Previous VTE                                                3  [  ] Thrombophilia                                             2  [  ] Lower limb paralysis                                   2        (unable to hold up >15 seconds)    [  ] Current Cancer                                            2         (within 6 months)  [  ] Immobilization > 24 hrs                              1  [  ] ICU/CCU stay > 24 hours                            1  [  ] Age > 60                                                    1    IMPROVE VTE Score _____1____

## 2020-08-20 NOTE — DISCHARGE NOTE PROVIDER - HOSPITAL COURSE
HPI:    89 yo F PMHx Arthritis, HLD, hypothyroidism, osteoporosis presents with left hip pain s/p mechanical slip and fall out of chair. Patient states that she was sitting in her chair and when she went to move she slipped out of her chair hitting her left hip. Per chart review aide states they were sitting talking when pt turned to the left and slid out of her chair onto her left side. Denies any dizziness or pre syncope prior to fall. Patient denies hitting her head. No loss of consciousness.  Patient states she has history of frequent falls. Denies chest pain, SOB, palpitations.         In the ED    VS: T 97.9, HR 76, /76, RR 14, SpO2 99 on RA    Labs: WBC 13.34, COVID negative     EKG: sinus rhythm with first degree AV block and RBBB, similar to prior ekg     Given morphine 2mg IV x1, ofivermev x1, seen by ortho and diagnosed with left intertrochanteric fracture.             ---    HOSPITAL COURSE: Patient was admitted with intertrochanteric fracture with plan for intramedullary nailing. Patient was placed on NPO, IV fluids, and NWB left lower extremity Patient had procedure done on 8/20/20.         ---    CONSULTANTS:         ---    TIME SPENT:    I, the attending physician, was physically present for the key portions of the evaluation and management (E/M) service provided. The total amount of time spent reviewing the hospital notes, laboratory values, imaging findings, assessing/counseling the patient, discussing with consultant physicians, social work, nursing staff was -- minutes        ---    Primary care provider was made aware of plan for discharge:      [  ] NO     [  ] YES HPI:    89 yo F PMHx Arthritis, HLD, hypothyroidism, osteoporosis presents with left hip pain s/p mechanical slip and fall out of chair. Patient states that she was sitting in her chair and when she went to move she slipped out of her chair hitting her left hip. Per chart review aide states they were sitting talking when pt turned to the left and slid out of her chair onto her left side. Denies any dizziness or pre syncope prior to fall. Patient denies hitting her head. No loss of consciousness.  Patient states she has history of frequent falls. Denies chest pain, SOB, palpitations.         In the ED    VS: T 97.9, HR 76, /76, RR 14, SpO2 99 on RA    Labs: WBC 13.34, COVID negative     EKG: sinus rhythm with first degree AV block and RBBB, similar to prior ekg     Given morphine 2mg IV x1, ofivermev x1, seen by ortho and diagnosed with left intertrochanteric fracture.             ---    HOSPITAL COURSE: Patient was admitted with intertrochanteric fracture with plan for intramedullary nailing. Patient was placed on NPO, IV fluids, and NWB left lower extremity Patient had procedure done on 8/20/20 which was tolerated well with no complications. Patient was placed on lovenox for DVT prophylaxis. Patient developed post-op anemia and received a total of _____ units of pRBCs. Post-op pain was managed with tylenol, tramadol, and oxycodone.         ---    CONSULTANTS:     Orthopedics:     Cardiology: Dr. Herman    Heme/Onc: Dr. Olson    Palliative Care: Dr. Donovan Ariza        ---    TIME SPENT:    I, the attending physician, was physically present for the key portions of the evaluation and management (E/M) service provided. The total amount of time spent reviewing the hospital notes, laboratory values, imaging findings, assessing/counseling the patient, discussing with consultant physicians, social work, nursing staff was -- minutes        ---    Primary care provider was made aware of plan for discharge:      [  ] NO     [  ] YES HPI:    89 yo F PMHx Arthritis, HLD, hypothyroidism, osteoporosis presents with left hip pain s/p mechanical slip and fall out of chair. Patient states that she was sitting in her chair and when she went to move she slipped out of her chair hitting her left hip. Per chart review aide states they were sitting talking when pt turned to the left and slid out of her chair onto her left side. Denies any dizziness or pre syncope prior to fall. Patient denies hitting her head. No loss of consciousness.  Patient states she has history of frequent falls. Denies chest pain, SOB, palpitations.         In the ED    VS: T 97.9, HR 76, /76, RR 14, SpO2 99 on RA    Labs: WBC 13.34, COVID negative     EKG: sinus rhythm with first degree AV block and RBBB, similar to prior ekg     Given morphine 2mg IV x1, ofivermev x1, seen by ortho and diagnosed with left intertrochanteric fracture.             ---    HOSPITAL COURSE: Patient was admitted with intertrochanteric fracture with plan for intramedullary nailing. Patient was placed on NPO, IV fluids, and NWB left lower extremity. Patient was cleared by cardiology and the primary medical team for procedure.  Patient had the procedure done on 8/20/20 which was tolerated well with no complications. Patient was placed on lovenox for DVT prophylaxis. Patient developed post-op anemia and received a total of 2 units of pRBCs as discussed with hematology. Post-op pain was managed with tylenol, tramadol, and oxycodone. Metoprolol succinate was continued for blood pressure control with hold parameters. Patient was started on therapeutic interchange of lipitor for home medication pravastatin. Patient was seen by palliative care and was placed DNR/DNI with no feeding tubes. Patient was evaluated by PT who recommended patient to be discharged to [facility] for balance, transfer, and gait training.         ---    CONSULTANTS:     Orthopedics: Librado Sorto    Cardiology: Dr. Herman    Heme/Onc: Dr. Olson    Palliative Care: Dr. Donovan Ariza HPI:    89 yo F PMHx Arthritis, HLD, hypothyroidism, osteoporosis presents with left hip pain s/p mechanical slip and fall out of chair. Patient states that she was sitting in her chair and when she went to move she slipped out of her chair hitting her left hip. Per chart review aide states they were sitting talking when pt turned to the left and slid out of her chair onto her left side. Denies any dizziness or pre syncope prior to fall. Patient denies hitting her head. No loss of consciousness.  Patient states she has history of frequent falls. Denies chest pain, SOB, palpitations.         In the ED    VS: T 97.9, HR 76, /76, RR 14, SpO2 99 on RA    Labs: WBC 13.34, COVID negative     EKG: sinus rhythm with first degree AV block and RBBB, similar to prior ekg     Given morphine 2mg IV x1, ofivermev x1, seen by ortho and diagnosed with left intertrochanteric fracture.             ---    HOSPITAL COURSE: Patient was admitted with intertrochanteric fracture with plan for intramedullary nailing. Patient was placed on NPO, IV fluids, and NWB left lower extremity. Patient was cleared by cardiology and the primary medical team for procedure.  Patient had the procedure done on 8/20/20 which was tolerated well with no complications. Patient was placed on lovenox for DVT prophylaxis. Patient developed post-op anemia and received a total of 2 units of pRBCs as discussed with hematology. Post-op pain was managed with tylenol, tramadol, and oxycodone. Metoprolol succinate was continued for blood pressure control with hold parameters. Patient was started on therapeutic interchange of lipitor for home medication pravastatin. Patient was seen by palliative care and was placed DNR/DNI with no feeding tubes. Patient was evaluated by PT who recommended patient to be discharged to [facility] for balance, transfer, and gait training.         T(C): 36.8 (08-24-20 @ 11:39), Max: 37.2 (08-24-20 @ 00:06)    HR: 85 (08-24-20 @ 11:39) (80 - 94)    BP: 113/72 (08-24-20 @ 11:39) (102/65 - 137/79)    RR: 18 (08-24-20 @ 11:39) (16 - 18)    SpO2: 91% (08-24-20 @ 11:39) (91% - 98%)        GENERAL: patient appears well, no acute distress, conversant    EYES: anicteric sclerae, no exudates    ENMT: oropharynx clear without erythema, no exudates, moist mucous membranes    NECK: supple, soft, no thyromegaly noted    LUNGS: clear to auscultation, no intercostal retractions    HEART: S1/S2, regular rate and rhythm, no murmurs noted, no lower extremity edema    GASTROINTESTINAL: abdomen is soft, nontender, nondistended, normoactive bowel sounds, no palpable masses    INTEGUMENT: good skin turgor, warm skin, appears well perfused    MUSCULOSKELETAL: L hip dressing c/d/i, no surrounding ecchymosis    NEUROLOGIC: awake, alert, oriented x3, good muscle tone in 4 extremities, no obvious sensory deficits    PSYCHIATRIC: mood is good, affect is congruent, linear and logical thought process    HEME/LYMPH: no palpable supraclavicular nodules, no obvious ecchymosis or petechiae         ---    CONSULTANTS:     Orthopedics: Librado Sorto    Cardiology: Dr. Herman    Heme/Onc: Dr. Olson    Palliative Care: Dr. Donovan Ariza HPI:    89 yo F PMHx Arthritis, HLD, hypothyroidism, osteoporosis presents with left hip pain s/p mechanical slip and fall out of chair. Patient states that she was sitting in her chair and when she went to move she slipped out of her chair hitting her left hip. Per chart review aide states they were sitting talking when pt turned to the left and slid out of her chair onto her left side. Denies any dizziness or pre syncope prior to fall. Patient denies hitting her head. No loss of consciousness.  Patient states she has history of frequent falls. Denies chest pain, SOB, palpitations.         In the ED    VS: T 97.9, HR 76, /76, RR 14, SpO2 99 on RA    Labs: WBC 13.34, COVID negative     EKG: sinus rhythm with first degree AV block and RBBB, similar to prior ekg     Given morphine 2mg IV x1, ofivermev x1, seen by ortho and diagnosed with left intertrochanteric fracture.             ---    HOSPITAL COURSE: Patient was admitted with intertrochanteric fracture with plan for intramedullary nailing. Patient was placed on NPO, IV fluids, and NWB left lower extremity. Patient was cleared by cardiology and the primary medical team for procedure.  Patient had the procedure done on 8/20/20 which was tolerated well with no complications. Patient was placed on lovenox for DVT prophylaxis. Patient developed post-op anemia and received a total of 2 units of pRBCs as discussed with hematology. Post-op pain was managed with tylenol, tramadol, and oxycodone. Metoprolol succinate was continued for blood pressure control with hold parameters. Patient was started on therapeutic interchange of lipitor for home medication pravastatin. Patient was seen by palliative care and was placed DNR/DNI with no feeding tubes. Patient was evaluated by PT who recommended patient to be discharged to [facility] for balance, transfer, and gait training.  Pt is DNR/DNI with MOLST form        T(C): 36.8 (08-24-20 @ 11:39), Max: 37.2 (08-24-20 @ 00:06)    HR: 85 (08-24-20 @ 11:39) (80 - 94)    BP: 113/72 (08-24-20 @ 11:39) (102/65 - 137/79)    RR: 18 (08-24-20 @ 11:39) (16 - 18)    SpO2: 91% (08-24-20 @ 11:39) (91% - 98%)    HEME/LYMPH: no palpable supraclavicular nodules, no obvious ecchymosis or petechiae         ---    CONSULTANTS:     Orthopedics: Librado Sorto    Cardiology: Dr. Herman    Heme/Onc: Dr. Olson    Palliative Care: Dr. Donovan Ariza

## 2020-08-20 NOTE — H&P ADULT - ASSESSMENT
89 yo F PMHx Arthritis, HLD, hypothyroidism, osteoporosis presents with left hip pain s/p mechanical slip and fall out of chair. Admitted with left intertrochanteric fracture.

## 2020-08-20 NOTE — PROGRESS NOTE ADULT - PROBLEM SELECTOR PLAN 4
-continue home dose escitalopram 10mg daily -patient on pravastatin 20mg at home, will continue interchange of Lipitor 10mg while admitted

## 2020-08-20 NOTE — PROGRESS NOTE ADULT - PROBLEM SELECTOR PLAN 6
infant, 2,000-2,499 grams  infant, 2,000-2,499 grams  infant, 2,000-2,499 grams  infant, 2,000-2,499 grams  infant, 2,000-2,499 grams  infant, 2,000-2,499 grams  infant, 2,000-2,499 grams  infant, 2,000-2,499 grams -continue home dose escitalopram 10mg daily

## 2020-08-20 NOTE — H&P ADULT - PROBLEM SELECTOR PLAN 5
no chemical ppx in preparation for OR   IMPROVE VTE Individual Risk Assessment          RISK                                                          Points    [  ] Previous VTE                                                3  [  ] Thrombophilia                                             2  [  ] Lower limb paralysis                                   2        (unable to hold up >15 seconds)    [  ] Current Cancer                                            2         (within 6 months)  [  ] Immobilization > 24 hrs                              1  [  ] ICU/CCU stay > 24 hours                            1  [  ] Age > 60                                                    1    IMPROVE VTE Score _____1____ no chemical ppx in preparation for OR   on Allegra at home, will continue Claritin daily while admitted.  IMPROVE VTE Individual Risk Assessment          RISK                                                          Points    [  ] Previous VTE                                                3  [  ] Thrombophilia                                             2  [  ] Lower limb paralysis                                   2        (unable to hold up >15 seconds)    [  ] Current Cancer                                            2         (within 6 months)  [  ] Immobilization > 24 hrs                              1  [  ] ICU/CCU stay > 24 hours                            1  [  ] Age > 60                                                    1    IMPROVE VTE Score _____1____

## 2020-08-20 NOTE — DIETITIAN INITIAL EVALUATION ADULT. - OTHER INFO
Pt A+Ox4. Dx left femoral neck fx. Plan for left hip IMN today. NPO on IVF-LR@60cc/hr. Pta follows a regular diet. Reports poor appetite/po intake over past few months after the loss of her spouse. Had bought ensure supplement however admits to rarely consuming. May have lost a few lbs during past few months. Stated weight 110lbs. No report difficulty chewing/swallowing. No N/V/D/C. Last BM 8/19 pta. Agreeable to nutritional supplement post OR upon diet advancement. Recommend assistance/encouragement with meals and supplements to prevent further wt loss. Will remain available.

## 2020-08-20 NOTE — DIETITIAN INITIAL EVALUATION ADULT. - SIGNS/SYMPTOMS
as evidenced by consuming < 75% estimated energy needs > 1 mo, severe muscle wasting & fat depletion

## 2020-08-20 NOTE — PROGRESS NOTE ADULT - PROBLEM SELECTOR PLAN 5
-no chemical ppx in preparation for OR   -on Allegra at home, will continue Claritin daily while admitted.  IMPROVE VTE Individual Risk Assessment          RISK                                                          Points    [  ] Previous VTE                                                3  [  ] Thrombophilia                                             2  [  ] Lower limb paralysis                                   2        (unable to hold up >15 seconds)    [  ] Current Cancer                                            2         (within 6 months)  [  ] Immobilization > 24 hrs                              1  [  ] ICU/CCU stay > 24 hours                            1  [  ] Age > 60                                                    1    IMPROVE VTE Score _____1____ -continue home dose levothyroxine 75mcg daily

## 2020-08-20 NOTE — CHART NOTE - NSCHARTNOTEFT_GEN_A_CORE
Upon Nutritional Assessment by the Registered Dietitian your patient was determined to meet criteria / has evidence of the following diagnosis/diagnoses:          [ ]  Mild Protein Calorie Malnutrition        [ ]  Moderate Protein Calorie Malnutrition        [ x] Severe Protein Calorie Malnutrition        [ ] Unspecified Protein Calorie Malnutrition        [x ] Underweight / BMI <19        [ ] Morbid Obesity / BMI > 40      Findings as based on:  •  Comprehensive nutrition assessment and consultation  •  Calorie counts (nutrient intake analysis)  •  Food acceptance and intake status from observations by staff  •  Follow up  •  Patient education  •  Intervention secondary to interdisciplinary rounds  •   concerns    Pt meets criteria for severe protein calorie malnutrition in context of chronic illness as evidenced by:  *Consuming < 75% estimated energy needs > 1 month  *Severe muscle wasting observed in temples, clavicles, shoulders per nutrition focused physical exam  *Severe fat depletion observed in orbital region, buccal region and triceps per nutrition focused physical exam    Treatment:    The following diet has been recommended:  *When medically feasible clear liquid diet w/ ensure clear 8oz TID and advance as tolerated to Regular with ensure enlive 8oz BID.   *MVI with minerals daily    PROVIDER Section:     By signing this assessment you are acknowledging and agree with the diagnosis/diagnoses assigned by the Registered Dietitian    Comments:

## 2020-08-20 NOTE — PROGRESS NOTE ADULT - PROBLEM SELECTOR PLAN 1
-LEFT intertrochanteric fracture  -Bedrest, NWB LEFT lower extremity   -Plan for IMN of LEFT hip today  -NPO pMN, IVF while NPO  -pain control as ordered  -Ortho following   -cardiology [facility] consulted for cardiac clearance given 1st degree block and RBBB on ekg - medically optimized for planned procedure as per cardio  -patient is medically optimized for planned procedure

## 2020-08-20 NOTE — PROGRESS NOTE ADULT - PROBLEM SELECTOR PLAN 3
-continue home dose levothyroxine 75mcg daily Leukocytosis 2/2 sarah Reactive with Hip Fracture  CBC in AM  -Pt is afebrile

## 2020-08-20 NOTE — H&P ADULT - GASTROINTESTINAL DETAILS
normal/no rebound tenderness/soft/no organomegaly/nontender/no bruit/no guarding/no rigidity/bowel sounds normal/no distention/no masses palpable

## 2020-08-20 NOTE — DISCHARGE NOTE PROVIDER - NSDCCPCAREPLAN_GEN_ALL_CORE_FT
PRINCIPAL DISCHARGE DIAGNOSIS  Diagnosis: Fracture of femoral neck, left, closed  Assessment and Plan of Treatment: You had a fracture of your left femoral neck   -you had intramedullary PRINCIPAL DISCHARGE DIAGNOSIS  Diagnosis: Fracture of femoral neck, left, closed  Assessment and Plan of Treatment: You had a fracture of your left femoral neck  -you had intramedullary nailing procedure on 8/20  -your post-operative pain was controlled with acetaminophen, oxycodone, and hydromorphone  -you were placed on lovenox to prevent a blood clot  -you will go to a subacute rehab facility to work on your balance, transfers, and gait  -please follow up with outpatient orthopedics in ____________      SECONDARY DISCHARGE DIAGNOSES  Diagnosis: Anemia  Assessment and Plan of Treatment: -You developed post-operative anemia, as the hip joint is highly vascularized   -you received 2 units of packed red blood cells which improved your hemoglobin level  -please follow up with your PCP within _______________    Diagnosis: Hyperlipidemia  Assessment and Plan of Treatment: -You were given atorvastatin during your hospitalization instead of your home medication pravastatin  -continue to take only your home medication pravastatin once discharged    Diagnosis: Hypothyroidism  Assessment and Plan of Treatment: -You were continued on your home medication levothyroxine  -Davonte continue to take levothyroxine at home as instructed by your PCP    Diagnosis: Depression  Assessment and Plan of Treatment: -You were continued on your home medication escitalopram PRINCIPAL DISCHARGE DIAGNOSIS  Diagnosis: Fracture of femoral neck, left, closed  Assessment and Plan of Treatment: You had a fracture of your left femoral neck  -you had intramedullary nailing procedure on 8/20  -your post-operative pain was controlled with acetaminophen for mild pain, and oxycodone for moderate to severe pain  -you were placed on lovenox to prevent a blood clot  -you will go to a subacute rehab facility to work on your balance, transfers, and gait  -please follow up with outpatient orthopedics in ____________      SECONDARY DISCHARGE DIAGNOSES  Diagnosis: Anemia  Assessment and Plan of Treatment: -You developed post-operative anemia, as the hip joint is highly vascularized   -you received 2 units of packed red blood cells which improved your hemoglobin level  -please follow up with your PCP within _______________    Diagnosis: Hyperlipidemia  Assessment and Plan of Treatment: -You were given atorvastatin during your hospitalization instead of your home medication pravastatin  -continue to take only your home medication pravastatin once discharged    Diagnosis: Hypothyroidism  Assessment and Plan of Treatment: -You were continued on your home medication levothyroxine  -Dvaonte continue to take levothyroxine at home as instructed by your PCP    Diagnosis: Depression  Assessment and Plan of Treatment: -You were continued on your home medication escitalopram PRINCIPAL DISCHARGE DIAGNOSIS  Diagnosis: Fracture of femoral neck, left, closed  Assessment and Plan of Treatment: You had a fracture of your left femoral neck  -you had intramedullary nailing procedure on 8/20  -your post-operative pain was controlled with acetaminophen for mild pain, tramadol for moderate pain, and oxycodone for moderate to severe pain  -you were placed on lovenox to prevent a blood clot  -you will go to a subacute rehab facility to work on your balance, transfers, and gait  -please follow up with outpatient orthopedics in 10-14 days after discharge from rehab      SECONDARY DISCHARGE DIAGNOSES  Diagnosis: Anemia  Assessment and Plan of Treatment: -You developed post-operative anemia, as the hip joint is highly vascularized   -you received 2 units of packed red blood cells which improved your hemoglobin level  -please follow up with your PCP within 1 week    Diagnosis: Hyperlipidemia  Assessment and Plan of Treatment: -You were given atorvastatin during your hospitalization instead of your home medication pravastatin  -continue to take only your home medication pravastatin once discharged    Diagnosis: Hypothyroidism  Assessment and Plan of Treatment: -You were continued on your home medication levothyroxine  -Thompson continue to take levothyroxine at home as instructed by your PCP    Diagnosis: Depression  Assessment and Plan of Treatment: -You were continued on your home medication escitalopram PRINCIPAL DISCHARGE DIAGNOSIS  Diagnosis: Fracture of femoral neck, left, closed  Assessment and Plan of Treatment: You had a fracture of your left femoral neck after mechanical fall.  -you had intramedullary nailing procedure on 8/20, WBAT with PT  -your post-operative pain was controlled with acetaminophen for mild pain, tramadol for moderate pain, and oxycodone for  severe pain  -you were placed on lovenox to prevent a blood clot- DVT PPx for 28 days   -Continue Pain meds & Senna, Miralax to avoid constipation.  -you will go to a subacute rehab facility to work on your balance, transfers, and gait.  -please follow up with outpatient orthopedics in 10-14 days after discharge from rehab      SECONDARY DISCHARGE DIAGNOSES  Diagnosis: Anemia  Assessment and Plan of Treatment: -You developed ac blodd loss anemia 2/2 left hip Fracture ,   -you received 2 units of packed red blood cells which improved your hemoglobin level- Hb is 9.1, CBC in 1 week at [facility]  -please follow up with your PCP within 1 week    Diagnosis: Hypothyroidism  Assessment and Plan of Treatment: -You were continued on your home medication levothyroxine  -Garland continue to take levothyroxine at home as instructed by your PCP    Diagnosis: Hyperlipidemia  Assessment and Plan of Treatment: -You were given atorvastatin during your hospitalization instead of your home medication pravastatin  -continue to take only your home medication pravastatin once discharged    Diagnosis: Leukocytosis  Assessment and Plan of Treatment: Mild elevation of  WBC , pt is afebrile   Likely Reactive with Left hip Fracture.  -CBC in 1 week    Diagnosis: Goals of care, counseling/discussion  Assessment and Plan of Treatment: Pt is DNR/DNI , has MOLST form. palliative care eval was done.    Diagnosis: Depression  Assessment and Plan of Treatment: -You were continued on your home medication escitalopram

## 2020-08-20 NOTE — DISCHARGE NOTE PROVIDER - CARE PROVIDERS DIRECT ADDRESSES
,DirectAddress_Unknown ,DirectAddress_Unknown,[email or web address] ,DirectAddress_Unknown,[email or web address],DirectAddress_Unknown

## 2020-08-20 NOTE — PROGRESS NOTE ADULT - PROBLEM SELECTOR PLAN 2
-patient on pravastatin 20mg at home, will continue interchange of Lipitor 10mg while admitted Acute blood loss anemia 2/2 Left Hip Fracture.  -Some dilutional drop with IV Fluids.  CBC in AM

## 2020-08-20 NOTE — DISCHARGE NOTE PROVIDER - NSDCMRMEDTOKEN_GEN_ALL_CORE_FT
Allegra 60 mg oral tablet: 1 tab(s) orally 2 times a day  escitalopram 10 mg oral tablet: 1 tab(s) orally once a day  levothyroxine 75 mcg (0.075 mg) oral tablet: 1 tab(s) orally once a day  Metoprolol Succinate ER 25 mg oral tablet, extended release: 0.5 tab(s) orally once a day  pravastatin 20 mg oral tablet: 1 tab(s) orally once a day acetaminophen 325 mg oral tablet: 2 tab(s) orally every 6 hours  Allegra 60 mg oral tablet: 1 tab(s) orally 2 times a day  calcium-vitamin D 500 mg-200 intl units (5 mcg) oral tablet: 1 tab(s) orally once a day  cholecalciferol oral tablet: 2000 unit(s) orally once a day  enoxaparin: 40 milligram(s) subcutaneous once a day  escitalopram 10 mg oral tablet: 1 tab(s) orally once a day  levothyroxine 75 mcg (0.075 mg) oral tablet: 1 tab(s) orally once a day  magnesium hydroxide 8% oral suspension: 30 milliliter(s) orally once a day, As needed, Constipation  melatonin 3 mg oral tablet: 1 tab(s) orally once a day (at bedtime), As needed, Insomnia  Metoprolol Succinate ER 25 mg oral tablet, extended release: 0.5 tab(s) orally once a day  oxyCODONE 10 mg oral tablet: 1 tab(s) orally every 4 hours, As needed, Severe Pain (7 - 10)  oxyCODONE 5 mg oral tablet: 1 tab(s) orally every 4 hours, As needed, Moderate Pain (4 - 6)  polyethylene glycol 3350 oral powder for reconstitution: 17 gram(s) orally once a day  pravastatin 20 mg oral tablet: 1 tab(s) orally once a day  senna oral tablet: 2 tab(s) orally once a day (at bedtime)  traMADol 50 mg oral tablet: 1 tab(s) orally every 6 hours, As needed, Mild Pain (1 - 3) acetaminophen 325 mg oral tablet: 2 tab(s) orally every 6 hours  Allegra 60 mg oral tablet: 1 tab(s) orally 2 times a day  calcium-vitamin D 500 mg-200 intl units (5 mcg) oral tablet: 1 tab(s) orally once a day  cholecalciferol oral tablet: 2000 unit(s) orally once a day  enoxaparin: 40 milligram(s) subcutaneous once a day for 28 days  escitalopram 10 mg oral tablet: 1 tab(s) orally once a day  levothyroxine 75 mcg (0.075 mg) oral tablet: 1 tab(s) orally once a day  magnesium hydroxide 8% oral suspension: 30 milliliter(s) orally once a day, As needed, Constipation  melatonin 3 mg oral tablet: 1 tab(s) orally once a day (at bedtime), As needed, Insomnia  Metoprolol Succinate ER 25 mg oral tablet, extended release: 0.5 tab(s) orally once a day  oxyCODONE 5 mg oral tablet: 1 tab(s) orally every 4 hours, As needed, Severe Pain   polyethylene glycol 3350 oral powder for reconstitution: 17 gram(s) orally once a day  pravastatin 20 mg oral tablet: 1 tab(s) orally once a day  senna oral tablet: 2 tab(s) orally once a day (at bedtime)  traMADol 50 mg oral tablet: 1 tab(s) orally every 6 hours, As needed, Moderate pain

## 2020-08-21 DIAGNOSIS — D72.829 ELEVATED WHITE BLOOD CELL COUNT, UNSPECIFIED: ICD-10-CM

## 2020-08-21 DIAGNOSIS — D64.9 ANEMIA, UNSPECIFIED: ICD-10-CM

## 2020-08-21 LAB
ALBUMIN SERPL ELPH-MCNC: 2.9 G/DL — LOW (ref 3.3–5)
ALP SERPL-CCNC: 70 U/L — SIGNIFICANT CHANGE UP (ref 40–120)
ALT FLD-CCNC: 16 U/L — SIGNIFICANT CHANGE UP (ref 12–78)
ANION GAP SERPL CALC-SCNC: 6 MMOL/L — SIGNIFICANT CHANGE UP (ref 5–17)
AST SERPL-CCNC: 15 U/L — SIGNIFICANT CHANGE UP (ref 15–37)
BASOPHILS # BLD AUTO: 0.03 K/UL — SIGNIFICANT CHANGE UP (ref 0–0.2)
BASOPHILS NFR BLD AUTO: 0.2 % — SIGNIFICANT CHANGE UP (ref 0–2)
BILIRUB SERPL-MCNC: 0.4 MG/DL — SIGNIFICANT CHANGE UP (ref 0.2–1.2)
BUN SERPL-MCNC: 19 MG/DL — SIGNIFICANT CHANGE UP (ref 7–23)
CALCIUM SERPL-MCNC: 8.9 MG/DL — SIGNIFICANT CHANGE UP (ref 8.5–10.1)
CHLORIDE SERPL-SCNC: 106 MMOL/L — SIGNIFICANT CHANGE UP (ref 96–108)
CO2 SERPL-SCNC: 26 MMOL/L — SIGNIFICANT CHANGE UP (ref 22–31)
CREAT SERPL-MCNC: 0.91 MG/DL — SIGNIFICANT CHANGE UP (ref 0.5–1.3)
EOSINOPHIL # BLD AUTO: 0 K/UL — SIGNIFICANT CHANGE UP (ref 0–0.5)
EOSINOPHIL NFR BLD AUTO: 0 % — SIGNIFICANT CHANGE UP (ref 0–6)
GLUCOSE SERPL-MCNC: 139 MG/DL — HIGH (ref 70–99)
HCT VFR BLD CALC: 24.7 % — LOW (ref 34.5–45)
HCT VFR BLD CALC: 27.4 % — LOW (ref 34.5–45)
HGB BLD-MCNC: 7.9 G/DL — LOW (ref 11.5–15.5)
HGB BLD-MCNC: 8.8 G/DL — LOW (ref 11.5–15.5)
IMM GRANULOCYTES NFR BLD AUTO: 0.8 % — SIGNIFICANT CHANGE UP (ref 0–1.5)
LYMPHOCYTES # BLD AUTO: 0.91 K/UL — LOW (ref 1–3.3)
LYMPHOCYTES # BLD AUTO: 5 % — LOW (ref 13–44)
MCHC RBC-ENTMCNC: 31.2 PG — SIGNIFICANT CHANGE UP (ref 27–34)
MCHC RBC-ENTMCNC: 31.3 PG — SIGNIFICANT CHANGE UP (ref 27–34)
MCHC RBC-ENTMCNC: 32 GM/DL — SIGNIFICANT CHANGE UP (ref 32–36)
MCHC RBC-ENTMCNC: 32.1 GM/DL — SIGNIFICANT CHANGE UP (ref 32–36)
MCV RBC AUTO: 97.2 FL — SIGNIFICANT CHANGE UP (ref 80–100)
MCV RBC AUTO: 98 FL — SIGNIFICANT CHANGE UP (ref 80–100)
MONOCYTES # BLD AUTO: 2.15 K/UL — HIGH (ref 0–0.9)
MONOCYTES NFR BLD AUTO: 11.9 % — SIGNIFICANT CHANGE UP (ref 2–14)
NEUTROPHILS # BLD AUTO: 14.9 K/UL — HIGH (ref 1.8–7.4)
NEUTROPHILS NFR BLD AUTO: 82.1 % — HIGH (ref 43–77)
NRBC # BLD: 0 /100 WBCS — SIGNIFICANT CHANGE UP (ref 0–0)
NRBC # BLD: 0 /100 WBCS — SIGNIFICANT CHANGE UP (ref 0–0)
PLATELET # BLD AUTO: 211 K/UL — SIGNIFICANT CHANGE UP (ref 150–400)
PLATELET # BLD AUTO: 229 K/UL — SIGNIFICANT CHANGE UP (ref 150–400)
POTASSIUM SERPL-MCNC: 4.4 MMOL/L — SIGNIFICANT CHANGE UP (ref 3.5–5.3)
POTASSIUM SERPL-SCNC: 4.4 MMOL/L — SIGNIFICANT CHANGE UP (ref 3.5–5.3)
PROT SERPL-MCNC: 5.7 G/DL — LOW (ref 6–8.3)
RBC # BLD: 2.52 M/UL — LOW (ref 3.8–5.2)
RBC # BLD: 2.82 M/UL — LOW (ref 3.8–5.2)
RBC # FLD: 13 % — SIGNIFICANT CHANGE UP (ref 10.3–14.5)
RBC # FLD: 13 % — SIGNIFICANT CHANGE UP (ref 10.3–14.5)
SARS-COV-2 IGG SERPL QL IA: NEGATIVE — SIGNIFICANT CHANGE UP
SARS-COV-2 IGM SERPL IA-ACNC: <0.1 INDEX — SIGNIFICANT CHANGE UP
SODIUM SERPL-SCNC: 138 MMOL/L — SIGNIFICANT CHANGE UP (ref 135–145)
WBC # BLD: 17.77 K/UL — HIGH (ref 3.8–10.5)
WBC # BLD: 18.13 K/UL — HIGH (ref 3.8–10.5)
WBC # FLD AUTO: 17.77 K/UL — HIGH (ref 3.8–10.5)
WBC # FLD AUTO: 18.13 K/UL — HIGH (ref 3.8–10.5)

## 2020-08-21 PROCEDURE — 99222 1ST HOSP IP/OBS MODERATE 55: CPT

## 2020-08-21 PROCEDURE — 99232 SBSQ HOSP IP/OBS MODERATE 35: CPT

## 2020-08-21 PROCEDURE — 99497 ADVNCD CARE PLAN 30 MIN: CPT

## 2020-08-21 RX ORDER — SODIUM CHLORIDE 9 MG/ML
1000 INJECTION INTRAMUSCULAR; INTRAVENOUS; SUBCUTANEOUS ONCE
Refills: 0 | Status: COMPLETED | OUTPATIENT
Start: 2020-08-21 | End: 2020-08-21

## 2020-08-21 RX ORDER — POLYETHYLENE GLYCOL 3350 17 G/17G
17 POWDER, FOR SOLUTION ORAL DAILY
Refills: 0 | Status: DISCONTINUED | OUTPATIENT
Start: 2020-08-21 | End: 2020-08-24

## 2020-08-21 RX ORDER — OXYCODONE HYDROCHLORIDE 5 MG/1
10 TABLET ORAL EVERY 4 HOURS
Refills: 0 | Status: DISCONTINUED | OUTPATIENT
Start: 2020-08-21 | End: 2020-08-24

## 2020-08-21 RX ORDER — OXYCODONE HYDROCHLORIDE 5 MG/1
5 TABLET ORAL EVERY 4 HOURS
Refills: 0 | Status: DISCONTINUED | OUTPATIENT
Start: 2020-08-21 | End: 2020-08-24

## 2020-08-21 RX ORDER — TRAMADOL HYDROCHLORIDE 50 MG/1
50 TABLET ORAL EVERY 6 HOURS
Refills: 0 | Status: DISCONTINUED | OUTPATIENT
Start: 2020-08-21 | End: 2020-08-24

## 2020-08-21 RX ADMIN — Medication 75 MICROGRAM(S): at 05:19

## 2020-08-21 RX ADMIN — Medication 1 TABLET(S): at 12:34

## 2020-08-21 RX ADMIN — Medication 650 MILLIGRAM(S): at 18:59

## 2020-08-21 RX ADMIN — Medication 650 MILLIGRAM(S): at 12:34

## 2020-08-21 RX ADMIN — Medication 100 MILLIGRAM(S): at 00:52

## 2020-08-21 RX ADMIN — LORATADINE 10 MILLIGRAM(S): 10 TABLET ORAL at 12:32

## 2020-08-21 RX ADMIN — SODIUM CHLORIDE 1000 MILLILITER(S): 9 INJECTION INTRAMUSCULAR; INTRAVENOUS; SUBCUTANEOUS at 10:02

## 2020-08-21 RX ADMIN — Medication 2000 UNIT(S): at 12:32

## 2020-08-21 RX ADMIN — Medication 650 MILLIGRAM(S): at 13:34

## 2020-08-21 RX ADMIN — Medication 650 MILLIGRAM(S): at 05:19

## 2020-08-21 RX ADMIN — ESCITALOPRAM OXALATE 10 MILLIGRAM(S): 10 TABLET, FILM COATED ORAL at 12:32

## 2020-08-21 RX ADMIN — Medication 650 MILLIGRAM(S): at 19:40

## 2020-08-21 RX ADMIN — OXYCODONE HYDROCHLORIDE 5 MILLIGRAM(S): 5 TABLET ORAL at 05:19

## 2020-08-21 RX ADMIN — Medication 650 MILLIGRAM(S): at 06:38

## 2020-08-21 RX ADMIN — SODIUM CHLORIDE 65 MILLILITER(S): 9 INJECTION, SOLUTION INTRAVENOUS at 03:26

## 2020-08-21 RX ADMIN — Medication 650 MILLIGRAM(S): at 01:00

## 2020-08-21 RX ADMIN — ATORVASTATIN CALCIUM 10 MILLIGRAM(S): 80 TABLET, FILM COATED ORAL at 21:19

## 2020-08-21 RX ADMIN — ENOXAPARIN SODIUM 40 MILLIGRAM(S): 100 INJECTION SUBCUTANEOUS at 12:32

## 2020-08-21 RX ADMIN — Medication 100 MILLIGRAM(S): at 08:43

## 2020-08-21 RX ADMIN — OXYCODONE HYDROCHLORIDE 5 MILLIGRAM(S): 5 TABLET ORAL at 22:15

## 2020-08-21 RX ADMIN — OXYCODONE HYDROCHLORIDE 5 MILLIGRAM(S): 5 TABLET ORAL at 23:19

## 2020-08-21 RX ADMIN — Medication 650 MILLIGRAM(S): at 23:15

## 2020-08-21 RX ADMIN — Medication 650 MILLIGRAM(S): at 00:53

## 2020-08-21 NOTE — PROGRESS NOTE ADULT - PROBLEM SELECTOR PLAN 1
-LEFT intertrochanteric fracture, POD# 1, s/p left IMN   -Out of bed to chair, ice and elevate LLE.   -Weight bearing as tolerated, physical therapy to work with patient, dispo planning per PT recs   -Pain control with: Tylenol 650 mg PO q6h PRN mild pain, oxycodone IR 2.5 mg q4h for moderate pain, oxycodone IR 5 mg q4h PRN for severe pain   -Senna, Miralax ordered to facilitate bowel movements  -Ortho following   -Cardio (Dr. Herman) consulted; f/u recommendations -LEFT intertrochanteric fracture, POD# 1, s/p left IMN   -Out of bed to chair, ice and elevate LLE.   -Weight bearing as tolerated, physical therapy to work with patient,   - D/C  planning per PT recs   -Pain control with: Tylenol 650 mg PO q6h PRN mild pain, oxycodone IR 2.5 mg q4h for moderate pain, oxycodone IR 5 mg q4h PRN for severe pain   -Senna, Miralax ordered to facilitate bowel movements  -Ortho following Dr Sorto , DVT PPX   -Cardio (Dr. Herman) follow up.

## 2020-08-21 NOTE — PROGRESS NOTE ADULT - PROBLEM SELECTOR PLAN 7
DVT ppx: Lovenox 40 mg daily + SCDs   -on Allegra at home, will continue Claritin daily while admitted.  IMPROVE VTE Individual Risk Assessment          RISK                                                          Points    [  ] Previous VTE                                                3  [  ] Thrombophilia                                             2  [  ] Lower limb paralysis                                   2        (unable to hold up >15 seconds)    [  ] Current Cancer                                            2         (within 6 months)  [  ] Immobilization > 24 hrs                              1  [  ] ICU/CCU stay > 24 hours                            1  [  ] Age > 60                                                    1    IMPROVE VTE Score _____1____

## 2020-08-21 NOTE — PHYSICAL THERAPY INITIAL EVALUATION ADULT - PERTINENT HX OF CURRENT PROBLEM, REHAB EVAL
88 yo F PMHx Arthritis, HLD, hypothyroidism, osteoporosis BIBEMS for left knee pain s/p mechanical slip and fall out of chair just prior to arrival. Pt presents with aide who witnessed fall, states they were sitting talking when pt turned to the left and slid out of her chair onto her left side.

## 2020-08-21 NOTE — PROGRESS NOTE ADULT - SUBJECTIVE AND OBJECTIVE BOX
DEPARTMENT OF ANESTHESIA  POST ANESTHETIC EVALUATION    The Patient was interviewed and evaluated    Vital Signs Last 24 Hrs  T(C): 36.4 (21 Aug 2020 05:18), Max: 37 (20 Aug 2020 13:27)  T(F): 97.6 (21 Aug 2020 05:18), Max: 98.6 (20 Aug 2020 13:27)  HR: 83 (21 Aug 2020 05:18) (74 - 96)  BP: 100/63 (21 Aug 2020 05:18) (92/59 - 186/87)  BP(mean): --  RR: 18 (21 Aug 2020 05:18) (10 - 18)  SpO2: 91% (21 Aug 2020 05:18) (91% - 100%)    Evaluation:      (x ) No apparent complications or complaints regarding anesthesia care at this time  (x ) All questions were answered    Condition:  (x ) Stable      ( ) Guarded      ( ) Critical    Recommendations:  (x ) None     ( ) Other:

## 2020-08-21 NOTE — PROGRESS NOTE ADULT - SUBJECTIVE AND OBJECTIVE BOX
[facility] -- Emelia Rowan, Donita Dinh, Levar Quiñonez Savella, [place]  Office # [phone]    Follow Up:  Preop/Postop Optimization    Subjective/Observations: No c/o postop pain at this time.  No overnight events.  Denies any respiratory or cardiac discomfort    REVIEW OF SYSTEMS: All other review of systems is negative unless indicated above  PAST MEDICAL & SURGICAL HISTORY:  Arthritis  Hyperlipidemia  Osteoporosis  Migraine  Cataract: s/p cataract surgery  Edema of Leg: b/l LE  Hypothyroidism  S/P cataract surgery: bialteral  S/P ovarian cystectomy  S/P hip replacement  S/P knee replacement, bilateral: Partial R) hip  Knee Replacement: b/l    MEDICATIONS  (STANDING):  acetaminophen   Tablet .. 650 milliGRAM(s) Oral every 6 hours  atorvastatin 10 milliGRAM(s) Oral at bedtime  calcium carbonate 1250 mG  + Vitamin D (OsCal 500 + D) 1 Tablet(s) Oral daily  ceFAZolin   IVPB 2000 milliGRAM(s) IV Intermittent every 8 hours  cholecalciferol 2000 Unit(s) Oral daily  enoxaparin Injectable 40 milliGRAM(s) SubCutaneous every 24 hours  escitalopram 10 milliGRAM(s) Oral daily  lactated ringers. 1000 milliLiter(s) (65 mL/Hr) IV Continuous <Continuous>  levothyroxine 75 MICROGram(s) Oral daily  loratadine 10 milliGRAM(s) Oral daily  metoprolol succinate ER 12.5 milliGRAM(s) Oral daily  polyethylene glycol 3350 17 Gram(s) Oral daily  senna 2 Tablet(s) Oral at bedtime    MEDICATIONS  (PRN):  benzocaine 15 mG/menthol 3.6 mG (Sugar-Free) Lozenge 1 Lozenge Oral three times a day PRN Sore Throat  HYDROmorphone  Injectable 0.5 milliGRAM(s) IV Push every 4 hours PRN Severe Pain (7 - 10)  magnesium hydroxide Suspension 30 milliLiter(s) Oral daily PRN Constipation  melatonin 3 milliGRAM(s) Oral at bedtime PRN Insomnia  ondansetron Injectable 4 milliGRAM(s) IV Push every 6 hours PRN Nausea and/or Vomiting  oxyCODONE    IR 2.5 milliGRAM(s) Oral every 4 hours PRN Moderate Pain (4 - 6)  oxyCODONE    IR 5 milliGRAM(s) Oral every 4 hours PRN Severe Pain (7 - 10)    Allergies    erythromycin (Hives)    Intolerances    Vital Signs Last 24 Hrs  T(C): 36.4 (21 Aug 2020 05:18), Max: 37 (20 Aug 2020 13:27)  T(F): 97.6 (21 Aug 2020 05:18), Max: 98.6 (20 Aug 2020 13:27)  HR: 83 (21 Aug 2020 05:18) (74 - 96)  BP: 100/63 (21 Aug 2020 05:18) (92/59 - 186/87)  BP(mean): --  RR: 18 (21 Aug 2020 05:18) (10 - 18)  SpO2: 91% (21 Aug 2020 05:18) (91% - 100%)  I&O's Summary    20 Aug 2020 07:01  -  21 Aug 2020 07:00  --------------------------------------------------------  IN: 515 mL / OUT: 250 mL / NET: 265 mL      Weight (kg): 49.9 (08-20 @ 14:33)    PHYSICAL EXAM:  TELE: Not on tele  Constitutional: NAD, awake and alert, well-developed  HEENT: Moist Mucous Membranes, Anicteric  Pulmonary: Non-labored, breath sounds are clear but diminished at bases bilaterally, No wheezing, rales or rhonchi  Cardiovascular: Regular, S1 and S2, + murmurs, no rubs, gallops or clicks  Gastrointestinal: Bowel Sounds present, soft, nontender.   Lymph: No peripheral edema. No lymphadenopathy.  Skin: No visible rashes or ulcers.  right hip dressing dry and intact  Psych:  Mood & affect appropriate  LABS: All Labs Reviewed:                        8.8    18.13 )-----------( 229      ( 21 Aug 2020 07:57 )             27.4                         10.4   20.73 )-----------( 261      ( 20 Aug 2020 18:19 )             33.0                         11.6   15.86 )-----------( 284      ( 20 Aug 2020 04:34 )             36.4     21 Aug 2020 07:57    138    |  106    |  19     ----------------------------<  139    4.4     |  26     |  0.91   20 Aug 2020 18:19    139    |  108    |  16     ----------------------------<  167    3.8     |  25     |  0.81   20 Aug 2020 04:34    141    |  110    |  19     ----------------------------<  151    4.4     |  28     |  0.79     Ca    8.9        21 Aug 2020 07:57  Ca    8.3        20 Aug 2020 18:19  Ca    9.3        20 Aug 2020 04:34    TPro  5.7    /  Alb  2.9    /  TBili  0.4    /  DBili  x      /  AST  15     /  ALT  16     /  AlkPhos  70     21 Aug 2020 07:57  TPro  6.4    /  Alb  3.5    /  TBili  0.3    /  DBili  .10    /  AST  17     /  ALT  18     /  AlkPhos  85     20 Aug 2020 04:34  TPro  7.2    /  Alb  3.8    /  TBili  0.3    /  DBili  x      /  AST  23     /  ALT  19     /  AlkPhos  95     19 Aug 2020 22:28    PT/INR - ( 20 Aug 2020 04:34 )   PT: 12.8 sec;   INR: 1.10 ratio      PTT - ( 20 Aug 2020 04:34 )  PTT:40.9 sec    < from: TTE Echo Doppler w/o Cont (04.28.19 @ 11:51) >     EXAM:  ECHO TTE WO CON COMP W DOPPLR         PROCEDURE DATE:  04/28/2019        INTERPRETATION:  INDICATION: Chest pain    Blood Pressure 110/63    Height 165 cm     Weight 53 kg       BSA 1.55 sq   m    Dimensions:    LA 2.8       Normal Values: 2.0 - 4.0 cm    Ao 3.0        Normal Values: 2.0 - 3.8 cm  SEPTUM 1.3       Normal Values: 0.6 - 1.2 cm  PWT 1.3       Normal Values: 0.6 - 1.1 cm  LVIDd 4.0         Normal Values: 3.0 - 5.6 cm  LVIDs 2.5         Normal Values: 1.8 - 4.0 cm      OBSERVATIONS:    Mitral Valve: Thickened mitral valve leaflets, trace physiologic MR.  Aortic Valve/Aorta: Sclerotic trileaflet aortic valve with normal   opening. No aortic insufficiency noted  Tricuspid Valve: normal with trace TR.  Pulmonic Valve: Tracepulmonic insufficiency  Left Atrium: normal  Right Atrium: normal  Left Ventricle: normal LV size and systolic function, estimated LVEF of   70 %. Mild left ventricular hypertrophy is present  Right Ventricle: Not well visualized but systolic function appears   grossly normal  Pericardium/Pleura: normal, no significant pericardial effusion.    Conclusion:  Normal left ventricular systolic function with an ejection fraction of   about 70%. Mild left ventricular hypertrophy is present  Right ventricle is not well visualized but systolic function appears   grossly normal  Aortic valve leaflets appear slightly thickened with normal opening, no   aortic insufficiency  Mitral valve leaflets are thickened with trace mitral regurgitation  Normal biatrial size      KAUSHAL SIGALA   This document has been electronically signed. Apr 29 2019  8:25AM     < end of copied text >      < from: Xray Chest 1 View-PORTABLE IMMEDIATE (08.19.20 @ 22:16) >    EXAM:  XR CHEST PORTABLE IMMED 1V                            PROCEDURE DATE:  08/19/2020        INTERPRETATION:  Chest one view    HISTORY: Admission for hip fracture    COMPARISON STUDY: 4/28/2019    Frontal expiratory view of the chest shows the heart to be similar in size. The lungs show questionable small right upper lobe infiltrate and there is no evidence of pneumothorax nor pleural effusion.    IMPRESSION:  Questionable right upper lobe infiltrate. Follow-up study is recommended as clinically warranted.    Thank you for the courtesy of this referral.    PRITI KILLIAN M.D., ATTENDING RADIOLOGIST  This document has been electronically signed. Aug 20 2020  1:16PM      < end of copied text >

## 2020-08-21 NOTE — OCCUPATIONAL THERAPY INITIAL EVALUATION ADULT - IADL RETRAINING, OT EVAL
Patient will increase standing tolerance to 2-3 minutes with RW to promote safe toileting at commode within 3-5 sessions

## 2020-08-21 NOTE — CONSULT NOTE ADULT - ASSESSMENT
Assessment/Plan:  89y Female with LEFT intertrochanteric fracture    -Pain control as needed  -Bedrest, NWB LEFT lower extremity   -Plan for IMN of LEFT hip   -NPO pMN, IVF while NPO  -FU preop labs  -DVT ppx: Please hold all chemical dvt ppx for OR   -Needs medical optimization for OR  -Needs documentation of medical clearance  -Medical management per primary team  -Will discuss with attending, and advise if plan changes
Mrs. Delgado is an 89 year old female with arthritis, HLD and hypothyroidism, who presents with hip pain after a fall and found to have a left intertrochanteric fracture.    - no sign of acute ischemia. Her EKG demonstrates a SR with 1st degree av block and rbbb, unchanged from an ekg from 2019.  - no sign of volume overload  - no suggestion of significant valvular disease. Echocardiogram in 2019 with normal LV function and no significant valvular disease.  - no sign of arrhthymias   - cont with statin for hld  - cont with metoprolol for htn  - no cardiac contraindication to proceeding with orthopedic surgery. Routine cardiac monitoring is recommended.  - dvt prophylaxis  - will follow with you post-operatively.
Multifactorial anemia related to chronic disease, hip fracture and hydration.  is s/p surgery 8/20/2020    Recommendations:  1.  continue post op care  2.  follow CBC and transfuse as clinically indicated  3.  check fe studies  4.  further heme recommendations pending    discussed with Dr. MARVIN Park
88 yo F PMHx Arthritis, HLD, hypothyroidism, osteoporosis presents with left hip pain s/p mechanical slip and fall out of chair. Admitted with left intertrochanteric fracture s/p fixation.     1) Poor po intake/underweight   - no feeding tubes; trial of IVF  - Trial of marinol as appetite stimulant if desired by pt    2) Pain 2/2 hip fracture s/p fall  - agree with tylenol and oxycodone prn    3) Failure to thrive  4) Goals of care/advance care planning  - Palliative performance scale score: 50%   - Prognosis: months-years   - Code status: DNR/DNI and no feeding tubes (MOLST form filled out and placed in the chart)  - GOC: IV antibiotics and IVF. Rehospitalize as needed.   - Psychosocial support provided    Appreciate consult. Discussed with the primary team.    Qi Andersen MD

## 2020-08-21 NOTE — PROGRESS NOTE ADULT - SUBJECTIVE AND OBJECTIVE BOX
Patient is a 89y old  Female who presents with a chief complaint of hip fracture (21 Aug 2020 08:20)    88 yo F PMHx Arthritis, HLD, hypothyroidism, osteoporosis presents with left hip pain s/p mechanical slip and fall out of chair. Patient states that she was sitting in her chair and when she went to move she slipped out of her chair hitting her left hip. Per chart review aide states they were sitting talking when pt turned to the left and slid out of her chair onto her left side. Denies any dizziness or pre syncope prior to fall. Patient denies hitting her head. No loss of consciousness.  Patient states she has history of frequent falls. Denies chest pain, SOB, palpitations.     In the ED  VS: T 97.9, HR 76, /76, RR 14, SpO2 99 on RA  Labs: WBC 13.34, COVID negative   EKG: sinus rhythm with first degree AV block and RBBB, similar to prior ekg   Given morphine 2mg IV x1, ofivermev x1, seen by ortho and diagnosed with left intertrochanteric fracture.     INTERVAL HPI:   8/20/20:  Patient seen and examined bedside. No overnight events reported. Patient experiencing left hip discomfort but tolerable, offers no other complaints at this time.   8/21/20: Patient seen and evaluated at bedside. Patient is POD#1 s/p left IMN procedure with ortho. Patient states that she has a mild headache this morning that she attributes to lack of sleep. She states that her pain is well controlled and that she would like to try and get up to walk today. She has not yet had a bowel movement, but states she has been urinating without difficulty. She has been tolerating a diet well. Denies fevers, chills. chest pain, palpitations, SOB, abdominal pain, N/V.     OVERNIGHT EVENTS: none     Home Medications:  Allegra 60 mg oral tablet: 1 tab(s) orally 2 times a day (20 Aug 2020 01:41)  escitalopram 10 mg oral tablet: 1 tab(s) orally once a day (20 Aug 2020 01:41)  levothyroxine 75 mcg (0.075 mg) oral tablet: 1 tab(s) orally once a day (20 Aug 2020 01:41)  Metoprolol Succinate ER 25 mg oral tablet, extended release: 0.5 tab(s) orally once a day (20 Aug 2020 01:41)  pravastatin 20 mg oral tablet: 1 tab(s) orally once a day (20 Aug 2020 01:41)      MEDICATIONS  (STANDING):  acetaminophen   Tablet .. 650 milliGRAM(s) Oral every 6 hours  atorvastatin 10 milliGRAM(s) Oral at bedtime  calcium carbonate 1250 mG  + Vitamin D (OsCal 500 + D) 1 Tablet(s) Oral daily  ceFAZolin   IVPB 2000 milliGRAM(s) IV Intermittent every 8 hours  cholecalciferol 2000 Unit(s) Oral daily  enoxaparin Injectable 40 milliGRAM(s) SubCutaneous every 24 hours  escitalopram 10 milliGRAM(s) Oral daily  levothyroxine 75 MICROGram(s) Oral daily  loratadine 10 milliGRAM(s) Oral daily  metoprolol succinate ER 12.5 milliGRAM(s) Oral daily  polyethylene glycol 3350 17 Gram(s) Oral daily  senna 2 Tablet(s) Oral at bedtime    MEDICATIONS  (PRN):  benzocaine 15 mG/menthol 3.6 mG (Sugar-Free) Lozenge 1 Lozenge Oral three times a day PRN Sore Throat  HYDROmorphone  Injectable 0.5 milliGRAM(s) IV Push every 4 hours PRN Severe Pain (7 - 10)  magnesium hydroxide Suspension 30 milliLiter(s) Oral daily PRN Constipation  melatonin 3 milliGRAM(s) Oral at bedtime PRN Insomnia  ondansetron Injectable 4 milliGRAM(s) IV Push every 6 hours PRN Nausea and/or Vomiting  oxyCODONE    IR 2.5 milliGRAM(s) Oral every 4 hours PRN Moderate Pain (4 - 6)  oxyCODONE    IR 5 milliGRAM(s) Oral every 4 hours PRN Severe Pain (7 - 10)      erythromycin (Hives)      Social History:  lives home with 24 hour aide  walks with cane or walker  denies smoking   states occasional etoh (20 Aug 2020 00:57)      REVIEW OF SYSTEMS: I feel tired and have a headache   CONSTITUTIONAL: No fever, No chills, No fatigue, No myalgia, No Body ache, No Weakness, +admits to headache  EYES: No eye pain,  No visual disturbances, No discharge, No Redness  ENMT: No ear pain, No nose bleed, No vertigo; No sinus pain, No throat pain, No Congestion  NECK: No pain, No stiffness  RESPIRATORY: No cough, No wheezing, No hemoptysis, No shortness of breath  CARDIOVASCULAR: No chest pain, No palpitations  GASTROINTESTINAL: No abdominal pain, No epigastric pain. No nausea, No vomiting, No diarrhea, No constipation; [ - ] BM no bowel movement   GENITOURINARY: No dysuria, No frequency, No urgency, No hematuria, No incontinence  NEUROLOGICAL: No headaches, No dizziness, No numbness, No tingling, No tremors, No weakness  EXTREMITIES: No Swelling, No Pain, No Edema  SKIN: [  ] No itching, burning, rashes, or lesions   MUSCULOSKELETAL: No joint pain, No joint swelling; No muscle pain, No back pain, No extremity pain  PSYCHIATRIC: No depression, No anxiety, No mood swings, No difficulty sleeping at night  PAIN SCALE: [ x ] None  [  ] Other-  ROS Unable to obtain due to: [  ] Dementia  [  ] Lethargy  [  ] Sedated  [  ] Non verbal  REST OF REVIEW OF SYSTEMS: [ x ] Normal     Vital Signs Last 24 Hrs  T(C): 36.4 (21 Aug 2020 05:18), Max: 37 (20 Aug 2020 13:27)  T(F): 97.6 (21 Aug 2020 05:18), Max: 98.6 (20 Aug 2020 13:27)  HR: 83 (21 Aug 2020 05:18) (74 - 96)  BP: 100/63 (21 Aug 2020 05:18) (92/59 - 186/87)  RR: 18 (21 Aug 2020 05:18) (10 - 18)  SpO2: 91% (21 Aug 2020 05:18) (91% - 100%)    CAPILLARY BLOOD GLUCOSE      I&O's Summary    20 Aug 2020 07:01  -  21 Aug 2020 07:00  --------------------------------------------------------  IN: 515 mL / OUT: 250 mL / NET: 265 mL      PHYSICAL EXAM:  GENERAL:  [ x ] NAD, [ x ] Well appearing, [  ] Agitated, [  ] Drowsy, [  ] Lethargy, [  ] Confused   HEAD:  [ x ] Normal, [  ] Other  EYES:  [  x] EOMI, [ x ] PERRLA, [x  ] Conjunctiva and sclera clear normal, [  ] Other, [  ] Pallor, [  ] Discharge  ENMT:  [ x ] Normal, [x  ] Moist mucous membranes, [ x ] Good dentition, [x  ] No thrush  NECK:  [ x ] Supple, [ x ] No JVD, [ x ] Normal thyroid, [  ] Lymphadenopathy, [  ] Other  CHEST/LUNG:  [ x ] Clear to auscultation bilaterally, [ x ] Breath Sounds equal B/L, [  ] Poor effort, [ x ] No rales, [ x ] No rhonchi, [x  ] No wheezing  HEART:  [ x ] Regular rate and rhythm, [  ] Tachycardia, [  ] Bradycardia, [  ] Irregular, [x  ] No murmurs, No rubs, No gallops, [  ] PPM in place (Mfr:  )  ABDOMEN:  [ x ] Soft, [ x ] Nontender, [ x ] Nondistended, [ x ] No mass, [ x ] Bowel sounds present, [  ] Obese  NERVOUS SYSTEM:  [ x ] Alert & Oriented x3, [ x ] Nonfocal, [  ] Confusion, [  ] Encephalopathic, [  ] Sedated, [  ] Unable to assess, [  ] Dementia, [  ] Other-  EXTREMITIES:  [ x ] 2+ Peripheral Pulses, No clubbing, No cyanosis,  [  ] Edema B/L lower EXT, [  ] PVD stasis skin changes B/L lower EXT, [  ] Wound, dressing to left hip, clean, dry, intact, no surrounding ecchymosis noted.   LYMPH:  No lymphadenopathy noted  SKIN:  [ x ] No rashes or lesions, [  ] Pressure ulcers, [  ] Ecchymosis, [  ] Skin tears, [  ] Other    DIET: Diet, Full Liquid (08-20-20 @ 17:44)  Diet, Regular (08-21-20 @ 06:13)      LABS:                        8.8    18.13 )-----------( 229      ( 21 Aug 2020 07:57 )             27.4     21 Aug 2020 07:57    138    |  106    |  19     ----------------------------<  139    4.4     |  26     |  0.91     Ca    8.9        21 Aug 2020 07:57    TPro  5.7    /  Alb  2.9    /  TBili  0.4    /  DBili  x      /  AST  15     /  ALT  16     /  AlkPhos  70     21 Aug 2020 07:57    PT/INR - ( 20 Aug 2020 04:34 )   PT: 12.8 sec;   INR: 1.10 ratio         PTT - ( 20 Aug 2020 04:34 )  PTT:40.9 sec          RADIOLOGY & ADDITIONAL TESTS:      HEALTH ISSUES - PROBLEM Dx:  Depression: Depression  Need for prophylactic measure: Need for prophylactic measure  Osteoporosis: Osteoporosis  Hypothyroidism: Hypothyroidism  Hyperlipidemia: Hyperlipidemia  Fracture of femoral neck, left, closed: Fracture of femoral neck, left, closed        Consultant(s) Notes Reviewed:  [ x ] YES     Care Discussed with [ x ] Consultants, [ x ] Patient, [ x ] Family, [  ] HCP, [x  ] , [  ] Social Service, [x  ] RN, [  ] Physical Therapy, [  ] Palliative Care Team  DVT PPX: [ x ] Lovenox, [  ] SC Heparin, [  ] Coumadin, [  ] Xarelto, [  ] Eliquis, [  ] Pradaxa, [  ] IV Heparin drip, [ x ] SCD, [  ] Ambulation, [  ] Contraindicated 2/2 GI Bleed, [  ] Contraindicated 2/2  Bleed, [  ] Contraindicated 2/2 Brain Bleed  Advanced Directive: [ x ] None, [  ] DNR/DNI Patient is a 89y old  Female who presents with a chief complaint of hip fracture (21 Aug 2020 08:20)    89 yo F PMHx Arthritis, HLD, hypothyroidism, osteoporosis presents with left hip pain s/p mechanical slip and fall out of chair. Patient states that she was sitting in her chair and when she went to move she slipped out of her chair hitting her left hip. Per chart review aide states they were sitting talking when pt turned to the left and slid out of her chair onto her left side. Denies any dizziness or pre syncope prior to fall. Patient denies hitting her head. No loss of consciousness.  Patient states she has history of frequent falls. Denies chest pain, SOB, palpitations.     In the ED  VS: T 97.9, HR 76, /76, RR 14, SpO2 99 on RA  Labs: WBC 13.34, COVID negative   EKG: sinus rhythm with first degree AV block and RBBB, similar to prior ekg   Given morphine 2mg IV x1, ofivermev x1, seen by ortho and diagnosed with left intertrochanteric fracture.     INTERVAL HPI:   8/20/20:  Patient seen and examined bedside. No overnight events reported. Patient experiencing left hip discomfort but tolerable, offers no other complaints at this time.   8/21/20: Patient seen and evaluated at bedside. Patient is POD#1 s/p left IMN procedure with ortho. Patient states that she has a mild headache this morning that she attributes to lack of sleep. She states that her pain is well controlled and that she would like to try and get up to walk today. She has not yet had a bowel movement, but states she has been urinating without difficulty. She has been tolerating a diet well. Denies fevers, chills. chest pain, palpitations, SOB, abdominal pain, N/V. H/H drop noted to 8.8/27.4 this AM, patient has been on IV fluid for the past 24 hours, drop may be dilutional vs 2/2 procedure. Will consult Dr Olson (Heme/Onc)    OVERNIGHT EVENTS: none     Home Medications:  Allegra 60 mg oral tablet: 1 tab(s) orally 2 times a day (20 Aug 2020 01:41)  escitalopram 10 mg oral tablet: 1 tab(s) orally once a day (20 Aug 2020 01:41)  levothyroxine 75 mcg (0.075 mg) oral tablet: 1 tab(s) orally once a day (20 Aug 2020 01:41)  Metoprolol Succinate ER 25 mg oral tablet, extended release: 0.5 tab(s) orally once a day (20 Aug 2020 01:41)  pravastatin 20 mg oral tablet: 1 tab(s) orally once a day (20 Aug 2020 01:41)      MEDICATIONS  (STANDING):  acetaminophen   Tablet .. 650 milliGRAM(s) Oral every 6 hours  atorvastatin 10 milliGRAM(s) Oral at bedtime  calcium carbonate 1250 mG  + Vitamin D (OsCal 500 + D) 1 Tablet(s) Oral daily  ceFAZolin   IVPB 2000 milliGRAM(s) IV Intermittent every 8 hours  cholecalciferol 2000 Unit(s) Oral daily  enoxaparin Injectable 40 milliGRAM(s) SubCutaneous every 24 hours  escitalopram 10 milliGRAM(s) Oral daily  levothyroxine 75 MICROGram(s) Oral daily  loratadine 10 milliGRAM(s) Oral daily  metoprolol succinate ER 12.5 milliGRAM(s) Oral daily  polyethylene glycol 3350 17 Gram(s) Oral daily  senna 2 Tablet(s) Oral at bedtime    MEDICATIONS  (PRN):  benzocaine 15 mG/menthol 3.6 mG (Sugar-Free) Lozenge 1 Lozenge Oral three times a day PRN Sore Throat  HYDROmorphone  Injectable 0.5 milliGRAM(s) IV Push every 4 hours PRN Severe Pain (7 - 10)  magnesium hydroxide Suspension 30 milliLiter(s) Oral daily PRN Constipation  melatonin 3 milliGRAM(s) Oral at bedtime PRN Insomnia  ondansetron Injectable 4 milliGRAM(s) IV Push every 6 hours PRN Nausea and/or Vomiting  oxyCODONE    IR 2.5 milliGRAM(s) Oral every 4 hours PRN Moderate Pain (4 - 6)  oxyCODONE    IR 5 milliGRAM(s) Oral every 4 hours PRN Severe Pain (7 - 10)      erythromycin (Hives)      Social History:  lives home with 24 hour aide  walks with cane or walker  denies smoking   states occasional etoh (20 Aug 2020 00:57)      REVIEW OF SYSTEMS: I feel tired and have a headache   CONSTITUTIONAL: No fever, No chills, No fatigue, No myalgia, No Body ache, No Weakness, +admits to headache  EYES: No eye pain,  No visual disturbances, No discharge, No Redness  ENMT: No ear pain, No nose bleed, No vertigo; No sinus pain, No throat pain, No Congestion  NECK: No pain, No stiffness  RESPIRATORY: No cough, No wheezing, No hemoptysis, No shortness of breath  CARDIOVASCULAR: No chest pain, No palpitations  GASTROINTESTINAL: No abdominal pain, No epigastric pain. No nausea, No vomiting, No diarrhea, No constipation; [ - ] BM no bowel movement   GENITOURINARY: No dysuria, No frequency, No urgency, No hematuria, No incontinence  NEUROLOGICAL: No headaches, No dizziness, No numbness, No tingling, No tremors, No weakness  EXTREMITIES: No Swelling, No Pain, No Edema  SKIN: [  ] No itching, burning, rashes, or lesions   MUSCULOSKELETAL: No joint pain, No joint swelling; No muscle pain, No back pain, No extremity pain  PSYCHIATRIC: No depression, No anxiety, No mood swings, No difficulty sleeping at night  PAIN SCALE: [ x ] None  [  ] Other-  ROS Unable to obtain due to: [  ] Dementia  [  ] Lethargy  [  ] Sedated  [  ] Non verbal  REST OF REVIEW OF SYSTEMS: [ x ] Normal     Vital Signs Last 24 Hrs  T(C): 36.4 (21 Aug 2020 05:18), Max: 37 (20 Aug 2020 13:27)  T(F): 97.6 (21 Aug 2020 05:18), Max: 98.6 (20 Aug 2020 13:27)  HR: 83 (21 Aug 2020 05:18) (74 - 96)  BP: 100/63 (21 Aug 2020 05:18) (92/59 - 186/87)  RR: 18 (21 Aug 2020 05:18) (10 - 18)  SpO2: 91% (21 Aug 2020 05:18) (91% - 100%)    CAPILLARY BLOOD GLUCOSE      I&O's Summary    20 Aug 2020 07:01  -  21 Aug 2020 07:00  --------------------------------------------------------  IN: 515 mL / OUT: 250 mL / NET: 265 mL      PHYSICAL EXAM:  GENERAL:  [ x ] NAD, [ x ] Well appearing, [  ] Agitated, [  ] Drowsy, [  ] Lethargy, [  ] Confused   HEAD:  [ x ] Normal, [  ] Other  EYES:  [  x] EOMI, [ x ] PERRLA, [x  ] Conjunctiva and sclera clear normal, [  ] Other, [  ] Pallor, [  ] Discharge  ENMT:  [ x ] Normal, [x  ] Moist mucous membranes, [ x ] Good dentition, [x  ] No thrush  NECK:  [ x ] Supple, [ x ] No JVD, [ x ] Normal thyroid, [  ] Lymphadenopathy, [  ] Other  CHEST/LUNG:  [ x ] Clear to auscultation bilaterally, [ x ] Breath Sounds equal B/L, [  ] Poor effort, [ x ] No rales, [ x ] No rhonchi, [x  ] No wheezing  HEART:  [ x ] Regular rate and rhythm, [  ] Tachycardia, [  ] Bradycardia, [  ] Irregular, [x  ] No murmurs, No rubs, No gallops, [  ] PPM in place (Mfr:  )  ABDOMEN:  [ x ] Soft, [ x ] Nontender, [ x ] Nondistended, [ x ] No mass, [ x ] Bowel sounds present, [  ] Obese  NERVOUS SYSTEM:  [ x ] Alert & Oriented x3, [ x ] Nonfocal, [  ] Confusion, [  ] Encephalopathic, [  ] Sedated, [  ] Unable to assess, [  ] Dementia, [  ] Other-  EXTREMITIES:  [ x ] 2+ Peripheral Pulses, No clubbing, No cyanosis,  [  ] Edema B/L lower EXT, [  ] PVD stasis skin changes B/L lower EXT, [  ] Wound, dressing to left hip, clean, dry, intact, no surrounding ecchymosis noted.   LYMPH:  No lymphadenopathy noted  SKIN:  [ x ] No rashes or lesions, [  ] Pressure ulcers, [  ] Ecchymosis, [  ] Skin tears, [  ] Other    DIET: Diet, Full Liquid (08-20-20 @ 17:44)  Diet, Regular (08-21-20 @ 06:13)      LABS:                        8.8    18.13 )-----------( 229      ( 21 Aug 2020 07:57 )             27.4     21 Aug 2020 07:57    138    |  106    |  19     ----------------------------<  139    4.4     |  26     |  0.91     Ca    8.9        21 Aug 2020 07:57    TPro  5.7    /  Alb  2.9    /  TBili  0.4    /  DBili  x      /  AST  15     /  ALT  16     /  AlkPhos  70     21 Aug 2020 07:57    PT/INR - ( 20 Aug 2020 04:34 )   PT: 12.8 sec;   INR: 1.10 ratio         PTT - ( 20 Aug 2020 04:34 )  PTT:40.9 sec          RADIOLOGY & ADDITIONAL TESTS:      HEALTH ISSUES - PROBLEM Dx:  Depression: Depression  Need for prophylactic measure: Need for prophylactic measure  Osteoporosis: Osteoporosis  Hypothyroidism: Hypothyroidism  Hyperlipidemia: Hyperlipidemia  Fracture of femoral neck, left, closed: Fracture of femoral neck, left, closed        Consultant(s) Notes Reviewed:  [ x ] YES     Care Discussed with [ x ] Consultants, [ x ] Patient, [ x ] Family, [  ] HCP, [x  ] , [  ] Social Service, [x  ] RN, [  ] Physical Therapy, [  ] Palliative Care Team  DVT PPX: [ x ] Lovenox, [  ] SC Heparin, [  ] Coumadin, [  ] Xarelto, [  ] Eliquis, [  ] Pradaxa, [  ] IV Heparin drip, [ x ] SCD, [  ] Ambulation, [  ] Contraindicated 2/2 GI Bleed, [  ] Contraindicated 2/2  Bleed, [  ] Contraindicated 2/2 Brain Bleed  Advanced Directive: [ x ] None, [  ] DNR/DNI Patient is a 89y old  Female who presents with a chief complaint of hip fracture (21 Aug 2020 08:20)    89 yo F PMHx Arthritis, HLD, hypothyroidism, osteoporosis presents with left hip pain s/p mechanical slip and fall out of chair. Patient states that she was sitting in her chair and when she went to move she slipped out of her chair hitting her left hip. Per chart review aide states they were sitting talking when pt turned to the left and slid out of her chair onto her left side. Denies any dizziness or pre syncope prior to fall. Patient denies hitting her head. No loss of consciousness.  Patient states she has history of frequent falls. Denies chest pain, SOB, palpitations.     In the ED  VS: T 97.9, HR 76, /76, RR 14, SpO2 99 on RA  Labs: WBC 13.34, COVID negative   EKG: sinus rhythm with first degree AV block and RBBB, similar to prior ekg   Given morphine 2mg IV x1, ofivermev x1, seen by ortho and diagnosed with left intertrochanteric fracture.     INTERVAL HPI:   8/20/20:  Patient seen and examined bedside. No overnight events reported. Patient experiencing left hip discomfort but tolerable, offers no other complaints at this time.   8/21/20: Patient seen and evaluated at bedside. Patient is POD#1 s/p left IMN procedure with ortho. Patient states that she has a mild headache this morning that she attributes to lack of sleep. She states that her pain is well controlled and that she would like to try and get up to walk today. She has not yet had a bowel movement, but states she has been urinating without difficulty. She has been tolerating a diet well. Denies fevers, chills. chest pain, palpitations, SOB, abdominal pain, N/V. H/H drop noted to 8.8/27.4 this AM, patient has been on IV fluid for the past 24 hours, drop may be dilutional with 2/2 Hip Fracture& Post op,  Will consult Dr Olson (Heme/Onc), Leukocytosis & Low H/H . [facility] -HCP , pt is DNR/DNI, MOLST placed in chart.     OVERNIGHT EVENTS: none     Home Medications:  Allegra 60 mg oral tablet: 1 tab(s) orally 2 times a day (20 Aug 2020 01:41)  escitalopram 10 mg oral tablet: 1 tab(s) orally once a day (20 Aug 2020 01:41)  levothyroxine 75 mcg (0.075 mg) oral tablet: 1 tab(s) orally once a day (20 Aug 2020 01:41)  Metoprolol Succinate ER 25 mg oral tablet, extended release: 0.5 tab(s) orally once a day (20 Aug 2020 01:41)  pravastatin 20 mg oral tablet: 1 tab(s) orally once a day (20 Aug 2020 01:41)      MEDICATIONS  (STANDING):  acetaminophen   Tablet .. 650 milliGRAM(s) Oral every 6 hours  atorvastatin 10 milliGRAM(s) Oral at bedtime  calcium carbonate 1250 mG  + Vitamin D (OsCal 500 + D) 1 Tablet(s) Oral daily  ceFAZolin   IVPB 2000 milliGRAM(s) IV Intermittent every 8 hours  cholecalciferol 2000 Unit(s) Oral daily  enoxaparin Injectable 40 milliGRAM(s) SubCutaneous every 24 hours  escitalopram 10 milliGRAM(s) Oral daily  levothyroxine 75 MICROGram(s) Oral daily  loratadine 10 milliGRAM(s) Oral daily  metoprolol succinate ER 12.5 milliGRAM(s) Oral daily  polyethylene glycol 3350 17 Gram(s) Oral daily  senna 2 Tablet(s) Oral at bedtime    MEDICATIONS  (PRN):  benzocaine 15 mG/menthol 3.6 mG (Sugar-Free) Lozenge 1 Lozenge Oral three times a day PRN Sore Throat  HYDROmorphone  Injectable 0.5 milliGRAM(s) IV Push every 4 hours PRN Severe Pain (7 - 10)  magnesium hydroxide Suspension 30 milliLiter(s) Oral daily PRN Constipation  melatonin 3 milliGRAM(s) Oral at bedtime PRN Insomnia  ondansetron Injectable 4 milliGRAM(s) IV Push every 6 hours PRN Nausea and/or Vomiting  oxyCODONE    IR 2.5 milliGRAM(s) Oral every 4 hours PRN Moderate Pain (4 - 6)  oxyCODONE    IR 5 milliGRAM(s) Oral every 4 hours PRN Severe Pain (7 - 10)      erythromycin (Hives)      Social History:  lives home with 24 hour aide  walks with cane or walker  denies smoking   states occasional etoh (20 Aug 2020 00:57)      REVIEW OF SYSTEMS: I feel tired and have a headache   CONSTITUTIONAL: No fever, No chills, No fatigue, No myalgia, No Body ache, No Weakness, +admits to headache  EYES: No eye pain,  No visual disturbances, No discharge, No Redness  ENMT: No ear pain, No nose bleed, No vertigo; No sinus pain, No throat pain, No Congestion  NECK: No pain, No stiffness  RESPIRATORY: No cough, No wheezing, No hemoptysis, No shortness of breath  CARDIOVASCULAR: No chest pain, No palpitations  GASTROINTESTINAL: No abdominal pain, No epigastric pain. No nausea, No vomiting, No diarrhea, No constipation; [ - ] BM no bowel movement   GENITOURINARY: No dysuria, No frequency, No urgency, No hematuria, No incontinence  NEUROLOGICAL: No headaches, No dizziness, No numbness, No tingling, No tremors, No weakness  EXTREMITIES: No Swelling, No Pain, No Edema  SKIN: [  ] No itching, burning, rashes, or lesions   MUSCULOSKELETAL: No joint pain, No joint swelling; No muscle pain, No back pain, No extremity pain  PSYCHIATRIC: No depression, No anxiety, No mood swings, No difficulty sleeping at night  PAIN SCALE: [ x ] None  [  ] Other-  ROS Unable to obtain due to: [  ] Dementia  [  ] Lethargy  [  ] Sedated  [  ] Non verbal  REST OF REVIEW OF SYSTEMS: [ x ] Normal     Vital Signs Last 24 Hrs  T(C): 36.4 (21 Aug 2020 05:18), Max: 37 (20 Aug 2020 13:27)  T(F): 97.6 (21 Aug 2020 05:18), Max: 98.6 (20 Aug 2020 13:27)  HR: 83 (21 Aug 2020 05:18) (74 - 96)  BP: 100/63 (21 Aug 2020 05:18) (92/59 - 186/87)  RR: 18 (21 Aug 2020 05:18) (10 - 18)  SpO2: 91% (21 Aug 2020 05:18) (91% - 100%)    CAPILLARY BLOOD GLUCOSE      I&O's Summary    20 Aug 2020 07:01  -  21 Aug 2020 07:00  --------------------------------------------------------  IN: 515 mL / OUT: 250 mL / NET: 265 mL      PHYSICAL EXAM:  GENERAL:  [ x ] NAD, [ x ] Well appearing, [  ] Agitated, [  ] Drowsy, [  ] Lethargy, [  ] Confused   HEAD:  [ x ] Normal, [  ] Other  EYES:  [  x] EOMI, [ x ] PERRLA, [x  ] Conjunctiva and sclera clear normal, [  ] Other, [  ] Pallor, [  ] Discharge  ENMT:  [ x ] Normal, [x  ] Moist mucous membranes, [ x ] Good dentition, [x  ] No thrush  NECK:  [ x ] Supple, [ x ] No JVD, [ x ] Normal thyroid, [  ] Lymphadenopathy, [  ] Other  CHEST/LUNG:  [ x ] Clear to auscultation bilaterally, [ x ] Breath Sounds equal B/L, [  ] Poor effort, [ x ] No rales, [ x ] No rhonchi, [x  ] No wheezing  HEART:  [ x ] Regular rate and rhythm, [  ] Tachycardia, [  ] Bradycardia, [  ] Irregular, [x  ] No murmurs, No rubs, No gallops, [  ] PPM in place (Mfr:  )  ABDOMEN:  [ x ] Soft, [ x ] Nontender, [ x ] Nondistended, [ x ] No mass, [ x ] Bowel sounds present, [  ] Obese  NERVOUS SYSTEM:  [ x ] Alert & Oriented x3, [ x ] Nonfocal, [  ] Confusion, [  ] Encephalopathic, [  ] Sedated, [  ] Unable to assess, [  ] Dementia, [  ] Other-  EXTREMITIES:  [ x ] 2+ Peripheral Pulses, No clubbing, No cyanosis,  [  ] Edema B/L lower EXT, [  ] PVD stasis skin changes B/L lower EXT, [  ] Wound, ++ dressing to left hip, clean, dry, intact, no surrounding ecchymosis noted.   LYMPH:  No lymphadenopathy noted  SKIN:  [ x ] No rashes or lesions, [  ] Pressure ulcers, [  ] Ecchymosis, [  ] Skin tears, [  ] Other    DIET: Diet, Full Liquid (08-20-20 @ 17:44)  Diet, Regular (08-21-20 @ 06:13)      LABS:                        8.8    18.13 )-----------( 229      ( 21 Aug 2020 07:57 )             27.4     21 Aug 2020 07:57    138    |  106    |  19     ----------------------------<  139    4.4     |  26     |  0.91     Ca    8.9        21 Aug 2020 07:57    TPro  5.7    /  Alb  2.9    /  TBili  0.4    /  DBili  x      /  AST  15     /  ALT  16     /  AlkPhos  70     21 Aug 2020 07:57    PT/INR - ( 20 Aug 2020 04:34 )   PT: 12.8 sec;   INR: 1.10 ratio         PTT - ( 20 Aug 2020 04:34 )  PTT:40.9 sec      RADIOLOGY & ADDITIONAL TESTS:NONE      HEALTH ISSUES - PROBLEM Dx:  Depression: Depression  Need for prophylactic measure: Need for prophylactic measure  Osteoporosis: Osteoporosis  Hypothyroidism: Hypothyroidism  Hyperlipidemia: Hyperlipidemia  Fracture of femoral neck, left, closed: Fracture of femoral neck, left, closed        Consultant(s) Notes Reviewed:  [ x ] YES     Care Discussed with [ x ] Consultants, [ x ] Patient, [ x ] Family, [  ] HCP, [x  ] , [  ] Social Service, [x  ] RN, [  ] Physical Therapy, [x  ] Palliative Care Team  DVT PPX: [ x ] Lovenox, [  ] SC Heparin, [  ] Coumadin, [  ] Xarelto, [  ] Eliquis, [  ] Pradaxa, [  ] IV Heparin drip, [ x ] SCD, [  ] Ambulation, [  ] Contraindicated 2/2 GI Bleed, [  ] Contraindicated 2/2  Bleed, [  ] Contraindicated 2/2 Brain Bleed  Advanced Directive: [  ] None, [ x ] DNR/DNI

## 2020-08-21 NOTE — OCCUPATIONAL THERAPY INITIAL EVALUATION ADULT - LEVEL OF INDEPENDENCE: TOILET, REHAB EVAL
unable to perform/not assessed due to dizziness/decreased safety; external catheter to be placed by CNA

## 2020-08-21 NOTE — PROGRESS NOTE ADULT - PROBLEM SELECTOR PLAN 6
DVT ppx: Lovenox 40 mg daily + SCDs   -on Allegra at home, will continue Claritin daily while admitted.  IMPROVE VTE Individual Risk Assessment          RISK                                                          Points    [  ] Previous VTE                                                3  [  ] Thrombophilia                                             2  [  ] Lower limb paralysis                                   2        (unable to hold up >15 seconds)    [  ] Current Cancer                                            2         (within 6 months)  [  ] Immobilization > 24 hrs                              1  [  ] ICU/CCU stay > 24 hours                            1  [  ] Age > 60                                                    1    IMPROVE VTE Score _____1____ -continue home dose escitalopram 10mg daily

## 2020-08-21 NOTE — PROGRESS NOTE ADULT - PROBLEM SELECTOR PLAN 5
DVT ppx: Lovenox 40 mg daily + SCDs   -on Allegra at home, will continue Claritin daily while admitted.  IMPROVE VTE Individual Risk Assessment          RISK                                                          Points    [  ] Previous VTE                                                3  [  ] Thrombophilia                                             2  [  ] Lower limb paralysis                                   2        (unable to hold up >15 seconds)    [  ] Current Cancer                                            2         (within 6 months)  [  ] Immobilization > 24 hrs                              1  [  ] ICU/CCU stay > 24 hours                            1  [  ] Age > 60                                                    1    IMPROVE VTE Score _____1____ -continue home dose escitalopram 10mg daily -continue home dose levothyroxine 75mcg daily

## 2020-08-21 NOTE — PROGRESS NOTE ADULT - PROBLEM SELECTOR PLAN 4
-continue home dose escitalopram 10mg daily -continue home dose levothyroxine 75mcg daily -patient on pravastatin 20mg at home, will continue interchange of Lipitor 10mg while admitted

## 2020-08-21 NOTE — CONSULT NOTE ADULT - SUBJECTIVE AND OBJECTIVE BOX
HPI:  88 yo F PMHx Arthritis, HLD, hypothyroidism, osteoporosis presents with left hip pain s/p mechanical slip and fall out of chair. Patient states that she was sitting in her chair and when she went to move she slipped out of her chair hitting her left hip. Per chart review aide states they were sitting talking when pt turned to the left and slid out of her chair onto her left side. Denies any dizziness or pre syncope prior to fall. Patient denies hitting her head. No loss of consciousness.  Patient states she has history of frequent falls. Denies chest pain, SOB, palpitations.     In the ED  VS: T 97.9, HR 76, /76, RR 14, SpO2 99 on RA  Labs: WBC 13.34, COVID negative   EKG: sinus rhythm with first degree AV block and RBBB, similar to prior ekg   Given morphine 2mg IV x1, ofivermev x1, seen by ortho and diagnosed with left intertrochanteric fracture. (20 Aug 2020 00:57)    PERTINENT PM/SXH:   Arthritis  Hypothyroid  Hyperlipidemia  Seasonal Allergies  Osteoporosis  Migraine  Cataract  Edema of Leg  Chronic Edema  Seasonal Rhinitis  Hypercholesteremia  Hypothyroidism  Anxiety  Clostridium Difficile Infection    S/P cataract surgery  S/P ovarian cystectomy  S/P hip replacement  S/P knee replacement, bilateral  Hip Replacement  Knee Replacement    FAMILY HISTORY:  No pertinent family history in first degree relatives    ITEMS NOT CHECKED ARE NOT PRESENT    SOCIAL HISTORY:   Significant other/partner[ ]  Children[ ]  Worship/Spirituality:  Substance hx:  [ ]   Tobacco hx:  [ ]   Alcohol hx: [ ]   Home Opioid hx:  [ ] I-Stop Reference No:  Living Situation: [ ]Home  [ ]Long term care  [ ]Rehab [ ]Other    ADVANCE DIRECTIVES:    DNR  Yes  MOLST  [ ]  Living Will  [ ]   DECISION MAKER(s):  [ ] Health Care Proxy(s)  [ ] Surrogate(s)  [ ] Guardian           Name(s): Phone Number(s):    BASELINE (I)ADL(s) (prior to admission):  [place]: [ ]Total  [ ] Moderate [ ]Dependent    Allergies    erythromycin (Hives)    Intolerances    MEDICATIONS  (STANDING):  acetaminophen   Tablet .. 650 milliGRAM(s) Oral every 6 hours  atorvastatin 10 milliGRAM(s) Oral at bedtime  calcium carbonate 1250 mG  + Vitamin D (OsCal 500 + D) 1 Tablet(s) Oral daily  cholecalciferol 2000 Unit(s) Oral daily  enoxaparin Injectable 40 milliGRAM(s) SubCutaneous every 24 hours  escitalopram 10 milliGRAM(s) Oral daily  levothyroxine 75 MICROGram(s) Oral daily  loratadine 10 milliGRAM(s) Oral daily  metoprolol succinate ER 12.5 milliGRAM(s) Oral daily  polyethylene glycol 3350 17 Gram(s) Oral daily  senna 2 Tablet(s) Oral at bedtime    MEDICATIONS  (PRN):  benzocaine 15 mG/menthol 3.6 mG (Sugar-Free) Lozenge 1 Lozenge Oral three times a day PRN Sore Throat  magnesium hydroxide Suspension 30 milliLiter(s) Oral daily PRN Constipation  melatonin 3 milliGRAM(s) Oral at bedtime PRN Insomnia  ondansetron Injectable 4 milliGRAM(s) IV Push every 6 hours PRN Nausea and/or Vomiting  oxyCODONE    IR 5 milliGRAM(s) Oral every 4 hours PRN Moderate Pain (4 - 6)  oxyCODONE    IR 10 milliGRAM(s) Oral every 4 hours PRN Severe Pain (7 - 10)  traMADol 50 milliGRAM(s) Oral every 6 hours PRN Mild Pain (1 - 3)    PRESENT SYMPTOMS: [ ]Unable to obtain due to poor mentation   Source if other than patient:  [ ]Family   [ ]Team     Pain: [ ]yes [ ]no  QOL impact -   Location -                    Aggravating factors -  Quality -  Radiation -  Timing-  Severity (0-10 scale):  Minimal acceptable level (0-10 scale):     CPOT:    https://www.[facility].org/getattachment/[email or web address]/Critical-Care-Pain-Observation-Tool-(CPOT)      PAIN AD Score:     http://geriatrictoolkit.[place].[facility]/cog/painad.pdf (press ctrl +  left click to view)    Dyspnea:                           [ ]Mild [ ]Moderate [ ]Severe  Anxiety:                             [ ]Mild [ ]Moderate [ ]Severe  Fatigue:                             [ ]Mild [ ]Moderate [ ]Severe  Nausea:                             [ ]Mild [ ]Moderate [ ]Severe  Loss of appetite:              [ ]Mild [ ]Moderate [ ]Severe  Constipation:                    [ ]Mild [ ]Moderate [ ]Severe    Other Symptoms:  [ ]All other review of systems negative     Palliative Performance Status Version 2:         %    http://npcrc.org/files/news/palliative_performance_scale_ppsv2.pdf  PHYSICAL EXAM:  Vital Signs Last 24 Hrs  T(C): 36.4 (21 Aug 2020 05:18), Max: 37 (20 Aug 2020 13:27)  T(F): 97.6 (21 Aug 2020 05:18), Max: 98.6 (20 Aug 2020 13:27)  HR: 83 (21 Aug 2020 05:18) (74 - 96)  BP: 100/60 (21 Aug 2020 10:14) (92/59 - 186/87)  BP(mean): --  RR: 18 (21 Aug 2020 10:14) (10 - 18)  SpO2: 91% (21 Aug 2020 10:14) (91% - 100%) I&O's Summary    20 Aug 2020 07:01  -  21 Aug 2020 07:00  --------------------------------------------------------  IN: 515 mL / OUT: 250 mL / NET: 265 mL      GENERAL:  [ ]Alert  [ ]Oriented x   [ ]Lethargic  [ ]Cachexia  [ ]Unarousable  [ ]Verbal  [ ]Non-Verbal  Behavioral:   [ ] Anxiety  [ ] Delirium [ ] Agitation [ ] Other  HEENT:  [ ]Normal   [ ]Dry mouth   [ ]ET Tube/Trach  [ ]Oral lesions  PULMONARY:   [ ]Clear [ ]Tachypnea  [ ]Audible excessive secretions   [ ]Rhonchi        [ ]Right [ ]Left [ ]Bilateral  [ ]Crackles        [ ]Right [ ]Left [ ]Bilateral  [ ]Wheezing     [ ]Right [ ]Left [ ]Bilateral  [ ]Diminished breath sounds [ ]right [ ]left [ ]bilateral  CARDIOVASCULAR:    [ ]Regular [ ]Irregular [ ]Tachy  [ ]Efren [ ]Murmur [ ]Other  GASTROINTESTINAL:  [ ]Soft  [ ]Distended   [ ]+BS  [ ]Non tender [ ]Tender  [ ]PEG [ ]OGT/ NGT  Last BM:     GENITOURINARY:  [ ]Normal [ ] Incontinent   [ ]Oliguria/Anuria   [ ]Diaz  MUSCULOSKELETAL:   [ ]Normal   [ ]Weakness  [ ]Bed/Wheelchair bound [ ]Edema  NEUROLOGIC:   [ ]No focal deficits  [ ]Cognitive impairment  [ ]Dysphagia [ ]Dysarthria [ ]Paresis [ ]Other   SKIN:   [ ]Normal    [ ]Rash  [ ]Pressure ulcer(s)       Present on admission [ ]y [ ]n    CRITICAL CARE:  [ ] Shock Present  [ ]Septic [ ]Cardiogenic [ ]Neurologic [ ]Hypovolemic  [ ]  Vasopressors [ ]  Inotropes   [ ]Respiratory failure present [ ]Mechanical ventilation [ ]Non-invasive ventilatory support [ ]High flow    [ ]Acute  [ ]Chronic [ ]Hypoxic  [ ]Hypercarbic [ ]Other  [ ]Other organ failure     LABS:                        8.8    18.13 )-----------( 229      ( 21 Aug 2020 07:57 )             27.4   08-21    138  |  106  |  19  ----------------------------<  139<H>  4.4   |  26  |  0.91    Ca    8.9      21 Aug 2020 07:57    TPro  5.7<L>  /  Alb  2.9<L>  /  TBili  0.4  /  DBili  x   /  AST  15  /  ALT  16  /  AlkPhos  70  08-21  PT/INR - ( 20 Aug 2020 04:34 )   PT: 12.8 sec;   INR: 1.10 ratio         PTT - ( 20 Aug 2020 04:34 )  PTT:40.9 sec      RADIOLOGY & ADDITIONAL STUDIES:    PROTEIN CALORIE MALNUTRITION PRESENT: [ ]mild [ ]moderate [ ]severe [ ]underweight [ ]morbid obesity  https://www.andeal.org/vault/2440/web/files/ONC/Table_Clinical%20Characteristics%20to%20Document%20Malnutrition-White%20JV%20et%20al%881677.pdf    Height (cm): 167.64 (08-20-20 @ 14:33), 157.48 (09-14-19 @ 00:09)  Weight (kg): 49.9 (08-20-20 @ 14:33), 50.8 (09-14-19 @ 00:09)  BMI (kg/m2): 17.8 (08-20-20 @ 14:33), 20.5 (09-14-19 @ 00:09)    [ ]PPSV2 < or = to 30% [ ]significant weight loss  [ ]poor nutritional intake  [ ]anasarca     Albumin, Serum: 2.9 g/dL (08-21-20 @ 07:57)   [ ]Artificial Nutrition      REFERRALS:   [ ]Chaplaincy  [ ]Hospice  [ ]Child Life  [ ]Social Work  [ ]Case management [ ]Holistic Therapy     Goals of Care Document: KRISTEN Jasso (08-21-20 @ 11:20)  Goals of Care Conversation:   Participants:  · Participants  Patient    Advance Directives:  · Does patient have Advance Directive  Yes  · Indicate Type  Medical Orders for Life-Sustaining Treatment (MOLST)  · Caregiver:  no    Conversation Discussion:  · Conversation  MOLST Discussed  · Conversation Details  Writer met with pt at bedside. Reviewed patient's medical and social history as well as events leading to patient's hospitalization. Writer discussed patient's current diagnosis, medical condition and management, . Inquired about patient's wishes regarding extent of medical care to be provided including escalation of medical care into the ICU and use of vasopressor support. In addition, the writer inquired about thoughts regarding cardiopulmnary resuscitation, artificial nutrition and hydration including use of feeding tubes and IVF, antibiotics, and further investigative studies such as blood draws and radiology. All questions answered. . Patient consented to DNR/DNI,no feeding tube status. MOLST form filled out and placed in chart. Psychosocial support provided.    Personal Advance Directives Treatment Guidelines:   Treatment Guidelines:  · Decision Maker  Patient  · Treatment Guidelines  DNR Order; No artificial nutrition; Antibiotic trial; IV fluid trial  · Treatment Guideline Comments  DNI  · Future Hospitalization/Transfer  Send to hospital, if necessary, based on MOLST orders    MOLST:  · Completed  21-Aug-2020    Location of Discussion:   Duration of Advanced Care Planning Meeting:  · Time spent (in minutes)  20    Location of Discussion:  · Location of discussion  Face to face    Electronic Signatures for Addendum Section:   Qi Parekh) (Signed Addendum 21-Aug-2020 11:37)    I attest that the writer and palliative care team RN discussed pt's current medical condition, hospital stay, prognosis, disease trajectory, and life expectancy. (refer to ACP/GOC note from palliative care team RN for more details on conversation).    Electronic Signatures:  Qi Parekh)  (Signed 21-Aug-2020 11:37)  	Co-Signer: Goals of Care Conversation, Personal Advance Directives Treatment Guidelines, Location of Discussion  Isa Stephenson (RN)  (Signed 21-Aug-2020 11:25)  	Authored: Goals of Care Conversation, Personal Advance Directives Treatment Guidelines, Location of Discussion      Last Updated: 21-Aug-2020 11:37 by Qi Parekh) HPI:  88 yo F PMHx Arthritis, HLD, hypothyroidism, osteoporosis presents with left hip pain s/p mechanical slip and fall out of chair. Patient states that she was sitting in her chair and when she went to move she slipped out of her chair hitting her left hip. Per chart review aide states they were sitting talking when pt turned to the left and slid out of her chair onto her left side. Denies any dizziness or pre syncope prior to fall. Patient denies hitting her head. No loss of consciousness.  Patient states she has history of frequent falls. Denies chest pain, SOB, palpitations.     In the ED  VS: T 97.9, HR 76, /76, RR 14, SpO2 99 on RA  Labs: WBC 13.34, COVID negative   EKG: sinus rhythm with first degree AV block and RBBB, similar to prior ekg   Given morphine 2mg IV x1, ofivermev x1, seen by ortho and diagnosed with left intertrochanteric fracture. (20 Aug 2020 00:57)    PERTINENT PM/SXH:   Arthritis  Hypothyroid  Hyperlipidemia  Seasonal Allergies  Osteoporosis  Migraine  Cataract  Edema of Leg  Chronic Edema  Seasonal Rhinitis  Hypercholesteremia  Hypothyroidism  Anxiety  Clostridium Difficile Infection    S/P cataract surgery  S/P ovarian cystectomy  S/P hip replacement  S/P knee replacement, bilateral  Hip Replacement  Knee Replacement    FAMILY HISTORY:  No pertinent family history in first degree relatives    ITEMS NOT CHECKED ARE NOT PRESENT    SOCIAL HISTORY:   Significant other/partner[ ]  Children[ ]  Uatsdin/Spirituality:  Substance hx:  [ ]   Tobacco hx:  [ ]   Alcohol hx: [ ]   Home Opioid hx:  [ ] I-Stop Reference No:  Living Situation: [ x]Home  [ ]Long term care  [ ]Rehab [ ]Other    ADVANCE DIRECTIVES:    DNR  Yes  MOLST  [ ]  Living Will  [ ]   DECISION MAKER(s):  [ ] Health Care Proxy(s)  [ ] Surrogate(s)  [ ] Guardian           Name(s): Phone Number(s):    BASELINE (I)ADL(s) (prior to admission):  [place]: [ ]Total  [x ] Moderate [ ]Dependent    Allergies    erythromycin (Hives)    Intolerances    MEDICATIONS  (STANDING):  acetaminophen   Tablet .. 650 milliGRAM(s) Oral every 6 hours  atorvastatin 10 milliGRAM(s) Oral at bedtime  calcium carbonate 1250 mG  + Vitamin D (OsCal 500 + D) 1 Tablet(s) Oral daily  cholecalciferol 2000 Unit(s) Oral daily  enoxaparin Injectable 40 milliGRAM(s) SubCutaneous every 24 hours  escitalopram 10 milliGRAM(s) Oral daily  levothyroxine 75 MICROGram(s) Oral daily  loratadine 10 milliGRAM(s) Oral daily  metoprolol succinate ER 12.5 milliGRAM(s) Oral daily  polyethylene glycol 3350 17 Gram(s) Oral daily  senna 2 Tablet(s) Oral at bedtime    MEDICATIONS  (PRN):  benzocaine 15 mG/menthol 3.6 mG (Sugar-Free) Lozenge 1 Lozenge Oral three times a day PRN Sore Throat  magnesium hydroxide Suspension 30 milliLiter(s) Oral daily PRN Constipation  melatonin 3 milliGRAM(s) Oral at bedtime PRN Insomnia  ondansetron Injectable 4 milliGRAM(s) IV Push every 6 hours PRN Nausea and/or Vomiting  oxyCODONE    IR 5 milliGRAM(s) Oral every 4 hours PRN Moderate Pain (4 - 6)  oxyCODONE    IR 10 milliGRAM(s) Oral every 4 hours PRN Severe Pain (7 - 10)  traMADol 50 milliGRAM(s) Oral every 6 hours PRN Mild Pain (1 - 3)    PRESENT SYMPTOMS: [ ]Unable to obtain due to poor mentation   Source if other than patient:  [ ]Family   [ ]Team     Pain: [ ]yes [x ]no  QOL impact -   Location -                    Aggravating factors -  Quality -  Radiation -  Timing-  Severity (0-10 scale):  Minimal acceptable level (0-10 scale):     CPOT:    https://www.[facility].org/getattachment/[email or web address]/Critical-Care-Pain-Observation-Tool-(CPOT)      PAIN AD Score:     http://geriatrictoolkit.[place].[facility]/cog/painad.pdf (press ctrl +  left click to view)    Dyspnea:                           [ ]Mild [ ]Moderate [ ]Severe  Anxiety:                             [ ]Mild [ ]Moderate [ ]Severe  Fatigue:                             [ ]Mild [ ]Moderate [ ]Severe  Nausea:                             [ ]Mild [ ]Moderate [ ]Severe  Loss of appetite:              [ ]Mild [ ]Moderate [ ]Severe  Constipation:                    [ ]Mild [ ]Moderate [ ]Severe    Other Symptoms:  [x ]All other review of systems negative     Palliative Performance Status Version 2:     50 %    http://[facility].org/files/news/palliative_performance_scale_ppsv2.pdf  PHYSICAL EXAM:  Vital Signs Last 24 Hrs  T(C): 36.4 (21 Aug 2020 05:18), Max: 37 (20 Aug 2020 13:27)  T(F): 97.6 (21 Aug 2020 05:18), Max: 98.6 (20 Aug 2020 13:27)  HR: 83 (21 Aug 2020 05:18) (74 - 96)  BP: 100/60 (21 Aug 2020 10:14) (92/59 - 186/87)  BP(mean): --  RR: 18 (21 Aug 2020 10:14) (10 - 18)  SpO2: 91% (21 Aug 2020 10:14) (91% - 100%) I&O's Summary    20 Aug 2020 07:01  -  21 Aug 2020 07:00  --------------------------------------------------------  IN: 515 mL / OUT: 250 mL / NET: 265 mL    GENERAL:  [ x ] NAD, [ x ] Well appearing, [  ] Agitated, [  ] Drowsy, [  ] Lethargy, [  ] Confused   HEAD:  [ x ] Normal, [  ] Other  EYES:  [  x] EOMI, [ x ] PERRLA, [x  ] Conjunctiva and sclera clear normal, [  ] Other, [  ] Pallor, [  ] Discharge  ENMT:  [ x ] Normal, [x  ] Moist mucous membranes, [ x ] Good dentition, [x  ] No thrush  NECK:  [ x ] Supple, [ x ] No JVD, [ x ] Normal thyroid, [  ] Lymphadenopathy, [  ] Other  CHEST/LUNG:  [ x ] Clear to auscultation bilaterally, [ x ] Breath Sounds equal B/L, [  ] Poor effort, [ x ] No rales, [ x ] No rhonchi, [x  ] No wheezing  HEART:  [ x ] Regular rate and rhythm, [  ] Tachycardia, [  ] Bradycardia, [  ] Irregular, [x  ] No murmurs, No rubs, No gallops, [  ] PPM in place (Mfr:  )  ABDOMEN:  [ x ] Soft, [ x ] Nontender, [ x ] Nondistended, [ x ] No mass, [ x ] Bowel sounds present, [  ] Obese  NERVOUS SYSTEM:  [ x ] Alert & Oriented x3, [ x ] Nonfocal, [  ] Confusion, [  ] Encephalopathic, [  ] Sedated, [  ] Unable to assess, [  ] Dementia, [  ] Other-  EXTREMITIES:  [ x ] 2+ Peripheral Pulses, No clubbing, No cyanosis,  [  ] Edema B/L lower EXT, [  ] PVD stasis skin changes B/L lower EXT, [  ] Wound, ++ dressing to left hip, clean, dry, intact, no surrounding ecchymosis noted.   LYMPH:  No lymphadenopathy noted  SKIN:  [ x ] No rashes or lesions, [  ] Pressure ulcers, [  ] Ecchymosis, [  ] Skin tears, [  ] Other  LABS:                        8.8    18.13 )-----------( 229      ( 21 Aug 2020 07:57 )             27.4   08-21    138  |  106  |  19  ----------------------------<  139<H>  4.4   |  26  |  0.91    Ca    8.9      21 Aug 2020 07:57    TPro  5.7<L>  /  Alb  2.9<L>  /  TBili  0.4  /  DBili  x   /  AST  15  /  ALT  16  /  AlkPhos  70  08-21  PT/INR - ( 20 Aug 2020 04:34 )   PT: 12.8 sec;   INR: 1.10 ratio         PTT - ( 20 Aug 2020 04:34 )  PTT:40.9 sec      RADIOLOGY & ADDITIONAL STUDIES: reviewed    PROTEIN CALORIE MALNUTRITION PRESENT: [ ]mild [ ]moderate [ ]severe [x ]underweight [ ]morbid obesity  https://www.andeal.org/vault/2440/web/files/ONC/Table_Clinical%20Characteristics%20to%20Document%20Malnutrition-White%20JV%20et%20al%202012.pdf    Height (cm): 167.64 (08-20-20 @ 14:33), 157.48 (09-14-19 @ 00:09)  Weight (kg): 49.9 (08-20-20 @ 14:33), 50.8 (09-14-19 @ 00:09)  BMI (kg/m2): 17.8 (08-20-20 @ 14:33), 20.5 (09-14-19 @ 00:09)    [ ]PPSV2 < or = to 30% [ ]significant weight loss  [x ]poor nutritional intake  [ ]anasarca     Albumin, Serum: 2.9 g/dL (08-21-20 @ 07:57)   [ ]Artificial Nutrition      REFERRALS:   [ ]Chaplaincy  [ ]Hospice  [ ]Child Life  [ ]Social Work  [ ]Case management [ ]Holistic Therapy     Goals of Care Document: KRISTEN Jsaso (08-21-20 @ 11:20)  Goals of Care Conversation:   Participants:  · Participants  Patient    Advance Directives:  · Does patient have Advance Directive  Yes  · Indicate Type  Medical Orders for Life-Sustaining Treatment (MOLST)  · Caregiver:  no    Conversation Discussion:  · Conversation  MOLST Discussed  · Conversation Details  Writer met with pt at bedside. Reviewed patient's medical and social history as well as events leading to patient's hospitalization. Writer discussed patient's current diagnosis, medical condition and management, . Inquired about patient's wishes regarding extent of medical care to be provided including escalation of medical care into the ICU and use of vasopressor support. In addition, the writer inquired about thoughts regarding cardiopulmnary resuscitation, artificial nutrition and hydration including use of feeding tubes and IVF, antibiotics, and further investigative studies such as blood draws and radiology. All questions answered. . Patient consented to DNR/DNI,no feeding tube status. MOLST form filled out and placed in chart. Psychosocial support provided.    Personal Advance Directives Treatment Guidelines:   Treatment Guidelines:  · Decision Maker  Patient  · Treatment Guidelines  DNR Order; No artificial nutrition; Antibiotic trial; IV fluid trial  · Treatment Guideline Comments  DNI  · Future Hospitalization/Transfer  Send to hospital, if necessary, based on MOLST orders    MOLST:  · Completed  21-Aug-2020    Location of Discussion:   Duration of Advanced Care Planning Meeting:  · Time spent (in minutes)  20    Location of Discussion:  · Location of discussion  Face to face    Electronic Signatures for Addendum Section:   Qi Parekh) (Signed Addendum 21-Aug-2020 11:37)    I attest that the writer and palliative care team RN discussed pt's current medical condition, hospital stay, prognosis, disease trajectory, and life expectancy. (refer to ACP/GOC note from palliative care team RN for more details on conversation).    Electronic Signatures:  Qi Parekh)  (Signed 21-Aug-2020 11:37)  	Co-Signer: Goals of Care Conversation, Personal Advance Directives Treatment Guidelines, Location of Discussion  Isa Stephenson (ANAID)  (Signed 21-Aug-2020 11:25)  	Authored: Goals of Care Conversation, Personal Advance Directives Treatment Guidelines, Location of Discussion      Last Updated: 21-Aug-2020 11:37 by Qi Parekh)

## 2020-08-21 NOTE — PROGRESS NOTE ADULT - PROBLEM SELECTOR PLAN 2
-patient on pravastatin 20mg at home, will continue interchange of Lipitor 10mg while admitted Patient noted to have drop in H/H from 10.4 to 8.8 yesterday, POD#1 from IMN procedure   -No ecchymosis noted on exam this AM   -Patient has been on IVF, this may have been dilutional or could be 2/2 blood loss from procedure   -IVF discontinued, continue to monitor closely  -Heme/Onc (Dr Olson) consulted ; f/u recs Patient noted to have drop in H/H from 10.4 to 8.8 yesterday, Ac Blood loss anemia 2/2 Hip Fracture   -POD#1 from IMN procedure   -No ecchymosis noted on exam this AM   -Patient has been on IVF, this may have been dilutional or could be 2/2 blood loss & IV Fluids -likely dilutional drop  -IVF discontinued, continue to monitor closely  -Heme/Onc (Dr Olson) consulted ; f/u recs

## 2020-08-21 NOTE — GOALS OF CARE CONVERSATION - ADVANCED CARE PLANNING - CONVERSATION DETAILS
Writer met with pt at bedside. Reviewed patient's medical and social history as well as events leading to patient's hospitalization. Writer discussed patient's current diagnosis, medical condition and management, . Inquired about patient's wishes regarding extent of medical care to be provided including escalation of medical care into the ICU and use of vasopressor support. In addition, the writer inquired about thoughts regarding cardiopulmnary resuscitation, artificial nutrition and hydration including use of feeding tubes and IVF, antibiotics, and further investigative studies such as blood draws and radiology. All questions answered. . Patient consented to DNR/DNI,no feeding tube status. MOLST form filled out and placed in chart. Psychosocial support provided.

## 2020-08-21 NOTE — OCCUPATIONAL THERAPY INITIAL EVALUATION ADULT - ADDITIONAL COMMENTS
Pt reported she lives in a private home, 4 steps to enter. Bedroom is on 2nd floor but pt reports she can stay on 1 level if needed upon initial d/c. Bathroom has a shower with grab bars. PTA pt used cane for functional mobility. Pt reported PTA she had 24/7 HHA who assisted PRN with ADLs and IADLs.

## 2020-08-21 NOTE — OCCUPATIONAL THERAPY INITIAL EVALUATION ADULT - PERTINENT HX OF CURRENT PROBLEM, REHAB EVAL
Pt is an 89 y/o female s/p mechanical slip and fall out of chair. Patient states that she was sitting in her chair and when she went to move she slipped out of her chair hitting her left hip. Per chart review aide states they were sitting talking when pt turned to the left and slid out of her chair onto her left side. S/p ORIF Left hip 8/20/2020.

## 2020-08-21 NOTE — PROGRESS NOTE ADULT - SUBJECTIVE AND OBJECTIVE BOX
Patient seen and examined at bedside, resting comfortably. No acute events overnight. Pain adequately controlled. Patient feeling well. No nausea or vomiting. No other acute complaints at this time    Vital Signs Last 24 Hrs  T(C): 36.4 (08-21-20 @ 05:18), Max: 37 (08-20-20 @ 13:27)  T(F): 97.6 (08-21-20 @ 05:18), Max: 98.6 (08-20-20 @ 13:27)  HR: 83 (08-21-20 @ 05:18) (74 - 96)  BP: 100/63 (08-21-20 @ 05:18) (92/59 - 186/87)  BP(mean): --  RR: 18 (08-21-20 @ 05:18) (10 - 18)  SpO2: 91% (08-21-20 @ 05:18) (91% - 100%)                        10.4   20.73 )-----------( 261      ( 20 Aug 2020 18:19 )             33.0     20 Aug 2020 18:19    139    |  108    |  16     ----------------------------<  167    3.8     |  25     |  0.81     Ca    8.3        20 Aug 2020 18:19    TPro  6.4    /  Alb  3.5    /  TBili  0.3    /  DBili  .10    /  AST  17     /  ALT  18     /  AlkPhos  85     20 Aug 2020 04:34  PT/INR - ( 20 Aug 2020 04:34 )   PT: 12.8 sec;   INR: 1.10 ratio    PTT - ( 20 Aug 2020 04:34 )  PTT:40.9 sec    Exam:  Gen: NAD, Awake and alert, following commands  LLE:  Dressing clean and dry  +EHL/FHL/TA/GS  SILT L2-S1  +DP  Calf Soft NT  Compartments soft and compressible

## 2020-08-21 NOTE — CONSULT NOTE ADULT - SUBJECTIVE AND OBJECTIVE BOX
Patient is a 89y old  Female who presents with a chief complaint of hip fracture (21 Aug 2020 11:38)      HPI:  88 yo F PMHx Arthritis, HLD, hypothyroidism, osteoporosis presents with left hip pain s/p mechanical slip and fall out of chair. Patient states that she was sitting in her chair and when she went to move she slipped out of her chair hitting her left hip. Per chart review aide states they were sitting talking when pt turned to the left and slid out of her chair onto her left side. Denies any dizziness or pre syncope prior to fall. Patient denies hitting her head. No loss of consciousness.  Patient states she has history of frequent falls. Denies chest pain, SOB, palpitations.     In the ED  VS: T 97.9, HR 76, /76, RR 14, SpO2 99 on RA  Labs: WBC 13.34, COVID negative   EKG: sinus rhythm with first degree AV block and RBBB, similar to prior ekg   Given morphine 2mg IV x1, ofivermev x1, seen by ortho and diagnosed with left intertrochanteric fracture. (20 Aug 2020 00:57)       ROS:  Negative except for:    PAST MEDICAL & SURGICAL HISTORY:  Arthritis  Hyperlipidemia  Osteoporosis  Migraine  Cataract: s/p cataract surgery  Edema of Leg: b/l LE  Hypothyroidism  S/P cataract surgery: bialteral  S/P ovarian cystectomy  S/P hip replacement  S/P knee replacement, bilateral: Partial R) hip  Knee Replacement: b/l      SOCIAL HISTORY:    FAMILY HISTORY:  No pertinent family history in first degree relatives      MEDICATIONS  (STANDING):  acetaminophen   Tablet .. 650 milliGRAM(s) Oral every 6 hours  atorvastatin 10 milliGRAM(s) Oral at bedtime  calcium carbonate 1250 mG  + Vitamin D (OsCal 500 + D) 1 Tablet(s) Oral daily  cholecalciferol 2000 Unit(s) Oral daily  enoxaparin Injectable 40 milliGRAM(s) SubCutaneous every 24 hours  escitalopram 10 milliGRAM(s) Oral daily  levothyroxine 75 MICROGram(s) Oral daily  loratadine 10 milliGRAM(s) Oral daily  metoprolol succinate ER 12.5 milliGRAM(s) Oral daily  polyethylene glycol 3350 17 Gram(s) Oral daily  senna 2 Tablet(s) Oral at bedtime    MEDICATIONS  (PRN):  benzocaine 15 mG/menthol 3.6 mG (Sugar-Free) Lozenge 1 Lozenge Oral three times a day PRN Sore Throat  magnesium hydroxide Suspension 30 milliLiter(s) Oral daily PRN Constipation  melatonin 3 milliGRAM(s) Oral at bedtime PRN Insomnia  ondansetron Injectable 4 milliGRAM(s) IV Push every 6 hours PRN Nausea and/or Vomiting  oxyCODONE    IR 5 milliGRAM(s) Oral every 4 hours PRN Moderate Pain (4 - 6)  oxyCODONE    IR 10 milliGRAM(s) Oral every 4 hours PRN Severe Pain (7 - 10)  traMADol 50 milliGRAM(s) Oral every 6 hours PRN Mild Pain (1 - 3)      Allergies    erythromycin (Hives)    Intolerances        Vital Signs Last 24 Hrs  T(C): 36.6 (21 Aug 2020 14:36), Max: 36.6 (20 Aug 2020 19:20)  T(F): 97.9 (21 Aug 2020 14:36), Max: 97.9 (21 Aug 2020 14:36)  HR: 90 (21 Aug 2020 14:36) (83 - 96)  BP: 94/55 (21 Aug 2020 14:36) (92/59 - 131/72)  BP(mean): --  RR: 18 (21 Aug 2020 14:36) (10 - 18)  SpO2: 91% (21 Aug 2020 14:36) (91% - 100%)    PHYSICAL EXAM  General: adult in NAD  HEENT: clear oropharynx, anicteric sclera, pink conjunctivae  Neck: supple  CV: normal S1S2 with no murmur rubs or gallops  Lungs: clear to auscultation, no wheezes, no rhales  Abdomen: soft non-tender non-distended, no hepato/splenomegaly  Ext: no clubbing cyanosis or edema. left hip with bandage with no active bleeding  Skin: no rashes and no petichiae  Neuro: alert and oriented X3 no focal deficits      LABS:    CBC Full  -  ( 21 Aug 2020 07:57 )  WBC Count : 18.13 K/uL  RBC Count : 2.82 M/uL  Hemoglobin : 8.8 g/dL  Hematocrit : 27.4 %  Platelet Count - Automated : 229 K/uL  Mean Cell Volume : 97.2 fl  Mean Cell Hemoglobin : 31.2 pg  Mean Cell Hemoglobin Concentration : 32.1 gm/dL  Auto Neutrophil # : 14.90 K/uL  Auto Lymphocyte # : 0.91 K/uL  Auto Monocyte # : 2.15 K/uL  Auto Eosinophil # : 0.00 K/uL  Auto Basophil # : 0.03 K/uL  Auto Neutrophil % : 82.1 %  Auto Lymphocyte % : 5.0 %  Auto Monocyte % : 11.9 %  Auto Eosinophil % : 0.0 %  Auto Basophil % : 0.2 %    08-21    138  |  106  |  19  ----------------------------<  139<H>  4.4   |  26  |  0.91    Ca    8.9      21 Aug 2020 07:57    TPro  5.7<L>  /  Alb  2.9<L>  /  TBili  0.4  /  DBili  x   /  AST  15  /  ALT  16  /  AlkPhos  70  08-21    PT/INR - ( 20 Aug 2020 04:34 )   PT: 12.8 sec;   INR: 1.10 ratio         PTT - ( 20 Aug 2020 04:34 )  PTT:40.9 sec          BLOOD SMEAR INTERPRETATION:    RADIOLOGY & ADDITIONAL STUDIES:    < from: Xray Hip 1 View, Left (08.20.20 @ 11:21) >  EXAM:  XR HIP 1V LT                            PROCEDURE DATE:  08/20/2020          INTERPRETATION:  Clinical information: Left hip fracture preoperative.    AP traction view left hip.    Comparison: 8/19/2020.    There is an intertrochanteric fracture proximal left femur.  The contour of the femoral head is maintained.    Impression:    Findings as discussed above.                DREW SERNA M.D., ATTENDING RADIOLOGIST  This document has been electronically signed. Aug 21 2020  9:03AM               < end of copied text >

## 2020-08-21 NOTE — PROGRESS NOTE ADULT - PROBLEM SELECTOR PLAN 3
-continue home dose levothyroxine 75mcg daily -patient on pravastatin 20mg at home, will continue interchange of Lipitor 10mg while admitted Leukocytosis likely reactive with Hip Fx & post op  -Afebrile, CBC in AM   -

## 2020-08-21 NOTE — PROGRESS NOTE ADULT - SUBJECTIVE AND OBJECTIVE BOX
Patient without complaints.  Afebrile.  LLE: Dressing with scant bloody drainage.        NVI distally.        Calves: soft and nontender.                          7.9    17.77 )-----------( 211      ( 21 Aug 2020 16:39 )             24.7

## 2020-08-21 NOTE — CONSULT NOTE ADULT - CONVERSATION DETAILS
Writer met with pt at bedside. Reviewed patient's medical and social history as well as events leading to patient's hospitalization. Writer discussed patient's current diagnosis, medical condition and management, . Inquired about patient's wishes regarding extent of medical care to be provided including escalation of medical care into the ICU and use of vasopressor support. In addition, the writer inquired about thoughts regarding cardiopulmnary resuscitation, artificial nutrition and hydration including use of feeding tubes and IVF, antibiotics, and further investigative studies such as blood draws and radiology. All questions answered. . Patient consented to DNR/DNI,no feeding tube status. MOLST form filled out and placed in chart. Psychosocial support provided. see GOC note above for details

## 2020-08-22 LAB
ALBUMIN SERPL ELPH-MCNC: 2.6 G/DL — LOW (ref 3.3–5)
ALP SERPL-CCNC: 64 U/L — SIGNIFICANT CHANGE UP (ref 40–120)
ALT FLD-CCNC: 10 U/L — LOW (ref 12–78)
ANION GAP SERPL CALC-SCNC: 4 MMOL/L — LOW (ref 5–17)
AST SERPL-CCNC: 22 U/L — SIGNIFICANT CHANGE UP (ref 15–37)
BASOPHILS # BLD AUTO: 0.05 K/UL — SIGNIFICANT CHANGE UP (ref 0–0.2)
BASOPHILS NFR BLD AUTO: 0.4 % — SIGNIFICANT CHANGE UP (ref 0–2)
BILIRUB SERPL-MCNC: 0.3 MG/DL — SIGNIFICANT CHANGE UP (ref 0.2–1.2)
BUN SERPL-MCNC: 24 MG/DL — HIGH (ref 7–23)
CALCIUM SERPL-MCNC: 9.1 MG/DL — SIGNIFICANT CHANGE UP (ref 8.5–10.1)
CHLORIDE SERPL-SCNC: 111 MMOL/L — HIGH (ref 96–108)
CO2 SERPL-SCNC: 26 MMOL/L — SIGNIFICANT CHANGE UP (ref 22–31)
CREAT SERPL-MCNC: 0.89 MG/DL — SIGNIFICANT CHANGE UP (ref 0.5–1.3)
EOSINOPHIL # BLD AUTO: 0.8 K/UL — HIGH (ref 0–0.5)
EOSINOPHIL NFR BLD AUTO: 6.2 % — HIGH (ref 0–6)
GLUCOSE SERPL-MCNC: 101 MG/DL — HIGH (ref 70–99)
HCT VFR BLD CALC: 22.1 % — LOW (ref 34.5–45)
HCT VFR BLD CALC: 31.2 % — LOW (ref 34.5–45)
HGB BLD-MCNC: 10.1 G/DL — LOW (ref 11.5–15.5)
HGB BLD-MCNC: 7.1 G/DL — LOW (ref 11.5–15.5)
IMM GRANULOCYTES NFR BLD AUTO: 0.5 % — SIGNIFICANT CHANGE UP (ref 0–1.5)
LYMPHOCYTES # BLD AUTO: 1.27 K/UL — SIGNIFICANT CHANGE UP (ref 1–3.3)
LYMPHOCYTES # BLD AUTO: 9.9 % — LOW (ref 13–44)
MCHC RBC-ENTMCNC: 31.7 PG — SIGNIFICANT CHANGE UP (ref 27–34)
MCHC RBC-ENTMCNC: 32.1 GM/DL — SIGNIFICANT CHANGE UP (ref 32–36)
MCV RBC AUTO: 98.7 FL — SIGNIFICANT CHANGE UP (ref 80–100)
MONOCYTES # BLD AUTO: 2.19 K/UL — HIGH (ref 0–0.9)
MONOCYTES NFR BLD AUTO: 17 % — HIGH (ref 2–14)
NEUTROPHILS # BLD AUTO: 8.52 K/UL — HIGH (ref 1.8–7.4)
NEUTROPHILS NFR BLD AUTO: 66 % — SIGNIFICANT CHANGE UP (ref 43–77)
NRBC # BLD: 0 /100 WBCS — SIGNIFICANT CHANGE UP (ref 0–0)
PLATELET # BLD AUTO: 198 K/UL — SIGNIFICANT CHANGE UP (ref 150–400)
POTASSIUM SERPL-MCNC: 4 MMOL/L — SIGNIFICANT CHANGE UP (ref 3.5–5.3)
POTASSIUM SERPL-SCNC: 4 MMOL/L — SIGNIFICANT CHANGE UP (ref 3.5–5.3)
PROT SERPL-MCNC: 5.4 G/DL — LOW (ref 6–8.3)
RBC # BLD: 2.24 M/UL — LOW (ref 3.8–5.2)
RBC # FLD: 13.2 % — SIGNIFICANT CHANGE UP (ref 10.3–14.5)
SODIUM SERPL-SCNC: 141 MMOL/L — SIGNIFICANT CHANGE UP (ref 135–145)
WBC # BLD: 12.89 K/UL — HIGH (ref 3.8–10.5)
WBC # FLD AUTO: 12.89 K/UL — HIGH (ref 3.8–10.5)

## 2020-08-22 PROCEDURE — 99232 SBSQ HOSP IP/OBS MODERATE 35: CPT

## 2020-08-22 RX ORDER — FUROSEMIDE 40 MG
10 TABLET ORAL ONCE
Refills: 0 | Status: COMPLETED | OUTPATIENT
Start: 2020-08-22 | End: 2020-08-22

## 2020-08-22 RX ADMIN — POLYETHYLENE GLYCOL 3350 17 GRAM(S): 17 POWDER, FOR SOLUTION ORAL at 12:13

## 2020-08-22 RX ADMIN — LORATADINE 10 MILLIGRAM(S): 10 TABLET ORAL at 12:13

## 2020-08-22 RX ADMIN — Medication 2000 UNIT(S): at 12:13

## 2020-08-22 RX ADMIN — ENOXAPARIN SODIUM 40 MILLIGRAM(S): 100 INJECTION SUBCUTANEOUS at 12:12

## 2020-08-22 RX ADMIN — Medication 650 MILLIGRAM(S): at 00:15

## 2020-08-22 RX ADMIN — Medication 650 MILLIGRAM(S): at 23:33

## 2020-08-22 RX ADMIN — OXYCODONE HYDROCHLORIDE 5 MILLIGRAM(S): 5 TABLET ORAL at 22:27

## 2020-08-22 RX ADMIN — Medication 650 MILLIGRAM(S): at 12:13

## 2020-08-22 RX ADMIN — Medication 650 MILLIGRAM(S): at 18:12

## 2020-08-22 RX ADMIN — ATORVASTATIN CALCIUM 10 MILLIGRAM(S): 80 TABLET, FILM COATED ORAL at 21:27

## 2020-08-22 RX ADMIN — Medication 10 MILLIGRAM(S): at 13:43

## 2020-08-22 RX ADMIN — Medication 650 MILLIGRAM(S): at 13:11

## 2020-08-22 RX ADMIN — OXYCODONE HYDROCHLORIDE 5 MILLIGRAM(S): 5 TABLET ORAL at 21:27

## 2020-08-22 RX ADMIN — ESCITALOPRAM OXALATE 10 MILLIGRAM(S): 10 TABLET, FILM COATED ORAL at 12:13

## 2020-08-22 RX ADMIN — SENNA PLUS 2 TABLET(S): 8.6 TABLET ORAL at 21:28

## 2020-08-22 RX ADMIN — Medication 650 MILLIGRAM(S): at 19:16

## 2020-08-22 RX ADMIN — Medication 1 TABLET(S): at 12:13

## 2020-08-22 RX ADMIN — Medication 75 MICROGRAM(S): at 06:03

## 2020-08-22 RX ADMIN — Medication 12.5 MILLIGRAM(S): at 06:03

## 2020-08-22 NOTE — PROGRESS NOTE ADULT - SUBJECTIVE AND OBJECTIVE BOX
Patient is a 89y old  Female who presents with a chief complaint of hip fracture (21 Aug 2020 08:20)    88 yo F PMHx Arthritis, HLD, hypothyroidism, osteoporosis presents with left hip pain s/p mechanical slip and fall out of chair. Patient states that she was sitting in her chair and when she went to move she slipped out of her chair hitting her left hip. Per chart review aide states they were sitting talking when pt turned to the left and slid out of her chair onto her left side. Denies any dizziness or pre syncope prior to fall. Patient denies hitting her head. No loss of consciousness.  Patient states she has history of frequent falls. Denies chest pain, SOB, palpitations.     In the ED  VS: T 97.9, HR 76, /76, RR 14, SpO2 99 on RA  Labs: WBC 13.34, COVID negative   EKG: sinus rhythm with first degree AV block and RBBB, similar to prior ekg   Given morphine 2mg IV x1, ofivermev x1, seen by ortho and diagnosed with left intertrochanteric fracture.     INTERVAL HPI:   8/20/20:  Patient seen and examined bedside. No overnight events reported. Patient experiencing left hip discomfort but tolerable, offers no other complaints at this time.   8/21/20: Patient seen and evaluated at bedside. Patient is POD#1 s/p left IMN procedure with ortho. Patient states that she has a mild headache this morning that she attributes to lack of sleep. She states that her pain is well controlled and that she would like to try and get up to walk today. She has not yet had a bowel movement, but states she has been urinating without difficulty. She has been tolerating a diet well. Denies fevers, chills. chest pain, palpitations, SOB, abdominal pain, N/V. H/H drop noted to 8.8/27.4 this AM, patient has been on IV fluid for the past 24 hours, drop may be dilutional with 2/2 Hip Fracture& Post op,  Will consult Dr Olson (Heme/Onc), Leukocytosis & Low H/H . [facility] -HCP , pt is DNR/DNI, MOLST placed in chart.   8/22/20: Patient seen and evaluated at bedside. Patient is POD#2 left hip IMN. Patient complains of headache this morning. States pain is similar to "migraine headaches" that she gets at home. Patient has not yet had a bowel movement. Denies any fever, chills, chest pain, SOB, abdominal pain, N/V. PT recommended patient discharged to [facility]. Dr. Olson recommends to continue monitoring cbc, transfuse as indicated, and Fe studies.     OVERNIGHT EVENTS: none     Home Medications:  Allegra 60 mg oral tablet: 1 tab(s) orally 2 times a day (20 Aug 2020 01:41)  escitalopram 10 mg oral tablet: 1 tab(s) orally once a day (20 Aug 2020 01:41)  levothyroxine 75 mcg (0.075 mg) oral tablet: 1 tab(s) orally once a day (20 Aug 2020 01:41)  Metoprolol Succinate ER 25 mg oral tablet, extended release: 0.5 tab(s) orally once a day (20 Aug 2020 01:41)  pravastatin 20 mg oral tablet: 1 tab(s) orally once a day (20 Aug 2020 01:41)      MEDICATIONS  (STANDING):  acetaminophen   Tablet .. 650 milliGRAM(s) Oral every 6 hours  atorvastatin 10 milliGRAM(s) Oral at bedtime  calcium carbonate 1250 mG  + Vitamin D (OsCal 500 + D) 1 Tablet(s) Oral daily  ceFAZolin   IVPB 2000 milliGRAM(s) IV Intermittent every 8 hours  cholecalciferol 2000 Unit(s) Oral daily  enoxaparin Injectable 40 milliGRAM(s) SubCutaneous every 24 hours  escitalopram 10 milliGRAM(s) Oral daily  levothyroxine 75 MICROGram(s) Oral daily  loratadine 10 milliGRAM(s) Oral daily  metoprolol succinate ER 12.5 milliGRAM(s) Oral daily  polyethylene glycol 3350 17 Gram(s) Oral daily  senna 2 Tablet(s) Oral at bedtime    MEDICATIONS  (PRN):  benzocaine 15 mG/menthol 3.6 mG (Sugar-Free) Lozenge 1 Lozenge Oral three times a day PRN Sore Throat  HYDROmorphone  Injectable 0.5 milliGRAM(s) IV Push every 4 hours PRN Severe Pain (7 - 10)  magnesium hydroxide Suspension 30 milliLiter(s) Oral daily PRN Constipation  melatonin 3 milliGRAM(s) Oral at bedtime PRN Insomnia  ondansetron Injectable 4 milliGRAM(s) IV Push every 6 hours PRN Nausea and/or Vomiting  oxyCODONE    IR 2.5 milliGRAM(s) Oral every 4 hours PRN Moderate Pain (4 - 6)  oxyCODONE    IR 5 milliGRAM(s) Oral every 4 hours PRN Severe Pain (7 - 10)      erythromycin (Hives)      Social History:  lives home with 24 hour aide  walks with cane or walker  denies smoking   states occasional etoh (20 Aug 2020 00:57)      REVIEW OF SYSTEMS: I have a headache   CONSTITUTIONAL: No fever, No chills, No fatigue, No myalgia, No Body ache, No Weakness, +admits to headache  EYES: No eye pain,  No visual disturbances, No discharge, No Redness  ENMT: No ear pain, No nose bleed, No vertigo; No sinus pain, No throat pain, No Congestion  NECK: No pain, No stiffness  RESPIRATORY: No cough, No wheezing, No hemoptysis, No shortness of breath  CARDIOVASCULAR: No chest pain, No palpitations  GASTROINTESTINAL: No abdominal pain, No epigastric pain. No nausea, No vomiting, No diarrhea, No constipation; [ - ] BM no bowel movement   GENITOURINARY: No dysuria, No frequency, No urgency, No hematuria, No incontinence  NEUROLOGICAL: +admits to headache, No dizziness, No numbness, No tingling, No tremors, No weakness  EXTREMITIES: No Swelling, No Pain, No Edema  SKIN: [  ] No itching, burning, rashes, or lesions   MUSCULOSKELETAL: No joint pain, No joint swelling; No muscle pain, No back pain, No extremity pain  PSYCHIATRIC: No depression, No anxiety, No mood swings, No difficulty sleeping at night  PAIN SCALE: [ x ] None  [  ] Other-  ROS Unable to obtain due to: [  ] Dementia  [  ] Lethargy  [  ] Sedated  [  ] Non verbal  REST OF REVIEW OF SYSTEMS: [ x ] Normal     Vital Signs Last 24 Hrs  T(C): 36.6 (22 Aug 2020 04:35), Max: 36.8 (21 Aug 2020 20:32)  T(F): 97.9 (22 Aug 2020 04:35), Max: 98.3 (21 Aug 2020 20:32)  HR: 90 (22 Aug 2020 06:02) (89 - 94)  BP: 120/60 (22 Aug 2020 06:02) (94/55 - 120/60)  BP(mean): --  RR: 17 (22 Aug 2020 06:02) (17 - 18)  SpO2: 96% (22 Aug 2020 06:02) (91% - 97%)    CAPILLARY BLOOD GLUCOSE      I&O's Summary    20 Aug 2020 07:01  -  21 Aug 2020 07:00  --------------------------------------------------------  IN: 515 mL / OUT: 250 mL / NET: 265 mL      PHYSICAL EXAM:  GENERAL:  [ x ] NAD, [ x ] Well appearing, [  ] Agitated, [  ] Drowsy, [  ] Lethargy, [  ] Confused   HEAD:  [ x ] Normal, [  ] Other  EYES:  [  x] EOMI, [ x ] PERRLA, [x  ] Conjunctiva and sclera clear normal, [  ] Other, [  ] Pallor, [  ] Discharge  ENMT:  [ x ] Normal, [x  ] Moist mucous membranes, [ x ] Good dentition, [x  ] No thrush  NECK:  [ x ] Supple, [ x ] No JVD, [ x ] Normal thyroid, [  ] Lymphadenopathy, [  ] Other  CHEST/LUNG:  [ x ] Clear to auscultation bilaterally, [ x ] Breath Sounds equal B/L, [  ] Poor effort, [ x ] No rales, [ x ] No rhonchi, [x  ] No wheezing  HEART:  [ x ] Regular rate and rhythm, [  ] Tachycardia, [  ] Bradycardia, [  ] Irregular, [x  ] No murmurs, No rubs, No gallops, [  ] PPM in place (Mfr:  )  ABDOMEN:  [ x ] Soft, [ x ] Nontender, [ x ] Nondistended, [ x ] No mass, [ x ] Bowel sounds present, [  ] Obese  NERVOUS SYSTEM:  [ x ] Alert & Oriented x3, [ x ] Nonfocal, [  ] Confusion, [  ] Encephalopathic, [  ] Sedated, [  ] Unable to assess, [  ] Dementia, [  ] Other-  EXTREMITIES:  [ x ] 2+ Peripheral Pulses, No clubbing, No cyanosis,  [  ] Edema B/L lower EXT, [  ] PVD stasis skin changes B/L lower EXT, [  ] Wound, ++ dressing to left hip, clean, dry, intact, no surrounding ecchymosis noted.   LYMPH:  No lymphadenopathy noted  SKIN:  [ x ] No rashes or lesions, [  ] Pressure ulcers, [  ] Ecchymosis, [  ] Skin tears, [  ] Other    DIET: Diet, Full Liquid (08-20-20 @ 17:44)  Diet, Regular (08-21-20 @ 06:13)      LABS:                        8.8    18.13 )-----------( 229      ( 21 Aug 2020 07:57 )             27.4     21 Aug 2020 07:57    138    |  106    |  19     ----------------------------<  139    4.4     |  26     |  0.91     Ca    8.9        21 Aug 2020 07:57    TPro  5.7    /  Alb  2.9    /  TBili  0.4    /  DBili  x      /  AST  15     /  ALT  16     /  AlkPhos  70     21 Aug 2020 07:57    PT/INR - ( 20 Aug 2020 04:34 )   PT: 12.8 sec;   INR: 1.10 ratio         PTT - ( 20 Aug 2020 04:34 )  PTT:40.9 sec      RADIOLOGY & ADDITIONAL TESTS:NONE      HEALTH ISSUES - PROBLEM Dx:  Depression: Depression  Need for prophylactic measure: Need for prophylactic measure  Osteoporosis: Osteoporosis  Hypothyroidism: Hypothyroidism  Hyperlipidemia: Hyperlipidemia  Fracture of femoral neck, left, closed: Fracture of femoral neck, left, closed        Consultant(s) Notes Reviewed:  [ x ] YES     Care Discussed with [ x ] Consultants, [ x ] Patient, [ x ] Family, [  ] HCP, [x  ] , [  ] Social Service, [x  ] RN, [  ] Physical Therapy, [x  ] Palliative Care Team  DVT PPX: [ x ] Lovenox, [  ] SC Heparin, [  ] Coumadin, [  ] Xarelto, [  ] Eliquis, [  ] Pradaxa, [  ] IV Heparin drip, [ x ] SCD, [  ] Ambulation, [  ] Contraindicated 2/2 GI Bleed, [  ] Contraindicated 2/2  Bleed, [  ] Contraindicated 2/2 Brain Bleed  Advanced Directive: [  ] None, [ x ] DNR/DNI Patient is a 89y old  Female who presents with a chief complaint of hip fracture (21 Aug 2020 08:20)    89 yo F PMHx Arthritis, HLD, hypothyroidism, osteoporosis presents with left hip pain s/p mechanical slip and fall out of chair. Patient states that she was sitting in her chair and when she went to move she slipped out of her chair hitting her left hip. Per chart review aide states they were sitting talking when pt turned to the left and slid out of her chair onto her left side. Denies any dizziness or pre syncope prior to fall. Patient denies hitting her head. No loss of consciousness.  Patient states she has history of frequent falls. Denies chest pain, SOB, palpitations.     In the ED  VS: T 97.9, HR 76, /76, RR 14, SpO2 99 on RA  Labs: WBC 13.34, COVID negative   EKG: sinus rhythm with first degree AV block and RBBB, similar to prior ekg   Given morphine 2mg IV x1, ofivermev x1, seen by ortho and diagnosed with left intertrochanteric fracture.     INTERVAL HPI:   8/20/20:  Patient seen and examined bedside. No overnight events reported. Patient experiencing left hip discomfort but tolerable, offers no other complaints at this time.   8/21/20: Patient seen and evaluated at bedside. Patient is POD#1 s/p left IMN procedure with ortho. Patient states that she has a mild headache this morning that she attributes to lack of sleep. She states that her pain is well controlled and that she would like to try and get up to walk today. She has not yet had a bowel movement, but states she has been urinating without difficulty. She has been tolerating a diet well. Denies fevers, chills. chest pain, palpitations, SOB, abdominal pain, N/V. H/H drop noted to 8.8/27.4 this AM, patient has been on IV fluid for the past 24 hours, drop may be dilutional with 2/2 Hip Fracture& Post op,  Will consult Dr Olson (Heme/Onc), Leukocytosis & Low H/H . [facility] Dtr -HCP , pt is DNR/DNI, MOLST placed in chart.   8/22/20: Patient seen and evaluated at bedside. Patient is POD#2 left hip IMN. Patient complains of headache this morning. States pain is similar to "migraine headaches" that she gets at home. Patient has not yet had a bowel movement. Denies any fever, chills, chest pain, SOB, abdominal pain, N/V. PT recommended patient discharged to [facility]. Dr. Olson recommends to continue monitoring cbc, transfuse as indicated, and Fe studies. Hgb 7.1 this morning, patient will get 2 units pRBC and lasix 10 IV with hold parameters.    OVERNIGHT EVENTS: none     Home Medications:  Allegra 60 mg oral tablet: 1 tab(s) orally 2 times a day (20 Aug 2020 01:41)  escitalopram 10 mg oral tablet: 1 tab(s) orally once a day (20 Aug 2020 01:41)  levothyroxine 75 mcg (0.075 mg) oral tablet: 1 tab(s) orally once a day (20 Aug 2020 01:41)  Metoprolol Succinate ER 25 mg oral tablet, extended release: 0.5 tab(s) orally once a day (20 Aug 2020 01:41)  pravastatin 20 mg oral tablet: 1 tab(s) orally once a day (20 Aug 2020 01:41)      MEDICATIONS  (STANDING):  acetaminophen   Tablet .. 650 milliGRAM(s) Oral every 6 hours  atorvastatin 10 milliGRAM(s) Oral at bedtime  calcium carbonate 1250 mG  + Vitamin D (OsCal 500 + D) 1 Tablet(s) Oral daily  ceFAZolin   IVPB 2000 milliGRAM(s) IV Intermittent every 8 hours  cholecalciferol 2000 Unit(s) Oral daily  enoxaparin Injectable 40 milliGRAM(s) SubCutaneous every 24 hours  escitalopram 10 milliGRAM(s) Oral daily  levothyroxine 75 MICROGram(s) Oral daily  loratadine 10 milliGRAM(s) Oral daily  metoprolol succinate ER 12.5 milliGRAM(s) Oral daily  polyethylene glycol 3350 17 Gram(s) Oral daily  senna 2 Tablet(s) Oral at bedtime    MEDICATIONS  (PRN):  benzocaine 15 mG/menthol 3.6 mG (Sugar-Free) Lozenge 1 Lozenge Oral three times a day PRN Sore Throat  HYDROmorphone  Injectable 0.5 milliGRAM(s) IV Push every 4 hours PRN Severe Pain (7 - 10)  magnesium hydroxide Suspension 30 milliLiter(s) Oral daily PRN Constipation  melatonin 3 milliGRAM(s) Oral at bedtime PRN Insomnia  ondansetron Injectable 4 milliGRAM(s) IV Push every 6 hours PRN Nausea and/or Vomiting  oxyCODONE    IR 2.5 milliGRAM(s) Oral every 4 hours PRN Moderate Pain (4 - 6)  oxyCODONE    IR 5 milliGRAM(s) Oral every 4 hours PRN Severe Pain (7 - 10)      erythromycin (Hives)      Social History:  lives home with 24 hour aide  walks with cane or walker  denies smoking   states occasional etoh (20 Aug 2020 00:57)      REVIEW OF SYSTEMS: I have a headache   CONSTITUTIONAL: No fever, No chills, No fatigue, No myalgia, No Body ache, No Weakness, +admits to headache  EYES: No eye pain,  No visual disturbances, No discharge, No Redness  ENMT: No ear pain, No nose bleed, No vertigo; No sinus pain, No throat pain, No Congestion  NECK: No pain, No stiffness  RESPIRATORY: No cough, No wheezing, No hemoptysis, No shortness of breath  CARDIOVASCULAR: No chest pain, No palpitations  GASTROINTESTINAL: No abdominal pain, No epigastric pain. No nausea, No vomiting, No diarrhea, No constipation; [ - ] BM no bowel movement   GENITOURINARY: No dysuria, No frequency, No urgency, No hematuria, No incontinence  NEUROLOGICAL: +admits to headache, No dizziness, No numbness, No tingling, No tremors, No weakness  EXTREMITIES: No Swelling, No Pain, No Edema  SKIN: [  ] No itching, burning, rashes, or lesions   MUSCULOSKELETAL: No joint pain, No joint swelling; No muscle pain, No back pain, No extremity pain  PSYCHIATRIC: No depression, No anxiety, No mood swings, No difficulty sleeping at night  PAIN SCALE: [ x ] None  [  ] Other-  ROS Unable to obtain due to: [  ] Dementia  [  ] Lethargy  [  ] Sedated  [  ] Non verbal  REST OF REVIEW OF SYSTEMS: [ x ] Normal     Vital Signs Last 24 Hrs  T(C): 36.6 (22 Aug 2020 04:35), Max: 36.8 (21 Aug 2020 20:32)  T(F): 97.9 (22 Aug 2020 04:35), Max: 98.3 (21 Aug 2020 20:32)  HR: 90 (22 Aug 2020 06:02) (89 - 94)  BP: 120/60 (22 Aug 2020 06:02) (94/55 - 120/60)  BP(mean): --  RR: 17 (22 Aug 2020 06:02) (17 - 18)  SpO2: 96% (22 Aug 2020 06:02) (91% - 97%)    CAPILLARY BLOOD GLUCOSE      I&O's Summary    20 Aug 2020 07:01  -  21 Aug 2020 07:00  --------------------------------------------------------  IN: 515 mL / OUT: 250 mL / NET: 265 mL      PHYSICAL EXAM:  GENERAL:  [ x ] NAD, [ x ] Well appearing, [  ] Agitated, [  ] Drowsy, [  ] Lethargy, [  ] Confused   HEAD:  [ x ] Normal, [  ] Other  EYES:  [  x] EOMI, [ x ] PERRLA, [x  ] Conjunctiva and sclera clear normal, [  ] Other, [  ] Pallor, [  ] Discharge  ENMT:  [ x ] Normal, [x  ] Moist mucous membranes, [ x ] Good dentition, [x  ] No thrush  NECK:  [ x ] Supple, [ x ] No JVD, [ x ] Normal thyroid, [  ] Lymphadenopathy, [  ] Other  CHEST/LUNG:  [ x ] Clear to auscultation bilaterally, [ x ] Breath Sounds equal B/L, [  ] Poor effort, [ x ] No rales, [ x ] No rhonchi, [x  ] No wheezing  HEART:  [ x ] Regular rate and rhythm, [  ] Tachycardia, [  ] Bradycardia, [  ] Irregular, [x  ] No murmurs, No rubs, No gallops, [  ] PPM in place (Mfr:  )  ABDOMEN:  [ x ] Soft, [ x ] Nontender, [ x ] Nondistended, [ x ] No mass, [ x ] Bowel sounds present, [  ] Obese  NERVOUS SYSTEM:  [ x ] Alert & Oriented x3, [ x ] Nonfocal, [  ] Confusion, [  ] Encephalopathic, [  ] Sedated, [  ] Unable to assess, [  ] Dementia, [  ] Other-  EXTREMITIES:  [ x ] 2+ Peripheral Pulses, No clubbing, No cyanosis,  [  ] Edema B/L lower EXT, [  ] PVD stasis skin changes B/L lower EXT, [  ] Wound, ++ dressing to left hip, clean, dry, intact, no surrounding ecchymosis noted.   LYMPH:  No lymphadenopathy noted  SKIN:  [ x ] No rashes or lesions, [  ] Pressure ulcers, [  ] Ecchymosis, [  ] Skin tears, [  ] Other    DIET: Diet, Full Liquid (08-20-20 @ 17:44)  Diet, Regular (08-21-20 @ 06:13)      LABS:                        8.8    18.13 )-----------( 229      ( 21 Aug 2020 07:57 )             27.4     21 Aug 2020 07:57    138    |  106    |  19     ----------------------------<  139    4.4     |  26     |  0.91     Ca    8.9        21 Aug 2020 07:57    TPro  5.7    /  Alb  2.9    /  TBili  0.4    /  DBili  x      /  AST  15     /  ALT  16     /  AlkPhos  70     21 Aug 2020 07:57    PT/INR - ( 20 Aug 2020 04:34 )   PT: 12.8 sec;   INR: 1.10 ratio         PTT - ( 20 Aug 2020 04:34 )  PTT:40.9 sec      RADIOLOGY & ADDITIONAL TESTS:NONE      HEALTH ISSUES - PROBLEM Dx:  Depression: Depression  Need for prophylactic measure: Need for prophylactic measure  Osteoporosis: Osteoporosis  Hypothyroidism: Hypothyroidism  Hyperlipidemia: Hyperlipidemia  Fracture of femoral neck, left, closed: Fracture of femoral neck, left, closed        Consultant(s) Notes Reviewed:  [ x ] YES     Care Discussed with [ x ] Consultants, [ x ] Patient, [ x ] Family, [  ] HCP, [x  ] , [  ] Social Service, [x  ] RN, [  ] Physical Therapy, [x  ] Palliative Care Team  DVT PPX: [ x ] Lovenox, [  ] SC Heparin, [  ] Coumadin, [  ] Xarelto, [  ] Eliquis, [  ] Pradaxa, [  ] IV Heparin drip, [ x ] SCD, [  ] Ambulation, [  ] Contraindicated 2/2 GI Bleed, [  ] Contraindicated 2/2  Bleed, [  ] Contraindicated 2/2 Brain Bleed  Advanced Directive: [  ] None, [ x ] DNR/DNI

## 2020-08-22 NOTE — PROGRESS NOTE ADULT - SUBJECTIVE AND OBJECTIVE BOX
Patient seen and examined;  Chart reviewed and events noted;   concered about drop in H/H      MEDICATIONS  (STANDING):  acetaminophen   Tablet .. 650 milliGRAM(s) Oral every 6 hours  atorvastatin 10 milliGRAM(s) Oral at bedtime  calcium carbonate 1250 mG  + Vitamin D (OsCal 500 + D) 1 Tablet(s) Oral daily  cholecalciferol 2000 Unit(s) Oral daily  enoxaparin Injectable 40 milliGRAM(s) SubCutaneous every 24 hours  escitalopram 10 milliGRAM(s) Oral daily  furosemide   Injectable 10 milliGRAM(s) IV Push once  levothyroxine 75 MICROGram(s) Oral daily  loratadine 10 milliGRAM(s) Oral daily  metoprolol succinate ER 12.5 milliGRAM(s) Oral daily  polyethylene glycol 3350 17 Gram(s) Oral daily  senna 2 Tablet(s) Oral at bedtime    MEDICATIONS  (PRN):  benzocaine 15 mG/menthol 3.6 mG (Sugar-Free) Lozenge 1 Lozenge Oral three times a day PRN Sore Throat  bisacodyl Suppository 10 milliGRAM(s) Rectal daily PRN If no bowel movement  magnesium hydroxide Suspension 30 milliLiter(s) Oral daily PRN Constipation  melatonin 3 milliGRAM(s) Oral at bedtime PRN Insomnia  ondansetron Injectable 4 milliGRAM(s) IV Push every 6 hours PRN Nausea and/or Vomiting  oxyCODONE    IR 5 milliGRAM(s) Oral every 4 hours PRN Moderate Pain (4 - 6)  oxyCODONE    IR 10 milliGRAM(s) Oral every 4 hours PRN Severe Pain (7 - 10)  traMADol 50 milliGRAM(s) Oral every 6 hours PRN Mild Pain (1 - 3)      Vital Signs Last 24 Hrs  T(C): 36.8 (22 Aug 2020 10:40), Max: 36.8 (21 Aug 2020 20:32)  T(F): 98.2 (22 Aug 2020 10:40), Max: 98.3 (21 Aug 2020 20:32)  HR: 80 (22 Aug 2020 10:40) (80 - 94)  BP: 114/68 (22 Aug 2020 10:40) (94/55 - 120/60)  BP(mean): --  RR: 18 (22 Aug 2020 10:40) (17 - 18)  SpO2: 95% (22 Aug 2020 10:40) (91% - 97%)    PHYSICAL EXAM  General: adult woman in NAD  HEENT: clear oropharynx, anicteric sclera, pink conjunctivae  Neck: supple  CV: normal S1S2 with no murmur rubs or gallops  Lungs: clear to auscultation, no wheezes, no rhales  Abdomen: soft non-tender non-distended, no hepato/splenomegaly  Ext: left leg with edema; dressings at left thigh/hip C/D/I  Skin: no rashes and no petichiae  Neuro: alert and oriented X3 no focal deficits      LABS:                        7.1    12.89 )-----------( 198      ( 22 Aug 2020 09:10 )             22.1     Hemoglobin: 7.1 g/dL (08-22 @ 09:10)  Hemoglobin: 7.9 g/dL (08-21 @ 16:39)  Hemoglobin: 8.8 g/dL (08-21 @ 07:57)  Hemoglobin: 10.4 g/dL (08-20 @ 18:19)  Hemoglobin: 11.6 g/dL (08-20 @ 04:34)    08-22    141  |  111<H>  |  24<H>  ----------------------------<  101<H>  4.0   |  26  |  0.89    Ca    9.1      22 Aug 2020 09:10    TPro  5.4<L>  /  Alb  2.6<L>  /  TBili  0.3  /  DBili  x   /  AST  22  /  ALT  10<L>  /  AlkPhos  64  08-22

## 2020-08-22 NOTE — PROGRESS NOTE ADULT - SUBJECTIVE AND OBJECTIVE BOX
[facility] -- Emelia Rowan Grossman, [place], Levar Quiñonez Savella, Goodger: Office # [phone]    Follow Up:  Cardiac optimization pre/post op     Subjective/Observations: Patient seen and examined. Patient awake and alert, resting comfortably in bed. Tolerating O2 via nasal cannula. No complaints of chest pain, SOB, LE edema, cough. No signs of orthopnea or PND.    REVIEW OF SYSTEMS: All review of systems is negative for eye, ENT, GI, , allergic, dermatologic, musculoskeletal and neurologic except as described above    PAST MEDICAL & SURGICAL HISTORY:  Arthritis  Hyperlipidemia  Osteoporosis  Migraine  Cataract: s/p cataract surgery  Edema of Leg: b/l LE  Hypothyroidism  S/P cataract surgery: bialteral  S/P ovarian cystectomy  S/P hip replacement  S/P knee replacement, bilateral: Partial R) hip  Knee Replacement: b/l    MEDICATIONS  (STANDING):  acetaminophen   Tablet .. 650 milliGRAM(s) Oral every 6 hours  atorvastatin 10 milliGRAM(s) Oral at bedtime  calcium carbonate 1250 mG  + Vitamin D (OsCal 500 + D) 1 Tablet(s) Oral daily  cholecalciferol 2000 Unit(s) Oral daily  enoxaparin Injectable 40 milliGRAM(s) SubCutaneous every 24 hours  escitalopram 10 milliGRAM(s) Oral daily  furosemide   Injectable 10 milliGRAM(s) IV Push once  levothyroxine 75 MICROGram(s) Oral daily  loratadine 10 milliGRAM(s) Oral daily  metoprolol succinate ER 12.5 milliGRAM(s) Oral daily  polyethylene glycol 3350 17 Gram(s) Oral daily  senna 2 Tablet(s) Oral at bedtime    MEDICATIONS  (PRN):  benzocaine 15 mG/menthol 3.6 mG (Sugar-Free) Lozenge 1 Lozenge Oral three times a day PRN Sore Throat  bisacodyl Suppository 10 milliGRAM(s) Rectal daily PRN If no bowel movement  magnesium hydroxide Suspension 30 milliLiter(s) Oral daily PRN Constipation  melatonin 3 milliGRAM(s) Oral at bedtime PRN Insomnia  ondansetron Injectable 4 milliGRAM(s) IV Push every 6 hours PRN Nausea and/or Vomiting  oxyCODONE    IR 5 milliGRAM(s) Oral every 4 hours PRN Moderate Pain (4 - 6)  oxyCODONE    IR 10 milliGRAM(s) Oral every 4 hours PRN Severe Pain (7 - 10)  traMADol 50 milliGRAM(s) Oral every 6 hours PRN Mild Pain (1 - 3)    Allergies  erythromycin (Hives)    Vital Signs Last 24 Hrs  T(C): 36.8 (22 Aug 2020 10:40), Max: 36.8 (21 Aug 2020 20:32)  T(F): 98.2 (22 Aug 2020 10:40), Max: 98.3 (21 Aug 2020 20:32)  HR: 80 (22 Aug 2020 10:40) (80 - 94)  BP: 114/68 (22 Aug 2020 10:40) (94/55 - 120/60)  BP(mean): --  RR: 18 (22 Aug 2020 10:40) (17 - 18)  SpO2: 95% (22 Aug 2020 10:40) (91% - 97%)    I&O's Summary    21 Aug 2020 07:01  -  22 Aug 2020 07:00  --------------------------------------------------------  IN: 0 mL / OUT: 800 mL / NET: -800 mL    22 Aug 2020 07:01  -  22 Aug 2020 10:58  --------------------------------------------------------  IN: 0 mL / OUT: 600 mL / NET: -600 mL    TELE: Not on telemetry   PHYSICAL EXAM:  Appearance: NAD, no distress, alert, Frail   HEENT: Moist Mucous Membranes, Anicteric  Cardiovascular: Regular rate and rhythm, Normal S1 S2, No JVD, + murmurs, No rubs, gallops or clicks  Respiratory: Non-labored, Clear to auscultation, No rales, No rhonchi, No wheezing.   Gastrointestinal:  Soft, Non-tender, + BS  Neurologic: Non-focal  Skin: Warm and dry, Lt hip DSD intact. No visible rashes or ulcers, No ecchymosis, No cyanosis  Musculoskeletal: No clubbing, No cyanosis, No joint swelling/tenderness  Psychiatry: Mood & affect appropriate  Lymph: No peripheral edema.     LABS: All Labs Reviewed:                        7.1    12.89 )-----------( 198      ( 22 Aug 2020 09:10 )             22.1                         7.9    17.77 )-----------( 211      ( 21 Aug 2020 16:39 )             24.7                         8.8    18.13 )-----------( 229      ( 21 Aug 2020 07:57 )             27.4     22 Aug 2020 09:10    141    |  111    |  24     ----------------------------<  101    4.0     |  26     |  0.89   21 Aug 2020 07:57    138    |  106    |  19     ----------------------------<  139    4.4     |  26     |  0.91   20 Aug 2020 18:19    139    |  108    |  16     ----------------------------<  167    3.8     |  25     |  0.81     Ca    9.1        22 Aug 2020 09:10  Ca    8.9        21 Aug 2020 07:57  Ca    8.3        20 Aug 2020 18:19    TPro  5.4    /  Alb  2.6    /  TBili  0.3    /  DBili  x      /  AST  22     /  ALT  10     /  AlkPhos  64     22 Aug 2020 09:10  TPro  5.7    /  Alb  2.9    /  TBili  0.4    /  DBili  x      /  AST  15     /  ALT  16     /  AlkPhos  70     21 Aug 2020 07:57  TPro  6.4    /  Alb  3.5    /  TBili  0.3    /  DBili  .10    /  AST  17     /  ALT  18     /  AlkPhos  85     20 Aug 2020 04:34    12 Lead ECG:   Ventricular Rate 76 BPM  Atrial Rate 76 BPM  P-R Interval 246 ms  QRS Duration 128 ms  Q-T Interval 398 ms  QTC Calculation(Bezet) 447 ms  P Axis 49 degrees  R Axis 72 degrees  T Axis 26 degrees  Diagnosis Line Sinus rhythm with 1st degree AV block  Right bundle branch block  Abnormal ECG  Confirmed by carlos Herman ([id number]) on 8/20/2020 2:18:52 PM (08-19-20 @ 22:51)    < from: TTE Echo Doppler w/o Cont (04.28.19 @ 11:51) >  Dimensions:    LA 2.8       Normal Values: 2.0 - 4.0 cm    Ao 3.0        Normal Values: 2.0 - 3.8 cm  SEPTUM 1.3       Normal Values: 0.6 - 1.2 cm  PWT 1.3       Normal Values: 0.6 - 1.1 cm  LVIDd 4.0         Normal Values: 3.0 - 5.6 cm  LVIDs 2.5         Normal Values: 1.8 - 4.0 cm    OBSERVATIONS:  Mitral Valve: Thickened mitral valve leaflets, trace physiologic MR.  Aortic Valve/Aorta: Sclerotic trileaflet aortic valve with normal   opening. No aortic insufficiency noted  Tricuspid Valve: normal with trace TR.  Pulmonic Valve: Tracepulmonic insufficiency  Left Atrium: normal  Right Atrium: normal  Left Ventricle: normal LV size and systolic function, estimated LVEF of   70 %. Mild left ventricular hypertrophy is present  Right Ventricle: Not well visualized but systolic function appears   grossly normal  Pericardium/Pleura: normal, no significant pericardial effusion.    Conclusion:  Normal left ventricular systolic function with an ejection fraction of   about 70%. Mild left ventricular hypertrophy is present  Right ventricle is not well visualized but systolic function appears   grossly normal  Aortic valve leaflets appear slightly thickened with normal opening, no   aortic insufficiency  Mitral valve leaflets are thickened with trace mitral regurgitation  Normal biatrial size  < end of copied text >    < from: Xray Chest 1 View-PORTABLE IMMEDIATE (08.19.20 @ 22:16) >  Frontal expiratory view of the chest shows the heart to be similar in size. The lungs show questionable small right upper lobe infiltrate and there is no evidence of pneumothorax nor pleural effusion.  IMPRESSION:  Questionable right upper lobe infiltrate. Follow-up study is recommended as clinically warranted.  Thank you for the courtesy of this referral.  < end of copied text >

## 2020-08-22 NOTE — PROGRESS NOTE ADULT - PROBLEM SELECTOR PLAN 1
-LEFT intertrochanteric fracture, POD# 1, s/p left IMN   -Out of bed to chair, ice and elevate LLE.   -Weight bearing as tolerated, physical therapy to work with patient,   - D/C  planning per PT recs   -Pain control with: Tylenol 650 mg PO q6h PRN mild pain, oxycodone IR 2.5 mg q4h for moderate pain, oxycodone IR 5 mg q4h PRN for severe pain   -Senna, Miralax ordered to facilitate bowel movements  -Ortho following Dr Sorto , DVT PPX   -Cardio (Dr. Herman) follow up. -LEFT intertrochanteric fracture, POD#2, s/p left IMN   -Out of bed to chair, ice and elevate LLE.   -Weight bearing as tolerated, physical therapy to work with patient,   - PT rec d/c to [facility]  -Pain control with: Tylenol 650 mg PO q6h PRN mild pain, oxycodone IR 2.5 mg q4h for moderate pain, oxycodone IR 5 mg q4h PRN for severe pain   -Senna, Miralax ordered to facilitate bowel movements  -Ortho following Dr Sorto , DVT PPX   -Cardio (Dr. Herman) follow up. -LEFT intertrochanteric fracture, POD#2, s/p left IMN   -Out of bed to chair, ice and elevate LLE.   -Weight bearing as tolerated, physical therapy to work with patient,   - PT rec d/c to [facility]  -Pain control with: Tylenol 650 mg PO q6h PRN mild pain, oxycodone IR 5 mg q4h for moderate pain, oxycodone IR 10 mg q 6 hr PRN for severe pain   -Senna, Miralax ordered to facilitate bowel movements  -Ortho following Dr Sorto , DVT PPX   -Cardio (Dr. Duckworth ) follow up.

## 2020-08-22 NOTE — PROGRESS NOTE ADULT - PROBLEM SELECTOR PLAN 2
Patient noted to have drop in H/H from 10.4 to 8.8 yesterday, Ac Blood loss anemia 2/2 Hip Fracture   -POD#1 from IMN procedure   -No ecchymosis noted on exam this AM   -Patient has been on IVF, this may have been dilutional or could be 2/2 blood loss & IV Fluids -likely dilutional drop  -IVF discontinued, continue to monitor closely  -Heme/Onc (Dr Olson) consulted ; f/u recs Hgb 7.1 this morning, Ac Blood loss anemia 2/2 Hip Fracture   -will give 2 unit pRBC today, monitor cbc  -POD#2 from IMN procedure   -No ecchymosis noted on exam this AM   -Patient has been on IVF, this may have been dilutional or could be 2/2 blood loss & IV Fluids -likely dilutional drop  -IVF discontinued, continue to monitor closely  -Heme/Onc (Dr Olson) consulted; recs appreciated  -follow up iron studies

## 2020-08-22 NOTE — PROGRESS NOTE ADULT - PROBLEM SELECTOR PLAN 3
Leukocytosis likely reactive with Hip Fx & post op  -Afebrile, CBC in AM   - Leukocytosis likely reactive with Hip Fx & post op  -Afebrile, CBC in AM Leukocytosis likely reactive with Hip Fx & post op, Improving   -Afebrile, CBC in AM

## 2020-08-23 LAB
ALBUMIN SERPL ELPH-MCNC: 2.6 G/DL — LOW (ref 3.3–5)
ALP SERPL-CCNC: 76 U/L — SIGNIFICANT CHANGE UP (ref 40–120)
ALT FLD-CCNC: 10 U/L — LOW (ref 12–78)
ANION GAP SERPL CALC-SCNC: 3 MMOL/L — LOW (ref 5–17)
AST SERPL-CCNC: 21 U/L — SIGNIFICANT CHANGE UP (ref 15–37)
BASOPHILS # BLD AUTO: 0.07 K/UL — SIGNIFICANT CHANGE UP (ref 0–0.2)
BASOPHILS NFR BLD AUTO: 0.5 % — SIGNIFICANT CHANGE UP (ref 0–2)
BILIRUB SERPL-MCNC: 0.8 MG/DL — SIGNIFICANT CHANGE UP (ref 0.2–1.2)
BUN SERPL-MCNC: 23 MG/DL — SIGNIFICANT CHANGE UP (ref 7–23)
CALCIUM SERPL-MCNC: 9 MG/DL — SIGNIFICANT CHANGE UP (ref 8.5–10.1)
CHLORIDE SERPL-SCNC: 109 MMOL/L — HIGH (ref 96–108)
CO2 SERPL-SCNC: 30 MMOL/L — SIGNIFICANT CHANGE UP (ref 22–31)
CREAT SERPL-MCNC: 0.66 MG/DL — SIGNIFICANT CHANGE UP (ref 0.5–1.3)
EOSINOPHIL # BLD AUTO: 1.04 K/UL — HIGH (ref 0–0.5)
EOSINOPHIL NFR BLD AUTO: 7.6 % — HIGH (ref 0–6)
GLUCOSE SERPL-MCNC: 105 MG/DL — HIGH (ref 70–99)
HCT VFR BLD CALC: 30.9 % — LOW (ref 34.5–45)
HGB BLD-MCNC: 10.1 G/DL — LOW (ref 11.5–15.5)
IMM GRANULOCYTES NFR BLD AUTO: 0.7 % — SIGNIFICANT CHANGE UP (ref 0–1.5)
IRON SATN MFR SERPL: 16 UG/DL — LOW (ref 30–160)
IRON SATN MFR SERPL: 7 % — LOW (ref 14–50)
LYMPHOCYTES # BLD AUTO: 1.16 K/UL — SIGNIFICANT CHANGE UP (ref 1–3.3)
LYMPHOCYTES # BLD AUTO: 8.5 % — LOW (ref 13–44)
MCHC RBC-ENTMCNC: 30.3 PG — SIGNIFICANT CHANGE UP (ref 27–34)
MCHC RBC-ENTMCNC: 32.7 GM/DL — SIGNIFICANT CHANGE UP (ref 32–36)
MCV RBC AUTO: 92.8 FL — SIGNIFICANT CHANGE UP (ref 80–100)
MONOCYTES # BLD AUTO: 2.06 K/UL — HIGH (ref 0–0.9)
MONOCYTES NFR BLD AUTO: 15 % — HIGH (ref 2–14)
NEUTROPHILS # BLD AUTO: 9.3 K/UL — HIGH (ref 1.8–7.4)
NEUTROPHILS NFR BLD AUTO: 67.7 % — SIGNIFICANT CHANGE UP (ref 43–77)
NRBC # BLD: 0 /100 WBCS — SIGNIFICANT CHANGE UP (ref 0–0)
PLATELET # BLD AUTO: 218 K/UL — SIGNIFICANT CHANGE UP (ref 150–400)
POTASSIUM SERPL-MCNC: 4 MMOL/L — SIGNIFICANT CHANGE UP (ref 3.5–5.3)
POTASSIUM SERPL-SCNC: 4 MMOL/L — SIGNIFICANT CHANGE UP (ref 3.5–5.3)
PROT SERPL-MCNC: 5.8 G/DL — LOW (ref 6–8.3)
RBC # BLD: 3.33 M/UL — LOW (ref 3.8–5.2)
RBC # FLD: 16.2 % — HIGH (ref 10.3–14.5)
SARS-COV-2 RNA SPEC QL NAA+PROBE: SIGNIFICANT CHANGE UP
SODIUM SERPL-SCNC: 142 MMOL/L — SIGNIFICANT CHANGE UP (ref 135–145)
TIBC SERPL-MCNC: 226 UG/DL — SIGNIFICANT CHANGE UP (ref 220–430)
UIBC SERPL-MCNC: 210 UG/DL — SIGNIFICANT CHANGE UP (ref 110–370)
WBC # BLD: 13.72 K/UL — HIGH (ref 3.8–10.5)
WBC # FLD AUTO: 13.72 K/UL — HIGH (ref 3.8–10.5)

## 2020-08-23 PROCEDURE — 99232 SBSQ HOSP IP/OBS MODERATE 35: CPT

## 2020-08-23 RX ADMIN — ENOXAPARIN SODIUM 40 MILLIGRAM(S): 100 INJECTION SUBCUTANEOUS at 12:18

## 2020-08-23 RX ADMIN — Medication 650 MILLIGRAM(S): at 23:11

## 2020-08-23 RX ADMIN — LORATADINE 10 MILLIGRAM(S): 10 TABLET ORAL at 12:17

## 2020-08-23 RX ADMIN — ESCITALOPRAM OXALATE 10 MILLIGRAM(S): 10 TABLET, FILM COATED ORAL at 12:17

## 2020-08-23 RX ADMIN — Medication 650 MILLIGRAM(S): at 18:05

## 2020-08-23 RX ADMIN — Medication 2000 UNIT(S): at 12:17

## 2020-08-23 RX ADMIN — Medication 12.5 MILLIGRAM(S): at 06:20

## 2020-08-23 RX ADMIN — SENNA PLUS 2 TABLET(S): 8.6 TABLET ORAL at 22:02

## 2020-08-23 RX ADMIN — Medication 1 TABLET(S): at 12:18

## 2020-08-23 RX ADMIN — Medication 650 MILLIGRAM(S): at 00:30

## 2020-08-23 RX ADMIN — OXYCODONE HYDROCHLORIDE 5 MILLIGRAM(S): 5 TABLET ORAL at 23:02

## 2020-08-23 RX ADMIN — TRAMADOL HYDROCHLORIDE 50 MILLIGRAM(S): 50 TABLET ORAL at 09:49

## 2020-08-23 RX ADMIN — Medication 650 MILLIGRAM(S): at 07:01

## 2020-08-23 RX ADMIN — ATORVASTATIN CALCIUM 10 MILLIGRAM(S): 80 TABLET, FILM COATED ORAL at 22:02

## 2020-08-23 RX ADMIN — Medication 75 MICROGRAM(S): at 06:20

## 2020-08-23 RX ADMIN — Medication 650 MILLIGRAM(S): at 18:04

## 2020-08-23 RX ADMIN — OXYCODONE HYDROCHLORIDE 5 MILLIGRAM(S): 5 TABLET ORAL at 22:00

## 2020-08-23 RX ADMIN — Medication 650 MILLIGRAM(S): at 06:20

## 2020-08-23 RX ADMIN — Medication 650 MILLIGRAM(S): at 12:18

## 2020-08-23 RX ADMIN — POLYETHYLENE GLYCOL 3350 17 GRAM(S): 17 POWDER, FOR SOLUTION ORAL at 12:17

## 2020-08-23 RX ADMIN — TRAMADOL HYDROCHLORIDE 50 MILLIGRAM(S): 50 TABLET ORAL at 08:49

## 2020-08-23 RX ADMIN — Medication 650 MILLIGRAM(S): at 13:19

## 2020-08-23 NOTE — PROGRESS NOTE ADULT - SUBJECTIVE AND OBJECTIVE BOX
[facility] -- Emelia Rowan Grossman, Wachsman, Levar Quiñonez Savella, Goodger: Office # [phone]    Follow Up:  Cardiac optimization pre/post op     Subjective/Observations: Patient seen and examined. Patient awake and alert, resting comfortably in bed. Tolerating O2 via nasal cannula. No complaints of chest pain, SOB, LE edema, cough. No signs of orthopnea or PND.    REVIEW OF SYSTEMS: All review of systems is negative for eye, ENT, GI, , allergic, dermatologic, musculoskeletal and neurologic except as described above    PAST MEDICAL & SURGICAL HISTORY:  Arthritis  Hyperlipidemia  Osteoporosis  Migraine  Cataract: s/p cataract surgery  Edema of Leg: b/l LE  Hypothyroidism  S/P cataract surgery: bialteral  S/P ovarian cystectomy  S/P hip replacement  S/P knee replacement, bilateral: Partial R) hip  Knee Replacement: b/l    MEDICATIONS  (STANDING):  acetaminophen   Tablet .. 650 milliGRAM(s) Oral every 6 hours  atorvastatin 10 milliGRAM(s) Oral at bedtime  calcium carbonate 1250 mG  + Vitamin D (OsCal 500 + D) 1 Tablet(s) Oral daily  cholecalciferol 2000 Unit(s) Oral daily  enoxaparin Injectable 40 milliGRAM(s) SubCutaneous every 24 hours  escitalopram 10 milliGRAM(s) Oral daily  levothyroxine 75 MICROGram(s) Oral daily  loratadine 10 milliGRAM(s) Oral daily  metoprolol succinate ER 12.5 milliGRAM(s) Oral daily  polyethylene glycol 3350 17 Gram(s) Oral daily  senna 2 Tablet(s) Oral at bedtime    MEDICATIONS  (PRN):  benzocaine 15 mG/menthol 3.6 mG (Sugar-Free) Lozenge 1 Lozenge Oral three times a day PRN Sore Throat  bisacodyl Suppository 10 milliGRAM(s) Rectal daily PRN If no bowel movement  magnesium hydroxide Suspension 30 milliLiter(s) Oral daily PRN Constipation  melatonin 3 milliGRAM(s) Oral at bedtime PRN Insomnia  ondansetron Injectable 4 milliGRAM(s) IV Push every 6 hours PRN Nausea and/or Vomiting  oxyCODONE    IR 5 milliGRAM(s) Oral every 4 hours PRN Moderate Pain (4 - 6)  oxyCODONE    IR 10 milliGRAM(s) Oral every 4 hours PRN Severe Pain (7 - 10)  traMADol 50 milliGRAM(s) Oral every 6 hours PRN Mild Pain (1 - 3)    Allergies  erythromycin (Hives)    Vital Signs Last 24 Hrs  T(C): 36.3 (23 Aug 2020 04:56), Max: 36.9 (22 Aug 2020 13:20)  T(F): 97.4 (23 Aug 2020 04:56), Max: 98.4 (22 Aug 2020 13:20)  HR: 86 (23 Aug 2020 10:10) (82 - 92)  BP: 136/79 (23 Aug 2020 10:10) (114/68 - 162/70)  BP(mean): --  RR: 18 (23 Aug 2020 04:56) (17 - 18)  SpO2: 99% (23 Aug 2020 10:10) (93% - 100%)    I&O's Summary  22 Aug 2020 07:01  -  23 Aug 2020 07:00  --------------------------------------------------------  IN: 0 mL / OUT: 2200 mL / NET: -2200 mL    23 Aug 2020 07:01  -  23 Aug 2020 11:16  --------------------------------------------------------  IN: 0 mL / OUT: 400 mL / NET: -400 mL    TELE: Not on telemetry   PHYSICAL EXAM:  Appearance: NAD, no distress, alert, Frail   HEENT: Moist Mucous Membranes, Anicteric  Cardiovascular: Regular rate and rhythm, Normal S1 S2, No JVD, + murmurs, No rubs, gallops or clicks  Respiratory: Non-labored, Clear to auscultation, No rales, No rhonchi, No wheezing.   Gastrointestinal:  Soft, Non-tender, + BS  Neurologic: Non-focal  Skin: Warm and dry, Lt hip DSD intact., No ecchymosis, No cyanosis  Musculoskeletal: No clubbing, No cyanosis, No joint swelling/tenderness  Psychiatry: Mood & affect appropriate  Lymph: No peripheral edema.     LABS: All Labs Reviewed:                        10.1   13.72 )-----------( 218      ( 23 Aug 2020 07:33 )             30.9                         10.1   x     )-----------( x        ( 22 Aug 2020 18:40 )             31.2                         7.1    12.89 )-----------( 198      ( 22 Aug 2020 09:10 )             22.1     23 Aug 2020 07:33    142    |  109    |  23     ----------------------------<  105    4.0     |  30     |  0.66   22 Aug 2020 09:10    141    |  111    |  24     ----------------------------<  101    4.0     |  26     |  0.89   21 Aug 2020 07:57    138    |  106    |  19     ----------------------------<  139    4.4     |  26     |  0.91     Ca    9.0        23 Aug 2020 07:33  Ca    9.1        22 Aug 2020 09:10  Ca    8.9        21 Aug 2020 07:57    TPro  5.8    /  Alb  2.6    /  TBili  0.8    /  DBili  x      /  AST  21     /  ALT  10     /  AlkPhos  76     23 Aug 2020 07:33  TPro  5.4    /  Alb  2.6    /  TBili  0.3    /  DBili  x      /  AST  22     /  ALT  10     /  AlkPhos  64     22 Aug 2020 09:10  TPro  5.7    /  Alb  2.9    /  TBili  0.4    /  DBili  x      /  AST  15     /  ALT  16     /  AlkPhos  70     21 Aug 2020 07:57    12 Lead ECG:   Ventricular Rate 76 BPM  Atrial Rate 76 BPM  P-R Interval 246 ms  QRS Duration 128 ms  Q-T Interval 398 ms  QTC Calculation(Bezet) 447 ms  P Axis 49 degrees  R Axis 72 degrees  T Axis 26 degrees  Diagnosis Line Sinus rhythm with 1st degree AV block  Right bundle branch block  Abnormal ECG  Confirmed by carlos Herman ([id number]) on 8/20/2020 2:18:52 PM (08-19-20 @ 22:51)    < from: TTE Echo Doppler w/o Cont (04.28.19 @ 11:51) >  Dimensions:    LA 2.8       Normal Values: 2.0 - 4.0 cm    Ao 3.0        Normal Values: 2.0 - 3.8 cm  SEPTUM 1.3       Normal Values: 0.6 - 1.2 cm  PWT 1.3       Normal Values: 0.6 - 1.1 cm  LVIDd 4.0         Normal Values: 3.0 - 5.6 cm  LVIDs 2.5         Normal Values: 1.8 - 4.0 cm    OBSERVATIONS:  Mitral Valve: Thickened mitral valve leaflets, trace physiologic MR.  Aortic Valve/Aorta: Sclerotic trileaflet aortic valve with normal   opening. No aortic insufficiency noted  Tricuspid Valve: normal with trace TR.  Pulmonic Valve: Tracepulmonic insufficiency  Left Atrium: normal  Right Atrium: normal  Left Ventricle: normal LV size and systolic function, estimated LVEF of   70 %. Mild left ventricular hypertrophy is present  Right Ventricle: Not well visualized but systolic function appears   grossly normal  Pericardium/Pleura: normal, no significant pericardial effusion.    Conclusion:  Normal left ventricular systolic function with an ejection fraction of   about 70%. Mild left ventricular hypertrophy is present  Right ventricle is not well visualized but systolic function appears   grossly normal  Aortic valve leaflets appear slightly thickened with normal opening, no   aortic insufficiency  Mitral valve leaflets are thickened with trace mitral regurgitation  Normal biatrial size  < end of copied text >    < from: Xray Chest 1 View-PORTABLE IMMEDIATE (08.19.20 @ 22:16) >  Frontal expiratory view of the chest shows the heart to be similar in size. The lungs show questionable small right upper lobe infiltrate and there is no evidence of pneumothorax nor pleural effusion.  IMPRESSION:  Questionable right upper lobe infiltrate. Follow-up study is recommended as clinically warranted.  Thank you for the courtesy of this referral.  < end of copied text >

## 2020-08-23 NOTE — PROGRESS NOTE ADULT - PROBLEM SELECTOR PLAN 3
Leukocytosis likely reactive with Hip Fx & post op, Improving   -Afebrile, CBC in AM Leukocytosis likely reactive with Hip Fx & post op, Improving   -Afebrile, CBC in AM  -Pt got jes op Abx x 3 doses

## 2020-08-23 NOTE — PROGRESS NOTE ADULT - PROBLEM SELECTOR PLAN 1
-LEFT intertrochanteric fracture, POD#3, s/p left IMN   -Out of bed to chair, ice and elevate LLE.   -Weight bearing as tolerated, physical therapy to work with patient,   - PT rec d/c to [facility]  -Pain control with: Tylenol 650 mg PO q6h PRN mild pain, oxycodone IR 5 mg q4h for moderate pain, oxycodone IR 10 mg q 6 hr PRN for severe pain   -Senna, Miralax ordered to facilitate bowel movements  -Ortho following Dr Sorto , DVT PPX   -Cardio (Dr. Duckworth ) follow up.

## 2020-08-23 NOTE — PROGRESS NOTE ADULT - SUBJECTIVE AND OBJECTIVE BOX
Patient seen and examined at bedside, resting comfortably. Pain well controlled. Denies headache/dizziness/lightheadedness, chest pain, dyspnea, n/v, numbness/tingling. No complaints at this time. No overnight events.    LABS:                        10.1   13.72 )-----------( 218      ( 23 Aug 2020 07:33 )             30.9     08-23    142  |  109<H>  |  23  ----------------------------<  105<H>  4.0   |  30  |  0.66    Ca    9.0      23 Aug 2020 07:33    TPro  5.8<L>  /  Alb  2.6<L>  /  TBili  0.8  /  DBili  x   /  AST  21  /  ALT  10<L>  /  AlkPhos  76  08-23      Vital Signs Last 24 Hrs  T(C): 36.3 (23 Aug 2020 04:56), Max: 36.9 (22 Aug 2020 13:20)  T(F): 97.4 (23 Aug 2020 04:56), Max: 98.4 (22 Aug 2020 13:20)  HR: 90 (23 Aug 2020 04:56) (80 - 92)  BP: 155/84 (23 Aug 2020 04:56) (114/68 - 162/70)  BP(mean): --  RR: 18 (23 Aug 2020 04:56) (17 - 18)  SpO2: 98% (23 Aug 2020 04:56) (93% - 100%)        Exam:  Gen: NAD, Awake and alert, following commands  LLE:  Dressing clean and dry  +EHL/FHL/TA/GS  SILT L2-S1  +DP  Calf Soft NT  Compartments soft and compressible

## 2020-08-23 NOTE — PROGRESS NOTE ADULT - SUBJECTIVE AND OBJECTIVE BOX
S: Patient without complaints    O: LLE: Dressing: c/d/i             NVI distally             Calves: soft and nontender                          10.1   13.72 )-----------( 218      ( 23 Aug 2020 07:33 )             30.9

## 2020-08-23 NOTE — PROGRESS NOTE ADULT - SUBJECTIVE AND OBJECTIVE BOX
Patient seen and examined;  Chart reviewed and events noted;   feeling better today; completed 2 units pRBC with appropriate rise in H/H post transfusion      MEDICATIONS  (STANDING):  acetaminophen   Tablet .. 650 milliGRAM(s) Oral every 6 hours  atorvastatin 10 milliGRAM(s) Oral at bedtime  calcium carbonate 1250 mG  + Vitamin D (OsCal 500 + D) 1 Tablet(s) Oral daily  cholecalciferol 2000 Unit(s) Oral daily  enoxaparin Injectable 40 milliGRAM(s) SubCutaneous every 24 hours  escitalopram 10 milliGRAM(s) Oral daily  levothyroxine 75 MICROGram(s) Oral daily  loratadine 10 milliGRAM(s) Oral daily  metoprolol succinate ER 12.5 milliGRAM(s) Oral daily  polyethylene glycol 3350 17 Gram(s) Oral daily  senna 2 Tablet(s) Oral at bedtime    MEDICATIONS  (PRN):  benzocaine 15 mG/menthol 3.6 mG (Sugar-Free) Lozenge 1 Lozenge Oral three times a day PRN Sore Throat  bisacodyl Suppository 10 milliGRAM(s) Rectal daily PRN If no bowel movement  magnesium hydroxide Suspension 30 milliLiter(s) Oral daily PRN Constipation  melatonin 3 milliGRAM(s) Oral at bedtime PRN Insomnia  ondansetron Injectable 4 milliGRAM(s) IV Push every 6 hours PRN Nausea and/or Vomiting  oxyCODONE    IR 5 milliGRAM(s) Oral every 4 hours PRN Moderate Pain (4 - 6)  oxyCODONE    IR 10 milliGRAM(s) Oral every 4 hours PRN Severe Pain (7 - 10)  traMADol 50 milliGRAM(s) Oral every 6 hours PRN Mild Pain (1 - 3)      Vital Signs Last 24 Hrs  T(C): 36.3 (23 Aug 2020 04:56), Max: 36.9 (22 Aug 2020 13:20)  T(F): 97.4 (23 Aug 2020 04:56), Max: 98.4 (22 Aug 2020 13:20)  HR: 90 (23 Aug 2020 04:56) (80 - 92)  BP: 155/84 (23 Aug 2020 04:56) (114/68 - 162/70)  RR: 18 (23 Aug 2020 04:56) (17 - 18)  SpO2: 98% (23 Aug 2020 04:56) (93% - 100%)    PHYSICAL EXAM  General: adult in NAD  HEENT: clear oropharynx, anicteric sclera, pink conjunctivae  Neck: supple  CV: normal S1S2 with no murmur rubs or gallops  Lungs: clear to auscultation, no wheezes, no rhales  Abdomen: soft non-tender non-distended, no hepato/splenomegaly  Ext: left hip surgical site with dressing C/D/I  Skin: no rashes and no petichiae  Neuro: alert and oriented X3 no focal deficits      LABS:                        10.1   13.72 )-----------( 218      ( 23 Aug 2020 07:33 )             30.9     Hemoglobin: 10.1 g/dL (08-23 @ 07:33)  Hemoglobin: 10.1 g/dL (08-22 @ 18:40)  Hemoglobin: 7.1 g/dL (08-22 @ 09:10) - Transfused 2 units  Hemoglobin: 7.9 g/dL (08-21 @ 16:39)  Hemoglobin: 8.8 g/dL (08-21 @ 07:57)    WBC Count: 13.72 K/uL (08-23 @ 07:33)  WBC Count: 12.89 K/uL (08-22 @ 09:10)  WBC Count: 17.77 K/uL (08-21 @ 16:39)  WBC Count: 18.13 K/uL (08-21 @ 07:57)  WBC Count: 20.73 K/uL (08-20 @ 18:19)    08-23    142  |  109<H>  |  23  ----------------------------<  105<H>  4.0   |  30  |  0.66    Ca    9.0      23 Aug 2020 07:33    TPro  5.8<L>  /  Alb  2.6<L>  /  TBili  0.8  /  DBili  x   /  AST  21  /  ALT  10<L>  /  AlkPhos  76  08-23

## 2020-08-23 NOTE — PROGRESS NOTE ADULT - SUBJECTIVE AND OBJECTIVE BOX
Patient is a 89y old  Female who presents with a chief complaint of hip fracture (21 Aug 2020 08:20)    89 yo F PMHx Arthritis, HLD, hypothyroidism, osteoporosis presents with left hip pain s/p mechanical slip and fall out of chair. Patient states that she was sitting in her chair and when she went to move she slipped out of her chair hitting her left hip. Per chart review aide states they were sitting talking when pt turned to the left and slid out of her chair onto her left side. Denies any dizziness or pre syncope prior to fall. Patient denies hitting her head. No loss of consciousness.  Patient states she has history of frequent falls. Denies chest pain, SOB, palpitations.     In the ED  VS: T 97.9, HR 76, /76, RR 14, SpO2 99 on RA  Labs: WBC 13.34, COVID negative   EKG: sinus rhythm with first degree AV block and RBBB, similar to prior ekg   Given morphine 2mg IV x1, ofivermev x1, seen by ortho and diagnosed with left intertrochanteric fracture.     INTERVAL HPI:   8/20/20:  Patient seen and examined bedside. No overnight events reported. Patient experiencing left hip discomfort but tolerable, offers no other complaints at this time.   8/21/20: Patient seen and evaluated at bedside. Patient is POD#1 s/p left IMN procedure with ortho. Patient states that she has a mild headache this morning that she attributes to lack of sleep. She states that her pain is well controlled and that she would like to try and get up to walk today. She has not yet had a bowel movement, but states she has been urinating without difficulty. She has been tolerating a diet well. Denies fevers, chills. chest pain, palpitations, SOB, abdominal pain, N/V. H/H drop noted to 8.8/27.4 this AM, patient has been on IV fluid for the past 24 hours, drop may be dilutional with 2/2 Hip Fracture& Post op,  Will consult Dr Olson (Heme/Onc), Leukocytosis & Low H/H . [facility] D/W Dtr -HCP , pt is DNR/DNI, MOLST placed in chart.   8/22/20: Patient seen and evaluated at bedside. Patient is POD#2 left hip IMN. Patient complains of headache this morning. States pain is similar to "migraine headaches" that she gets at home. Patient has not yet had a bowel movement. Denies any fever, chills, chest pain, SOB, abdominal pain, N/V. PT recommended patient discharged to [facility]. Dr. Olson recommends to continue monitoring cbc, transfuse as indicated, and Fe studies. Hgb 7.1 this morning, patient will get 2 units pRBC and lasix 10 IV with hold parameters.  8/23/20: Patient seen and evaluated at bedside. Patient is POD#3 left him IMN. Patient denies any headache this morning. Hgb 10.1 last night s/p 2 units pRBC. Complains of left hip pain but tolerable. Denies fevers, chills, chest pain, SOB, abd pain, N/V.     OVERNIGHT EVENTS: none     Home Medications:  Allegra 60 mg oral tablet: 1 tab(s) orally 2 times a day (20 Aug 2020 01:41)  escitalopram 10 mg oral tablet: 1 tab(s) orally once a day (20 Aug 2020 01:41)  levothyroxine 75 mcg (0.075 mg) oral tablet: 1 tab(s) orally once a day (20 Aug 2020 01:41)  Metoprolol Succinate ER 25 mg oral tablet, extended release: 0.5 tab(s) orally once a day (20 Aug 2020 01:41)  pravastatin 20 mg oral tablet: 1 tab(s) orally once a day (20 Aug 2020 01:41)      MEDICATIONS  (STANDING):  acetaminophen   Tablet .. 650 milliGRAM(s) Oral every 6 hours  atorvastatin 10 milliGRAM(s) Oral at bedtime  calcium carbonate 1250 mG  + Vitamin D (OsCal 500 + D) 1 Tablet(s) Oral daily  ceFAZolin   IVPB 2000 milliGRAM(s) IV Intermittent every 8 hours  cholecalciferol 2000 Unit(s) Oral daily  enoxaparin Injectable 40 milliGRAM(s) SubCutaneous every 24 hours  escitalopram 10 milliGRAM(s) Oral daily  levothyroxine 75 MICROGram(s) Oral daily  loratadine 10 milliGRAM(s) Oral daily  metoprolol succinate ER 12.5 milliGRAM(s) Oral daily  polyethylene glycol 3350 17 Gram(s) Oral daily  senna 2 Tablet(s) Oral at bedtime    MEDICATIONS  (PRN):  benzocaine 15 mG/menthol 3.6 mG (Sugar-Free) Lozenge 1 Lozenge Oral three times a day PRN Sore Throat  HYDROmorphone  Injectable 0.5 milliGRAM(s) IV Push every 4 hours PRN Severe Pain (7 - 10)  magnesium hydroxide Suspension 30 milliLiter(s) Oral daily PRN Constipation  melatonin 3 milliGRAM(s) Oral at bedtime PRN Insomnia  ondansetron Injectable 4 milliGRAM(s) IV Push every 6 hours PRN Nausea and/or Vomiting  oxyCODONE    IR 2.5 milliGRAM(s) Oral every 4 hours PRN Moderate Pain (4 - 6)  oxyCODONE    IR 5 milliGRAM(s) Oral every 4 hours PRN Severe Pain (7 - 10)      erythromycin (Hives)      Social History:  lives home with 24 hour aide  walks with cane or walker  denies smoking   states occasional etoh (20 Aug 2020 00:57)      REVIEW OF SYSTEMS: I have a headache   CONSTITUTIONAL: No fever, No headache, No chills, No fatigue, No myalgia, No Body ache, No Weakness  EYES: No eye pain,  No visual disturbances, No discharge, No Redness  ENMT: No ear pain, No nose bleed, No vertigo; No sinus pain, No throat pain, No Congestion  NECK: No pain, No stiffness  RESPIRATORY: No cough, No wheezing, No hemoptysis, No shortness of breath  CARDIOVASCULAR: No chest pain, No palpitations  GASTROINTESTINAL: No abdominal pain, No epigastric pain. No nausea, No vomiting, No diarrhea, No constipation; [ - ] BM no bowel movement   GENITOURINARY: No dysuria, No frequency, No urgency, No hematuria, No incontinence  NEUROLOGICAL: No headache, No dizziness, No numbness, No tingling, No tremors, No weakness  EXTREMITIES: No Swelling, No Pain, No Edema  SKIN: [  ] No itching, burning, rashes, or lesions   MUSCULOSKELETAL: No joint pain, No joint swelling; No muscle pain, No back pain, No extremity pain  PSYCHIATRIC: No depression, No anxiety, No mood swings, No difficulty sleeping at night  PAIN SCALE: [ x ] None  [  ] Other-  ROS Unable to obtain due to: [  ] Dementia  [  ] Lethargy  [  ] Sedated  [  ] Non verbal  REST OF REVIEW OF SYSTEMS: [ x ] Normal     Vital Signs Last 24 Hrs  T(C): 36.3 (23 Aug 2020 04:56), Max: 36.9 (22 Aug 2020 13:20)  T(F): 97.4 (23 Aug 2020 04:56), Max: 98.4 (22 Aug 2020 13:20)  HR: 90 (23 Aug 2020 04:56) (80 - 92)  BP: 155/84 (23 Aug 2020 04:56) (114/68 - 162/70)  BP(mean): --  RR: 18 (23 Aug 2020 04:56) (17 - 18)  SpO2: 98% (23 Aug 2020 04:56) (93% - 100%)    CAPILLARY BLOOD GLUCOSE      I&O's Summary    20 Aug 2020 07:01  -  21 Aug 2020 07:00  --------------------------------------------------------  IN: 515 mL / OUT: 250 mL / NET: 265 mL      PHYSICAL EXAM:  GENERAL:  [ x ] NAD, [ x ] Well appearing, [  ] Agitated, [  ] Drowsy, [  ] Lethargy, [  ] Confused   HEAD:  [ x ] Normal, [  ] Other  EYES:  [  x] EOMI, [ x ] PERRLA, [x  ] Conjunctiva and sclera clear normal, [  ] Other, [  ] Pallor, [  ] Discharge  ENMT:  [ x ] Normal, [x  ] Moist mucous membranes, [ x ] Good dentition, [x  ] No thrush  NECK:  [ x ] Supple, [ x ] No JVD, [ x ] Normal thyroid, [  ] Lymphadenopathy, [  ] Other  CHEST/LUNG:  [ x ] Clear to auscultation bilaterally, [ x ] Breath Sounds equal B/L, [  ] Poor effort, [ x ] No rales, [ x ] No rhonchi, [x  ] No wheezing  HEART:  [ x ] Regular rate and rhythm, [  ] Tachycardia, [  ] Bradycardia, [  ] Irregular, [x  ] No murmurs, No rubs, No gallops, [  ] PPM in place (Mfr:  )  ABDOMEN:  [ x ] Soft, [ x ] Nontender, [ x ] Nondistended, [ x ] No mass, [ x ] Bowel sounds present, [  ] Obese  NERVOUS SYSTEM:  [ x ] Alert & Oriented x3, [ x ] Nonfocal, [  ] Confusion, [  ] Encephalopathic, [  ] Sedated, [  ] Unable to assess, [  ] Dementia, [  ] Other-  EXTREMITIES:  [ x ] 2+ Peripheral Pulses, No clubbing, No cyanosis,  [  ] Edema B/L lower EXT, [  ] PVD stasis skin changes B/L lower EXT, [  ] Wound, ++ dressing to left hip, clean, dry, intact, no surrounding ecchymosis noted.   LYMPH:  No lymphadenopathy noted  SKIN:  [ x ] No rashes or lesions, [  ] Pressure ulcers, [  ] Ecchymosis, [  ] Skin tears, [  ] Other    DIET: Diet, Full Liquid (08-20-20 @ 17:44)  Diet, Regular (08-21-20 @ 06:13)    LABS:  cret                        10.1   13.72 )-----------( 218      ( 23 Aug 2020 07:33 )             30.9     08-23    142  |  109<H>  |  23  ----------------------------<  105<H>  4.0   |  30  |  0.66    Ca    9.0      23 Aug 2020 07:33    TPro  5.8<L>  /  Alb  2.6<L>  /  TBili  0.8  /  DBili  x   /  AST  21  /  ALT  10<L>  /  AlkPhos  76  08-23    RADIOLOGY & ADDITIONAL TESTS:NONE      HEALTH ISSUES - PROBLEM Dx:  Depression: Depression  Need for prophylactic measure: Need for prophylactic measure  Osteoporosis: Osteoporosis  Hypothyroidism: Hypothyroidism  Hyperlipidemia: Hyperlipidemia  Fracture of femoral neck, left, closed: Fracture of femoral neck, left, closed        Consultant(s) Notes Reviewed:  [ x ] YES     Care Discussed with [ x ] Consultants, [ x ] Patient, [ x ] Family, [  ] HCP, [x  ] , [  ] Social Service, [x  ] RN, [  ] Physical Therapy, [x  ] Palliative Care Team  DVT PPX: [ x ] Lovenox, [  ] SC Heparin, [  ] Coumadin, [  ] Xarelto, [  ] Eliquis, [  ] Pradaxa, [  ] IV Heparin drip, [ x ] SCD, [  ] Ambulation, [  ] Contraindicated 2/2 GI Bleed, [  ] Contraindicated 2/2  Bleed, [  ] Contraindicated 2/2 Brain Bleed  Advanced Directive: [  ] None, [ x ] DNR/DNI Patient is a 89y old  Female who presents with a chief complaint of hip fracture (21 Aug 2020 08:20)    88 yo F PMHx Arthritis, HLD, hypothyroidism, osteoporosis presents with left hip pain s/p mechanical slip and fall out of chair. Patient states that she was sitting in her chair and when she went to move she slipped out of her chair hitting her left hip. Per chart review aide states they were sitting talking when pt turned to the left and slid out of her chair onto her left side. Denies any dizziness or pre syncope prior to fall. Patient denies hitting her head. No loss of consciousness.  Patient states she has history of frequent falls. Denies chest pain, SOB, palpitations.     In the ED  VS: T 97.9, HR 76, /76, RR 14, SpO2 99 on RA  Labs: WBC 13.34, COVID negative   EKG: sinus rhythm with first degree AV block and RBBB, similar to prior ekg   Given morphine 2mg IV x1, ofivermev x1, seen by ortho and diagnosed with left intertrochanteric fracture.     INTERVAL HPI:   8/20/20:  Patient seen and examined bedside. No overnight events reported. Patient experiencing left hip discomfort but tolerable, offers no other complaints at this time.   8/21/20: Patient seen and evaluated at bedside. Patient is POD#1 s/p left IMN procedure with ortho. Patient states that she has a mild headache this morning that she attributes to lack of sleep. She states that her pain is well controlled and that she would like to try and get up to walk today. She has not yet had a bowel movement, but states she has been urinating without difficulty. She has been tolerating a diet well. Denies fevers, chills. chest pain, palpitations, SOB, abdominal pain, N/V. H/H drop noted to 8.8/27.4 this AM, patient has been on IV fluid for the past 24 hours, drop may be dilutional with 2/2 Hip Fracture& Post op,  Will consult Dr Olson (Heme/Onc), Leukocytosis & Low H/H . [facility] D/W Dtr -HCP , pt is DNR/DNI, MOLST placed in chart.   8/22/20: Patient seen and evaluated at bedside. Patient is POD#2 left hip IMN. Patient complains of headache this morning. States pain is similar to "migraine headaches" that she gets at home. Patient has not yet had a bowel movement. Denies any fever, chills, chest pain, SOB, abdominal pain, N/V. PT recommended patient discharged to [facility]. Dr. Olson recommends to continue monitoring cbc, transfuse as indicated, and Fe studies. Hgb 7.1 this morning, patient will get 2 units pRBC and lasix 10 IV with hold parameters.  8/23/20: Patient seen and evaluated at bedside. Patient is POD#3 left him IMN. Patient denies any headache this morning. Hgb 10.1 last night s/p 2 units pRBC. Complains of left hip pain but tolerable. Denies fevers, chills, chest pain, SOB, abd pain, N/V. mild leukocytosis persist.    OVERNIGHT EVENTS: none     Home Medications:  Allegra 60 mg oral tablet: 1 tab(s) orally 2 times a day (20 Aug 2020 01:41)  escitalopram 10 mg oral tablet: 1 tab(s) orally once a day (20 Aug 2020 01:41)  levothyroxine 75 mcg (0.075 mg) oral tablet: 1 tab(s) orally once a day (20 Aug 2020 01:41)  Metoprolol Succinate ER 25 mg oral tablet, extended release: 0.5 tab(s) orally once a day (20 Aug 2020 01:41)  pravastatin 20 mg oral tablet: 1 tab(s) orally once a day (20 Aug 2020 01:41)      MEDICATIONS  (STANDING):  acetaminophen   Tablet .. 650 milliGRAM(s) Oral every 6 hours  atorvastatin 10 milliGRAM(s) Oral at bedtime  calcium carbonate 1250 mG  + Vitamin D (OsCal 500 + D) 1 Tablet(s) Oral daily  ceFAZolin   IVPB 2000 milliGRAM(s) IV Intermittent every 8 hours  cholecalciferol 2000 Unit(s) Oral daily  enoxaparin Injectable 40 milliGRAM(s) SubCutaneous every 24 hours  escitalopram 10 milliGRAM(s) Oral daily  levothyroxine 75 MICROGram(s) Oral daily  loratadine 10 milliGRAM(s) Oral daily  metoprolol succinate ER 12.5 milliGRAM(s) Oral daily  polyethylene glycol 3350 17 Gram(s) Oral daily  senna 2 Tablet(s) Oral at bedtime    MEDICATIONS  (PRN):  benzocaine 15 mG/menthol 3.6 mG (Sugar-Free) Lozenge 1 Lozenge Oral three times a day PRN Sore Throat  HYDROmorphone  Injectable 0.5 milliGRAM(s) IV Push every 4 hours PRN Severe Pain (7 - 10)  magnesium hydroxide Suspension 30 milliLiter(s) Oral daily PRN Constipation  melatonin 3 milliGRAM(s) Oral at bedtime PRN Insomnia  ondansetron Injectable 4 milliGRAM(s) IV Push every 6 hours PRN Nausea and/or Vomiting  oxyCODONE    IR 2.5 milliGRAM(s) Oral every 4 hours PRN Moderate Pain (4 - 6)  oxyCODONE    IR 5 milliGRAM(s) Oral every 4 hours PRN Severe Pain (7 - 10)      erythromycin (Hives)      Social History:  lives home with 24 hour aide  walks with cane or walker  denies smoking   states occasional etoh (20 Aug 2020 00:57)      REVIEW OF SYSTEMS: I have a headache   CONSTITUTIONAL: No fever, No headache, No chills, No fatigue, No myalgia, No Body ache, No Weakness  EYES: No eye pain,  No visual disturbances, No discharge, No Redness  ENMT: No ear pain, No nose bleed, No vertigo; No sinus pain, No throat pain, No Congestion  NECK: No pain, No stiffness  RESPIRATORY: No cough, No wheezing, No hemoptysis, No shortness of breath  CARDIOVASCULAR: No chest pain, No palpitations  GASTROINTESTINAL: No abdominal pain, No epigastric pain. No nausea, No vomiting, No diarrhea, No constipation; [ - ] BM no bowel movement   GENITOURINARY: No dysuria, No frequency, No urgency, No hematuria, No incontinence  NEUROLOGICAL: No headache, No dizziness, No numbness, No tingling, No tremors, No weakness  EXTREMITIES: No Swelling, No Pain, No Edema  SKIN: [  ] No itching, burning, rashes, or lesions   MUSCULOSKELETAL: No joint pain, No joint swelling; No muscle pain, No back pain, No extremity pain  PSYCHIATRIC: No depression, No anxiety, No mood swings, No difficulty sleeping at night  PAIN SCALE: [ x ] None  [  ] Other-  ROS Unable to obtain due to: [  ] Dementia  [  ] Lethargy  [  ] Sedated  [  ] Non verbal  REST OF REVIEW OF SYSTEMS: [ x ] Normal     Vital Signs Last 24 Hrs  T(C): 36.3 (23 Aug 2020 04:56), Max: 36.9 (22 Aug 2020 13:20)  T(F): 97.4 (23 Aug 2020 04:56), Max: 98.4 (22 Aug 2020 13:20)  HR: 90 (23 Aug 2020 04:56) (80 - 92)  BP: 155/84 (23 Aug 2020 04:56) (114/68 - 162/70)  BP(mean): --  RR: 18 (23 Aug 2020 04:56) (17 - 18)  SpO2: 98% (23 Aug 2020 04:56) (93% - 100%)    CAPILLARY BLOOD GLUCOSE      I&O's Summary    20 Aug 2020 07:01  -  21 Aug 2020 07:00  --------------------------------------------------------  IN: 515 mL / OUT: 250 mL / NET: 265 mL      PHYSICAL EXAM:  GENERAL:  [ x ] NAD, [ x ] Well appearing, [  ] Agitated, [  ] Drowsy, [  ] Lethargy, [  ] Confused   HEAD:  [ x ] Normal, [  ] Other  EYES:  [  x] EOMI, [ x ] PERRLA, [x  ] Conjunctiva and sclera clear normal, [  ] Other, [  ] Pallor, [  ] Discharge  ENMT:  [ x ] Normal, [x  ] Moist mucous membranes, [ x ] Good dentition, [x  ] No thrush  NECK:  [ x ] Supple, [ x ] No JVD, [ x ] Normal thyroid, [  ] Lymphadenopathy, [  ] Other  CHEST/LUNG:  [ x ] Clear to auscultation bilaterally, [ x ] Breath Sounds equal B/L, [  ] Poor effort, [ x ] No rales, [ x ] No rhonchi, [x  ] No wheezing  HEART:  [ x ] Regular rate and rhythm, [  ] Tachycardia, [  ] Bradycardia, [  ] Irregular, [x  ] No murmurs, No rubs, No gallops, [  ] PPM in place (Mfr:  )  ABDOMEN:  [ x ] Soft, [ x ] Nontender, [ x ] Nondistended, [ x ] No mass, [ x ] Bowel sounds present, [  ] Obese  NERVOUS SYSTEM:  [ x ] Alert & Oriented x3, [ x ] Nonfocal, [  ] Confusion, [  ] Encephalopathic, [  ] Sedated, [  ] Unable to assess, [  ] Dementia, [  ] Other-  EXTREMITIES:  [ x ] 2+ Peripheral Pulses, No clubbing, No cyanosis,  [  ] Edema B/L lower EXT, [  ] PVD stasis skin changes B/L lower EXT, [  ] Wound, ++ dressing to left hip,Swelling, clean, dry, intact, no surrounding ecchymosis noted.   LYMPH:  No lymphadenopathy noted  SKIN:  [ x ] No rashes or lesions, [  ] Pressure ulcers, [  ] Ecchymosis, [  ] Skin tears, [  ] Other    DIET: Diet, Full Liquid (08-20-20 @ 17:44)  Diet, Regular (08-21-20 @ 06:13)    LABS:  cret                        10.1   13.72 )-----------( 218      ( 23 Aug 2020 07:33 )             30.9     08-23    142  |  109<H>  |  23  ----------------------------<  105<H>  4.0   |  30  |  0.66    Ca    9.0      23 Aug 2020 07:33    TPro  5.8<L>  /  Alb  2.6<L>  /  TBili  0.8  /  DBili  x   /  AST  21  /  ALT  10<L>  /  AlkPhos  76  08-23    RADIOLOGY & ADDITIONAL TESTS:NONE      HEALTH ISSUES - PROBLEM Dx:  Depression: Depression  Need for prophylactic measure: Need for prophylactic measure  Osteoporosis: Osteoporosis  Hypothyroidism: Hypothyroidism  Hyperlipidemia: Hyperlipidemia  Fracture of femoral neck, left, closed: Fracture of femoral neck, left, closed        Consultant(s) Notes Reviewed:  [ x ] YES     Care Discussed with [ x ] Consultants, [ x ] Patient, [ x ] Family, [  ] HCP, [x  ] , [  ] Social Service, [x  ] RN, [  ] Physical Therapy, [x  ] Palliative Care Team  DVT PPX: [ x ] Lovenox, [  ] SC Heparin, [  ] Coumadin, [  ] Xarelto, [  ] Eliquis, [  ] Pradaxa, [  ] IV Heparin drip, [ x ] SCD, [  ] Ambulation, [  ] Contraindicated 2/2 GI Bleed, [  ] Contraindicated 2/2  Bleed, [  ] Contraindicated 2/2 Brain Bleed  Advanced Directive: [  ] None, [ x ] DNR/DNI

## 2020-08-23 NOTE — PROGRESS NOTE ADULT - PROBLEM SELECTOR PLAN 2
Hgb stable s/p 2 units pRBC yesterday, Ac Blood loss anemia 2/2 Hip Fracture   -POD#3 from IMN procedure   -No ecchymosis noted on exam this AM   -Patient has been on IVF, this may have been dilutional or could be 2/2 blood loss & IV Fluids -likely dilutional drop  -IVF discontinued, continue to monitor closely  -Heme/Onc (Dr Olson) consulted; recs appreciated  -follow up iron studies Hgb stable 10.1 in am s/p 2 units pRBC yesterday, Ac Blood loss anemia 2/2 Hip Fracture   -POD#3 from IMN procedure   -No ecchymosis noted on exam this AM   -Patient has been on IVF, this may have been dilutional or could be 2/2 blood loss & IV Fluids -likely dilutional drop  -IVF discontinued, continue to monitor closely  -Heme/Onc (Dr Olson) consulted; recs appreciated  -follow up iron studies Hgb stable 10.1 in am s/p 2 units pRBC yesterday, Ac Blood loss anemia 2/2 Hip Fracture , H/H low stable.  -POD#3 from IMN procedure   -No ecchymosis noted on exam this AM   -Patient has been on IVF, this may have been dilutional or could be 2/2 blood loss & IV Fluids -likely dilutional drop  -IVF discontinued, continue to monitor closely  -Heme/Onc (Dr Olson) consulted; recs appreciated  -follow up iron studies

## 2020-08-23 NOTE — CHART NOTE - NSCHARTNOTEFT_GEN_A_CORE
Assessment/Follow up: Pt A+Ox4 with private aide at bedside; seen for malnutrition follow up. S/p L hip IMN  POD#3. Anemia s/p 2 units PRBC 8/22. Low Fe 16 (8/23)-recommend supplementation per MD order.  Tolerating regular diet well with fair appetite/po intake; usually 50% meal consumed per pt. Drinking almost 2 bottles ensure/day. No report N/V/D. +constipation. Last BM pta. Senna, miralax, dulcolax rx. Encourage po fluids/dietary fiber for bowel regularity. Additional food preferences/meal alternative obtained to maximize po intake. Recommend assistance/encouragement with meals and supplements. Per MOLST pt DNR/DNI/No Tube Feeding. Will remain available prn/routinely.     Factors impacting intake: [ ] none [ ] nausea  [ ] vomiting [ ] diarrhea [ ] constipation  [ ]chewing problems [ ] swallowing issues  [ x] other: lack of appetite, pain    Diet Presciption: Diet, Full Liquid (08-20-20 @ 17:44)  Diet, Regular:   Supplement Feeding Modality:  Oral  Ensure Enlive Cans or Servings Per Day:  1       Frequency:  Three Times a day (08-21-20 @ 16:10)    Intake: fair po intake ~50%    Current Weight: Weight (kg): 49.9 (08-20 @ 14:33)      Pertinent Medications: MEDICATIONS  (STANDING):  acetaminophen   Tablet .. 650 milliGRAM(s) Oral every 6 hours  atorvastatin 10 milliGRAM(s) Oral at bedtime  calcium carbonate 1250 mG  + Vitamin D (OsCal 500 + D) 1 Tablet(s) Oral daily  cholecalciferol 2000 Unit(s) Oral daily  enoxaparin Injectable 40 milliGRAM(s) SubCutaneous every 24 hours  escitalopram 10 milliGRAM(s) Oral daily  levothyroxine 75 MICROGram(s) Oral daily  loratadine 10 milliGRAM(s) Oral daily  metoprolol succinate ER 12.5 milliGRAM(s) Oral daily  polyethylene glycol 3350 17 Gram(s) Oral daily  senna 2 Tablet(s) Oral at bedtime    MEDICATIONS  (PRN):  benzocaine 15 mG/menthol 3.6 mG (Sugar-Free) Lozenge 1 Lozenge Oral three times a day PRN Sore Throat  bisacodyl Suppository 10 milliGRAM(s) Rectal daily PRN If no bowel movement  magnesium hydroxide Suspension 30 milliLiter(s) Oral daily PRN Constipation  melatonin 3 milliGRAM(s) Oral at bedtime PRN Insomnia  ondansetron Injectable 4 milliGRAM(s) IV Push every 6 hours PRN Nausea and/or Vomiting  oxyCODONE    IR 5 milliGRAM(s) Oral every 4 hours PRN Moderate Pain (4 - 6)  oxyCODONE    IR 10 milliGRAM(s) Oral every 4 hours PRN Severe Pain (7 - 10)  traMADol 50 milliGRAM(s) Oral every 6 hours PRN Mild Pain (1 - 3)    Pertinent Labs: 08-23 Na142 mmol/L Glu 105 mg/dL<H> K+ 4.0 mmol/L Cr  0.66 mg/dL BUN 23 mg/dL 08-23 Alb 2.6 g/dL<L>     CAPILLARY BLOOD GLUCOSE        Skin: left hip surgical incision. Micah 16.     Estimated Needs:   [x ] no change since previous assessment  [ ] recalculated:     Previous Nutrition Diagnosis:   [ ] Inadequate Energy Intake [ ]Inadequate Oral Intake [ ] Excessive Energy Intake   [ ] Underweight [ ] Increased Nutrient Needs [ ] Overweight/Obesity   [ ] Altered GI Function [ ] Unintended Weight Loss [ ] Food & Nutrition Related Knowledge Deficit [x ] Malnutrition     Nutrition Diagnosis is [ x] ongoing  [ ] resolved [ ] not applicable     New Nutrition Diagnosis: [ x] not applicable       Interventions:   Recommend  [ ] Change Diet To:  [ ] Nutrition Supplement  [ ] Nutrition Support  [x ] Other: MVI with minerals daily. Fe supplementation per MD.     Monitoring and Evaluation:   [ ] PO intake [ x ] Tolerance to diet prescription [ x ] weights [ x ] labs[ x ] follow up per protocol  [ ] other:

## 2020-08-24 ENCOUNTER — TRANSCRIPTION ENCOUNTER (OUTPATIENT)
Age: 85
End: 2020-08-24

## 2020-08-24 VITALS
DIASTOLIC BLOOD PRESSURE: 72 MMHG | OXYGEN SATURATION: 91 % | TEMPERATURE: 98 F | SYSTOLIC BLOOD PRESSURE: 113 MMHG | RESPIRATION RATE: 18 BRPM | HEART RATE: 85 BPM

## 2020-08-24 DIAGNOSIS — T78.40XA ALLERGY, UNSPECIFIED, INITIAL ENCOUNTER: ICD-10-CM

## 2020-08-24 LAB
ALBUMIN SERPL ELPH-MCNC: 2.3 G/DL — LOW (ref 3.3–5)
ALP SERPL-CCNC: 75 U/L — SIGNIFICANT CHANGE UP (ref 40–120)
ALT FLD-CCNC: 9 U/L — LOW (ref 12–78)
ANION GAP SERPL CALC-SCNC: 3 MMOL/L — LOW (ref 5–17)
AST SERPL-CCNC: 19 U/L — SIGNIFICANT CHANGE UP (ref 15–37)
BASOPHILS # BLD AUTO: 0.06 K/UL — SIGNIFICANT CHANGE UP (ref 0–0.2)
BASOPHILS NFR BLD AUTO: 0.5 % — SIGNIFICANT CHANGE UP (ref 0–2)
BILIRUB SERPL-MCNC: 0.7 MG/DL — SIGNIFICANT CHANGE UP (ref 0.2–1.2)
BUN SERPL-MCNC: 30 MG/DL — HIGH (ref 7–23)
CALCIUM SERPL-MCNC: 8.7 MG/DL — SIGNIFICANT CHANGE UP (ref 8.5–10.1)
CHLORIDE SERPL-SCNC: 109 MMOL/L — HIGH (ref 96–108)
CO2 SERPL-SCNC: 31 MMOL/L — SIGNIFICANT CHANGE UP (ref 22–31)
CREAT SERPL-MCNC: 0.68 MG/DL — SIGNIFICANT CHANGE UP (ref 0.5–1.3)
EOSINOPHIL # BLD AUTO: 1.03 K/UL — HIGH (ref 0–0.5)
EOSINOPHIL NFR BLD AUTO: 8.2 % — HIGH (ref 0–6)
GLUCOSE SERPL-MCNC: 106 MG/DL — HIGH (ref 70–99)
HCT VFR BLD CALC: 28.3 % — LOW (ref 34.5–45)
HGB BLD-MCNC: 9.1 G/DL — LOW (ref 11.5–15.5)
IMM GRANULOCYTES NFR BLD AUTO: 1 % — SIGNIFICANT CHANGE UP (ref 0–1.5)
LYMPHOCYTES # BLD AUTO: 1.67 K/UL — SIGNIFICANT CHANGE UP (ref 1–3.3)
LYMPHOCYTES # BLD AUTO: 13.3 % — SIGNIFICANT CHANGE UP (ref 13–44)
MCHC RBC-ENTMCNC: 30.4 PG — SIGNIFICANT CHANGE UP (ref 27–34)
MCHC RBC-ENTMCNC: 32.2 GM/DL — SIGNIFICANT CHANGE UP (ref 32–36)
MCV RBC AUTO: 94.6 FL — SIGNIFICANT CHANGE UP (ref 80–100)
MONOCYTES # BLD AUTO: 2.13 K/UL — HIGH (ref 0–0.9)
MONOCYTES NFR BLD AUTO: 16.9 % — HIGH (ref 2–14)
NEUTROPHILS # BLD AUTO: 7.58 K/UL — HIGH (ref 1.8–7.4)
NEUTROPHILS NFR BLD AUTO: 60.1 % — SIGNIFICANT CHANGE UP (ref 43–77)
NRBC # BLD: 0 /100 WBCS — SIGNIFICANT CHANGE UP (ref 0–0)
PLATELET # BLD AUTO: 231 K/UL — SIGNIFICANT CHANGE UP (ref 150–400)
POTASSIUM SERPL-MCNC: 4.5 MMOL/L — SIGNIFICANT CHANGE UP (ref 3.5–5.3)
POTASSIUM SERPL-SCNC: 4.5 MMOL/L — SIGNIFICANT CHANGE UP (ref 3.5–5.3)
PROT SERPL-MCNC: 5 G/DL — LOW (ref 6–8.3)
RBC # BLD: 2.99 M/UL — LOW (ref 3.8–5.2)
RBC # FLD: 15.9 % — HIGH (ref 10.3–14.5)
SODIUM SERPL-SCNC: 143 MMOL/L — SIGNIFICANT CHANGE UP (ref 135–145)
WBC # BLD: 12.6 K/UL — HIGH (ref 3.8–10.5)
WBC # FLD AUTO: 12.6 K/UL — HIGH (ref 3.8–10.5)

## 2020-08-24 PROCEDURE — 85018 HEMOGLOBIN: CPT

## 2020-08-24 PROCEDURE — 99232 SBSQ HOSP IP/OBS MODERATE 35: CPT

## 2020-08-24 PROCEDURE — 97116 GAIT TRAINING THERAPY: CPT

## 2020-08-24 PROCEDURE — 97162 PT EVAL MOD COMPLEX 30 MIN: CPT

## 2020-08-24 PROCEDURE — 86850 RBC ANTIBODY SCREEN: CPT

## 2020-08-24 PROCEDURE — 73502 X-RAY EXAM HIP UNI 2-3 VIEWS: CPT

## 2020-08-24 PROCEDURE — 36430 TRANSFUSION BLD/BLD COMPNT: CPT

## 2020-08-24 PROCEDURE — C1713: CPT

## 2020-08-24 PROCEDURE — 73501 X-RAY EXAM HIP UNI 1 VIEW: CPT

## 2020-08-24 PROCEDURE — 97112 NEUROMUSCULAR REEDUCATION: CPT

## 2020-08-24 PROCEDURE — 93005 ELECTROCARDIOGRAM TRACING: CPT

## 2020-08-24 PROCEDURE — 80048 BASIC METABOLIC PNL TOTAL CA: CPT

## 2020-08-24 PROCEDURE — 97530 THERAPEUTIC ACTIVITIES: CPT

## 2020-08-24 PROCEDURE — 85027 COMPLETE CBC AUTOMATED: CPT

## 2020-08-24 PROCEDURE — 99285 EMERGENCY DEPT VISIT HI MDM: CPT | Mod: 25

## 2020-08-24 PROCEDURE — 73552 X-RAY EXAM OF FEMUR 2/>: CPT

## 2020-08-24 PROCEDURE — 71045 X-RAY EXAM CHEST 1 VIEW: CPT

## 2020-08-24 PROCEDURE — 83540 ASSAY OF IRON: CPT

## 2020-08-24 PROCEDURE — 83550 IRON BINDING TEST: CPT

## 2020-08-24 PROCEDURE — 85610 PROTHROMBIN TIME: CPT

## 2020-08-24 PROCEDURE — P9016: CPT

## 2020-08-24 PROCEDURE — 76000 FLUOROSCOPY <1 HR PHYS/QHP: CPT

## 2020-08-24 PROCEDURE — 86769 SARS-COV-2 COVID-19 ANTIBODY: CPT

## 2020-08-24 PROCEDURE — 86901 BLOOD TYPING SEROLOGIC RH(D): CPT

## 2020-08-24 PROCEDURE — C1769: CPT

## 2020-08-24 PROCEDURE — 80076 HEPATIC FUNCTION PANEL: CPT

## 2020-08-24 PROCEDURE — 97110 THERAPEUTIC EXERCISES: CPT

## 2020-08-24 PROCEDURE — 96374 THER/PROPH/DIAG INJ IV PUSH: CPT

## 2020-08-24 PROCEDURE — 73521 X-RAY EXAM HIPS BI 2 VIEWS: CPT

## 2020-08-24 PROCEDURE — 36415 COLL VENOUS BLD VENIPUNCTURE: CPT

## 2020-08-24 PROCEDURE — 87635 SARS-COV-2 COVID-19 AMP PRB: CPT

## 2020-08-24 PROCEDURE — 86923 COMPATIBILITY TEST ELECTRIC: CPT

## 2020-08-24 PROCEDURE — 80053 COMPREHEN METABOLIC PANEL: CPT

## 2020-08-24 PROCEDURE — 85014 HEMATOCRIT: CPT

## 2020-08-24 PROCEDURE — 97165 OT EVAL LOW COMPLEX 30 MIN: CPT

## 2020-08-24 PROCEDURE — 85730 THROMBOPLASTIN TIME PARTIAL: CPT

## 2020-08-24 PROCEDURE — 86900 BLOOD TYPING SEROLOGIC ABO: CPT

## 2020-08-24 PROCEDURE — 97535 SELF CARE MNGMENT TRAINING: CPT

## 2020-08-24 RX ORDER — ENOXAPARIN SODIUM 100 MG/ML
40 INJECTION SUBCUTANEOUS
Qty: 0 | Refills: 0 | DISCHARGE
Start: 2020-08-24

## 2020-08-24 RX ORDER — POLYETHYLENE GLYCOL 3350 17 G/17G
17 POWDER, FOR SOLUTION ORAL
Qty: 0 | Refills: 0 | DISCHARGE
Start: 2020-08-24

## 2020-08-24 RX ORDER — OXYCODONE HYDROCHLORIDE 5 MG/1
1 TABLET ORAL
Qty: 0 | Refills: 0 | DISCHARGE
Start: 2020-08-24

## 2020-08-24 RX ORDER — ACETAMINOPHEN 500 MG
2 TABLET ORAL
Qty: 0 | Refills: 0 | DISCHARGE
Start: 2020-08-24

## 2020-08-24 RX ORDER — SENNA PLUS 8.6 MG/1
2 TABLET ORAL
Qty: 0 | Refills: 0 | DISCHARGE
Start: 2020-08-24

## 2020-08-24 RX ORDER — TRAMADOL HYDROCHLORIDE 50 MG/1
1 TABLET ORAL
Qty: 0 | Refills: 0 | DISCHARGE
Start: 2020-08-24

## 2020-08-24 RX ORDER — CHOLECALCIFEROL (VITAMIN D3) 125 MCG
2000 CAPSULE ORAL
Qty: 0 | Refills: 0 | DISCHARGE
Start: 2020-08-24

## 2020-08-24 RX ORDER — LANOLIN ALCOHOL/MO/W.PET/CERES
1 CREAM (GRAM) TOPICAL
Qty: 0 | Refills: 0 | DISCHARGE
Start: 2020-08-24

## 2020-08-24 RX ORDER — MAGNESIUM HYDROXIDE 400 MG/1
30 TABLET, CHEWABLE ORAL
Qty: 0 | Refills: 0 | DISCHARGE
Start: 2020-08-24

## 2020-08-24 RX ADMIN — Medication 75 MICROGRAM(S): at 06:00

## 2020-08-24 RX ADMIN — Medication 650 MILLIGRAM(S): at 12:00

## 2020-08-24 RX ADMIN — ESCITALOPRAM OXALATE 10 MILLIGRAM(S): 10 TABLET, FILM COATED ORAL at 11:07

## 2020-08-24 RX ADMIN — Medication 1 TABLET(S): at 11:08

## 2020-08-24 RX ADMIN — Medication 12.5 MILLIGRAM(S): at 06:00

## 2020-08-24 RX ADMIN — OXYCODONE HYDROCHLORIDE 5 MILLIGRAM(S): 5 TABLET ORAL at 08:52

## 2020-08-24 RX ADMIN — Medication 650 MILLIGRAM(S): at 00:15

## 2020-08-24 RX ADMIN — ENOXAPARIN SODIUM 40 MILLIGRAM(S): 100 INJECTION SUBCUTANEOUS at 11:07

## 2020-08-24 RX ADMIN — Medication 650 MILLIGRAM(S): at 07:30

## 2020-08-24 RX ADMIN — Medication 2000 UNIT(S): at 11:07

## 2020-08-24 RX ADMIN — POLYETHYLENE GLYCOL 3350 17 GRAM(S): 17 POWDER, FOR SOLUTION ORAL at 11:07

## 2020-08-24 RX ADMIN — Medication 650 MILLIGRAM(S): at 11:07

## 2020-08-24 RX ADMIN — Medication 650 MILLIGRAM(S): at 06:00

## 2020-08-24 RX ADMIN — LORATADINE 10 MILLIGRAM(S): 10 TABLET ORAL at 11:08

## 2020-08-24 RX ADMIN — OXYCODONE HYDROCHLORIDE 5 MILLIGRAM(S): 5 TABLET ORAL at 09:50

## 2020-08-24 NOTE — PROGRESS NOTE ADULT - SUBJECTIVE AND OBJECTIVE BOX
Orthopedics    Patient seen and examined at bedside. Feeling well. Pain controlled. No n/v. No acute events overnight.    Vital Signs Last 24 Hrs  T(C): 36.3 (08-24-20 @ 05:14), Max: 37.2 (08-24-20 @ 00:06)  T(F): 97.4 (08-24-20 @ 05:14), Max: 98.9 (08-24-20 @ 00:06)  HR: 80 (08-24-20 @ 05:14) (80 - 94)  BP: 125/72 (08-24-20 @ 05:14) (102/65 - 136/79)  BP(mean): --  RR: 18 (08-24-20 @ 05:14) (16 - 18)  SpO2: 98% (08-24-20 @ 05:14) (93% - 99%)                        10.1   13.72 )-----------( 218      ( 23 Aug 2020 07:33 )             30.9     23 Aug 2020 07:33    142    |  109    |  23     ----------------------------<  105    4.0     |  30     |  0.66     Ca    9.0        23 Aug 2020 07:33    TPro  5.8    /  Alb  2.6    /  TBili  0.8    /  DBili  x      /  AST  21     /  ALT  10     /  AlkPhos  76     23 Aug 2020 07:33        Exam:  Gen: NAD, resting comfortably  LLE:  Dressing c/d/i  NTTP calves b/l  +EHL/FHL/TA/GS  Patient unable to preform SLR  SILT L2-S1  Compartments soft and compressible  2+ Pulses palpable

## 2020-08-24 NOTE — PROGRESS NOTE ADULT - SUBJECTIVE AND OBJECTIVE BOX
Patient seen and examined;  Chart reviewed and events noted;   sitting in bed; tired this morning after working with PT      MEDICATIONS  (STANDING):  acetaminophen   Tablet .. 650 milliGRAM(s) Oral every 6 hours  atorvastatin 10 milliGRAM(s) Oral at bedtime  calcium carbonate 1250 mG  + Vitamin D (OsCal 500 + D) 1 Tablet(s) Oral daily  cholecalciferol 2000 Unit(s) Oral daily  enoxaparin Injectable 40 milliGRAM(s) SubCutaneous every 24 hours  escitalopram 10 milliGRAM(s) Oral daily  levothyroxine 75 MICROGram(s) Oral daily  loratadine 10 milliGRAM(s) Oral daily  metoprolol succinate ER 12.5 milliGRAM(s) Oral daily  polyethylene glycol 3350 17 Gram(s) Oral daily  senna 2 Tablet(s) Oral at bedtime    MEDICATIONS  (PRN):  benzocaine 15 mG/menthol 3.6 mG (Sugar-Free) Lozenge 1 Lozenge Oral three times a day PRN Sore Throat  bisacodyl Suppository 10 milliGRAM(s) Rectal daily PRN If no bowel movement  magnesium hydroxide Suspension 30 milliLiter(s) Oral daily PRN Constipation  melatonin 3 milliGRAM(s) Oral at bedtime PRN Insomnia  ondansetron Injectable 4 milliGRAM(s) IV Push every 6 hours PRN Nausea and/or Vomiting  oxyCODONE    IR 5 milliGRAM(s) Oral every 4 hours PRN Moderate Pain (4 - 6)  oxyCODONE    IR 10 milliGRAM(s) Oral every 4 hours PRN Severe Pain (7 - 10)  traMADol 50 milliGRAM(s) Oral every 6 hours PRN Mild Pain (1 - 3)      Vital Signs Last 24 Hrs  T(C): 36.3 (24 Aug 2020 05:14), Max: 37.2 (24 Aug 2020 00:06)  T(F): 97.4 (24 Aug 2020 05:14), Max: 98.9 (24 Aug 2020 00:06)  HR: 92 (24 Aug 2020 09:15) (80 - 94)  BP: 137/79 (24 Aug 2020 09:15) (102/65 - 137/79)  RR: 18 (24 Aug 2020 05:14) (16 - 18)  SpO2: 92% (24 Aug 2020 09:15) (92% - 98%)    PHYSICAL EXAM  General: adult elderly woman in NAD  HEENT: clear oropharynx, anicteric sclera, pink conjunctivae  Neck: supple  CV: normal S1S2 with no murmur rubs or gallops  Lungs: clear to auscultation, no wheezes, no rhales  Abdomen: soft non-tender non-distended, no hepato/splenomegaly  Ext: left thigh swelling; dressings C/D/I  Skin: no rashes and no petichiae  Neuro: alert and oriented X3 no focal deficits      LABS:                        9.1    12.60 )-----------( 231      ( 24 Aug 2020 07:08 )             28.3     WBC Count: 12.60 K/uL (08-24 @ 07:08)  WBC Count: 13.72 K/uL (08-23 @ 07:33)  WBC Count: 12.89 K/uL (08-22 @ 09:10)  WBC Count: 17.77 K/uL (08-21 @ 16:39)  WBC Count: 18.13 K/uL (08-21 @ 07:57)    Hemoglobin: 9.1 g/dL (08-24 @ 07:08)  Hemoglobin: 10.1 g/dL (08-23 @ 07:33)  Hemoglobin: 10.1 g/dL (08-22 @ 18:40)  Hemoglobin: 7.1 g/dL (08-22 @ 09:10)  Hemoglobin: 7.9 g/dL (08-21 @ 16:39)    08-24    143  |  109<H>  |  30<H>  ----------------------------<  106<H>  4.5   |  31  |  0.68    Ca    8.7      24 Aug 2020 07:08    TPro  5.0<L>  /  Alb  2.3<L>  /  TBili  0.7  /  DBili  x   /  AST  19  /  ALT  9<L>  /  AlkPhos  75  08-24

## 2020-08-24 NOTE — PROGRESS NOTE ADULT - PROBLEM SELECTOR PROBLEM 1
Fracture of femoral neck, left, closed

## 2020-08-24 NOTE — PROGRESS NOTE ADULT - ASSESSMENT
89 year old female with arthritis, HLD and hypothyroidism, who presents with hip pain after a fall and found to have a left intertrochanteric fracture.    s/p Mechanical Fall  - She is now s/p left hip IMN le.  tolerated procedure well with no obvious cardiac complications  - Pain control  - Encourage incentive spirometer  - PT eval    HLD  - Continue statin    HTN  - BP: BP: 136/79 (08-23-20 @ 10:10) (114/68 - 162/70)  - BP was initially elevated preop, likely reactive to pain, now stable   - Cont home BB with parameters    Anemia  - Hemoglobin <--10.1, <--10.1, <--7.1  - Hematocrit <--30.9, <--31.2, <--22.1  - Trend CBC  - S/p PRBC transfusion    DVT ppx  - Per Primary    - Patient stable from cardiac stand point.   - Monitor and replete lytes, keep K>4, Mg>2.  - All other medical needs as per primary team.  - Other cardiovascular workup will depend on clinical course.  - Will continue to follow.    Aleyda Greco, MS FNP, [facility]  Nurse Practitioner- Cardiology   Spectra #[phone]
88 yo F PMHx Arthritis, HLD, hypothyroidism, osteoporosis presents with left hip pain s/p mechanical slip and fall out of chair. Admitted with left intertrochanteric fracture.
88 yo woman presented after hip fracture, underwent left hip IMN POD #3; noted to have post-operative anemia; suspect multifactorial anemia related to chronic disease, hip fracture and perioperative loss in elderly patient with poor marrow reserve    Recommendations:  - agree with transfusion of pRBC as planned  - anticipated discharge to rehab within next 24 to 48 hours;   - would monitor CBC in light of her being on anticoagulation to prevent post-op thrombotic events  - will follow
88 yo woman presented after hip fracture, underwent left hip IMN POD #5; noted to have post-operative anemia; suspect multifactorial anemia related to chronic disease, hip fracture and perioperative loss in elderly patient with poor marrow reserve; responded to transfusion appropriately    Recommendations:  - anticipated discharge to rehab within next 24  hours;   - would monitor CBC in light of her being on anticoagulation to prevent post-op thrombotic events while she is at rehab  - no hematologic contra-indication to discharge  - will follow
88 yo woman presented after hip fracture, underwent left hip IMN POD #5; noted to have post-operative anemia; suspect multifactorial anemia related to chronic disease, hip fracture and perioperative loss in elderly patient with poor marrow reserve; responded to transfusion appropriately    Recommendations:  - cleared by ortho and anticipated for discharge to rehab within next 24  hours;   - would monitor CBC in light of her being on anticoagulation to prevent post-op thrombotic events while she is at rehab  - no hematologic contra-indication to discharge  - will follow
89 year old female with arthritis, HLD and hypothyroidism, who presents with hip pain after a fall and found to have a left intertrochanteric fracture.    s/p Mechanical Fall  - She is now s/p left hip IMN le.  tolerated procedure well with no obvious cardiac complications  - Pain control  - Encourage incentive spirometer  - PT eval    HLD  - Continue statin    HTN  - BP was initially elevated preop, likely reactive to pain  - Postop, her BP has been on the low side, systolic 's  - Cont home BB with parameters    DVT ppx  - Per Primary    Will continue to follow  All other care per Primary and ortho    Irena Hoover DNP, NP-C  Cardiology   Spectra #[phone]
89 year old female with arthritis, HLD and hypothyroidism, who presents with hip pain after a fall and found to have a left intertrochanteric fracture.    s/p Mechanical Fall  - She is now s/p left hip IMN le.  tolerated procedure well with no obvious cardiac complications  - Pain control  - Encourage incentive spirometer  - PT eval    HLD  - Continue statin    HTN  - BP: BP: 113/72 (08-24-20 @ 11:39) (102/65 - 137/79)  - BP was initially elevated preop, likely reactive to pain, now stable   - Cont home BB with parameters    Anemia  - Hemoglobin <--9.1, <--10.1, <--10.1  - Hematocrit <--28.3, <--30.9, <--31.2  - Trend CBC  - S/p PRBC transfusion    DVT ppx  - Per Primary    - Patient stable from cardiac stand point.   - Monitor and replete lytes, keep K>4, Mg>2.  - All other medical needs as per primary team.  - Other cardiovascular workup will depend on clinical course.  - Will continue to follow.    Aleyda Greco, MS FNP, [facility]  Nurse Practitioner- Cardiology   Spectra #[phone]
89 year old female with arthritis, HLD and hypothyroidism, who presents with hip pain after a fall and found to have a left intertrochanteric fracture.    s/p Mechanical Fall  - She is now s/p left hip IMN le.  tolerated procedure well with no obvious cardiac complications  - Pain control  - Encourage incentive spirometer  - PT eval    HLD  - Continue statin    HTN  - BP: BP: 114/68 (08-22-20 @ 10:40) (94/55 - 120/60)  - BP was initially elevated preop, likely reactive to pain, now stable   - Cont home BB with parameters    Anemia  - H/H trend: Hb 7.1 g/dL, HCT 22.1   - Trend CBC  - PRBC transfusion pending     DVT ppx  - Per Primary    - Monitor and replete lytes, keep K>4, Mg>2.  - All other medical needs as per primary team.  - Other cardiovascular workup will depend on clinical course.  - Will continue to follow.    Aleyda Greco, MS FNP, AGACNP  Nurse Practitioner- Cardiology   Spectra #[phone]
89F s/p IMN of L Hip    -WBAT LLE  -Pain control  -Ice  -Medical management appreciated  -PT/OT  -DVT PPx Lovenox tomorrow  -FU Dispo   -Will discuss with attending and advise if plan changes
89 yo F PMHx Arthritis, HLD, hypothyroidism, osteoporosis presents with left hip pain s/p mechanical slip and fall out of chair. Admitted with left intertrochanteric fracture.
A/P:  Patient is a 89y Female w/ L hip fx s/p L hip IMN Stable POD 1    -Medical management per primary team  -Cards following  -Ice/Elevate  -Incentive Spirometry  -Multimodal Analgesia  -DVT PE ppx  -SCDs  -PT/OT OOB   -WBAT  -Discharge planning - pending PT eval  -Will discuss w/ attending and advise if plan changes
A/P:  Patient is a 89y Female w/ L hip fx s/p L hip IMN Stable POD 2    -Medical management per primary team  -Cards following  -Ice/Elevate  -Incentive Spirometry  -Multimodal Analgesia  -DVT PE ppx  -SCDs  -PT/OT OOB   -WBAT  -Discharge planning - pending PT eval  -Will discuss w/ attending and advise if plan changes
A/P:  Patient is a 89y Female w/ L hip fx s/p L hip IMN Stable POD 3    -Medical management per primary team  -Cards following  -Ice/Elevate  -Incentive Spirometry  -Multimodal Analgesia  -DVT PE ppx  -SCDs  -PT/OT OOB   -WBAT  -Discharge planning: TERRY  -No further orthopedic surgical intervention needed at this time  -Discussed with attending who agrees with plan  -Ortho stable for discharge    -Will discuss w/ attending and advise if plan changes
A/P:  Patient is a 89y Female w/ L hip fx s/p L hip IMN Stable POD 4    -Medical management per primary team  -Cards following  -Ice/Elevate  -Incentive Spirometry  -Multimodal Analgesia  -DVT PE ppx  -SCDs  -PT/OT OOB   -WBAT  -Discharge planning: TERRY  -No further orthopedic surgical intervention needed at this time  -Discussed with attending who agrees with plan  -Ortho stable for discharge
A/P: POD #1 S/P ORIF L hip fracture           Monitor H/H           PT.  WBAT-LLE.  Ambulation training.           Discharge planning for rehab.    Librado Sorto MD
A/P: POD #3 s/p ORIF left hip intertrochanteric fracture                 Continue PT.  WBAT-LLE with assistive devices.                 DVT prophylaxis                 OOB                 Awaiting transfer to rehab.    Librado Sorto MD
Assessment/Plan:  89y Female with LEFT intertrochanteric fracture    -Pain control as needed  -Bedrest, NWB LEFT lower extremity   -Plan for IMN of LEFT hip   -NPO pMN, IVF while NPO  -FU preop labs  -DVT ppx: Please hold all chemical dvt ppx for OR   -Needs medical optimization for OR  -Needs documentation of medical clearance  -Medical management per primary team  -Will discuss with attending, and advise if plan changes
88 yo F PMHx Arthritis, HLD, hypothyroidism, osteoporosis presents with left hip pain s/p mechanical slip and fall out of chair. Admitted with left intertrochanteric fracture.
89 yo F PMHx Arthritis, HLD, hypothyroidism, osteoporosis presents with left hip pain s/p mechanical slip and fall out of chair. Admitted with left intertrochanteric fracture.
88 yo F PMHx Arthritis, HLD, hypothyroidism, osteoporosis presents with left hip pain s/p mechanical slip and fall out of chair. Admitted with left intertrochanteric fracture.

## 2020-08-24 NOTE — PROGRESS NOTE ADULT - PROBLEM SELECTOR PLAN 3
Leukocytosis likely reactive with Hip Fx & post op, Improving   -Afebrile, CBC in AM  -Pt got jes op Abx x 3 doses Leukocytosis likely reactive with Hip Fx & post op, Improving  Daily   -Afebrile, CBC at [facility] in 1 week  -Pt got jes op Abx x 3 doses

## 2020-08-24 NOTE — PROGRESS NOTE ADULT - SUBJECTIVE AND OBJECTIVE BOX
Patient is a 89y old  Female who presents with a chief complaint of hip fracture (21 Aug 2020 08:20)    89 yo F PMHx Arthritis, HLD, hypothyroidism, osteoporosis presents with left hip pain s/p mechanical slip and fall out of chair. Patient states that she was sitting in her chair and when she went to move she slipped out of her chair hitting her left hip. Per chart review aide states they were sitting talking when pt turned to the left and slid out of her chair onto her left side. Denies any dizziness or pre syncope prior to fall. Patient denies hitting her head. No loss of consciousness.  Patient states she has history of frequent falls. Denies chest pain, SOB, palpitations.     In the ED  VS: T 97.9, HR 76, /76, RR 14, SpO2 99 on RA  Labs: WBC 13.34, COVID negative   EKG: sinus rhythm with first degree AV block and RBBB, similar to prior ekg   Given morphine 2mg IV x1, ofivermev x1, seen by ortho and diagnosed with left intertrochanteric fracture.     INTERVAL HPI:   8/20/20:  Patient seen and examined bedside. No overnight events reported. Patient experiencing left hip discomfort but tolerable, offers no other complaints at this time.   8/21/20: Patient seen and evaluated at bedside. Patient is POD#1 s/p left IMN procedure with ortho. Patient states that she has a mild headache this morning that she attributes to lack of sleep. She states that her pain is well controlled and that she would like to try and get up to walk today. She has not yet had a bowel movement, but states she has been urinating without difficulty. She has been tolerating a diet well. Denies fevers, chills. chest pain, palpitations, SOB, abdominal pain, N/V. H/H drop noted to 8.8/27.4 this AM, patient has been on IV fluid for the past 24 hours, drop may be dilutional with 2/2 Hip Fracture& Post op,  Will consult Dr Olson (Heme/Onc), Leukocytosis & Low H/H . [facility] D/W Dtr -HCP , pt is DNR/DNI, MOLST placed in chart.   8/22/20: Patient seen and evaluated at bedside. Patient is POD#2 left hip IMN. Patient complains of headache this morning. States pain is similar to "migraine headaches" that she gets at home. Patient has not yet had a bowel movement. Denies any fever, chills, chest pain, SOB, abdominal pain, N/V. PT recommended patient discharged to [facility]. Dr. Olson recommends to continue monitoring cbc, transfuse as indicated, and Fe studies. Hgb 7.1 this morning, patient will get 2 units pRBC and lasix 10 IV with hold parameters.  8/23/20: Patient seen and evaluated at bedside. Patient is POD#3 left hip IMN. Patient denies any headache this morning. Hgb 10.1 last night s/p 2 units pRBC. Complains of left hip pain but tolerable. Denies fevers, chills, chest pain, SOB, abd pain, N/V. mild leukocytosis persist.  8/24/20: Patient seen and evaluated at bedside. Patient is POD#4 left hip IMN. Patient feels tired but offers no other complaints. Patient states post-surgical pain is controlled. Patient had BM last night. Denies fever, chills, chest pain, SOB, abd pain, N/V. Mild leukocytosis trending down.     OVERNIGHT EVENTS: none     Home Medications:  Allegra 60 mg oral tablet: 1 tab(s) orally 2 times a day (20 Aug 2020 01:41)  escitalopram 10 mg oral tablet: 1 tab(s) orally once a day (20 Aug 2020 01:41)  levothyroxine 75 mcg (0.075 mg) oral tablet: 1 tab(s) orally once a day (20 Aug 2020 01:41)  Metoprolol Succinate ER 25 mg oral tablet, extended release: 0.5 tab(s) orally once a day (20 Aug 2020 01:41)  pravastatin 20 mg oral tablet: 1 tab(s) orally once a day (20 Aug 2020 01:41)    MEDICATIONS  (STANDING):  acetaminophen   Tablet .. 650 milliGRAM(s) Oral every 6 hours  atorvastatin 10 milliGRAM(s) Oral at bedtime  calcium carbonate 1250 mG  + Vitamin D (OsCal 500 + D) 1 Tablet(s) Oral daily  ceFAZolin   IVPB 2000 milliGRAM(s) IV Intermittent every 8 hours  cholecalciferol 2000 Unit(s) Oral daily  enoxaparin Injectable 40 milliGRAM(s) SubCutaneous every 24 hours  escitalopram 10 milliGRAM(s) Oral daily  levothyroxine 75 MICROGram(s) Oral daily  loratadine 10 milliGRAM(s) Oral daily  metoprolol succinate ER 12.5 milliGRAM(s) Oral daily  polyethylene glycol 3350 17 Gram(s) Oral daily  senna 2 Tablet(s) Oral at bedtime    MEDICATIONS  (PRN):  benzocaine 15 mG/menthol 3.6 mG (Sugar-Free) Lozenge 1 Lozenge Oral three times a day PRN Sore Throat  HYDROmorphone  Injectable 0.5 milliGRAM(s) IV Push every 4 hours PRN Severe Pain (7 - 10)  magnesium hydroxide Suspension 30 milliLiter(s) Oral daily PRN Constipation  melatonin 3 milliGRAM(s) Oral at bedtime PRN Insomnia  ondansetron Injectable 4 milliGRAM(s) IV Push every 6 hours PRN Nausea and/or Vomiting  oxyCODONE    IR 2.5 milliGRAM(s) Oral every 4 hours PRN Moderate Pain (4 - 6)  oxyCODONE    IR 5 milliGRAM(s) Oral every 4 hours PRN Severe Pain (7 - 10)      erythromycin (Hives)      Social History:  lives home with 24 hour aide  walks with cane or walker  denies smoking   states occasional etoh (20 Aug 2020 00:57)      REVIEW OF SYSTEMS: I have a headache   CONSTITUTIONAL: No fever, No headache, No chills, No fatigue, No myalgia, No Body ache, No Weakness  EYES: No eye pain,  No visual disturbances, No discharge, No Redness  ENMT: No ear pain, No nose bleed, No vertigo; No sinus pain, No throat pain, No Congestion  NECK: No pain, No stiffness  RESPIRATORY: No cough, No wheezing, No hemoptysis, No shortness of breath  CARDIOVASCULAR: No chest pain, No palpitations  GASTROINTESTINAL: No abdominal pain, No epigastric pain. No nausea, No vomiting, No diarrhea, No constipation; [ + ] BM   GENITOURINARY: No dysuria, No frequency, No urgency, No hematuria, No incontinence  NEUROLOGICAL: No headache, No dizziness, No numbness, No tingling, No tremors, No weakness  EXTREMITIES: No Swelling, No Pain, No Edema  SKIN: [  ] No itching, burning, rashes, or lesions   MUSCULOSKELETAL: No joint pain, No joint swelling; No muscle pain, No back pain, No extremity pain  PSYCHIATRIC: No depression, No anxiety, No mood swings, No difficulty sleeping at night  PAIN SCALE: [ x ] None  [  ] Other-  ROS Unable to obtain due to: [  ] Dementia  [  ] Lethargy  [  ] Sedated  [  ] Non verbal  REST OF REVIEW OF SYSTEMS: [ x ] Normal     Vital Signs Last 24 Hrs  T(C): 36.3 (24 Aug 2020 05:14), Max: 37.2 (24 Aug 2020 00:06)  T(F): 97.4 (24 Aug 2020 05:14), Max: 98.9 (24 Aug 2020 00:06)  HR: 80 (24 Aug 2020 05:14) (80 - 94)  BP: 125/72 (24 Aug 2020 05:14) (102/65 - 136/79)  BP(mean): --  RR: 18 (24 Aug 2020 05:14) (16 - 18)  SpO2: 98% (24 Aug 2020 05:14) (93% - 99%)    CAPILLARY BLOOD GLUCOSE    I&O's Summary    23 Aug 2020 07:01  -  24 Aug 2020 07:00  --------------------------------------------------------  IN: 0 mL / OUT: 1200 mL / NET: -1200 mL      PHYSICAL EXAM:  GENERAL:  [ x ] NAD, [ x ] Well appearing, [  ] Agitated, [  ] Drowsy, [  ] Lethargy, [  ] Confused   HEAD:  [ x ] Normal, [  ] Other  EYES:  [  x] EOMI, [ x ] PERRLA, [x  ] Conjunctiva and sclera clear normal, [  ] Other, [  ] Pallor, [  ] Discharge  ENMT:  [ x ] Normal, [x  ] Moist mucous membranes, [ x ] Good dentition, [x  ] No thrush  NECK:  [ x ] Supple, [ x ] No JVD, [ x ] Normal thyroid, [  ] Lymphadenopathy, [  ] Other  CHEST/LUNG:  [ x ] Clear to auscultation bilaterally, [ x ] Breath Sounds equal B/L, [  ] Poor effort, [ x ] No rales, [ x ] No rhonchi, [x  ] No wheezing  HEART:  [ x ] Regular rate and rhythm, [  ] Tachycardia, [  ] Bradycardia, [  ] Irregular, [x  ] No murmurs, No rubs, No gallops, [  ] PPM in place (Mfr:  )  ABDOMEN:  [ x ] Soft, [ x ] Nontender, [ x ] Nondistended, [ x ] No mass, [ x ] Bowel sounds present, [  ] Obese  NERVOUS SYSTEM:  [ x ] Alert & Oriented x3, [ x ] Nonfocal, [  ] Confusion, [  ] Encephalopathic, [  ] Sedated, [  ] Unable to assess, [  ] Dementia, [  ] Other-  EXTREMITIES:  [ x ] 2+ Peripheral Pulses, No clubbing, No cyanosis,  [  ] Edema B/L lower EXT, [  ] PVD stasis skin changes B/L lower EXT, [  ] Wound, ++ dressing to left hip, Swelling, clean, dry, intact, no surrounding ecchymosis noted.   LYMPH:  No lymphadenopathy noted  SKIN:  [ x ] No rashes or lesions, [  ] Pressure ulcers, [  ] Ecchymosis, [  ] Skin tears, [  ] Other    DIET: Diet, Full Liquid (08-20-20 @ 17:44)  Diet, Regular (08-21-20 @ 06:13)    LABS:  cret                        9.1    12.60 )-----------( 231      ( 24 Aug 2020 07:08 )             28.3     08-24    143  |  109<H>  |  30<H>  ----------------------------<  106<H>  4.5   |  31  |  0.68    Ca    8.7      24 Aug 2020 07:08    TPro  5.0<L>  /  Alb  2.3<L>  /  TBili  0.7  /  DBili  x   /  AST  19  /  ALT  9<L>  /  AlkPhos  75  08-24    RADIOLOGY & ADDITIONAL TESTS:NONE      HEALTH ISSUES - PROBLEM Dx:  Depression: Depression  Need for prophylactic measure: Need for prophylactic measure  Osteoporosis: Osteoporosis  Hypothyroidism: Hypothyroidism  Hyperlipidemia: Hyperlipidemia  Fracture of femoral neck, left, closed: Fracture of femoral neck, left, closed    Consultant(s) Notes Reviewed:  [ x ] YES     Care Discussed with [ x ] Consultants, [ x ] Patient, [ x ] Family, [  ] HCP, [x  ] , [  ] Social Service, [x  ] RN, [  ] Physical Therapy, [x  ] Palliative Care Team  DVT PPX: [ x ] Lovenox, [  ] SC Heparin, [  ] Coumadin, [  ] Xarelto, [  ] Eliquis, [  ] Pradaxa, [  ] IV Heparin drip, [ x ] SCD, [  ] Ambulation, [  ] Contraindicated 2/2 GI Bleed, [  ] Contraindicated 2/2  Bleed, [  ] Contraindicated 2/2 Brain Bleed  Advanced Directive: [  ] None, [ x ] DNR/DNI Patient is a 89y old  Female who presents with a chief complaint of hip fracture (21 Aug 2020 08:20)    88 yo F PMHx Arthritis, HLD, hypothyroidism, osteoporosis presents with left hip pain s/p mechanical slip and fall out of chair. Patient states that she was sitting in her chair and when she went to move she slipped out of her chair hitting her left hip. Per chart review aide states they were sitting talking when pt turned to the left and slid out of her chair onto her left side. Denies any dizziness or pre syncope prior to fall. Patient denies hitting her head. No loss of consciousness.  Patient states she has history of frequent falls. Denies chest pain, SOB, palpitations.     In the ED  VS: T 97.9, HR 76, /76, RR 14, SpO2 99 on RA  Labs: WBC 13.34, COVID negative   EKG: sinus rhythm with first degree AV block and RBBB, similar to prior ekg   Given morphine 2mg IV x1, ofivermev x1, seen by ortho and diagnosed with left intertrochanteric fracture.     INTERVAL HPI:   8/20/20:  Patient seen and examined bedside. No overnight events reported. Patient experiencing left hip discomfort but tolerable, offers no other complaints at this time.   8/21/20: Patient seen and evaluated at bedside. Patient is POD#1 s/p left IMN procedure with ortho. Patient states that she has a mild headache this morning that she attributes to lack of sleep. She states that her pain is well controlled and that she would like to try and get up to walk today. She has not yet had a bowel movement, but states she has been urinating without difficulty. She has been tolerating a diet well. Denies fevers, chills. chest pain, palpitations, SOB, abdominal pain, N/V. H/H drop noted to 8.8/27.4 this AM, patient has been on IV fluid for the past 24 hours, drop may be dilutional with 2/2 Hip Fracture& Post op,  Will consult Dr Olson (Heme/Onc), Leukocytosis & Low H/H . [facility] D/W Dtr -HCP , pt is DNR/DNI, MOLST placed in chart.   8/22/20: Patient seen and evaluated at bedside. Patient is POD#2 left hip IMN. Patient complains of headache this morning. States pain is similar to "migraine headaches" that she gets at home. Patient has not yet had a bowel movement. Denies any fever, chills, chest pain, SOB, abdominal pain, N/V. PT recommended patient discharged to [facility]. Dr. Olson recommends to continue monitoring cbc, transfuse as indicated, and Fe studies. Hgb 7.1 this morning, patient will get 2 units pRBC and lasix 10 IV with hold parameters.  8/23/20: Patient seen and evaluated at bedside. Patient is POD#3 left hip IMN. Patient denies any headache this morning. Hgb 10.1 last night s/p 2 units pRBC. Complains of left hip pain but tolerable. Denies fevers, chills, chest pain, SOB, abd pain, N/V. mild leukocytosis persist.  8/24/20: Patient seen and evaluated at bedside. Patient is POD#4 left hip IMN. Patient feels tired but offers no other complaints. Patient states post-surgical pain is controlled. Patient had BM last night. Denies fever, chills, chest pain, SOB, abd pain, N/V. Mild leukocytosis trending down.     OVERNIGHT EVENTS: none     Home Medications:  Allegra 60 mg oral tablet: 1 tab(s) orally 2 times a day (20 Aug 2020 01:41)  escitalopram 10 mg oral tablet: 1 tab(s) orally once a day (20 Aug 2020 01:41)  levothyroxine 75 mcg (0.075 mg) oral tablet: 1 tab(s) orally once a day (20 Aug 2020 01:41)  Metoprolol Succinate ER 25 mg oral tablet, extended release: 0.5 tab(s) orally once a day (20 Aug 2020 01:41)  pravastatin 20 mg oral tablet: 1 tab(s) orally once a day (20 Aug 2020 01:41)    MEDICATIONS  (STANDING):  acetaminophen   Tablet .. 650 milliGRAM(s) Oral every 6 hours  atorvastatin 10 milliGRAM(s) Oral at bedtime  calcium carbonate 1250 mG  + Vitamin D (OsCal 500 + D) 1 Tablet(s) Oral daily  ceFAZolin   IVPB 2000 milliGRAM(s) IV Intermittent every 8 hours  cholecalciferol 2000 Unit(s) Oral daily  enoxaparin Injectable 40 milliGRAM(s) SubCutaneous every 24 hours  escitalopram 10 milliGRAM(s) Oral daily  levothyroxine 75 MICROGram(s) Oral daily  loratadine 10 milliGRAM(s) Oral daily  metoprolol succinate ER 12.5 milliGRAM(s) Oral daily  polyethylene glycol 3350 17 Gram(s) Oral daily  senna 2 Tablet(s) Oral at bedtime    MEDICATIONS  (PRN):  benzocaine 15 mG/menthol 3.6 mG (Sugar-Free) Lozenge 1 Lozenge Oral three times a day PRN Sore Throat  HYDROmorphone  Injectable 0.5 milliGRAM(s) IV Push every 4 hours PRN Severe Pain (7 - 10)  magnesium hydroxide Suspension 30 milliLiter(s) Oral daily PRN Constipation  melatonin 3 milliGRAM(s) Oral at bedtime PRN Insomnia  ondansetron Injectable 4 milliGRAM(s) IV Push every 6 hours PRN Nausea and/or Vomiting  oxyCODONE    IR 2.5 milliGRAM(s) Oral every 4 hours PRN Moderate Pain (4 - 6)  oxyCODONE    IR 5 milliGRAM(s) Oral every 4 hours PRN Severe Pain (7 - 10)      erythromycin (Hives)      Social History:  lives home with 24 hour aide  walks with cane or walker  denies smoking   states occasional etoh (20 Aug 2020 00:57)      REVIEW OF SYSTEMS:    CONSTITUTIONAL: No fever, No headache, No chills, No fatigue, No myalgia, No Body ache, No Weakness  EYES: No eye pain,  No visual disturbances, No discharge, No Redness  ENMT: No ear pain, No nose bleed, No vertigo; No sinus pain, No throat pain, No Congestion  NECK: No pain, No stiffness  RESPIRATORY: No cough, No wheezing, No hemoptysis, No shortness of breath  CARDIOVASCULAR: No chest pain, No palpitations  GASTROINTESTINAL: No abdominal pain, No epigastric pain. No nausea, No vomiting, No diarrhea, No constipation; [ + ] BM   GENITOURINARY: No dysuria, No frequency, No urgency, No hematuria, No incontinence  NEUROLOGICAL: No headache, No dizziness, No numbness, No tingling, No tremors, No weakness  EXTREMITIES: No Swelling, No Pain, No Edema  SKIN: [  ] No itching, burning, rashes, or lesions   MUSCULOSKELETAL: No joint pain, No joint swelling; No muscle pain, No back pain, No extremity pain  PSYCHIATRIC: No depression, No anxiety, No mood swings, No difficulty sleeping at night  PAIN SCALE: [ x ] None  [  ] Other-  ROS Unable to obtain due to: [  ] Dementia  [  ] Lethargy  [  ] Sedated  [  ] Non verbal  REST OF REVIEW OF SYSTEMS: [ x ] Normal     Vital Signs Last 24 Hrs  T(C): 36.3 (24 Aug 2020 05:14), Max: 37.2 (24 Aug 2020 00:06)  T(F): 97.4 (24 Aug 2020 05:14), Max: 98.9 (24 Aug 2020 00:06)  HR: 80 (24 Aug 2020 05:14) (80 - 94)  BP: 125/72 (24 Aug 2020 05:14) (102/65 - 136/79)  BP(mean): --  RR: 18 (24 Aug 2020 05:14) (16 - 18)  SpO2: 98% (24 Aug 2020 05:14) (93% - 99%)    CAPILLARY BLOOD GLUCOSE    I&O's Summary    23 Aug 2020 07:01  -  24 Aug 2020 07:00  --------------------------------------------------------  IN: 0 mL / OUT: 1200 mL / NET: -1200 mL      PHYSICAL EXAM:  GENERAL:  [ x ] NAD, [ x ] Well appearing, [  ] Agitated, [  ] Drowsy, [  ] Lethargy, [  ] Confused   HEAD:  [ x ] Normal, [  ] Other  EYES:  [  x] EOMI, [ x ] PERRLA, [x  ] Conjunctiva and sclera clear normal, [  ] Other, [  ] Pallor, [  ] Discharge  ENMT:  [ x ] Normal, [x  ] Moist mucous membranes, [ x ] Good dentition, [x  ] No thrush  NECK:  [ x ] Supple, [ x ] No JVD, [ x ] Normal thyroid, [  ] Lymphadenopathy, [  ] Other  CHEST/LUNG:  [ x ] Clear to auscultation bilaterally, [ x ] Breath Sounds equal B/L, [  ] Poor effort, [ x ] No rales, [ x ] No rhonchi, [x  ] No wheezing  HEART:  [ x ] Regular rate and rhythm, [  ] Tachycardia, [  ] Bradycardia, [  ] Irregular, [x  ] No murmurs, No rubs, No gallops, [  ] PPM in place (Mfr:  )  ABDOMEN:  [ x ] Soft, [ x ] Nontender, [ x ] Nondistended, [ x ] No mass, [ x ] Bowel sounds present, [  ] Obese  NERVOUS SYSTEM:  [ x ] Alert & Oriented x3, [ x ] Nonfocal, [  ] Confusion, [  ] Encephalopathic, [  ] Sedated, [  ] Unable to assess, [  ] Dementia, [  ] Other-  EXTREMITIES:  [ x ] 2+ Peripheral Pulses, No clubbing, No cyanosis,  [  ] Edema B/L lower EXT, [  ] PVD stasis skin changes B/L lower EXT, [  ] Wound, ++ dressing to left hip, Swelling, clean, dry, intact, no surrounding ecchymosis noted.   LYMPH:  No lymphadenopathy noted  SKIN:  [ x ] No rashes or lesions, [  ] Pressure ulcers, [  ] Ecchymosis, [  ] Skin tears, [  ] Other    DIET: Diet, Full Liquid (08-20-20 @ 17:44)  Diet, Regular (08-21-20 @ 06:13)    LABS:  cret                        9.1    12.60 )-----------( 231      ( 24 Aug 2020 07:08 )             28.3     08-24    143  |  109<H>  |  30<H>  ----------------------------<  106<H>  4.5   |  31  |  0.68    Ca    8.7      24 Aug 2020 07:08    TPro  5.0<L>  /  Alb  2.3<L>  /  TBili  0.7  /  DBili  x   /  AST  19  /  ALT  9<L>  /  AlkPhos  75  08-24    RADIOLOGY & ADDITIONAL TESTS:NONE      HEALTH ISSUES - PROBLEM Dx:  Depression: Depression  Need for prophylactic measure: Need for prophylactic measure  Osteoporosis: Osteoporosis  Hypothyroidism: Hypothyroidism  Hyperlipidemia: Hyperlipidemia  Fracture of femoral neck, left, closed: Fracture of femoral neck, left, closed    Consultant(s) Notes Reviewed:  [ x ] YES     Care Discussed with [ x ] Consultants, [ x ] Patient, [ x ] Family, [  ] HCP, [x  ] , [  ] Social Service, [x  ] RN, [  ] Physical Therapy, [x  ] Palliative Care Team  DVT PPX: [ x ] Lovenox, [  ] SC Heparin, [  ] Coumadin, [  ] Xarelto, [  ] Eliquis, [  ] Pradaxa, [  ] IV Heparin drip, [ x ] SCD, [  ] Ambulation, [  ] Contraindicated 2/2 GI Bleed, [  ] Contraindicated 2/2  Bleed, [  ] Contraindicated 2/2 Brain Bleed  Advanced Directive: [  ] None, [ x ] DNR/DNI Patient is a 89y old  Female who presents with a chief complaint of hip fracture (21 Aug 2020 08:20)    89 yo F PMHx Arthritis, HLD, hypothyroidism, osteoporosis presents with left hip pain s/p mechanical slip and fall out of chair. Patient states that she was sitting in her chair and when she went to move she slipped out of her chair hitting her left hip. Per chart review aide states they were sitting talking when pt turned to the left and slid out of her chair onto her left side. Denies any dizziness or pre syncope prior to fall. Patient denies hitting her head. No loss of consciousness.  Patient states she has history of frequent falls. Denies chest pain, SOB, palpitations.     In the ED  VS: T 97.9, HR 76, /76, RR 14, SpO2 99 on RA  Labs: WBC 13.34, COVID negative   EKG: sinus rhythm with first degree AV block and RBBB, similar to prior ekg   Given morphine 2mg IV x1, ofivermev x1, seen by ortho and diagnosed with left intertrochanteric fracture.     INTERVAL HPI:   8/20/20:  Patient seen and examined bedside. No overnight events reported. Patient experiencing left hip discomfort but tolerable, offers no other complaints at this time.   8/21/20: Patient seen and evaluated at bedside. Patient is POD#1 s/p left IMN procedure with ortho. Patient states that she has a mild headache this morning that she attributes to lack of sleep. She states that her pain is well controlled and that she would like to try and get up to walk today. She has not yet had a bowel movement, but states she has been urinating without difficulty. She has been tolerating a diet well. Denies fevers, chills. chest pain, palpitations, SOB, abdominal pain, N/V. H/H drop noted to 8.8/27.4 this AM, patient has been on IV fluid for the past 24 hours, drop may be dilutional with 2/2 Hip Fracture& Post op,  Will consult Dr Olson (Heme/Onc), Leukocytosis & Low H/H . [facility] D/W Dtr -HCP , pt is DNR/DNI, MOLST placed in chart.   8/22/20: Patient seen and evaluated at bedside. Patient is POD#2 left hip IMN. Patient complains of headache this morning. States pain is similar to "migraine headaches" that she gets at home. Patient has not yet had a bowel movement. Denies any fever, chills, chest pain, SOB, abdominal pain, N/V. PT recommended patient discharged to [facility]. Dr. Olson recommends to continue monitoring cbc, transfuse as indicated, and Fe studies. Hgb 7.1 this morning, patient will get 2 units pRBC and lasix 10 IV with hold parameters.  8/23/20: Patient seen and evaluated at bedside. Patient is POD#3 left hip IMN. Patient denies any headache this morning. Hgb 10.1 last night s/p 2 units pRBC. Complains of left hip pain but tolerable. Denies fevers, chills, chest pain, SOB, abd pain, N/V. mild leukocytosis persist.  8/24/20: Patient seen and evaluated at bedside. Patient is POD#4 left hip IMN. Patient feels tired but offers no other complaints. Patient states post-surgical pain is controlled. Patient had BM last night. Denies fever, chills, chest pain, SOB, abd pain, N/V. Mild leukocytosis trending down. stable for D/C to [facility].     OVERNIGHT EVENTS: none     Home Medications:  Allegra 60 mg oral tablet: 1 tab(s) orally 2 times a day (20 Aug 2020 01:41)  escitalopram 10 mg oral tablet: 1 tab(s) orally once a day (20 Aug 2020 01:41)  levothyroxine 75 mcg (0.075 mg) oral tablet: 1 tab(s) orally once a day (20 Aug 2020 01:41)  Metoprolol Succinate ER 25 mg oral tablet, extended release: 0.5 tab(s) orally once a day (20 Aug 2020 01:41)  pravastatin 20 mg oral tablet: 1 tab(s) orally once a day (20 Aug 2020 01:41)    MEDICATIONS  (STANDING):  acetaminophen   Tablet .. 650 milliGRAM(s) Oral every 6 hours  atorvastatin 10 milliGRAM(s) Oral at bedtime  calcium carbonate 1250 mG  + Vitamin D (OsCal 500 + D) 1 Tablet(s) Oral daily  ceFAZolin   IVPB 2000 milliGRAM(s) IV Intermittent every 8 hours  cholecalciferol 2000 Unit(s) Oral daily  enoxaparin Injectable 40 milliGRAM(s) SubCutaneous every 24 hours  escitalopram 10 milliGRAM(s) Oral daily  levothyroxine 75 MICROGram(s) Oral daily  loratadine 10 milliGRAM(s) Oral daily  metoprolol succinate ER 12.5 milliGRAM(s) Oral daily  polyethylene glycol 3350 17 Gram(s) Oral daily  senna 2 Tablet(s) Oral at bedtime    MEDICATIONS  (PRN):  benzocaine 15 mG/menthol 3.6 mG (Sugar-Free) Lozenge 1 Lozenge Oral three times a day PRN Sore Throat  HYDROmorphone  Injectable 0.5 milliGRAM(s) IV Push every 4 hours PRN Severe Pain (7 - 10)  magnesium hydroxide Suspension 30 milliLiter(s) Oral daily PRN Constipation  melatonin 3 milliGRAM(s) Oral at bedtime PRN Insomnia  ondansetron Injectable 4 milliGRAM(s) IV Push every 6 hours PRN Nausea and/or Vomiting  oxyCODONE    IR 2.5 milliGRAM(s) Oral every 4 hours PRN Moderate Pain (4 - 6)  oxyCODONE    IR 5 milliGRAM(s) Oral every 4 hours PRN Severe Pain (7 - 10)      erythromycin (Hives)      Social History:  lives home with 24 hour aide  walks with cane or walker  denies smoking   states occasional etoh (20 Aug 2020 00:57)      REVIEW OF SYSTEMS:  i walk today with PT  CONSTITUTIONAL: No fever, No headache, No chills, No fatigue, No myalgia, No Body ache, No Weakness  EYES: No eye pain,  No visual disturbances, No discharge, No Redness  ENMT: No ear pain, No nose bleed, No vertigo; No sinus pain, No throat pain, No Congestion  NECK: No pain, No stiffness  RESPIRATORY: No cough, No wheezing, No hemoptysis, No shortness of breath  CARDIOVASCULAR: No chest pain, No palpitations  GASTROINTESTINAL: No abdominal pain, No epigastric pain. No nausea, No vomiting, No diarrhea, No constipation; [ + ] BM   GENITOURINARY: No dysuria, No frequency, No urgency, No hematuria, No incontinence  NEUROLOGICAL: No headache, No dizziness, No numbness, No tingling, No tremors, No weakness  EXTREMITIES: No Swelling, No Pain, No Edema  SKIN: [  ] No itching, burning, rashes, or lesions   MUSCULOSKELETAL: No joint pain, No joint swelling; No muscle pain, No back pain, No extremity pain  PSYCHIATRIC: No depression, No anxiety, No mood swings, No difficulty sleeping at night  PAIN SCALE: [ x ] None  [  ] Other-  ROS Unable to obtain due to: [  ] Dementia  [  ] Lethargy  [  ] Sedated  [  ] Non verbal  REST OF REVIEW OF SYSTEMS: [ x ] Normal     Vital Signs Last 24 Hrs  T(C): 36.3 (24 Aug 2020 05:14), Max: 37.2 (24 Aug 2020 00:06)  T(F): 97.4 (24 Aug 2020 05:14), Max: 98.9 (24 Aug 2020 00:06)  HR: 80 (24 Aug 2020 05:14) (80 - 94)  BP: 125/72 (24 Aug 2020 05:14) (102/65 - 136/79)  BP(mean): --  RR: 18 (24 Aug 2020 05:14) (16 - 18)  SpO2: 98% (24 Aug 2020 05:14) (93% - 99%)    CAPILLARY BLOOD GLUCOSE    I&O's Summary    23 Aug 2020 07:01  -  24 Aug 2020 07:00  --------------------------------------------------------  IN: 0 mL / OUT: 1200 mL / NET: -1200 mL      PHYSICAL EXAM: i am OK  GENERAL:  [ x ] NAD, [ x ] Well appearing, [  ] Agitated, [  ] Drowsy, [  ] Lethargy, [  ] Confused   HEAD:  [ x ] Normal, [  ] Other  EYES:  [  x] EOMI, [ x ] PERRLA, [x  ] Conjunctiva and sclera clear normal, [  ] Other, [  ] Pallor, [  ] Discharge  ENMT:  [ x ] Normal, [x  ] Moist mucous membranes, [ x ] Good dentition, [x  ] No thrush  NECK:  [ x ] Supple, [ x ] No JVD, [ x ] Normal thyroid, [  ] Lymphadenopathy, [  ] Other  CHEST/LUNG:  [ x ] Clear to auscultation bilaterally, [ x ] Breath Sounds equal B/L, [  ] Poor effort, [ x ] No rales, [ x ] No rhonchi, [x  ] No wheezing  HEART:  [ x ] Regular rate and rhythm, [  ] Tachycardia, [  ] Bradycardia, [  ] Irregular, [x  ] No murmurs, No rubs, No gallops, [  ] PPM in place (Mfr:  )  ABDOMEN:  [ x ] Soft, [ x ] Nontender, [ x ] Nondistended, [ x ] No mass, [ x ] Bowel sounds present, [  ] Obese  NERVOUS SYSTEM:  [ x ] Alert & Oriented x3, [ x ] Nonfocal, [  ] Confusion, [  ] Encephalopathic, [  ] Sedated, [  ] Unable to assess, [  ] Dementia, [  ] Other-  EXTREMITIES:  [ x ] 2+ Peripheral Pulses, No clubbing, No cyanosis,  [  ] Edema B/L lower EXT, [  ] PVD stasis skin changes B/L lower EXT, [  ] Wound, ++ dressing to left hip, Thigh Swelling, clean, dry, intact, no surrounding ecchymosis noted.   LYMPH:  No lymphadenopathy noted  SKIN:  [ x ] No rashes or lesions, [  ] Pressure ulcers, [  ] Ecchymosis, [  ] Skin tears, [  ] Other    DIET: Diet, Full Liquid (08-20-20 @ 17:44)  Diet, Regular (08-21-20 @ 06:13)    LABS:  cret                        9.1    12.60 )-----------( 231      ( 24 Aug 2020 07:08 )             28.3     08-24    143  |  109<H>  |  30<H>  ----------------------------<  106<H>  4.5   |  31  |  0.68    Ca    8.7      24 Aug 2020 07:08    TPro  5.0<L>  /  Alb  2.3<L>  /  TBili  0.7  /  DBili  x   /  AST  19  /  ALT  9<L>  /  AlkPhos  75  08-24    RADIOLOGY & ADDITIONAL TESTS:NONE      HEALTH ISSUES - PROBLEM Dx:  Depression: Depression  Need for prophylactic measure: Need for prophylactic measure  Osteoporosis: Osteoporosis  Hypothyroidism: Hypothyroidism  Hyperlipidemia: Hyperlipidemia  Fracture of femoral neck, left, closed: Fracture of femoral neck, left, closed    Consultant(s) Notes Reviewed:  [ x ] YES     Care Discussed with [ x ] Consultants, [ x ] Patient, [ x ] Family, [  ] HCP, [x  ] , [  ] Social Service, [x  ] RN, [  ] Physical Therapy, [x  ] Palliative Care Team  DVT PPX: [ x ] Lovenox, [  ] SC Heparin, [  ] Coumadin, [  ] Xarelto, [  ] Eliquis, [  ] Pradaxa, [  ] IV Heparin drip, [ x ] SCD, [  ] Ambulation, [  ] Contraindicated 2/2 GI Bleed, [  ] Contraindicated 2/2  Bleed, [  ] Contraindicated 2/2 Brain Bleed  Advanced Directive: [  ] None, [ x ] DNR/DNI

## 2020-08-24 NOTE — PROGRESS NOTE ADULT - ATTENDING COMMENTS
I personally saw and examined the patient in detail.  I have spoken to the above provider regarding the assessment and plan of care.  I reviewed the above assessment and plan of care, and agree.  I have made changes in the body of the note where appropriate.
The patient was personally seen and examined, in addition to being examined and evaluated by NP.  All elements of the note were edited where appropriate.
Pt seen, examined, case & care plan d/w pt, seen, examined, case & care plan d/w pt, dtr at detail.  AM labs   NPO for OR, today.  -Pt is medically optimized for schedule Orthopedic procedure.. Left IM Nail .  - Pre Op IV Antibiotics as per orthopedics  -Post op DVT prophylaxis   -Post op Incentive spirometry.  D/W Dtr at detail. [phone]
pt seen, examined case & care plan d/w pt, residents at detail.  AM labs  D/C Plan as per PT  Dtr wants pt to go home with HCPT
Pt seen, examined, Case & care plan d/w pt, residents at detail.  AM Labs   D/W Dtr at detail.  2 upRBC given today.  PT---> [facility]
Pt seen, examined, Case & care plan d/w pt, residents at detail.  D/C to [facility] today. COVID test- NEG   D/W Dtr at detail.  D/C to [facility]   Total D/C care time is 40 minutes.
Pt seen, examined, Case & care plan d/w pt, residents at detail.  -D/W Dtr at detail about Care plan with [facility], PT---> [facility]   -[facility] discussion with Dtr- pt is  DNR/DNI. D/C Plan to [facility] d/w Pt.  AM labs

## 2020-08-24 NOTE — DISCHARGE NOTE NURSING/CASE MANAGEMENT/SOCIAL WORK - PATIENT PORTAL LINK FT
You can access the FollowMyHealth Patient Portal offered by [facility] by registering at the following website: [email or web address]. By joining [facility]’s FollowMyHealth portal, you will also be able to view your health information using other applications (apps) compatible with our system.

## 2020-08-24 NOTE — PROGRESS NOTE ADULT - PROVIDER SPECIALTY LIST ADULT
Anesthesia
Cardiology
Heme/Onc
Internal Medicine
Internal Medicine
Orthopedics
Cardiology
Internal Medicine

## 2020-08-24 NOTE — PROGRESS NOTE ADULT - PROBLEM SELECTOR PLAN 1
-LEFT intertrochanteric fracture, POD#4, s/p left IMN   -Out of bed to chair, ice and elevate LLE.   -Weight bearing as tolerated, physical therapy to work with patient,   - PT rec d/c to [facility]  -Pain control with: Tylenol 650 mg PO q6h PRN mild pain, oxycodone IR 5 mg q4h for moderate pain, oxycodone IR 10 mg q 6 hr PRN for severe pain   -Senna, Miralax ordered to facilitate bowel movements  -Ortho following Dr Sorto , DVT PPX   -Cardio (Dr. Duckworth ) follow up.

## 2020-08-24 NOTE — PROGRESS NOTE ADULT - PROBLEM SELECTOR PLAN 8
DVT ppx: Lovenox 40 mg daily + SCDs   IMPROVE VTE Individual Risk Assessment          RISK                                                          Points    [  ] Previous VTE                                                3  [  ] Thrombophilia                                             2  [  ] Lower limb paralysis                                   2        (unable to hold up >15 seconds)    [  ] Current Cancer                                            2         (within 6 months)  [  ] Immobilization > 24 hrs                              1  [  ] ICU/CCU stay > 24 hours                            1  [  ] Age > 60                                                    1    IMPROVE VTE Score _____1____

## 2020-08-24 NOTE — PROGRESS NOTE ADULT - SUBJECTIVE AND OBJECTIVE BOX
[facility] -- Emelia Rowan Grossman, [place], Levar Quiñonez Savella, Goodger: Office # [phone]    Follow Up:  Cardiac optimization pre/post op     Subjective/Observations: Patient seen and examined. Patient awake and alert, OOB chair. No complaints of chest pain, SOB, LE edema, cough. No signs of orthopnea or PND.    REVIEW OF SYSTEMS: All review of systems is negative for eye, ENT, GI, , allergic, dermatologic, musculoskeletal and neurologic except as described above    PAST MEDICAL & SURGICAL HISTORY:  Arthritis  Hyperlipidemia  Osteoporosis  Migraine  Cataract: s/p cataract surgery  Edema of Leg: b/l LE  Hypothyroidism  S/P cataract surgery: bialteral  S/P ovarian cystectomy  S/P hip replacement  S/P knee replacement, bilateral: Partial R) hip  Knee Replacement: b/l    MEDICATIONS  (STANDING):  acetaminophen   Tablet .. 650 milliGRAM(s) Oral every 6 hours  atorvastatin 10 milliGRAM(s) Oral at bedtime  calcium carbonate 1250 mG  + Vitamin D (OsCal 500 + D) 1 Tablet(s) Oral daily  cholecalciferol 2000 Unit(s) Oral daily  enoxaparin Injectable 40 milliGRAM(s) SubCutaneous every 24 hours  escitalopram 10 milliGRAM(s) Oral daily  levothyroxine 75 MICROGram(s) Oral daily  loratadine 10 milliGRAM(s) Oral daily  metoprolol succinate ER 12.5 milliGRAM(s) Oral daily  polyethylene glycol 3350 17 Gram(s) Oral daily  senna 2 Tablet(s) Oral at bedtime    MEDICATIONS  (PRN):  benzocaine 15 mG/menthol 3.6 mG (Sugar-Free) Lozenge 1 Lozenge Oral three times a day PRN Sore Throat  bisacodyl Suppository 10 milliGRAM(s) Rectal daily PRN If no bowel movement  magnesium hydroxide Suspension 30 milliLiter(s) Oral daily PRN Constipation  melatonin 3 milliGRAM(s) Oral at bedtime PRN Insomnia  ondansetron Injectable 4 milliGRAM(s) IV Push every 6 hours PRN Nausea and/or Vomiting  oxyCODONE    IR 5 milliGRAM(s) Oral every 4 hours PRN Moderate Pain (4 - 6)  oxyCODONE    IR 10 milliGRAM(s) Oral every 4 hours PRN Severe Pain (7 - 10)  traMADol 50 milliGRAM(s) Oral every 6 hours PRN Mild Pain (1 - 3)    Allergies  erythromycin (Hives)    Vital Signs Last 24 Hrs  T(C): 36.8 (24 Aug 2020 11:39), Max: 37.2 (24 Aug 2020 00:06)  T(F): 98.2 (24 Aug 2020 11:39), Max: 98.9 (24 Aug 2020 00:06)  HR: 85 (24 Aug 2020 11:39) (80 - 94)  BP: 113/72 (24 Aug 2020 11:39) (102/65 - 137/79)  BP(mean): --  RR: 18 (24 Aug 2020 11:39) (16 - 18)  SpO2: 91% (24 Aug 2020 11:39) (91% - 98%)    I&O's Summary    23 Aug 2020 07:01  -  24 Aug 2020 07:00  --------------------------------------------------------  IN: 0 mL / OUT: 1200 mL / NET: -1200 mL    TELE: Not on telemetry   PHYSICAL EXAM:  Appearance: NAD, no distress, alert, Frail   HEENT: Moist Mucous Membranes, Anicteric  Cardiovascular: Regular rate and rhythm, Normal S1 S2, No JVD, + murmurs, No rubs, gallops or clicks  Respiratory: Non-labored, Clear to auscultation, No rales, No rhonchi, No wheezing.   Gastrointestinal:  Soft, Non-tender, + BS  Neurologic: Non-focal  Skin: Warm and dry, + Lt hip incision, No ecchymosis, No cyanosis  Musculoskeletal: No clubbing, No cyanosis, No joint swelling/tenderness  Psychiatry: Mood & affect appropriate  Lymph: No peripheral edema.     LABS: All Labs Reviewed:                        9.1    12.60 )-----------( 231      ( 24 Aug 2020 07:08 )             28.3                         10.1   13.72 )-----------( 218      ( 23 Aug 2020 07:33 )             30.9                         10.1   x     )-----------( x        ( 22 Aug 2020 18:40 )             31.2     24 Aug 2020 07:08    143    |  109    |  30     ----------------------------<  106    4.5     |  31     |  0.68   23 Aug 2020 07:33    142    |  109    |  23     ----------------------------<  105    4.0     |  30     |  0.66   22 Aug 2020 09:10    141    |  111    |  24     ----------------------------<  101    4.0     |  26     |  0.89     Ca    8.7        24 Aug 2020 07:08  Ca    9.0        23 Aug 2020 07:33  Ca    9.1        22 Aug 2020 09:10    TPro  5.0    /  Alb  2.3    /  TBili  0.7    /  DBili  x      /  AST  19     /  ALT  9      /  AlkPhos  75     24 Aug 2020 07:08  TPro  5.8    /  Alb  2.6    /  TBili  0.8    /  DBili  x      /  AST  21     /  ALT  10     /  AlkPhos  76     23 Aug 2020 07:33  TPro  5.4    /  Alb  2.6    /  TBili  0.3    /  DBili  x      /  AST  22     /  ALT  10     /  AlkPhos  64     22 Aug 2020 09:10    12 Lead ECG:   Ventricular Rate 76 BPM  Atrial Rate 76 BPM  P-R Interval 246 ms  QRS Duration 128 ms  Q-T Interval 398 ms  QTC Calculation(Bezet) 447 ms  P Axis 49 degrees  R Axis 72 degrees  T Axis 26 degrees  Diagnosis Line Sinus rhythm with 1st degree AV block  Right bundle branch block  Abnormal ECG  Confirmed by carlos Herman ([id number]) on 8/20/2020 2:18:52 PM (08-19-20 @ 22:51)    < from: TTE Echo Doppler w/o Cont (04.28.19 @ 11:51) >  Dimensions:    LA 2.8       Normal Values: 2.0 - 4.0 cm    Ao 3.0        Normal Values: 2.0 - 3.8 cm  SEPTUM 1.3       Normal Values: 0.6 - 1.2 cm  PWT 1.3       Normal Values: 0.6 - 1.1 cm  LVIDd 4.0         Normal Values: 3.0 - 5.6 cm  LVIDs 2.5         Normal Values: 1.8 - 4.0 cm    OBSERVATIONS:  Mitral Valve: Thickened mitral valve leaflets, trace physiologic MR.  Aortic Valve/Aorta: Sclerotic trileaflet aortic valve with normal   opening. No aortic insufficiency noted  Tricuspid Valve: normal with trace TR.  Pulmonic Valve: Tracepulmonic insufficiency  Left Atrium: normal  Right Atrium: normal  Left Ventricle: normal LV size and systolic function, estimated LVEF of   70 %. Mild left ventricular hypertrophy is present  Right Ventricle: Not well visualized but systolic function appears   grossly normal  Pericardium/Pleura: normal, no significant pericardial effusion.    Conclusion:  Normal left ventricular systolic function with an ejection fraction of   about 70%. Mild left ventricular hypertrophy is present  Right ventricle is not well visualized but systolic function appears   grossly normal  Aortic valve leaflets appear slightly thickened with normal opening, no   aortic insufficiency  Mitral valve leaflets are thickened with trace mitral regurgitation  Normal biatrial size  < end of copied text >    < from: Xray Chest 1 View-PORTABLE IMMEDIATE (08.19.20 @ 22:16) >  Frontal expiratory view of the chest shows the heart to be similar in size. The lungs show questionable small right upper lobe infiltrate and there is no evidence of pneumothorax nor pleural effusion.  IMPRESSION:  Questionable right upper lobe infiltrate. Follow-up study is recommended as clinically warranted.  Thank you for the courtesy of this referral.  < end of copied text >

## 2020-08-24 NOTE — PROGRESS NOTE ADULT - PROBLEM SELECTOR PLAN 7
DVT ppx: Lovenox 40 mg daily + SCDs   -on Allegra at home, will continue Claritin daily while admitted.  IMPROVE VTE Individual Risk Assessment          RISK                                                          Points    [  ] Previous VTE                                                3  [  ] Thrombophilia                                             2  [  ] Lower limb paralysis                                   2        (unable to hold up >15 seconds)    [  ] Current Cancer                                            2         (within 6 months)  [  ] Immobilization > 24 hrs                              1  [  ] ICU/CCU stay > 24 hours                            1  [  ] Age > 60                                                    1    IMPROVE VTE Score _____1____ -on Allegra at home, will continue Claritin daily while admitted

## 2020-08-24 NOTE — PROGRESS NOTE ADULT - PROBLEM SELECTOR PLAN 2
Hgb stable 9.1 in am, Ac Blood loss anemia 2/2 Hip Fracture  -s/p 2 units (8/22)  -POD#4 from IMN procedure   -No ecchymosis noted on exam this AM   -Patient has been on IVF, this may have been dilutional or could be 2/2 blood loss & IV Fluids -likely dilutional drop  -IVF discontinued, continue to monitor closely  -Heme/Onc (Dr Olson) consulted; recs appreciated  -follow up iron studies Hgb stable 9.1 in am, Ac Blood loss anemia 2/2 Hip Fracture  -s/p 2 units (8/22)- Hg 9.1 today,   -POD#4 from IMN procedure   -No ecchymosis noted on exam this AM   -Patient has been on IVF, this may have been dilutional or could be 2/2 blood loss & IV Fluids -likely dilutional drop  -IVF discontinued, continue to monitor closely  -Heme/Onc (Dr Olson) consulted; recs appreciated  -follow up iron studies

## 2020-10-02 NOTE — ED ADULT NURSE NOTE - SUICIDE SCREENING QUESTION 2
[FreeTextEntry1] : 9/9/20 Right lung biopsy:\par - Squamous cell carcinoma, moderately differentiated,keratinizing.  Note: The morphology of the tumor is compatible with ametastasis. 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 They are new when compared to previous exam. Exact etiology is unclear. Differential diagnoses includes metastasis and or infection.  Low-attenuation lesion in the spleen is indeterminate based on this exam. Further evaluation with ultrasound is recommended.\par \par 8/18/20 CT neck: 1. Interval total laryngectomy, partial pharyngectomy, forearm free flap reconstruction of a neopharynx, tracheotomy, total thyroidectomy, and placement of a tracheoesophageal puncture device when compared to the prior studies.\par 2. No evidence of masslike or nodular enhancement in or about the surgical sites.\par 3. Interval bilateral and central neck dissection without evidence of new cervical lymphadenopathy.\par 4. New large consolidative masses within both lung\par \par \par 9/16/19 Pathology:\par 1. Lymph nodes, right neck, levels 2, 3 and 4, neck dissection\par - 1 out of 31 lymph nodes positive for metastatic carcinoma\par - Extranodal extension is identified\par 2. Lymph nodes, right paratracheal and pretracheal, dissection\par - 14 lymph nodes negative for metastatic carcinoma\par - Unremarkable parathyroid tissue\par 3. Lymph nodes, left neck, levels 2, 3 and 4, neck dissection\par - 36 lymph nodes negative for metastatic carcinoma\par 4. Lymph nodes, left paratracheal and pretracheal, dissection\par - 13 lymph nodes negative for metastatic carcinoma\par 5. Skin, stoma, biopsy\par - Skin with solar elastosis and underlying adnexa and subdermal fat\par 6. Additional posterior cricoid mucosal true margin, biopsy\par - Squamous mucosa negative for carcinoma and dysplasia\par 7. Right inferior posterior cricoid mucosal margin, biopsy\par - Squamous mucosa with small focus of high grade dysplasia\par 8. Right lateral pyriform sinus margin, biopsy\par - Squamous mucosa negative for carcinoma and dysplasia\par 9. Right posterior cricoid inferior soft tissue, biopsy\par - Fibroadipose tissue negative for carcinoma\par 10. Larynx, total laryngectomy, thyroidectomy, partial pharyngectomy\par - Squamous cell carcinoma, keratinizing, well-to-moderately differentiated (see synoptic summary)\par - Focal high grade dysplasia/carcinoma in situ\par - Thyroid, negative for carcinoma\par 11. Additional posterior cricoid soft tissue, biopsy\par - Fibrous connective tissue, skeletal muscle, and small focus of minor salivary glands, negative for carcinoma\par 12. Additional posterior cricoid mucosal margin, biopsy\par - Squamous mucosa negative for carcinoma and dysplasia\par 13. Additional left pyriform sinus margin, biopsy\par \par 8/29/19 Pathology:\par NECK, MID ANTERIOR, US GUIDED FNA POSITIVE FOR MALIGNANT CELLS.\par Consistent with squamous cell carcinoma\par LYMPH NODE, NECK, LEVEL 3, RIGHT, US GUIDED FNA SUSPICIOUS FOR MALIGNANCY.\par \par 8/23/19 MRI Orbit Face/Neck: There is an enhancing 4.4 x 4 x 4.6 cm, (AP, TR, CC) mass involving the right aryepiglottic fold, right piriform sinus, right greater than left false and true cords, with extra lateral extension into the anterior strap muscles and thyroid with 1.1 cm subglottic extension concerning neoplasm as detailed above. \par Lymph nodes in III cervical chains, although nodes are less than 1.5 cm is in short axis, the nodes are slightly clustered and heterogeneous and enhancement, metastatic adenopathy is not excluded, please see PET/CT for full description of scottie findings.\par \par 8/22/19 PET/CT: Abnormal FDG-PET/CT scan. \par 1. Large hypermetabolic transglottic mass centered in right larynx with extension across the midline. There is extralaryngeal extension involving the bilateral strap muscles. \par 2. Asymmetric hypermetabolism in cricoarytenoid region, left greater than right, is indeterminate, possibly physiologic. \par 3 Small hypermetabolic lymph nodes in right level III and IV cervical lymph nodes are compatible with metastatic disease. A tiny left level III lymph node is indeterminate. Further evaluation may be performed with ultrasound guided percutaneous needle biopsy. \par 4. No evidence of distant disease.\par \par 8/6/19 CT Neck: 2.6 x 2.8 x 2.7 cm transglottic mass centered in the right larynx with extension across midline and into strap muscles. Histopathologic correlation is recommended. 12 x 7 mm right level 3 lymph node. No

## 2020-12-16 NOTE — ED PROVIDER NOTE - CROS ED RESP ALL NEG
- - - Benzoyl Peroxide Counseling: Patient counseled that medicine may cause skin irritation and bleach clothing.  In the event of skin irritation, the patient was advised to reduce the amount of the drug applied or use it less frequently.   The patient verbalized understanding of the proper use and possible adverse effects of benzoyl peroxide.  All of the patient's questions and concerns were addressed.

## 2021-07-25 NOTE — PROGRESS NOTE ADULT - PROBLEM SELECTOR PLAN 2
infection  on ABX  am WBC pending  pt refusing CT scan imaging  will monitor clinical course and LABS Unknown

## 2021-08-10 NOTE — H&P ADULT - MOUTH
Last refill:1/7/21  Last OV:6/30/21  Upcoming appointment:None      Refill sent with note to schedule physical.    RETURN:  Return in about 13 weeks (around 9/29/2021) for physical.     Tim Rueda MD  6/30/2021      normal mouth and gums/moist

## 2021-10-26 NOTE — SWALLOW BEDSIDE ASSESSMENT ADULT - ASR SWALLOW DENTITION
strengtheing ex's utilizing tband resistance. Challenged with ex's but tolerated well overall. Applied E-stim to offer relief. Treatment Diagnosis: B shoulder/arm pain after activity limiting tolerance to tasks at times. Prognosis: Good    Goals:  Long term goals  Time Frame for Long term goals : 4 weeks  Long term goal 1: Indep HEP for symptom management  Long term goal 2: Pt demo improved overall function by reporting greater than 80% per functional survey score  Long term goal 3: Improve B LT and MT strength 4/5 to allow patient to improve posture awareness in sitting and during activity. Progress toward goals: ongoing, pt is compliant with HEP     POST-PAIN       Pain Rating (0-10 pain scale):   4/10   Location and pain description same as pre-treatment unless indicated. Action: [] NA   [x] Perform HEP  [x] Meds as prescribed  [] Modalities as prescribed   [] Call Physician     Frequency/Duration:  Plan  Times per week: 2  Plan weeks: 4  Current Treatment Recommendations: Strengthening, Modalities, Home Exercise Program, Integrated Dry Needling, Manual Therapy - Soft Tissue Mobilization, Neuromuscular Re-education  Plan Comment: Pt reports may be difficult to attend appts due to work schedule. Pt to continue current HEP. See objective section for any therapeutic exercise changes, additions or modifications this date.     PT Individual Minutes  Time In: 9515  Time Out: 9617  Minutes: 50  Timed Code Treatment Minutes: 38 Minutes  Procedure Minutes: E-stim x 10'      Timed Activity Minutes Units   Ther Ex 38 3     Signature:  Electronically signed by Tierney Montelongo PTA on 10/26/21 at 4:22 PM EDT
present and adequate

## 2022-03-29 NOTE — PROGRESS NOTE ADULT - REASON FOR ADMISSION
[FreeTextEntry1] : Tinnitus management was discussed including diet, environmental factors, medications, herbal supplements, and hearing aids. 
hip fracture
hip fracture - hematology asked to evaluate for anemia
hip fracture; heme consulted for anemia
hip fracture

## 2022-06-02 NOTE — PROVIDER CONTACT NOTE (OTHER) - ASSESSMENT
Caller: Danni Figueroa    Relationship to patient: Self    Best call back number: [phone]    Chief complaint:CANC./ANA., AS PATIENT ANASTASIYA. TWO APPTS. AT THE SAME TIME.    Type of visit: VITALS ONLY/FOLLOW UP    Requested date: M, W OR F    If rescheduling, when is the original appointment: 6/7/2022               had lunch   asymptomatic

## 2022-08-15 NOTE — CONSULT NOTE ADULT - PROBLEM/RECOMMENDATION-2
DISPLAY PLAN FREE TEXT
After discharge, please stay on pelvic rest for 6 weeks, meaning no sexual intercourse, no tampons and no douching.  No driving for 2 weeks as women can loose a lot of blood during delivery and there is a possibility of being lightheaded/fainting.  No lifting objects heavier than baby for two weeks.  Expect to have vaginal bleeding/spotting for up to six weeks. The bleeding should get lighter and more white/light brown with time.  For bleeding soaking more than a pad an hour or passing clots greater than the size of your fist, come in to the emergency department.    Follow up with your doctor in 6 weeks.

## 2022-10-14 NOTE — ED ADULT NURSE NOTE - CARDIO WDL
Normal rate, regular rhythm Enbrel Counseling:  I discussed with the patient the risks of etanercept including but not limited to myelosuppression, immunosuppression, autoimmune hepatitis, demyelinating diseases, lymphoma, and infections.  The patient understands that monitoring is required including a PPD at baseline and must alert us or the primary physician if symptoms of infection or other concerning signs are noted.

## 2023-07-28 NOTE — PATIENT PROFILE ADULT - VISION (WITH CORRECTIVE LENSES IF THE PATIENT USUALLY WEARS THEM):
Patient is a&OX4, RA, patient is up with 2 and rolling walker. Voiding freely, BM today. Patient has clearances to discharge today to rehab from surgical, medical and PT/OT. Discharge went over with patient and paperwork given to transport. This nurse called report to incoming nurse at rehab    Problem: Patient Centered Care  Goal: Patient preferences are identified and integrated in the patient's plan of care  Description: Interventions:  - What would you like us to know as we care for you?  From home with spouse  - Provide timely, complete, and accurate information to patient/family  - Incorporate patient and family knowledge, values, beliefs, and cultural backgrounds into the planning and delivery of care  - Encourage patient/family to participate in care and decision-making at the level they choose  - Honor patient and family perspectives and choices  Outcome: Adequate for Discharge Partially impaired: cannot see medication labels or newsprint, but can see obstacles in path, and the surrounding layout; can count fingers at arm's length

## 2023-10-24 NOTE — H&P ADULT - BREASTS
not examined Isotretinoin Counseling: Patient should get monthly blood tests, not donate blood, not drive at night if vision affected, not share medication, and not undergo elective surgery for 6 months after tx completed. Side effects reviewed, pt to contact office should one occur.

## 2024-01-12 NOTE — ED ADULT NURSE NOTE - ED CARDIAC RATE
No care due was identified.  Health [facility] Embedded Care Due Messages. Reference number: [id number].   1/12/2024 10:59:36 AM CST   tachycardic

## 2024-03-26 NOTE — CONSULT NOTE ADULT - NS PRO AD PATIENT TYPE
----- Message from Dave Gutierrez DO sent at 3/25/2024  6:44 AM CDT -----  Normal coronary arteries  
Called pt. Updated on results and recommendations per Dr. Gutierrez. Patient verbalizes understanding with no further questions or concerns at this time.     
Medical Orders for Life-Sustaining Treatment (MOLST)

## 2024-04-17 ENCOUNTER — NON-APPOINTMENT (OUTPATIENT)
Age: 89
End: 2024-04-17

## 2024-04-17 DIAGNOSIS — M79.675 PAIN IN RIGHT TOE(S): ICD-10-CM

## 2024-04-17 DIAGNOSIS — M79.671 PAIN IN RIGHT FOOT: ICD-10-CM

## 2024-04-17 DIAGNOSIS — I70.203 UNSPECIFIED ATHEROSCLEROSIS OF NATIVE ARTERIES OF EXTREMITIES, BILATERAL LEGS: ICD-10-CM

## 2024-04-17 DIAGNOSIS — B35.1 TINEA UNGUIUM: ICD-10-CM

## 2024-04-17 DIAGNOSIS — M79.674 PAIN IN RIGHT TOE(S): ICD-10-CM

## 2024-04-17 DIAGNOSIS — L84 CORNS AND CALLOSITIES: ICD-10-CM

## 2024-04-17 DIAGNOSIS — Z87.39 PERSONAL HISTORY OF OTHER DISEASES OF THE MUSCULOSKELETAL SYSTEM AND CONNECTIVE TISSUE: ICD-10-CM

## 2024-04-17 DIAGNOSIS — Z86.39 PERSONAL HISTORY OF OTHER ENDOCRINE, NUTRITIONAL AND METABOLIC DISEASE: ICD-10-CM

## 2024-06-05 ENCOUNTER — NON-APPOINTMENT (OUTPATIENT)
Age: 89
End: 2024-06-05

## 2024-07-30 ENCOUNTER — APPOINTMENT (OUTPATIENT)
Dept: PODIATRY | Facility: CLINIC | Age: 89
End: 2024-07-30

## 2024-07-30 DIAGNOSIS — Z87.440 PERSONAL HISTORY OF URINARY (TRACT) INFECTIONS: ICD-10-CM

## 2024-07-30 DIAGNOSIS — Z87.891 PERSONAL HISTORY OF NICOTINE DEPENDENCE: ICD-10-CM

## 2024-07-30 DIAGNOSIS — H91.90 UNSPECIFIED HEARING LOSS, UNSPECIFIED EAR: ICD-10-CM

## 2024-07-30 DIAGNOSIS — I48.91 UNSPECIFIED ATRIAL FIBRILLATION: ICD-10-CM

## 2024-07-30 PROCEDURE — 11719 TRIM NAIL(S) ANY NUMBER: CPT | Mod: Q8

## 2024-07-30 PROCEDURE — 11056 PARNG/CUTG B9 HYPRKR LES 2-4: CPT | Mod: 58

## 2024-07-30 PROCEDURE — 11720 DEBRIDE NAIL 1-5: CPT | Mod: 59,Q8

## 2024-07-30 NOTE — PHYSICAL EXAM
[FreeTextEntry3] : Class findings (Q8) indicated due to abs L posterior tibial pulse, abs R posterior tibial pulse, abs L dorsalis pedis pulse, abs R dorsalis pedis pulse, hair growth decreased or absent, nail changes (thickening), pigmentary changes (discoloration), skin texture L (thin, shiny), skin texture R (thin, shiny) and temperature changes.  Skin intact, no infection.       [FreeTextEntry1] : Dermatological Exam:  Corn located on her plantar lateral aspect of the right forefoot and submetatarsal 1 right, thickened and hard. Pain: subject to breakdown. There is 1 mycotic nail located on left 1st toenail, brittle, crumbly, discolored and thickened. Pain: On palpation. There are 9 non dystrophic nails located on right 1st toenail, right 2nd toenail, right 3rd toenail, right 4th toenail, right 5th toenail, left 2nd toenail, left 3rd toenail, left 4th toenail and left 5th toenail, elongated. Pain: None.  Skin intact.  No signs of bacterial infection at this time.

## 2024-07-30 NOTE — PROCEDURE
[FreeTextEntry1] : Plan:  Plantar lateral aspect of the right forefoot and submetatarsal 1 right - Keratotic lesions were debrided. (Gp=56881). Left 1st toenail - Fungal nails were debrided using manual cutters and electrical  to patient tolerance. (Lw=12766). Right 1st toenail, right 2nd toenail, right 3rd toenail, right 4th toenail, right 5th toenail, left 2nd toenail, left 3rd toenail, left 4th toenail and left 5th toenail - The remainder of nails were trimmed. (Gf=[id number]).  Patient to return: 9 Weeks

## 2024-07-30 NOTE — HISTORY OF PRESENT ILLNESS
[FreeTextEntry1] : Myriam is a 89-year-old female who presents this afternoon with her home health aide complaining of corns, toenails that are difficult to cut, and long toenails. The patient is under the care of Eduardo Martin. Rickreall Pain: Subject to breakdown. Mycotic Nail Pain: On palpation. Non-Dystrophic Nail Pain: None. Date last seen: 04/24/24.

## 2024-07-31 RX ORDER — APIXABAN 5 MG/1
TABLET, FILM COATED ORAL
Refills: 0 | Status: ACTIVE | COMMUNITY

## 2024-07-31 RX ORDER — METOPROLOL TARTRATE 75 MG/1
TABLET, FILM COATED ORAL
Refills: 0 | Status: ACTIVE | COMMUNITY

## 2024-08-01 PROBLEM — Z87.440 HISTORY OF URINARY TRACT INFECTION: Status: RESOLVED | Noted: 2024-07-31 | Resolved: 2024-08-01

## 2024-08-01 PROBLEM — H91.90 HARD OF HEARING: Status: ACTIVE | Noted: 2024-07-31

## 2024-08-01 PROBLEM — I48.91 ATRIAL FIBRILLATION: Status: ACTIVE | Noted: 2024-07-31

## 2024-08-01 PROBLEM — Z87.891 FORMER SMOKER: Status: ACTIVE | Noted: 2024-07-31

## 2024-09-26 NOTE — ED ADULT NURSE NOTE - CAS EDP DISCH DISPOSITION ADMI
Procedure To Be Performed At Next Visit: Liquid nitrogen Introduction Text (Please End With A Colon): Has two upcoming weddings. Wants to wait for treatment until after weddings. Discussed and offered biopsy versus LN2 to area. Patient deferred. Re-check area in 2-4 weeks. Size Of Lesion In Cm (Optional): 0 Detail Level: Zone [facility]

## 2024-10-17 ENCOUNTER — APPOINTMENT (OUTPATIENT)
Dept: PODIATRY | Facility: CLINIC | Age: 89
End: 2024-10-17

## 2024-11-11 NOTE — ED ADULT NURSE REASSESSMENT NOTE - NS ED NURSE REASSESS COMMENT FT1
[id number] Blood obtained & sent for repeat troponin Pt awake & alert, oriented x2, then reassessed oriented x3. Lactated Ringers in progress as ordered. AM meds administered as prescribed. Verbal report to Rajni WORRELL` Safety precautions maintained. Addended by: TEMITOPE FLORES on: 11/11/2024 01:33 PM     Modules accepted: Orders

## 2025-06-16 NOTE — PROGRESS NOTE ADULT - PROBLEM SELECTOR PLAN 1
emp ABX  oral and skin care  asp prec  dysphagia diet  NEBS and chest pt and suction PRN  diuresis PRN, TTE noted, cvs regimen and BP control  GGO on ct chest - may repeat in 12 weeks, differential broad  cough Rx regimen  will follow  assist with ADL  pt is DNR  prognosis guarded BMP/lab results pending